# Patient Record
Sex: FEMALE | Race: WHITE | Employment: PART TIME | URBAN - METROPOLITAN AREA
[De-identification: names, ages, dates, MRNs, and addresses within clinical notes are randomized per-mention and may not be internally consistent; named-entity substitution may affect disease eponyms.]

---

## 2017-03-01 PROBLEM — J44.1 COPD EXACERBATION (HCC): Status: ACTIVE | Noted: 2017-03-01

## 2017-03-23 ENCOUNTER — OFFICE VISIT (OUTPATIENT)
Dept: INTERNAL MEDICINE CLINIC | Age: 62
End: 2017-03-23

## 2017-03-23 VITALS
WEIGHT: 134 LBS | HEIGHT: 63 IN | BODY MASS INDEX: 23.74 KG/M2 | HEART RATE: 102 BPM | DIASTOLIC BLOOD PRESSURE: 80 MMHG | SYSTOLIC BLOOD PRESSURE: 150 MMHG | TEMPERATURE: 97.7 F

## 2017-03-23 DIAGNOSIS — D50.9 MICROCYTIC ANEMIA: ICD-10-CM

## 2017-03-23 DIAGNOSIS — K13.79 ORAL PAIN: Primary | ICD-10-CM

## 2017-03-23 DIAGNOSIS — Z09 HOSPITAL DISCHARGE FOLLOW-UP: ICD-10-CM

## 2017-03-23 DIAGNOSIS — E11.8 TYPE 2 DIABETES MELLITUS WITH COMPLICATION, WITHOUT LONG-TERM CURRENT USE OF INSULIN (HCC): ICD-10-CM

## 2017-03-23 LAB
FERRITIN: 5.4 NG/ML (ref 15–150)
FOLATE: 13.25 NG/ML (ref 4.78–24.2)
IRON SATURATION: 5 % (ref 15–50)
IRON: 26 UG/DL (ref 37–145)
TOTAL IRON BINDING CAPACITY: 516 UG/DL (ref 260–445)
TRANSFERRIN: 428 MG/DL (ref 200–360)
VITAMIN B-12: 483 PG/ML (ref 211–911)

## 2017-03-23 PROCEDURE — 99213 OFFICE O/P EST LOW 20 MIN: CPT | Performed by: PHYSICIAN ASSISTANT

## 2017-03-23 RX ORDER — DEXTROSE 4 G
1 TABLET,CHEWABLE ORAL 3 TIMES DAILY
Qty: 100 EACH | Refills: 3 | Status: SHIPPED | OUTPATIENT
Start: 2017-03-23 | End: 2018-05-02

## 2017-03-23 ASSESSMENT — ENCOUNTER SYMPTOMS
COUGH: 1
SORE THROAT: 0
VOICE CHANGE: 1
RHINORRHEA: 0
SHORTNESS OF BREATH: 0
ABDOMINAL PAIN: 0
VOMITING: 0
BACK PAIN: 0
NAUSEA: 0
TROUBLE SWALLOWING: 0

## 2017-03-24 DIAGNOSIS — D50.9 IRON DEFICIENCY ANEMIA, UNSPECIFIED IRON DEFICIENCY ANEMIA TYPE: Primary | ICD-10-CM

## 2017-03-24 RX ORDER — LANOLIN ALCOHOL/MO/W.PET/CERES
325 CREAM (GRAM) TOPICAL
Qty: 90 TABLET | Refills: 3 | Status: ON HOLD | OUTPATIENT
Start: 2017-03-24 | End: 2019-10-30 | Stop reason: SDUPTHER

## 2019-10-28 ENCOUNTER — HOSPITAL ENCOUNTER (INPATIENT)
Age: 64
LOS: 2 days | Discharge: HOME | DRG: 378 | End: 2019-10-30
Attending: EMERGENCY MEDICINE | Admitting: INTERNAL MEDICINE
Payer: COMMERCIAL

## 2019-10-28 ENCOUNTER — APPOINTMENT (OUTPATIENT)
Dept: GENERAL RADIOLOGY | Age: 64
DRG: 378 | End: 2019-10-28
Payer: COMMERCIAL

## 2019-10-28 DIAGNOSIS — R60.0 BILATERAL LEG EDEMA: ICD-10-CM

## 2019-10-28 DIAGNOSIS — D50.9 IRON DEFICIENCY ANEMIA, UNSPECIFIED IRON DEFICIENCY ANEMIA TYPE: Primary | ICD-10-CM

## 2019-10-28 DIAGNOSIS — I50.9 CONGESTIVE HEART FAILURE, UNSPECIFIED HF CHRONICITY, UNSPECIFIED HEART FAILURE TYPE (HCC): ICD-10-CM

## 2019-10-28 PROBLEM — D62 ACUTE BLOOD LOSS ANEMIA: Status: ACTIVE | Noted: 2019-10-28

## 2019-10-28 LAB
A/G RATIO: 1.3 (ref 1.1–2.2)
ABO/RH: NORMAL
ALBUMIN SERPL-MCNC: 4.2 G/DL (ref 3.4–5)
ALP BLD-CCNC: 80 U/L (ref 40–129)
ALT SERPL-CCNC: 11 U/L (ref 10–40)
ANION GAP SERPL CALCULATED.3IONS-SCNC: 11 MMOL/L (ref 3–16)
ANISOCYTOSIS: ABNORMAL
ANTIBODY SCREEN: NORMAL
AST SERPL-CCNC: 19 U/L (ref 15–37)
BASOPHILS ABSOLUTE: 0.1 K/UL (ref 0–0.2)
BASOPHILS RELATIVE PERCENT: 2.4 %
BILIRUB SERPL-MCNC: 0.5 MG/DL (ref 0–1)
BILIRUBIN URINE: NEGATIVE
BLOOD BANK DISPENSE STATUS: NORMAL
BLOOD BANK PRODUCT CODE: NORMAL
BLOOD, URINE: NEGATIVE
BPU ID: NORMAL
BUN BLDV-MCNC: 16 MG/DL (ref 7–20)
CALCIUM SERPL-MCNC: 9 MG/DL (ref 8.3–10.6)
CHLORIDE BLD-SCNC: 99 MMOL/L (ref 99–110)
CLARITY: CLEAR
CO2: 24 MMOL/L (ref 21–32)
COLOR: YELLOW
CREAT SERPL-MCNC: 0.6 MG/DL (ref 0.6–1.2)
DESCRIPTION BLOOD BANK: NORMAL
EKG ATRIAL RATE: 97 BPM
EKG ATRIAL RATE: 97 BPM
EKG DIAGNOSIS: NORMAL
EKG DIAGNOSIS: NORMAL
EKG P AXIS: 77 DEGREES
EKG P AXIS: 86 DEGREES
EKG P-R INTERVAL: 142 MS
EKG P-R INTERVAL: 168 MS
EKG Q-T INTERVAL: 408 MS
EKG Q-T INTERVAL: 412 MS
EKG QRS DURATION: 138 MS
EKG QRS DURATION: 138 MS
EKG QTC CALCULATION (BAZETT): 518 MS
EKG QTC CALCULATION (BAZETT): 523 MS
EKG R AXIS: -45 DEGREES
EKG R AXIS: -49 DEGREES
EKG T AXIS: 125 DEGREES
EKG T AXIS: 128 DEGREES
EKG VENTRICULAR RATE: 97 BPM
EKG VENTRICULAR RATE: 97 BPM
EOSINOPHILS ABSOLUTE: 0.1 K/UL (ref 0–0.6)
EOSINOPHILS RELATIVE PERCENT: 1.6 %
FERRITIN: 7.9 NG/ML (ref 15–150)
FOLATE: 13.92 NG/ML (ref 4.78–24.2)
GFR AFRICAN AMERICAN: >60
GFR NON-AFRICAN AMERICAN: >60
GLOBULIN: 3.2 G/DL
GLUCOSE BLD-MCNC: 169 MG/DL (ref 70–99)
GLUCOSE BLD-MCNC: 239 MG/DL (ref 70–99)
GLUCOSE BLD-MCNC: 325 MG/DL (ref 70–99)
GLUCOSE URINE: 100 MG/DL
HCT VFR BLD CALC: 20.1 % (ref 36–48)
HEMATOLOGY PATH CONSULT: YES
HEMOGLOBIN: 5.5 G/DL (ref 12–16)
HEMOGLOBIN: 6.7 G/DL (ref 12–16)
HEMOGLOBIN: 7.2 G/DL (ref 12–16)
HYPOCHROMIA: ABNORMAL
INR BLD: 1.32 (ref 0.86–1.14)
IRON SATURATION: 3 % (ref 15–50)
IRON: 14 UG/DL (ref 37–145)
KETONES, URINE: NEGATIVE MG/DL
LEUKOCYTE ESTERASE, URINE: NEGATIVE
LYMPHOCYTES ABSOLUTE: 1.3 K/UL (ref 1–5.1)
LYMPHOCYTES RELATIVE PERCENT: 21.3 %
MCH RBC QN AUTO: 14.9 PG (ref 26–34)
MCHC RBC AUTO-ENTMCNC: 27.5 G/DL (ref 31–36)
MCV RBC AUTO: 54.1 FL (ref 80–100)
MICROCYTES: ABNORMAL
MICROSCOPIC EXAMINATION: ABNORMAL
MONOCYTES ABSOLUTE: 0.5 K/UL (ref 0–1.3)
MONOCYTES RELATIVE PERCENT: 8.7 %
NEUTROPHILS ABSOLUTE: 3.9 K/UL (ref 1.7–7.7)
NEUTROPHILS RELATIVE PERCENT: 66 %
NITRITE, URINE: NEGATIVE
OCCULT BLOOD DIAGNOSTIC: ABNORMAL
OVALOCYTES: ABNORMAL
PDW BLD-RTO: 21.2 % (ref 12.4–15.4)
PERFORMED ON: ABNORMAL
PERFORMED ON: ABNORMAL
PH UA: 6.5 (ref 5–8)
PLATELET # BLD: 243 K/UL (ref 135–450)
PLATELET SLIDE REVIEW: ADEQUATE
PMV BLD AUTO: 8.9 FL (ref 5–10.5)
POIKILOCYTES: ABNORMAL
POLYCHROMASIA: ABNORMAL
POTASSIUM REFLEX MAGNESIUM: 3.6 MMOL/L (ref 3.5–5.1)
PRO-BNP: 4057 PG/ML (ref 0–124)
PROTEIN UA: NEGATIVE MG/DL
PROTHROMBIN TIME: 15 SEC (ref 9.8–13)
RBC # BLD: 3.72 M/UL (ref 4–5.2)
SLIDE REVIEW: ABNORMAL
SODIUM BLD-SCNC: 134 MMOL/L (ref 136–145)
SPECIFIC GRAVITY UA: <=1.005 (ref 1–1.03)
TOTAL IRON BINDING CAPACITY: 538 UG/DL (ref 260–445)
TOTAL PROTEIN: 7.4 G/DL (ref 6.4–8.2)
TROPONIN: 0.01 NG/ML
TROPONIN: 0.02 NG/ML
TROPONIN: <0.01 NG/ML
TROPONIN: <0.01 NG/ML
URINE REFLEX TO CULTURE: ABNORMAL
URINE TYPE: ABNORMAL
UROBILINOGEN, URINE: 1 E.U./DL
VITAMIN B-12: 462 PG/ML (ref 211–911)
WBC # BLD: 5.9 K/UL (ref 4–11)

## 2019-10-28 PROCEDURE — 82746 ASSAY OF FOLIC ACID SERUM: CPT

## 2019-10-28 PROCEDURE — 96361 HYDRATE IV INFUSION ADD-ON: CPT

## 2019-10-28 PROCEDURE — 82607 VITAMIN B-12: CPT

## 2019-10-28 PROCEDURE — 2580000003 HC RX 258: Performed by: PHYSICIAN ASSISTANT

## 2019-10-28 PROCEDURE — 83550 IRON BINDING TEST: CPT

## 2019-10-28 PROCEDURE — 6370000000 HC RX 637 (ALT 250 FOR IP): Performed by: PHYSICIAN ASSISTANT

## 2019-10-28 PROCEDURE — 93010 ELECTROCARDIOGRAM REPORT: CPT | Performed by: INTERNAL MEDICINE

## 2019-10-28 PROCEDURE — 83036 HEMOGLOBIN GLYCOSYLATED A1C: CPT

## 2019-10-28 PROCEDURE — 99285 EMERGENCY DEPT VISIT HI MDM: CPT

## 2019-10-28 PROCEDURE — 71046 X-RAY EXAM CHEST 2 VIEWS: CPT

## 2019-10-28 PROCEDURE — 83540 ASSAY OF IRON: CPT

## 2019-10-28 PROCEDURE — 82728 ASSAY OF FERRITIN: CPT

## 2019-10-28 PROCEDURE — 86850 RBC ANTIBODY SCREEN: CPT

## 2019-10-28 PROCEDURE — 36415 COLL VENOUS BLD VENIPUNCTURE: CPT

## 2019-10-28 PROCEDURE — 93005 ELECTROCARDIOGRAM TRACING: CPT | Performed by: PHYSICIAN ASSISTANT

## 2019-10-28 PROCEDURE — 83880 ASSAY OF NATRIURETIC PEPTIDE: CPT

## 2019-10-28 PROCEDURE — 36430 TRANSFUSION BLD/BLD COMPNT: CPT

## 2019-10-28 PROCEDURE — 6360000002 HC RX W HCPCS: Performed by: PHYSICIAN ASSISTANT

## 2019-10-28 PROCEDURE — C9113 INJ PANTOPRAZOLE SODIUM, VIA: HCPCS | Performed by: PHYSICIAN ASSISTANT

## 2019-10-28 PROCEDURE — 81003 URINALYSIS AUTO W/O SCOPE: CPT

## 2019-10-28 PROCEDURE — 99223 1ST HOSP IP/OBS HIGH 75: CPT | Performed by: PHYSICIAN ASSISTANT

## 2019-10-28 PROCEDURE — 94640 AIRWAY INHALATION TREATMENT: CPT

## 2019-10-28 PROCEDURE — 80053 COMPREHEN METABOLIC PANEL: CPT

## 2019-10-28 PROCEDURE — 96374 THER/PROPH/DIAG INJ IV PUSH: CPT

## 2019-10-28 PROCEDURE — 85025 COMPLETE CBC W/AUTO DIFF WBC: CPT

## 2019-10-28 PROCEDURE — G0328 FECAL BLOOD SCRN IMMUNOASSAY: HCPCS

## 2019-10-28 PROCEDURE — 96375 TX/PRO/DX INJ NEW DRUG ADDON: CPT

## 2019-10-28 PROCEDURE — 84484 ASSAY OF TROPONIN QUANT: CPT

## 2019-10-28 PROCEDURE — P9016 RBC LEUKOCYTES REDUCED: HCPCS

## 2019-10-28 PROCEDURE — 2060000000 HC ICU INTERMEDIATE R&B

## 2019-10-28 PROCEDURE — 94761 N-INVAS EAR/PLS OXIMETRY MLT: CPT

## 2019-10-28 PROCEDURE — 86901 BLOOD TYPING SEROLOGIC RH(D): CPT

## 2019-10-28 PROCEDURE — 85610 PROTHROMBIN TIME: CPT

## 2019-10-28 PROCEDURE — 85018 HEMOGLOBIN: CPT

## 2019-10-28 PROCEDURE — 86900 BLOOD TYPING SEROLOGIC ABO: CPT

## 2019-10-28 PROCEDURE — 86923 COMPATIBILITY TEST ELECTRIC: CPT

## 2019-10-28 RX ORDER — METHYLPREDNISOLONE SODIUM SUCCINATE 125 MG/2ML
125 INJECTION, POWDER, LYOPHILIZED, FOR SOLUTION INTRAMUSCULAR; INTRAVENOUS ONCE
Status: COMPLETED | OUTPATIENT
Start: 2019-10-28 | End: 2019-10-28

## 2019-10-28 RX ORDER — DIPHENHYDRAMINE HCL 25 MG
25 TABLET ORAL NIGHTLY PRN
Status: DISCONTINUED | OUTPATIENT
Start: 2019-10-28 | End: 2019-10-30 | Stop reason: HOSPADM

## 2019-10-28 RX ORDER — FERROUS SULFATE 325(65) MG
325 TABLET ORAL
Status: DISCONTINUED | OUTPATIENT
Start: 2019-10-28 | End: 2019-10-29

## 2019-10-28 RX ORDER — SODIUM CHLORIDE 9 MG/ML
INJECTION, SOLUTION INTRAVENOUS CONTINUOUS
Status: DISCONTINUED | OUTPATIENT
Start: 2019-10-28 | End: 2019-10-29

## 2019-10-28 RX ORDER — FUROSEMIDE 10 MG/ML
40 INJECTION INTRAMUSCULAR; INTRAVENOUS ONCE
Status: COMPLETED | OUTPATIENT
Start: 2019-10-28 | End: 2019-10-28

## 2019-10-28 RX ORDER — PANTOPRAZOLE SODIUM 40 MG/10ML
40 INJECTION, POWDER, LYOPHILIZED, FOR SOLUTION INTRAVENOUS EVERY 12 HOURS
Status: DISCONTINUED | OUTPATIENT
Start: 2019-10-28 | End: 2019-10-30 | Stop reason: HOSPADM

## 2019-10-28 RX ORDER — PANTOPRAZOLE SODIUM 40 MG/10ML
40 INJECTION, POWDER, LYOPHILIZED, FOR SOLUTION INTRAVENOUS ONCE
Status: COMPLETED | OUTPATIENT
Start: 2019-10-28 | End: 2019-10-28

## 2019-10-28 RX ORDER — IPRATROPIUM BROMIDE AND ALBUTEROL SULFATE 2.5; .5 MG/3ML; MG/3ML
1 SOLUTION RESPIRATORY (INHALATION) ONCE
Status: COMPLETED | OUTPATIENT
Start: 2019-10-28 | End: 2019-10-28

## 2019-10-28 RX ORDER — SODIUM CHLORIDE 0.9 % (FLUSH) 0.9 %
10 SYRINGE (ML) INJECTION EVERY 12 HOURS SCHEDULED
Status: DISCONTINUED | OUTPATIENT
Start: 2019-10-28 | End: 2019-10-30 | Stop reason: HOSPADM

## 2019-10-28 RX ORDER — 0.9 % SODIUM CHLORIDE 0.9 %
250 INTRAVENOUS SOLUTION INTRAVENOUS ONCE
Status: DISCONTINUED | OUTPATIENT
Start: 2019-10-28 | End: 2019-10-28

## 2019-10-28 RX ORDER — NICOTINE POLACRILEX 4 MG
15 LOZENGE BUCCAL PRN
Status: DISCONTINUED | OUTPATIENT
Start: 2019-10-28 | End: 2019-10-30 | Stop reason: HOSPADM

## 2019-10-28 RX ORDER — SODIUM CHLORIDE 9 MG/ML
10 INJECTION INTRAVENOUS EVERY 12 HOURS
Status: DISCONTINUED | OUTPATIENT
Start: 2019-10-28 | End: 2019-10-28

## 2019-10-28 RX ORDER — ACETAMINOPHEN 325 MG/1
650 TABLET ORAL EVERY 4 HOURS PRN
Status: DISCONTINUED | OUTPATIENT
Start: 2019-10-28 | End: 2019-10-30 | Stop reason: HOSPADM

## 2019-10-28 RX ORDER — LISINOPRIL AND HYDROCHLOROTHIAZIDE 12.5; 1 MG/1; MG/1
1 TABLET ORAL DAILY
Status: DISCONTINUED | OUTPATIENT
Start: 2019-10-28 | End: 2019-10-30 | Stop reason: HOSPADM

## 2019-10-28 RX ORDER — DEXTROSE MONOHYDRATE 25 G/50ML
12.5 INJECTION, SOLUTION INTRAVENOUS PRN
Status: DISCONTINUED | OUTPATIENT
Start: 2019-10-28 | End: 2019-10-30 | Stop reason: HOSPADM

## 2019-10-28 RX ORDER — TRAZODONE HYDROCHLORIDE 50 MG/1
50 TABLET ORAL NIGHTLY PRN
Status: DISCONTINUED | OUTPATIENT
Start: 2019-10-28 | End: 2019-10-30 | Stop reason: HOSPADM

## 2019-10-28 RX ORDER — DEXTROSE MONOHYDRATE 50 MG/ML
100 INJECTION, SOLUTION INTRAVENOUS PRN
Status: DISCONTINUED | OUTPATIENT
Start: 2019-10-28 | End: 2019-10-30 | Stop reason: HOSPADM

## 2019-10-28 RX ORDER — FUROSEMIDE 10 MG/ML
40 INJECTION INTRAMUSCULAR; INTRAVENOUS DAILY
Status: DISCONTINUED | OUTPATIENT
Start: 2019-10-29 | End: 2019-10-29

## 2019-10-28 RX ORDER — ONDANSETRON 2 MG/ML
4 INJECTION INTRAMUSCULAR; INTRAVENOUS EVERY 6 HOURS PRN
Status: DISCONTINUED | OUTPATIENT
Start: 2019-10-28 | End: 2019-10-28

## 2019-10-28 RX ORDER — SODIUM CHLORIDE 0.9 % (FLUSH) 0.9 %
10 SYRINGE (ML) INJECTION PRN
Status: DISCONTINUED | OUTPATIENT
Start: 2019-10-28 | End: 2019-10-30 | Stop reason: HOSPADM

## 2019-10-28 RX ORDER — 0.9 % SODIUM CHLORIDE 0.9 %
250 INTRAVENOUS SOLUTION INTRAVENOUS ONCE
Status: COMPLETED | OUTPATIENT
Start: 2019-10-28 | End: 2019-10-29

## 2019-10-28 RX ORDER — METOPROLOL SUCCINATE 25 MG/1
25 TABLET, EXTENDED RELEASE ORAL DAILY
Status: DISCONTINUED | OUTPATIENT
Start: 2019-10-28 | End: 2019-10-30

## 2019-10-28 RX ADMIN — METOPROLOL SUCCINATE 25 MG: 25 TABLET, EXTENDED RELEASE ORAL at 17:22

## 2019-10-28 RX ADMIN — METHYLPREDNISOLONE SODIUM SUCCINATE 125 MG: 125 INJECTION, POWDER, FOR SOLUTION INTRAMUSCULAR; INTRAVENOUS at 12:51

## 2019-10-28 RX ADMIN — Medication 10 ML: at 21:44

## 2019-10-28 RX ADMIN — PANTOPRAZOLE SODIUM 40 MG: 40 INJECTION, POWDER, FOR SOLUTION INTRAVENOUS at 14:53

## 2019-10-28 RX ADMIN — FUROSEMIDE 40 MG: 10 INJECTION, SOLUTION INTRAMUSCULAR; INTRAVENOUS at 13:56

## 2019-10-28 RX ADMIN — TRAZODONE HYDROCHLORIDE 50 MG: 50 TABLET ORAL at 21:45

## 2019-10-28 RX ADMIN — PANTOPRAZOLE SODIUM 40 MG: 40 INJECTION, POWDER, FOR SOLUTION INTRAVENOUS at 17:22

## 2019-10-28 RX ADMIN — INSULIN LISPRO 2 UNITS: 100 INJECTION, SOLUTION INTRAVENOUS; SUBCUTANEOUS at 17:24

## 2019-10-28 RX ADMIN — Medication 10 ML: at 17:23

## 2019-10-28 RX ADMIN — SODIUM CHLORIDE 250 ML: 9 INJECTION, SOLUTION INTRAVENOUS at 15:25

## 2019-10-28 RX ADMIN — LISINOPRIL AND HYDROCHLOROTHIAZIDE 1 TABLET: 12.5; 1 TABLET ORAL at 17:22

## 2019-10-28 RX ADMIN — SODIUM CHLORIDE: 9 INJECTION, SOLUTION INTRAVENOUS at 21:45

## 2019-10-28 RX ADMIN — IPRATROPIUM BROMIDE AND ALBUTEROL SULFATE 1 AMPULE: .5; 3 SOLUTION RESPIRATORY (INHALATION) at 12:51

## 2019-10-28 RX ADMIN — FERROUS SULFATE TAB 325 MG (65 MG ELEMENTAL FE) 325 MG: 325 (65 FE) TAB at 17:22

## 2019-10-28 RX ADMIN — Medication 2 PUFF: at 18:50

## 2019-10-28 ASSESSMENT — ENCOUNTER SYMPTOMS
EYES NEGATIVE: 1
SHORTNESS OF BREATH: 1
RESPIRATORY NEGATIVE: 1
ALLERGIC/IMMUNOLOGIC NEGATIVE: 1
GASTROINTESTINAL NEGATIVE: 1

## 2019-10-28 ASSESSMENT — PAIN DESCRIPTION - PAIN TYPE: TYPE: ACUTE PAIN

## 2019-10-28 ASSESSMENT — PAIN DESCRIPTION - DESCRIPTORS: DESCRIPTORS: ACHING

## 2019-10-28 ASSESSMENT — PAIN DESCRIPTION - FREQUENCY: FREQUENCY: INTERMITTENT

## 2019-10-28 ASSESSMENT — PAIN DESCRIPTION - LOCATION: LOCATION: ANKLE

## 2019-10-28 ASSESSMENT — PAIN SCALES - GENERAL: PAINLEVEL_OUTOF10: 5

## 2019-10-28 ASSESSMENT — PAIN DESCRIPTION - ORIENTATION: ORIENTATION: RIGHT;LEFT

## 2019-10-29 LAB
ANION GAP SERPL CALCULATED.3IONS-SCNC: 11 MMOL/L (ref 3–16)
BASOPHILS ABSOLUTE: 0 K/UL (ref 0–0.2)
BASOPHILS RELATIVE PERCENT: 0.4 %
BLOOD BANK DISPENSE STATUS: NORMAL
BLOOD BANK PRODUCT CODE: NORMAL
BPU ID: NORMAL
BUN BLDV-MCNC: 16 MG/DL (ref 7–20)
CALCIUM SERPL-MCNC: 8.6 MG/DL (ref 8.3–10.6)
CHLORIDE BLD-SCNC: 97 MMOL/L (ref 99–110)
CO2: 25 MMOL/L (ref 21–32)
CREAT SERPL-MCNC: 0.8 MG/DL (ref 0.6–1.2)
DESCRIPTION BLOOD BANK: NORMAL
EOSINOPHILS ABSOLUTE: 0.1 K/UL (ref 0–0.6)
EOSINOPHILS RELATIVE PERCENT: 0.9 %
GFR AFRICAN AMERICAN: >60
GFR NON-AFRICAN AMERICAN: >60
GLUCOSE BLD-MCNC: 139 MG/DL (ref 70–99)
GLUCOSE BLD-MCNC: 202 MG/DL (ref 70–99)
GLUCOSE BLD-MCNC: 222 MG/DL (ref 70–99)
GLUCOSE BLD-MCNC: 266 MG/DL (ref 70–99)
HCT VFR BLD CALC: 26.6 % (ref 36–48)
HEMATOLOGY PATH CONSULT: NO
HEMATOLOGY PATH CONSULT: NORMAL
HEMOGLOBIN: 7.9 G/DL (ref 12–16)
HEMOGLOBIN: 8.6 G/DL (ref 12–16)
LV EF: 48 %
LVEF MODALITY: NORMAL
LYMPHOCYTES ABSOLUTE: 1.5 K/UL (ref 1–5.1)
LYMPHOCYTES RELATIVE PERCENT: 20.4 %
MAGNESIUM: 2 MG/DL (ref 1.8–2.4)
MAGNESIUM: 2 MG/DL (ref 1.8–2.4)
MCH RBC QN AUTO: 18.6 PG (ref 26–34)
MCHC RBC AUTO-ENTMCNC: 29.6 G/DL (ref 31–36)
MCV RBC AUTO: 62.8 FL (ref 80–100)
MONOCYTES ABSOLUTE: 1.1 K/UL (ref 0–1.3)
MONOCYTES RELATIVE PERCENT: 14.8 %
NEUTROPHILS ABSOLUTE: 4.7 K/UL (ref 1.7–7.7)
NEUTROPHILS RELATIVE PERCENT: 63.5 %
PDW BLD-RTO: 31.9 % (ref 12.4–15.4)
PERFORMED ON: ABNORMAL
PLATELET # BLD: 196 K/UL (ref 135–450)
PMV BLD AUTO: 9.3 FL (ref 5–10.5)
POTASSIUM REFLEX MAGNESIUM: 3.3 MMOL/L (ref 3.5–5.1)
RBC # BLD: 4.24 M/UL (ref 4–5.2)
SODIUM BLD-SCNC: 133 MMOL/L (ref 136–145)
TROPONIN: <0.01 NG/ML
WBC # BLD: 7.4 K/UL (ref 4–11)

## 2019-10-29 PROCEDURE — G0008 ADMIN INFLUENZA VIRUS VAC: HCPCS | Performed by: INTERNAL MEDICINE

## 2019-10-29 PROCEDURE — 2709999900 HC NON-CHARGEABLE SUPPLY: Performed by: INTERNAL MEDICINE

## 2019-10-29 PROCEDURE — 7100000011 HC PHASE II RECOVERY - ADDTL 15 MIN: Performed by: INTERNAL MEDICINE

## 2019-10-29 PROCEDURE — 99232 SBSQ HOSP IP/OBS MODERATE 35: CPT | Performed by: INTERNAL MEDICINE

## 2019-10-29 PROCEDURE — 99254 IP/OBS CNSLTJ NEW/EST MOD 60: CPT | Performed by: INTERNAL MEDICINE

## 2019-10-29 PROCEDURE — 2580000003 HC RX 258: Performed by: INTERNAL MEDICINE

## 2019-10-29 PROCEDURE — 84484 ASSAY OF TROPONIN QUANT: CPT

## 2019-10-29 PROCEDURE — 85025 COMPLETE CBC W/AUTO DIFF WBC: CPT

## 2019-10-29 PROCEDURE — 2060000000 HC ICU INTERMEDIATE R&B

## 2019-10-29 PROCEDURE — 6370000000 HC RX 637 (ALT 250 FOR IP): Performed by: INTERNAL MEDICINE

## 2019-10-29 PROCEDURE — 6370000000 HC RX 637 (ALT 250 FOR IP): Performed by: PHYSICIAN ASSISTANT

## 2019-10-29 PROCEDURE — 36430 TRANSFUSION BLD/BLD COMPNT: CPT

## 2019-10-29 PROCEDURE — 36415 COLL VENOUS BLD VENIPUNCTURE: CPT

## 2019-10-29 PROCEDURE — 90686 IIV4 VACC NO PRSV 0.5 ML IM: CPT | Performed by: INTERNAL MEDICINE

## 2019-10-29 PROCEDURE — 94761 N-INVAS EAR/PLS OXIMETRY MLT: CPT

## 2019-10-29 PROCEDURE — 83735 ASSAY OF MAGNESIUM: CPT

## 2019-10-29 PROCEDURE — 3609012400 HC EGD TRANSORAL BIOPSY SINGLE/MULTIPLE: Performed by: INTERNAL MEDICINE

## 2019-10-29 PROCEDURE — 80048 BASIC METABOLIC PNL TOTAL CA: CPT

## 2019-10-29 PROCEDURE — 88305 TISSUE EXAM BY PATHOLOGIST: CPT

## 2019-10-29 PROCEDURE — 88342 IMHCHEM/IMCYTCHM 1ST ANTB: CPT

## 2019-10-29 PROCEDURE — 6360000002 HC RX W HCPCS: Performed by: INTERNAL MEDICINE

## 2019-10-29 PROCEDURE — 2580000003 HC RX 258: Performed by: PHYSICIAN ASSISTANT

## 2019-10-29 PROCEDURE — 7100000010 HC PHASE II RECOVERY - FIRST 15 MIN: Performed by: INTERNAL MEDICINE

## 2019-10-29 PROCEDURE — 6360000002 HC RX W HCPCS: Performed by: PHYSICIAN ASSISTANT

## 2019-10-29 PROCEDURE — C9113 INJ PANTOPRAZOLE SODIUM, VIA: HCPCS | Performed by: PHYSICIAN ASSISTANT

## 2019-10-29 PROCEDURE — 93306 TTE W/DOPPLER COMPLETE: CPT

## 2019-10-29 PROCEDURE — 0DB68ZX EXCISION OF STOMACH, VIA NATURAL OR ARTIFICIAL OPENING ENDOSCOPIC, DIAGNOSTIC: ICD-10-PCS | Performed by: INTERNAL MEDICINE

## 2019-10-29 PROCEDURE — 99152 MOD SED SAME PHYS/QHP 5/>YRS: CPT | Performed by: INTERNAL MEDICINE

## 2019-10-29 PROCEDURE — P9016 RBC LEUKOCYTES REDUCED: HCPCS

## 2019-10-29 PROCEDURE — 94640 AIRWAY INHALATION TREATMENT: CPT

## 2019-10-29 PROCEDURE — 85018 HEMOGLOBIN: CPT

## 2019-10-29 RX ORDER — FENTANYL CITRATE 50 UG/ML
INJECTION, SOLUTION INTRAMUSCULAR; INTRAVENOUS PRN
Status: DISCONTINUED | OUTPATIENT
Start: 2019-10-29 | End: 2019-10-29 | Stop reason: ALTCHOICE

## 2019-10-29 RX ORDER — MIDAZOLAM HYDROCHLORIDE 5 MG/ML
INJECTION INTRAMUSCULAR; INTRAVENOUS PRN
Status: DISCONTINUED | OUTPATIENT
Start: 2019-10-29 | End: 2019-10-29 | Stop reason: ALTCHOICE

## 2019-10-29 RX ORDER — SODIUM CHLORIDE, SODIUM LACTATE, POTASSIUM CHLORIDE, CALCIUM CHLORIDE 600; 310; 30; 20 MG/100ML; MG/100ML; MG/100ML; MG/100ML
INJECTION, SOLUTION INTRAVENOUS CONTINUOUS PRN
Status: COMPLETED | OUTPATIENT
Start: 2019-10-29 | End: 2019-10-29

## 2019-10-29 RX ADMIN — TRAZODONE HYDROCHLORIDE 50 MG: 50 TABLET ORAL at 21:41

## 2019-10-29 RX ADMIN — LISINOPRIL AND HYDROCHLOROTHIAZIDE 1 TABLET: 12.5; 1 TABLET ORAL at 09:09

## 2019-10-29 RX ADMIN — Medication 2 PUFF: at 18:57

## 2019-10-29 RX ADMIN — INFLUENZA A VIRUS A/BRISBANE/02/2018 IVR-190 (H1N1) ANTIGEN (PROPIOLACTONE INACTIVATED), INFLUENZA A VIRUS A/KANSAS/14/2017 X-327 (H3N2) ANTIGEN (PROPIOLACTONE INACTIVATED), INFLUENZA B VIRUS B/MARYLAND/15/2016 ANTIGEN (PROPIOLACTONE INACTIVATED), INFLUENZA B VIRUS B/PHUKET/3073/2013 BVR-1B ANTIGEN (PROPIOLACTONE INACTIVATED) 0.5 ML: 15; 15; 15; 15 INJECTION, SUSPENSION INTRAMUSCULAR at 09:09

## 2019-10-29 RX ADMIN — INSULIN LISPRO 2 UNITS: 100 INJECTION, SOLUTION INTRAVENOUS; SUBCUTANEOUS at 17:39

## 2019-10-29 RX ADMIN — Medication 10 ML: at 17:39

## 2019-10-29 RX ADMIN — INSULIN LISPRO 3 UNITS: 100 INJECTION, SOLUTION INTRAVENOUS; SUBCUTANEOUS at 21:43

## 2019-10-29 RX ADMIN — SODIUM CHLORIDE 250 ML: 9 INJECTION, SOLUTION INTRAVENOUS at 02:53

## 2019-10-29 RX ADMIN — FUROSEMIDE 40 MG: 10 INJECTION, SOLUTION INTRAMUSCULAR; INTRAVENOUS at 09:09

## 2019-10-29 RX ADMIN — PANTOPRAZOLE SODIUM 40 MG: 40 INJECTION, POWDER, FOR SOLUTION INTRAVENOUS at 06:15

## 2019-10-29 RX ADMIN — IRON SUCROSE 200 MG: 20 INJECTION, SOLUTION INTRAVENOUS at 09:30

## 2019-10-29 RX ADMIN — Medication 2 PUFF: at 07:14

## 2019-10-29 RX ADMIN — Medication 10 ML: at 09:09

## 2019-10-29 RX ADMIN — PANTOPRAZOLE SODIUM 40 MG: 40 INJECTION, POWDER, FOR SOLUTION INTRAVENOUS at 17:39

## 2019-10-29 RX ADMIN — METOPROLOL SUCCINATE 25 MG: 25 TABLET, EXTENDED RELEASE ORAL at 09:09

## 2019-10-29 RX ADMIN — Medication 10 ML: at 21:41

## 2019-10-29 ASSESSMENT — PAIN SCALES - GENERAL
PAINLEVEL_OUTOF10: 0
PAINLEVEL_OUTOF10: 0

## 2019-10-29 ASSESSMENT — PAIN - FUNCTIONAL ASSESSMENT: PAIN_FUNCTIONAL_ASSESSMENT: 0-10

## 2019-10-30 VITALS
BODY MASS INDEX: 23.62 KG/M2 | SYSTOLIC BLOOD PRESSURE: 132 MMHG | RESPIRATION RATE: 16 BRPM | HEART RATE: 77 BPM | WEIGHT: 133.31 LBS | DIASTOLIC BLOOD PRESSURE: 57 MMHG | TEMPERATURE: 98 F | OXYGEN SATURATION: 92 % | HEIGHT: 63 IN

## 2019-10-30 LAB
ANION GAP SERPL CALCULATED.3IONS-SCNC: 12 MMOL/L (ref 3–16)
BUN BLDV-MCNC: 23 MG/DL (ref 7–20)
CALCIUM SERPL-MCNC: 8.6 MG/DL (ref 8.3–10.6)
CHLORIDE BLD-SCNC: 101 MMOL/L (ref 99–110)
CO2: 26 MMOL/L (ref 21–32)
CREAT SERPL-MCNC: 0.8 MG/DL (ref 0.6–1.2)
GFR AFRICAN AMERICAN: >60
GFR NON-AFRICAN AMERICAN: >60
GLUCOSE BLD-MCNC: 170 MG/DL (ref 70–99)
GLUCOSE BLD-MCNC: 186 MG/DL (ref 70–99)
GLUCOSE BLD-MCNC: 191 MG/DL (ref 70–99)
HEMOGLOBIN: 8.1 G/DL (ref 12–16)
PERFORMED ON: ABNORMAL
PERFORMED ON: ABNORMAL
POTASSIUM SERPL-SCNC: 3.6 MMOL/L (ref 3.5–5.1)
SODIUM BLD-SCNC: 139 MMOL/L (ref 136–145)

## 2019-10-30 PROCEDURE — 2580000003 HC RX 258: Performed by: PHYSICIAN ASSISTANT

## 2019-10-30 PROCEDURE — 36415 COLL VENOUS BLD VENIPUNCTURE: CPT

## 2019-10-30 PROCEDURE — 6360000002 HC RX W HCPCS: Performed by: INTERNAL MEDICINE

## 2019-10-30 PROCEDURE — 6370000000 HC RX 637 (ALT 250 FOR IP): Performed by: INTERNAL MEDICINE

## 2019-10-30 PROCEDURE — 80048 BASIC METABOLIC PNL TOTAL CA: CPT

## 2019-10-30 PROCEDURE — 6370000000 HC RX 637 (ALT 250 FOR IP): Performed by: PHYSICIAN ASSISTANT

## 2019-10-30 PROCEDURE — C9113 INJ PANTOPRAZOLE SODIUM, VIA: HCPCS | Performed by: PHYSICIAN ASSISTANT

## 2019-10-30 PROCEDURE — 6360000002 HC RX W HCPCS: Performed by: PHYSICIAN ASSISTANT

## 2019-10-30 PROCEDURE — 85018 HEMOGLOBIN: CPT

## 2019-10-30 PROCEDURE — 2580000003 HC RX 258: Performed by: INTERNAL MEDICINE

## 2019-10-30 PROCEDURE — 94640 AIRWAY INHALATION TREATMENT: CPT

## 2019-10-30 PROCEDURE — 99238 HOSP IP/OBS DSCHRG MGMT 30/<: CPT | Performed by: INTERNAL MEDICINE

## 2019-10-30 PROCEDURE — 99233 SBSQ HOSP IP/OBS HIGH 50: CPT | Performed by: INTERNAL MEDICINE

## 2019-10-30 RX ORDER — BLOOD-GLUCOSE METER
EACH MISCELLANEOUS
Qty: 1 DEVICE | Refills: 0 | Status: SHIPPED | OUTPATIENT
Start: 2019-10-30

## 2019-10-30 RX ORDER — METOPROLOL SUCCINATE 50 MG/1
50 TABLET, EXTENDED RELEASE ORAL DAILY
Status: DISCONTINUED | OUTPATIENT
Start: 2019-10-30 | End: 2019-10-30 | Stop reason: HOSPADM

## 2019-10-30 RX ORDER — IPRATROPIUM BROMIDE AND ALBUTEROL SULFATE 2.5; .5 MG/3ML; MG/3ML
3 SOLUTION RESPIRATORY (INHALATION) EVERY 6 HOURS PRN
Qty: 360 ML | Refills: 0 | Status: SHIPPED | OUTPATIENT
Start: 2019-10-30 | End: 2019-12-04 | Stop reason: SDUPTHER

## 2019-10-30 RX ORDER — METOPROLOL SUCCINATE 50 MG/1
50 TABLET, EXTENDED RELEASE ORAL DAILY
Qty: 90 TABLET | Refills: 0 | Status: SHIPPED | OUTPATIENT
Start: 2019-10-30 | End: 2019-12-04 | Stop reason: SDUPTHER

## 2019-10-30 RX ORDER — LISINOPRIL AND HYDROCHLOROTHIAZIDE 12.5; 1 MG/1; MG/1
1 TABLET ORAL DAILY
Qty: 30 TABLET | Refills: 2 | Status: SHIPPED | OUTPATIENT
Start: 2019-10-30 | End: 2019-11-20 | Stop reason: SINTOL

## 2019-10-30 RX ORDER — PANTOPRAZOLE SODIUM 40 MG/1
40 TABLET, DELAYED RELEASE ORAL
Qty: 60 TABLET | Refills: 0 | Status: SHIPPED | OUTPATIENT
Start: 2019-10-30 | End: 2019-12-04 | Stop reason: SDUPTHER

## 2019-10-30 RX ORDER — GLIMEPIRIDE 4 MG/1
2 TABLET ORAL
Qty: 30 TABLET | Refills: 1 | Status: SHIPPED | OUTPATIENT
Start: 2019-10-30 | End: 2019-12-04 | Stop reason: SDUPTHER

## 2019-10-30 RX ORDER — LANCETS 33 GAUGE
EACH MISCELLANEOUS
Qty: 100 EACH | Refills: 0 | Status: SHIPPED | OUTPATIENT
Start: 2019-10-30

## 2019-10-30 RX ORDER — LANOLIN ALCOHOL/MO/W.PET/CERES
325 CREAM (GRAM) TOPICAL
Qty: 90 TABLET | Refills: 1 | Status: ON HOLD | OUTPATIENT
Start: 2019-10-30 | End: 2020-10-13 | Stop reason: SDUPTHER

## 2019-10-30 RX ADMIN — PANTOPRAZOLE SODIUM 40 MG: 40 INJECTION, POWDER, FOR SOLUTION INTRAVENOUS at 03:31

## 2019-10-30 RX ADMIN — Medication 10 ML: at 10:38

## 2019-10-30 RX ADMIN — METOPROLOL SUCCINATE 50 MG: 50 TABLET, EXTENDED RELEASE ORAL at 10:37

## 2019-10-30 RX ADMIN — LISINOPRIL AND HYDROCHLOROTHIAZIDE 1 TABLET: 12.5; 1 TABLET ORAL at 10:37

## 2019-10-30 RX ADMIN — Medication 10 ML: at 03:32

## 2019-10-30 RX ADMIN — IRON SUCROSE 200 MG: 20 INJECTION, SOLUTION INTRAVENOUS at 10:37

## 2019-10-30 RX ADMIN — INSULIN LISPRO 1 UNITS: 100 INJECTION, SOLUTION INTRAVENOUS; SUBCUTANEOUS at 12:14

## 2019-10-30 RX ADMIN — Medication 2 PUFF: at 06:57

## 2019-10-31 ENCOUNTER — FOLLOWUP TELEPHONE ENCOUNTER (OUTPATIENT)
Dept: ADMINISTRATIVE | Age: 64
End: 2019-10-31

## 2019-10-31 ENCOUNTER — TELEPHONE (OUTPATIENT)
Dept: INTERNAL MEDICINE CLINIC | Age: 64
End: 2019-10-31

## 2019-10-31 LAB
ESTIMATED AVERAGE GLUCOSE: 168.6 MG/DL
HBA1C MFR BLD: 7.5 %

## 2019-11-01 ENCOUNTER — OFFICE VISIT (OUTPATIENT)
Dept: INTERNAL MEDICINE CLINIC | Age: 64
End: 2019-11-01

## 2019-11-01 VITALS
SYSTOLIC BLOOD PRESSURE: 140 MMHG | BODY MASS INDEX: 23.57 KG/M2 | TEMPERATURE: 98.4 F | HEART RATE: 80 BPM | HEIGHT: 63 IN | DIASTOLIC BLOOD PRESSURE: 65 MMHG | WEIGHT: 133 LBS

## 2019-11-01 DIAGNOSIS — I50.20 SYSTOLIC CONGESTIVE HEART FAILURE, UNSPECIFIED HF CHRONICITY (HCC): ICD-10-CM

## 2019-11-01 DIAGNOSIS — Z09 HOSPITAL DISCHARGE FOLLOW-UP: Primary | ICD-10-CM

## 2019-11-01 DIAGNOSIS — D62 ACUTE BLOOD LOSS ANEMIA: ICD-10-CM

## 2019-11-01 DIAGNOSIS — K25.3 ACUTE GASTRIC EROSION: ICD-10-CM

## 2019-11-01 PROCEDURE — 99214 OFFICE O/P EST MOD 30 MIN: CPT | Performed by: PHYSICIAN ASSISTANT

## 2019-11-01 PROCEDURE — 1111F DSCHRG MED/CURRENT MED MERGE: CPT | Performed by: PHYSICIAN ASSISTANT

## 2019-11-01 ASSESSMENT — ENCOUNTER SYMPTOMS
BLOOD IN STOOL: 0
NAUSEA: 0
ABDOMINAL PAIN: 0
VOMITING: 0
SHORTNESS OF BREATH: 0
COUGH: 0

## 2019-11-18 ENCOUNTER — OFFICE VISIT (OUTPATIENT)
Dept: CARDIOLOGY CLINIC | Age: 64
End: 2019-11-18
Payer: COMMERCIAL

## 2019-11-18 VITALS
WEIGHT: 131.8 LBS | SYSTOLIC BLOOD PRESSURE: 138 MMHG | HEART RATE: 69 BPM | BODY MASS INDEX: 23.35 KG/M2 | OXYGEN SATURATION: 99 % | HEIGHT: 63 IN | DIASTOLIC BLOOD PRESSURE: 82 MMHG

## 2019-11-18 DIAGNOSIS — D62 ACUTE BLOOD LOSS ANEMIA: ICD-10-CM

## 2019-11-18 DIAGNOSIS — R06.09 DOE (DYSPNEA ON EXERTION): ICD-10-CM

## 2019-11-18 DIAGNOSIS — Z86.79 HX OF SUPRAVENTRICULAR TACHYCARDIA: ICD-10-CM

## 2019-11-18 DIAGNOSIS — I50.20 SYSTOLIC CONGESTIVE HEART FAILURE, UNSPECIFIED HF CHRONICITY (HCC): ICD-10-CM

## 2019-11-18 DIAGNOSIS — R60.0 BILATERAL LEG EDEMA: ICD-10-CM

## 2019-11-18 DIAGNOSIS — I42.9 CARDIOMYOPATHY, UNSPECIFIED TYPE (HCC): ICD-10-CM

## 2019-11-18 DIAGNOSIS — Z72.0 TOBACCO ABUSE: ICD-10-CM

## 2019-11-18 DIAGNOSIS — I10 ESSENTIAL HYPERTENSION: ICD-10-CM

## 2019-11-18 DIAGNOSIS — I50.22 CHRONIC SYSTOLIC (CONGESTIVE) HEART FAILURE (HCC): Primary | ICD-10-CM

## 2019-11-18 LAB
ANION GAP SERPL CALCULATED.3IONS-SCNC: 15 MMOL/L (ref 3–16)
BASOPHILS ABSOLUTE: 0.2 K/UL (ref 0–0.2)
BASOPHILS RELATIVE PERCENT: 1.9 %
BUN BLDV-MCNC: 15 MG/DL (ref 7–20)
CALCIUM SERPL-MCNC: 9.5 MG/DL (ref 8.3–10.6)
CHLORIDE BLD-SCNC: 90 MMOL/L (ref 99–110)
CO2: 25 MMOL/L (ref 21–32)
CREAT SERPL-MCNC: 0.6 MG/DL (ref 0.6–1.2)
EOSINOPHILS ABSOLUTE: 0.2 K/UL (ref 0–0.6)
EOSINOPHILS RELATIVE PERCENT: 2.2 %
GFR AFRICAN AMERICAN: >60
GFR NON-AFRICAN AMERICAN: >60
GLUCOSE BLD-MCNC: 164 MG/DL (ref 70–99)
HCT VFR BLD CALC: 38.6 % (ref 36–48)
HEMATOLOGY PATH CONSULT: NO
HEMOGLOBIN: 12 G/DL (ref 12–16)
LYMPHOCYTES ABSOLUTE: 2.1 K/UL (ref 1–5.1)
LYMPHOCYTES RELATIVE PERCENT: 23.4 %
MCH RBC QN AUTO: 21.7 PG (ref 26–34)
MCHC RBC AUTO-ENTMCNC: 31.2 G/DL (ref 31–36)
MCV RBC AUTO: 69.7 FL (ref 80–100)
MONOCYTES ABSOLUTE: 0.8 K/UL (ref 0–1.3)
MONOCYTES RELATIVE PERCENT: 9.2 %
NEUTROPHILS ABSOLUTE: 5.5 K/UL (ref 1.7–7.7)
NEUTROPHILS RELATIVE PERCENT: 63.3 %
PDW BLD-RTO: 35.5 % (ref 12.4–15.4)
PLATELET # BLD: 541 K/UL (ref 135–450)
PMV BLD AUTO: 9.6 FL (ref 5–10.5)
POTASSIUM SERPL-SCNC: 4.5 MMOL/L (ref 3.5–5.1)
RBC # BLD: 5.54 M/UL (ref 4–5.2)
SODIUM BLD-SCNC: 130 MMOL/L (ref 136–145)
WBC # BLD: 8.8 K/UL (ref 4–11)

## 2019-11-18 PROCEDURE — 99214 OFFICE O/P EST MOD 30 MIN: CPT | Performed by: NURSE PRACTITIONER

## 2019-11-18 ASSESSMENT — ENCOUNTER SYMPTOMS
SHORTNESS OF BREATH: 0
CHEST TIGHTNESS: 0

## 2019-11-20 DIAGNOSIS — R79.89 ELEVATED PLATELET COUNT: ICD-10-CM

## 2019-11-20 DIAGNOSIS — I50.20 SYSTOLIC CONGESTIVE HEART FAILURE, UNSPECIFIED HF CHRONICITY (HCC): ICD-10-CM

## 2019-11-20 DIAGNOSIS — D75.839 THROMBOCYTOSIS: ICD-10-CM

## 2019-11-20 DIAGNOSIS — E87.1 HYPONATREMIA: Primary | ICD-10-CM

## 2019-11-20 RX ORDER — LISINOPRIL 10 MG/1
10 TABLET ORAL DAILY
Qty: 30 TABLET | Refills: 3 | Status: SHIPPED | OUTPATIENT
Start: 2019-11-20 | End: 2019-12-04 | Stop reason: SDUPTHER

## 2019-11-22 ENCOUNTER — TELEPHONE (OUTPATIENT)
Dept: INTERNAL MEDICINE CLINIC | Age: 64
End: 2019-11-22

## 2019-12-04 ENCOUNTER — OFFICE VISIT (OUTPATIENT)
Dept: INTERNAL MEDICINE CLINIC | Age: 64
End: 2019-12-04

## 2019-12-04 VITALS
BODY MASS INDEX: 23.74 KG/M2 | DIASTOLIC BLOOD PRESSURE: 78 MMHG | SYSTOLIC BLOOD PRESSURE: 160 MMHG | HEIGHT: 63 IN | WEIGHT: 134 LBS

## 2019-12-04 DIAGNOSIS — I10 ESSENTIAL HYPERTENSION: ICD-10-CM

## 2019-12-04 DIAGNOSIS — D50.9 IRON DEFICIENCY ANEMIA, UNSPECIFIED IRON DEFICIENCY ANEMIA TYPE: ICD-10-CM

## 2019-12-04 DIAGNOSIS — I50.20 SYSTOLIC CONGESTIVE HEART FAILURE, UNSPECIFIED HF CHRONICITY (HCC): Primary | ICD-10-CM

## 2019-12-04 DIAGNOSIS — E11.9 TYPE 2 DIABETES MELLITUS WITHOUT COMPLICATION, WITHOUT LONG-TERM CURRENT USE OF INSULIN (HCC): ICD-10-CM

## 2019-12-04 DIAGNOSIS — J44.9 CHRONIC OBSTRUCTIVE PULMONARY DISEASE, UNSPECIFIED COPD TYPE (HCC): ICD-10-CM

## 2019-12-04 PROBLEM — E87.1 HYPONATREMIA: Status: RESOLVED | Noted: 2019-11-20 | Resolved: 2019-12-04

## 2019-12-04 PROBLEM — D62 ACUTE BLOOD LOSS ANEMIA: Status: RESOLVED | Noted: 2019-10-28 | Resolved: 2019-12-04

## 2019-12-04 PROCEDURE — 99214 OFFICE O/P EST MOD 30 MIN: CPT | Performed by: INTERNAL MEDICINE

## 2019-12-04 RX ORDER — PANTOPRAZOLE SODIUM 40 MG/1
40 TABLET, DELAYED RELEASE ORAL DAILY
Qty: 30 TABLET | Refills: 2 | Status: SHIPPED | OUTPATIENT
Start: 2019-12-04 | End: 2020-02-28 | Stop reason: SDUPTHER

## 2019-12-04 RX ORDER — LISINOPRIL 10 MG/1
10 TABLET ORAL DAILY
Qty: 30 TABLET | Refills: 2 | Status: SHIPPED | OUTPATIENT
Start: 2019-12-04 | End: 2020-02-28 | Stop reason: SDUPTHER

## 2019-12-04 RX ORDER — IPRATROPIUM BROMIDE AND ALBUTEROL SULFATE 2.5; .5 MG/3ML; MG/3ML
3 SOLUTION RESPIRATORY (INHALATION) EVERY 6 HOURS PRN
Qty: 360 ML | Refills: 2 | Status: SHIPPED | OUTPATIENT
Start: 2019-12-04 | End: 2022-01-20

## 2019-12-04 RX ORDER — METOPROLOL SUCCINATE 50 MG/1
50 TABLET, EXTENDED RELEASE ORAL DAILY
Qty: 30 TABLET | Refills: 2 | Status: SHIPPED | OUTPATIENT
Start: 2019-12-04 | End: 2020-02-28 | Stop reason: SDUPTHER

## 2019-12-04 RX ORDER — GLIMEPIRIDE 4 MG/1
2 TABLET ORAL
Qty: 15 TABLET | Refills: 2 | Status: SHIPPED | OUTPATIENT
Start: 2019-12-04 | End: 2020-09-18 | Stop reason: SDUPTHER

## 2019-12-04 ASSESSMENT — ENCOUNTER SYMPTOMS
DIARRHEA: 0
SHORTNESS OF BREATH: 1
CHEST TIGHTNESS: 0
WHEEZING: 0
ABDOMINAL PAIN: 0
COUGH: 1
VOMITING: 0
BLOOD IN STOOL: 0
NAUSEA: 0

## 2019-12-05 LAB
CREATININE URINE: 53.5 MG/DL (ref 28–259)
MICROALBUMIN UR-MCNC: 2.6 MG/DL
MICROALBUMIN/CREAT UR-RTO: 48.6 MG/G (ref 0–30)

## 2020-01-23 ENCOUNTER — OFFICE VISIT (OUTPATIENT)
Dept: INTERNAL MEDICINE CLINIC | Age: 65
End: 2020-01-23

## 2020-01-23 VITALS
HEART RATE: 80 BPM | DIASTOLIC BLOOD PRESSURE: 80 MMHG | BODY MASS INDEX: 23.74 KG/M2 | SYSTOLIC BLOOD PRESSURE: 180 MMHG | RESPIRATION RATE: 16 BRPM | WEIGHT: 134 LBS | HEIGHT: 63 IN

## 2020-01-23 DIAGNOSIS — R31.9 HEMATURIA, UNSPECIFIED TYPE: ICD-10-CM

## 2020-01-23 LAB
ANION GAP SERPL CALCULATED.3IONS-SCNC: 15 MMOL/L (ref 3–16)
BILIRUBIN, POC: NORMAL
BLOOD URINE, POC: NORMAL
BUN BLDV-MCNC: 12 MG/DL (ref 7–20)
CALCIUM SERPL-MCNC: 9.5 MG/DL (ref 8.3–10.6)
CHLORIDE BLD-SCNC: 96 MMOL/L (ref 99–110)
CLARITY, POC: NORMAL
CO2: 24 MMOL/L (ref 21–32)
COLOR, POC: NORMAL
CREAT SERPL-MCNC: 0.6 MG/DL (ref 0.6–1.2)
GFR AFRICAN AMERICAN: >60
GFR NON-AFRICAN AMERICAN: >60
GLUCOSE BLD-MCNC: 98 MG/DL (ref 70–99)
GLUCOSE URINE, POC: NORMAL
HCT VFR BLD CALC: 39.2 % (ref 36–48)
HEMOGLOBIN: 12.9 G/DL (ref 12–16)
KETONES, POC: NORMAL
LEUKOCYTE EST, POC: NORMAL
MCH RBC QN AUTO: 24.4 PG (ref 26–34)
MCHC RBC AUTO-ENTMCNC: 33 G/DL (ref 31–36)
MCV RBC AUTO: 74 FL (ref 80–100)
NITRITE, POC: NORMAL
PDW BLD-RTO: 17.3 % (ref 12.4–15.4)
PH, POC: NORMAL
PLATELET # BLD: 261 K/UL (ref 135–450)
PMV BLD AUTO: 9.4 FL (ref 5–10.5)
POTASSIUM SERPL-SCNC: 4.4 MMOL/L (ref 3.5–5.1)
PROTEIN, POC: NORMAL
RBC # BLD: 5.29 M/UL (ref 4–5.2)
SODIUM BLD-SCNC: 135 MMOL/L (ref 136–145)
SPECIFIC GRAVITY, POC: NORMAL
UROBILINOGEN, POC: NORMAL
WBC # BLD: 8.4 K/UL (ref 4–11)

## 2020-01-23 PROCEDURE — 99213 OFFICE O/P EST LOW 20 MIN: CPT | Performed by: NURSE PRACTITIONER

## 2020-01-23 PROCEDURE — 81002 URINALYSIS NONAUTO W/O SCOPE: CPT | Performed by: NURSE PRACTITIONER

## 2020-01-23 RX ORDER — CIPROFLOXACIN 500 MG/1
500 TABLET, FILM COATED ORAL 2 TIMES DAILY
Qty: 14 TABLET | Refills: 0 | Status: SHIPPED | OUTPATIENT
Start: 2020-01-23 | End: 2020-01-30

## 2020-01-23 ASSESSMENT — ENCOUNTER SYMPTOMS
NAUSEA: 0
DIARRHEA: 0
VOMITING: 0
ABDOMINAL PAIN: 1
SHORTNESS OF BREATH: 1

## 2020-01-23 NOTE — PROGRESS NOTES
cmp  Chief Complaint:   Waylon Esparza is a 59 y.o. female who presents for   Chief Complaint   Patient presents with    Urinary Tract Infection        HPI    Patient presents to the office today with c/o passing 2 clots. States there was blood on the toilet paper when wiped. She shows pictures of these. This happened today. C/o suprapubic pressure. She states when she first urinated, no blood came out, but there was blood when she wiped. Denies fevers, chills, dysuria, urgency, or frequency. Review of Systems  Review of Systems   Constitutional: Negative for chills and fever. Respiratory: Positive for shortness of breath. Cardiovascular: Positive for chest pain. Gastrointestinal: Positive for abdominal pain (suprapubic pressure). Negative for diarrhea, nausea and vomiting. Genitourinary: Positive for frequency, hematuria and vaginal bleeding. Negative for dysuria and urgency. Psychiatric/Behavioral: The patient is nervous/anxious. Allergies  Morphine;  Chantix [varenicline tartrate]; and Percocet [oxycodone-acetaminophen]      Vitals  BP (!) 180/80 (Site: Left Upper Arm, Position: Sitting)   Pulse 80   Resp 16   Ht 5' 3\" (1.6 m)   Wt 134 lb (60.8 kg)   BMI 23.74 kg/m²     Current Medications  Current Outpatient Medications   Medication Sig Dispense Refill    lisinopril (PRINIVIL) 10 MG tablet Take 1 tablet by mouth daily 30 tablet 2    metoprolol succinate (TOPROL XL) 50 MG extended release tablet Take 1 tablet by mouth daily 30 tablet 2    metFORMIN (GLUCOPHAGE) 500 MG tablet Take 1 tablet by mouth 2 times daily (with meals) 60 tablet 2    ipratropium-albuterol (DUONEB) 0.5-2.5 (3) MG/3ML SOLN nebulizer solution Inhale 3 mLs into the lungs every 6 hours as needed for Shortness of Breath 360 mL 2    glimepiride (AMARYL) 4 MG tablet Take 0.5 tablets by mouth every morning (before breakfast) 15 tablet 2    pantoprazole (PROTONIX) 40 MG tablet Take 1 tablet by mouth daily 30 tablet 2    ferrous sulfate (FE TABS) 325 (65 Fe) MG EC tablet Take 1 tablet by mouth daily (with breakfast) 90 tablet 1    blood glucose test strips (DARVIN CONTOUR NEXT TEST) strip Test three times daily. DX:E11.9 100 each 0    AGAMATRIX ULTRA-THIN LANCETS MISC Check sugars daily after each meal 100 each 0    Blood Glucose Monitoring Suppl (AGAMATRIX AMP) ISABELLA Check BG daily after meals 1 Device 0    [facility] Lancets MISC 1 each by Does not apply route 3 times daily 100 each 0     No current facility-administered medications for this visit. Past Medical History  Past Medical History:   Diagnosis Date    Bronchitis chronic     Cancer ([facility] [address]     cervical    Diabetes mellitus ([facility] [address]     Emphysema of lung ([facility] [address]     Hypercholesteremia     Hypertension     Microcytic anemia 3/23/2017    Psoriasis        Social History  Social History     Socioeconomic History    Marital status:       Spouse name: Not on file    Number of children: Not on file    Years of education: Not on file    Highest education level: Not on file   Occupational History    Not on file   Social Needs    Financial resource strain: Not on file    Food insecurity:     Worry: Not on file     Inability: Not on file    Transportation needs:     Medical: Not on file     Non-medical: Not on file   Tobacco Use    Smoking status: Current Every Day Smoker     Packs/day: 1.00     Years: 32.00     Pack years: 32.00     Types: Cigarettes    Smokeless tobacco: Never Used    Tobacco comment: pt states she wants to quit. chantix did not work   Substance and Sexual Activity    Alcohol use: No    Drug use: No    Sexual activity: Yes     Partners: Male   Lifestyle    Physical activity:     Days per week: Not on file     Minutes per session: Not on file    Stress: Not on file   Relationships    Social connections:     Talks on phone: Not on file     Gets together: Not on file     Attends Worship service: Not on file     Active member of club or organization: Not on file     Attends meetings of clubs or organizations: Not on file     Relationship status: Not on file    Intimate partner violence:     Fear of current or ex partner: Not on file     Emotionally abused: Not on file     Physically abused: Not on file     Forced sexual activity: Not on file   Other Topics Concern    Not on file   Social History Narrative    Not on file       SurgicalHistory  Past Surgical History:   Procedure Laterality Date   [address] SURGERY  2004    CHOLECYSTECTOMY      DIAGNOSTIC CARDIAC CATH LAB PROCEDURE  4/2011    angiogram with 30% blockage    HYSTERECTOMY      KIDNEY STONE SURGERY      TONSILLECTOMY      UPPER GASTROINTESTINAL ENDOSCOPY N/A 10/29/2019    EGD BIOPSY performed by Milli Boyd DO at [address]  [facility]       Physical Exam  Physical Exam  Constitutional:       General: She is not in acute distress. Appearance: Normal appearance. HENT:      Head: Normocephalic and atraumatic. Eyes:      Conjunctiva/sclera: Conjunctivae normal.      Pupils: Pupils are equal, round, and reactive to light. Cardiovascular:      Rate and Rhythm: Normal rate and regular rhythm. Heart sounds: Normal heart sounds. No murmur. No friction rub. No gallop. Pulmonary:      Effort: Pulmonary effort is normal. No respiratory distress. Breath sounds: Normal breath sounds. No wheezing, rhonchi or rales. Abdominal:      General: Bowel sounds are normal. There is no distension. Palpations: Abdomen is soft. Tenderness: There is no tenderness. Skin:     General: Skin is warm and dry. Neurological:      General: No focal deficit present. Mental Status: She is alert and oriented to person, place, and time. Psychiatric:         Mood and Affect: Mood normal.         Behavior: Behavior normal.         Thought Content:  Thought content normal.         Judgment: Judgment normal.         Assessment and Plan    Hematuria  - with clots  - Checked UA (trace protein, Large blood), sent for culture  - will Rx. Cipro 500 mg BID for 7 days  - BMP, CBC  - referral to urology. - patient states she may not go see urology. Educated that she should see them as this could get worse and needs to be evaluated. Attempted to call patient with results. Left message.      Silvia ANAYA-C  1/23/2020

## 2020-01-24 LAB — URINE CULTURE, ROUTINE: NORMAL

## 2020-01-28 ENCOUNTER — TELEPHONE (OUTPATIENT)
Dept: INTERNAL MEDICINE CLINIC | Age: 65
End: 2020-01-28

## 2020-01-28 NOTE — TELEPHONE ENCOUNTER
----- Message from Rebeka Stapleton sent at 1/28/2020  2:41 PM EST -----  Contact: [phone]    ----- Message -----  From: GOLDEN Holden CNP  Sent: 1/28/2020   2:33 PM EST  To: Rebeka Stapleton    It was negative.   ----- Message -----  From: Rebeka Stapleton  Sent: 1/28/2020   1:43 PM EST  To: GOLDEN Holden CNP    Pt called about the results of her urine culture from 1/23/2020.     [phone]

## 2020-02-28 ENCOUNTER — OFFICE VISIT (OUTPATIENT)
Dept: INTERNAL MEDICINE CLINIC | Age: 65
End: 2020-02-28

## 2020-02-28 VITALS
SYSTOLIC BLOOD PRESSURE: 146 MMHG | HEIGHT: 63 IN | BODY MASS INDEX: 24.1 KG/M2 | DIASTOLIC BLOOD PRESSURE: 80 MMHG | WEIGHT: 136 LBS | HEART RATE: 76 BPM

## 2020-02-28 DIAGNOSIS — E11.9 TYPE 2 DIABETES MELLITUS WITHOUT COMPLICATION, WITHOUT LONG-TERM CURRENT USE OF INSULIN (HCC): ICD-10-CM

## 2020-02-28 PROBLEM — J44.1 COPD EXACERBATION (HCC): Status: RESOLVED | Noted: 2017-03-01 | Resolved: 2020-02-28

## 2020-02-28 PROBLEM — F51.01 PRIMARY INSOMNIA: Status: ACTIVE | Noted: 2020-02-28

## 2020-02-28 LAB
ALBUMIN SERPL-MCNC: 4.1 G/DL (ref 3.4–5)
ALP BLD-CCNC: 82 U/L (ref 40–129)
ALT SERPL-CCNC: 17 U/L (ref 10–40)
AST SERPL-CCNC: 29 U/L (ref 15–37)
BILIRUB SERPL-MCNC: <0.2 MG/DL (ref 0–1)
BILIRUBIN DIRECT: <0.2 MG/DL (ref 0–0.3)
BILIRUBIN, INDIRECT: NORMAL MG/DL (ref 0–1)
CHOLESTEROL, TOTAL: 175 MG/DL (ref 0–199)
HDLC SERPL-MCNC: 31 MG/DL (ref 40–60)
LDL CHOLESTEROL CALCULATED: 97 MG/DL
TOTAL PROTEIN: 8 G/DL (ref 6.4–8.2)
TRIGL SERPL-MCNC: 233 MG/DL (ref 0–150)
VLDLC SERPL CALC-MCNC: 47 MG/DL

## 2020-02-28 PROCEDURE — G0296 VISIT TO DETERM LDCT ELIG: HCPCS | Performed by: INTERNAL MEDICINE

## 2020-02-28 PROCEDURE — 99214 OFFICE O/P EST MOD 30 MIN: CPT | Performed by: INTERNAL MEDICINE

## 2020-02-28 PROCEDURE — 90732 PPSV23 VACC 2 YRS+ SUBQ/IM: CPT | Performed by: INTERNAL MEDICINE

## 2020-02-28 PROCEDURE — 90471 IMMUNIZATION ADMIN: CPT | Performed by: INTERNAL MEDICINE

## 2020-02-28 RX ORDER — PANTOPRAZOLE SODIUM 40 MG/1
40 TABLET, DELAYED RELEASE ORAL DAILY
Qty: 30 TABLET | Refills: 2 | Status: SHIPPED | OUTPATIENT
Start: 2020-02-28 | End: 2020-09-18 | Stop reason: SDUPTHER

## 2020-02-28 RX ORDER — LISINOPRIL 20 MG/1
20 TABLET ORAL DAILY
Qty: 30 TABLET | Refills: 2 | Status: SHIPPED | OUTPATIENT
Start: 2020-02-28 | End: 2020-09-18 | Stop reason: SDUPTHER

## 2020-02-28 RX ORDER — ZOLPIDEM TARTRATE 5 MG/1
5 TABLET ORAL NIGHTLY PRN
Qty: 30 TABLET | Refills: 0 | Status: SHIPPED | OUTPATIENT
Start: 2020-02-28 | End: 2020-03-29

## 2020-02-28 RX ORDER — METOPROLOL SUCCINATE 50 MG/1
50 TABLET, EXTENDED RELEASE ORAL DAILY
Qty: 30 TABLET | Refills: 2 | Status: SHIPPED | OUTPATIENT
Start: 2020-02-28 | End: 2020-09-18 | Stop reason: SDUPTHER

## 2020-02-28 ASSESSMENT — ENCOUNTER SYMPTOMS
VOMITING: 0
WHEEZING: 0
COUGH: 1
CHEST TIGHTNESS: 0
SHORTNESS OF BREATH: 0
ABDOMINAL PAIN: 0
DIARRHEA: 0
NAUSEA: 0
BLOOD IN STOOL: 0

## 2020-02-28 NOTE — PROGRESS NOTES
screening. Need for follow up screens annually to continue lung cancer screening effectiveness     Risks associated with radiation from annual LDCT- Radiation exposure is about the same as for a mammogram, which is about 1/3 of the annual background radiation exposure from everyday life. Starting screening at age 54 is not likely to increase cancer risk from radiation exposure. Patients with comorbidities resulting in life expectancy of < 10 years, or that would preclude treatment of an abnormality identified on CT, should not be screened due to lack of benefit.     To obtain maximal benefit from this screening, smoking cessation and long-term abstinence from smoking is critical

## 2020-02-29 LAB
ESTIMATED AVERAGE GLUCOSE: 200.1 MG/DL
HBA1C MFR BLD: 8.6 %

## 2020-09-18 ENCOUNTER — OFFICE VISIT (OUTPATIENT)
Dept: INTERNAL MEDICINE CLINIC | Age: 65
End: 2020-09-18

## 2020-09-18 VITALS
SYSTOLIC BLOOD PRESSURE: 146 MMHG | WEIGHT: 135 LBS | DIASTOLIC BLOOD PRESSURE: 78 MMHG | BODY MASS INDEX: 23.92 KG/M2 | HEIGHT: 63 IN

## 2020-09-18 DIAGNOSIS — E11.9 TYPE 2 DIABETES MELLITUS WITHOUT COMPLICATION, WITHOUT LONG-TERM CURRENT USE OF INSULIN (HCC): ICD-10-CM

## 2020-09-18 PROCEDURE — 99214 OFFICE O/P EST MOD 30 MIN: CPT | Performed by: INTERNAL MEDICINE

## 2020-09-18 PROCEDURE — 90662 IIV NO PRSV INCREASED AG IM: CPT | Performed by: INTERNAL MEDICINE

## 2020-09-18 PROCEDURE — 90471 IMMUNIZATION ADMIN: CPT | Performed by: INTERNAL MEDICINE

## 2020-09-18 PROCEDURE — 3052F HG A1C>EQUAL 8.0%<EQUAL 9.0%: CPT | Performed by: INTERNAL MEDICINE

## 2020-09-18 RX ORDER — LISINOPRIL 20 MG/1
20 TABLET ORAL DAILY
Qty: 30 TABLET | Refills: 2 | Status: ON HOLD | OUTPATIENT
Start: 2020-09-18 | End: 2022-06-27 | Stop reason: SDUPTHER

## 2020-09-18 RX ORDER — PANTOPRAZOLE SODIUM 40 MG/1
40 TABLET, DELAYED RELEASE ORAL DAILY
Qty: 30 TABLET | Refills: 2 | Status: ON HOLD | OUTPATIENT
Start: 2020-09-18 | End: 2021-12-18

## 2020-09-18 RX ORDER — GLIMEPIRIDE 4 MG/1
2 TABLET ORAL
Qty: 15 TABLET | Refills: 2 | Status: ON HOLD | OUTPATIENT
Start: 2020-09-18 | End: 2022-06-27 | Stop reason: HOSPADM

## 2020-09-18 RX ORDER — METOPROLOL SUCCINATE 50 MG/1
50 TABLET, EXTENDED RELEASE ORAL DAILY
Qty: 30 TABLET | Refills: 2 | Status: ON HOLD | OUTPATIENT
Start: 2020-09-18 | End: 2022-06-27 | Stop reason: SDUPTHER

## 2020-09-18 ASSESSMENT — ENCOUNTER SYMPTOMS
CHEST TIGHTNESS: 0
NAUSEA: 0
DIARRHEA: 0
VOMITING: 0
SHORTNESS OF BREATH: 0
COUGH: 0
WHEEZING: 0
ABDOMINAL PAIN: 0
BLOOD IN STOOL: 0

## 2020-09-18 NOTE — PROGRESS NOTES
oriented to person, place, and time. Assessment:       Diagnosis Orders   1. Systolic congestive heart failure, unspecified HF chronicity (HCC)  lisinopril (PRINIVIL;ZESTRIL) 20 MG tablet   2. Need for influenza vaccination  INFLUENZA, HIGH-DOSE, QUADV, 65 YRS +, IM, PF, 0.7ML (FLUZONE)   3. Type 2 diabetes mellitus without complication, without long-term current use of insulin (HCC)  Hemoglobin M1D    Basic Metabolic Panel   4. Essential hypertension     5. Hypercholesteremia     6. Chronic obstructive pulmonary disease, unspecified COPD type ([facility] [address]     7. Primary insomnia     8. Muscle cramps             Plan:        HTN. Bp is high as she ran out of pills a few days ago  Continue lisinopril and toprol     DM, type 2   Controlled. Continue current meds      Chronic systolic cHF  Patent foramen ovale versus ASD  Stable. Continue ACEI and toprol     Insomnia  Sleep hygeine discussed. Muscle cramps at night  Check BMP     Anemia- iron def. S/p EGD and colon 10/19  Has polyps- removed     COPD. Counselled to stop smoking.   Tosin Foreman MD

## 2020-09-19 LAB
ANION GAP SERPL CALCULATED.3IONS-SCNC: 11 MMOL/L (ref 3–16)
BUN BLDV-MCNC: 20 MG/DL (ref 7–20)
CALCIUM SERPL-MCNC: 9.7 MG/DL (ref 8.3–10.6)
CHLORIDE BLD-SCNC: 100 MMOL/L (ref 99–110)
CO2: 24 MMOL/L (ref 21–32)
CREAT SERPL-MCNC: 0.8 MG/DL (ref 0.6–1.2)
ESTIMATED AVERAGE GLUCOSE: 171.4 MG/DL
GFR AFRICAN AMERICAN: >60
GFR NON-AFRICAN AMERICAN: >60
GLUCOSE BLD-MCNC: 133 MG/DL (ref 70–99)
HBA1C MFR BLD: 7.6 %
POTASSIUM SERPL-SCNC: 4.7 MMOL/L (ref 3.5–5.1)
SODIUM BLD-SCNC: 135 MMOL/L (ref 136–145)

## 2020-10-09 ENCOUNTER — APPOINTMENT (OUTPATIENT)
Dept: GENERAL RADIOLOGY | Age: 65
DRG: 378 | End: 2020-10-09
Payer: MEDICARE

## 2020-10-09 ENCOUNTER — APPOINTMENT (OUTPATIENT)
Dept: CT IMAGING | Age: 65
DRG: 378 | End: 2020-10-09
Payer: MEDICARE

## 2020-10-09 ENCOUNTER — HOSPITAL ENCOUNTER (INPATIENT)
Age: 65
LOS: 3 days | Discharge: HOME | DRG: 378 | End: 2020-10-13
Attending: EMERGENCY MEDICINE | Admitting: INTERNAL MEDICINE
Payer: MEDICARE

## 2020-10-09 LAB
BACTERIA: ABNORMAL /HPF
BILIRUBIN URINE: NEGATIVE
BLOOD, URINE: ABNORMAL
CLARITY: ABNORMAL
COLOR: YELLOW
GLUCOSE URINE: NEGATIVE MG/DL
KETONES, URINE: NEGATIVE MG/DL
LEUKOCYTE ESTERASE, URINE: ABNORMAL
MICROSCOPIC EXAMINATION: YES
MUCUS: ABNORMAL /LPF
NITRITE, URINE: POSITIVE
PH UA: 5 (ref 5–8)
PROTEIN UA: ABNORMAL MG/DL
RBC UA: ABNORMAL /HPF (ref 0–4)
SPECIFIC GRAVITY UA: 1.02 (ref 1–1.03)
URINE TYPE: ABNORMAL
UROBILINOGEN, URINE: 4 E.U./DL
WBC UA: ABNORMAL /HPF (ref 0–5)

## 2020-10-09 PROCEDURE — 86923 COMPATIBILITY TEST ELECTRIC: CPT

## 2020-10-09 PROCEDURE — 83605 ASSAY OF LACTIC ACID: CPT

## 2020-10-09 PROCEDURE — 87086 URINE CULTURE/COLONY COUNT: CPT

## 2020-10-09 PROCEDURE — 81001 URINALYSIS AUTO W/SCOPE: CPT

## 2020-10-09 PROCEDURE — 86900 BLOOD TYPING SEROLOGIC ABO: CPT

## 2020-10-09 PROCEDURE — P9016 RBC LEUKOCYTES REDUCED: HCPCS

## 2020-10-09 PROCEDURE — 80053 COMPREHEN METABOLIC PANEL: CPT

## 2020-10-09 PROCEDURE — 87040 BLOOD CULTURE FOR BACTERIA: CPT

## 2020-10-09 PROCEDURE — 86901 BLOOD TYPING SEROLOGIC RH(D): CPT

## 2020-10-09 PROCEDURE — 83880 ASSAY OF NATRIURETIC PEPTIDE: CPT

## 2020-10-09 PROCEDURE — 86850 RBC ANTIBODY SCREEN: CPT

## 2020-10-09 PROCEDURE — 70450 CT HEAD/BRAIN W/O DYE: CPT

## 2020-10-09 PROCEDURE — 99285 EMERGENCY DEPT VISIT HI MDM: CPT

## 2020-10-09 PROCEDURE — 85025 COMPLETE CBC W/AUTO DIFF WBC: CPT

## 2020-10-09 PROCEDURE — 93005 ELECTROCARDIOGRAM TRACING: CPT | Performed by: EMERGENCY MEDICINE

## 2020-10-09 PROCEDURE — 71046 X-RAY EXAM CHEST 2 VIEWS: CPT

## 2020-10-09 PROCEDURE — 84484 ASSAY OF TROPONIN QUANT: CPT

## 2020-10-09 ASSESSMENT — ENCOUNTER SYMPTOMS
NAUSEA: 0
BACK PAIN: 0
ABDOMINAL PAIN: 0
COUGH: 0
SHORTNESS OF BREATH: 1
DIARRHEA: 0
VOMITING: 0
SORE THROAT: 0
EYE DISCHARGE: 0

## 2020-10-09 NOTE — LETTER
[facility] PCU Telemetry  [address] [address]  Phone: [phone]          October 13, 2020     Patient: Peter Reid   YOB: 1955   Date of Visit: 10/9/2020       To Whom It May Concern: It is my medical opinion that Peter Reid may return to work on Monday October 19 2020. If you have any questions or concerns, please don't hesitate to call.     Sincerely,

## 2020-10-10 PROBLEM — K92.2 ACUTE GI BLEEDING: Status: ACTIVE | Noted: 2020-10-10

## 2020-10-10 LAB
A/G RATIO: 1.4 (ref 1.1–2.2)
ABO/RH: NORMAL
ALBUMIN SERPL-MCNC: 4.1 G/DL (ref 3.4–5)
ALP BLD-CCNC: 65 U/L (ref 40–129)
ALT SERPL-CCNC: 9 U/L (ref 10–40)
ANION GAP SERPL CALCULATED.3IONS-SCNC: 8 MMOL/L (ref 3–16)
ANISOCYTOSIS: ABNORMAL
ANTIBODY SCREEN: NORMAL
AST SERPL-CCNC: 18 U/L (ref 15–37)
BASOPHILS ABSOLUTE: 0.1 K/UL (ref 0–0.2)
BASOPHILS RELATIVE PERCENT: 2 %
BILIRUB SERPL-MCNC: 0.5 MG/DL (ref 0–1)
BLOOD BANK DISPENSE STATUS: NORMAL
BLOOD BANK PRODUCT CODE: NORMAL
BPU ID: NORMAL
BUN BLDV-MCNC: 31 MG/DL (ref 7–20)
CALCIUM SERPL-MCNC: 8.7 MG/DL (ref 8.3–10.6)
CHLORIDE BLD-SCNC: 105 MMOL/L (ref 99–110)
CO2: 24 MMOL/L (ref 21–32)
CREAT SERPL-MCNC: 1 MG/DL (ref 0.6–1.2)
DESCRIPTION BLOOD BANK: NORMAL
EKG ATRIAL RATE: 77 BPM
EKG DIAGNOSIS: NORMAL
EKG P AXIS: 72 DEGREES
EKG P-R INTERVAL: 174 MS
EKG Q-T INTERVAL: 430 MS
EKG QRS DURATION: 134 MS
EKG QTC CALCULATION (BAZETT): 486 MS
EKG R AXIS: -50 DEGREES
EKG T AXIS: 148 DEGREES
EKG VENTRICULAR RATE: 77 BPM
EOSINOPHILS ABSOLUTE: 0.1 K/UL (ref 0–0.6)
EOSINOPHILS RELATIVE PERCENT: 2.5 %
GFR AFRICAN AMERICAN: >60
GFR NON-AFRICAN AMERICAN: 56
GLOBULIN: 2.9 G/DL
GLUCOSE BLD-MCNC: 122 MG/DL (ref 70–99)
GLUCOSE BLD-MCNC: 133 MG/DL (ref 70–99)
GLUCOSE BLD-MCNC: 155 MG/DL (ref 70–99)
GLUCOSE BLD-MCNC: 156 MG/DL (ref 70–99)
GLUCOSE BLD-MCNC: 195 MG/DL (ref 70–99)
GLUCOSE BLD-MCNC: 199 MG/DL (ref 70–99)
HCT VFR BLD CALC: 16.4 % (ref 36–48)
HCT VFR BLD CALC: 30.2 % (ref 36–48)
HEMATOLOGY PATH CONSULT: YES
HEMOGLOBIN: 4.5 G/DL (ref 12–16)
HEMOGLOBIN: 9.2 G/DL (ref 12–16)
LACTIC ACID, SEPSIS: 1.1 MMOL/L (ref 0.4–1.9)
LYMPHOCYTES ABSOLUTE: 1.1 K/UL (ref 1–5.1)
LYMPHOCYTES RELATIVE PERCENT: 18.7 %
MCH RBC QN AUTO: 14.9 PG (ref 26–34)
MCHC RBC AUTO-ENTMCNC: 27.5 G/DL (ref 31–36)
MCV RBC AUTO: 54.2 FL (ref 80–100)
MICROCYTES: ABNORMAL
MONOCYTES ABSOLUTE: 0.7 K/UL (ref 0–1.3)
MONOCYTES RELATIVE PERCENT: 12.5 %
NEUTROPHILS ABSOLUTE: 3.8 K/UL (ref 1.7–7.7)
NEUTROPHILS RELATIVE PERCENT: 64.3 %
OCCULT BLOOD DIAGNOSTIC: ABNORMAL
PDW BLD-RTO: 22 % (ref 12.4–15.4)
PERFORMED ON: ABNORMAL
PLATELET # BLD: 198 K/UL (ref 135–450)
PMV BLD AUTO: 8.9 FL (ref 5–10.5)
POTASSIUM REFLEX MAGNESIUM: 4.1 MMOL/L (ref 3.5–5.1)
PRO-BNP: 4859 PG/ML (ref 0–124)
RBC # BLD: 3.04 M/UL (ref 4–5.2)
SLIDE REVIEW: ABNORMAL
SODIUM BLD-SCNC: 137 MMOL/L (ref 136–145)
TARGET CELLS: ABNORMAL
TOTAL PROTEIN: 7 G/DL (ref 6.4–8.2)
TROPONIN: <0.01 NG/ML
WBC # BLD: 5.8 K/UL (ref 4–11)

## 2020-10-10 PROCEDURE — 36430 TRANSFUSION BLD/BLD COMPNT: CPT

## 2020-10-10 PROCEDURE — 6370000000 HC RX 637 (ALT 250 FOR IP): Performed by: INTERNAL MEDICINE

## 2020-10-10 PROCEDURE — 87040 BLOOD CULTURE FOR BACTERIA: CPT

## 2020-10-10 PROCEDURE — 6360000002 HC RX W HCPCS: Performed by: EMERGENCY MEDICINE

## 2020-10-10 PROCEDURE — 2580000003 HC RX 258: Performed by: INTERNAL MEDICINE

## 2020-10-10 PROCEDURE — 85018 HEMOGLOBIN: CPT

## 2020-10-10 PROCEDURE — 6360000002 HC RX W HCPCS: Performed by: INTERNAL MEDICINE

## 2020-10-10 PROCEDURE — 2580000003 HC RX 258: Performed by: EMERGENCY MEDICINE

## 2020-10-10 PROCEDURE — C9113 INJ PANTOPRAZOLE SODIUM, VIA: HCPCS | Performed by: EMERGENCY MEDICINE

## 2020-10-10 PROCEDURE — 36415 COLL VENOUS BLD VENIPUNCTURE: CPT

## 2020-10-10 PROCEDURE — 93010 ELECTROCARDIOGRAM REPORT: CPT | Performed by: INTERNAL MEDICINE

## 2020-10-10 PROCEDURE — 85014 HEMATOCRIT: CPT

## 2020-10-10 PROCEDURE — 96365 THER/PROPH/DIAG IV INF INIT: CPT

## 2020-10-10 PROCEDURE — 2060000000 HC ICU INTERMEDIATE R&B

## 2020-10-10 PROCEDURE — 96375 TX/PRO/DX INJ NEW DRUG ADDON: CPT

## 2020-10-10 PROCEDURE — C9113 INJ PANTOPRAZOLE SODIUM, VIA: HCPCS | Performed by: INTERNAL MEDICINE

## 2020-10-10 PROCEDURE — G0328 FECAL BLOOD SCRN IMMUNOASSAY: HCPCS

## 2020-10-10 RX ORDER — POLYETHYLENE GLYCOL 3350 17 G/17G
17 POWDER, FOR SOLUTION ORAL DAILY PRN
Status: DISCONTINUED | OUTPATIENT
Start: 2020-10-10 | End: 2020-10-13 | Stop reason: HOSPADM

## 2020-10-10 RX ORDER — LISINOPRIL 20 MG/1
20 TABLET ORAL DAILY
Status: DISCONTINUED | OUTPATIENT
Start: 2020-10-10 | End: 2020-10-13 | Stop reason: HOSPADM

## 2020-10-10 RX ORDER — CEFDINIR 300 MG/1
300 CAPSULE ORAL EVERY 12 HOURS SCHEDULED
Status: DISCONTINUED | OUTPATIENT
Start: 2020-10-10 | End: 2020-10-11

## 2020-10-10 RX ORDER — PANTOPRAZOLE SODIUM 40 MG/10ML
40 INJECTION, POWDER, LYOPHILIZED, FOR SOLUTION INTRAVENOUS ONCE
Status: COMPLETED | OUTPATIENT
Start: 2020-10-10 | End: 2020-10-10

## 2020-10-10 RX ORDER — GLIMEPIRIDE 2 MG/1
2 TABLET ORAL
Status: DISCONTINUED | OUTPATIENT
Start: 2020-10-10 | End: 2020-10-13 | Stop reason: HOSPADM

## 2020-10-10 RX ORDER — DEXTROSE MONOHYDRATE 50 MG/ML
100 INJECTION, SOLUTION INTRAVENOUS PRN
Status: DISCONTINUED | OUTPATIENT
Start: 2020-10-10 | End: 2020-10-13 | Stop reason: HOSPADM

## 2020-10-10 RX ORDER — PROMETHAZINE HYDROCHLORIDE 25 MG/1
12.5 TABLET ORAL EVERY 6 HOURS PRN
Status: DISCONTINUED | OUTPATIENT
Start: 2020-10-10 | End: 2020-10-13 | Stop reason: HOSPADM

## 2020-10-10 RX ORDER — ONDANSETRON 2 MG/ML
4 INJECTION INTRAMUSCULAR; INTRAVENOUS EVERY 6 HOURS PRN
Status: DISCONTINUED | OUTPATIENT
Start: 2020-10-10 | End: 2020-10-13 | Stop reason: HOSPADM

## 2020-10-10 RX ORDER — PANTOPRAZOLE SODIUM 40 MG/1
40 TABLET, DELAYED RELEASE ORAL DAILY
Status: DISCONTINUED | OUTPATIENT
Start: 2020-10-10 | End: 2020-10-11

## 2020-10-10 RX ORDER — SODIUM CHLORIDE 0.9 % (FLUSH) 0.9 %
10 SYRINGE (ML) INJECTION PRN
Status: DISCONTINUED | OUTPATIENT
Start: 2020-10-10 | End: 2020-10-13 | Stop reason: HOSPADM

## 2020-10-10 RX ORDER — ACETAMINOPHEN 650 MG/1
650 SUPPOSITORY RECTAL EVERY 6 HOURS PRN
Status: DISCONTINUED | OUTPATIENT
Start: 2020-10-10 | End: 2020-10-13 | Stop reason: HOSPADM

## 2020-10-10 RX ORDER — 0.9 % SODIUM CHLORIDE 0.9 %
250 INTRAVENOUS SOLUTION INTRAVENOUS ONCE
Status: COMPLETED | OUTPATIENT
Start: 2020-10-10 | End: 2020-10-10

## 2020-10-10 RX ORDER — 0.9 % SODIUM CHLORIDE 0.9 %
20 INTRAVENOUS SOLUTION INTRAVENOUS ONCE
Status: DISCONTINUED | OUTPATIENT
Start: 2020-10-10 | End: 2020-10-11

## 2020-10-10 RX ORDER — SODIUM CHLORIDE 9 MG/ML
INJECTION, SOLUTION INTRAVENOUS CONTINUOUS
Status: DISCONTINUED | OUTPATIENT
Start: 2020-10-10 | End: 2020-10-12

## 2020-10-10 RX ORDER — FUROSEMIDE 10 MG/ML
20 INJECTION INTRAMUSCULAR; INTRAVENOUS ONCE
Status: COMPLETED | OUTPATIENT
Start: 2020-10-10 | End: 2020-10-10

## 2020-10-10 RX ORDER — DEXTROSE MONOHYDRATE 25 G/50ML
12.5 INJECTION, SOLUTION INTRAVENOUS PRN
Status: DISCONTINUED | OUTPATIENT
Start: 2020-10-10 | End: 2020-10-13 | Stop reason: HOSPADM

## 2020-10-10 RX ORDER — METOPROLOL SUCCINATE 50 MG/1
50 TABLET, EXTENDED RELEASE ORAL DAILY
Status: DISCONTINUED | OUTPATIENT
Start: 2020-10-10 | End: 2020-10-13 | Stop reason: HOSPADM

## 2020-10-10 RX ORDER — ACETAMINOPHEN 325 MG/1
650 TABLET ORAL EVERY 6 HOURS PRN
Status: DISCONTINUED | OUTPATIENT
Start: 2020-10-10 | End: 2020-10-13 | Stop reason: HOSPADM

## 2020-10-10 RX ORDER — ZOLPIDEM TARTRATE 5 MG/1
5 TABLET ORAL ONCE
Status: COMPLETED | OUTPATIENT
Start: 2020-10-10 | End: 2020-10-10

## 2020-10-10 RX ORDER — NICOTINE POLACRILEX 4 MG
15 LOZENGE BUCCAL PRN
Status: DISCONTINUED | OUTPATIENT
Start: 2020-10-10 | End: 2020-10-13 | Stop reason: HOSPADM

## 2020-10-10 RX ORDER — SODIUM CHLORIDE 0.9 % (FLUSH) 0.9 %
10 SYRINGE (ML) INJECTION EVERY 12 HOURS SCHEDULED
Status: DISCONTINUED | OUTPATIENT
Start: 2020-10-10 | End: 2020-10-13 | Stop reason: HOSPADM

## 2020-10-10 RX ORDER — PANTOPRAZOLE SODIUM 40 MG/10ML
40 INJECTION, POWDER, LYOPHILIZED, FOR SOLUTION INTRAVENOUS 2 TIMES DAILY
Status: DISCONTINUED | OUTPATIENT
Start: 2020-10-10 | End: 2020-10-13 | Stop reason: HOSPADM

## 2020-10-10 RX ADMIN — METFORMIN HYDROCHLORIDE 500 MG: 500 TABLET ORAL at 09:38

## 2020-10-10 RX ADMIN — METOPROLOL SUCCINATE 50 MG: 50 TABLET, EXTENDED RELEASE ORAL at 09:47

## 2020-10-10 RX ADMIN — CEFDINIR 300 MG: 300 CAPSULE ORAL at 09:37

## 2020-10-10 RX ADMIN — LISINOPRIL 20 MG: 20 TABLET ORAL at 09:47

## 2020-10-10 RX ADMIN — PANTOPRAZOLE SODIUM 40 MG: 40 INJECTION, POWDER, FOR SOLUTION INTRAVENOUS at 02:09

## 2020-10-10 RX ADMIN — METFORMIN HYDROCHLORIDE 500 MG: 500 TABLET ORAL at 16:48

## 2020-10-10 RX ADMIN — Medication 10 ML: at 09:36

## 2020-10-10 RX ADMIN — PANTOPRAZOLE SODIUM 40 MG: 40 INJECTION, POWDER, FOR SOLUTION INTRAVENOUS at 20:36

## 2020-10-10 RX ADMIN — INSULIN LISPRO 2 UNITS: 100 INJECTION, SOLUTION INTRAVENOUS; SUBCUTANEOUS at 16:49

## 2020-10-10 RX ADMIN — ZOLPIDEM TARTRATE 5 MG: 5 TABLET ORAL at 22:25

## 2020-10-10 RX ADMIN — PANTOPRAZOLE SODIUM 40 MG: 40 INJECTION, POWDER, FOR SOLUTION INTRAVENOUS at 09:35

## 2020-10-10 RX ADMIN — CEFTRIAXONE SODIUM 1 G: 1 INJECTION, POWDER, FOR SOLUTION INTRAMUSCULAR; INTRAVENOUS at 00:33

## 2020-10-10 RX ADMIN — INSULIN LISPRO 2 UNITS: 100 INJECTION, SOLUTION INTRAVENOUS; SUBCUTANEOUS at 12:19

## 2020-10-10 RX ADMIN — SODIUM CHLORIDE: 9 INJECTION, SOLUTION INTRAVENOUS at 09:37

## 2020-10-10 RX ADMIN — CEFDINIR 300 MG: 300 CAPSULE ORAL at 20:36

## 2020-10-10 RX ADMIN — SODIUM CHLORIDE 250 ML: 9 INJECTION, SOLUTION INTRAVENOUS at 00:40

## 2020-10-10 RX ADMIN — Medication 10 ML: at 20:37

## 2020-10-10 RX ADMIN — ONDANSETRON 4 MG: 2 INJECTION INTRAMUSCULAR; INTRAVENOUS at 11:14

## 2020-10-10 RX ADMIN — FUROSEMIDE 20 MG: 10 INJECTION, SOLUTION INTRAVENOUS at 09:35

## 2020-10-10 RX ADMIN — PANTOPRAZOLE SODIUM 40 MG: 40 TABLET, DELAYED RELEASE ORAL at 09:38

## 2020-10-10 ASSESSMENT — ENCOUNTER SYMPTOMS
EYES NEGATIVE: 1
ABDOMINAL PAIN: 1
ALLERGIC/IMMUNOLOGIC NEGATIVE: 1
RESPIRATORY NEGATIVE: 1

## 2020-10-10 ASSESSMENT — PAIN SCALES - GENERAL: PAINLEVEL_OUTOF10: 0

## 2020-10-10 NOTE — PROGRESS NOTES
Pt has 2nd unit of PRBC's infusing without difficulty. No s/s of reaction noted. Pt is to get a total of 3 units of PRBC's.

## 2020-10-10 NOTE — ED PROVIDER NOTES
[address] ED  EMERGENCY DEPARTMENT ENCOUNTER        Pt Name: Darlina Snellen  MRN: [id number]  [place] 1955  Date of evaluation: 10/9/2020  Provider: Rosalina Boateng MD  PCP: Charbel Shah MD  ED Attending: Rosalina Boateng MD    CHIEF COMPLAINT       Chief Complaint   Patient presents with    Dizziness     pt c/o seeing strobe lights and having dizziness that started around 0800 this am, pt states she feels weak all over     Leg Swelling     pt c/o bilat ankle swelling x 3 days, pt states she has hx of CHF       HISTORY OF PRESENT ILLNESS   (Location/Symptom, Timing/Onset, Context/Setting, Quality, Duration, Modifying Factors, Severity)  Note limiting factors. Darlina Snellen is a 72 y.o. female who for three days has had increasing swelling in her legs. Having strobe lights in her vision. Has had a couple of episodes of near syncope. No increased shortness of breath. Does not weigh herself daily. No chest pain. Gets up to walk, feels lightheaded, sits down before falling. Sees the strobe lights when she sits down. They occur in both eyes. Family denies slurred speech, dysarthria. Patient's diuretic was stopped about six months ago. History is obtained from the patient and her family. REVIEW OF SYSTEMS    (2-9 systems for level 4, 10 or more for level 5)     Review of Systems   Constitutional: Positive for fatigue. Negative for chills and fever. HENT: Negative for congestion and sore throat. Eyes: Positive for visual disturbance. Negative for discharge. Respiratory: Positive for shortness of breath. Negative for cough. Cardiovascular: Positive for leg swelling. Negative for chest pain. Gastrointestinal: Negative for abdominal pain, diarrhea, nausea and vomiting. Endocrine: Negative for polydipsia. Genitourinary: Negative for dysuria and urgency. Musculoskeletal: Negative for back pain and myalgias. Skin: Negative for rash. Neurological: Negative for weakness and headaches. Hematological: Does not bruise/bleed easily. Psychiatric/Behavioral: Negative for confusion. Positives and Pertinent negatives as per HPI. Except as noted above in the ROS, all other systems were reviewed and negative. PAST MEDICAL HISTORY     Past Medical History:   Diagnosis Date    Bronchitis chronic     Cancer ([facility] [address]     cervical    Diabetes mellitus ([facility] [address]     Emphysema of lung ([facility] [address]     Hypercholesteremia     Hypertension     Microcytic anemia 3/23/2017    Psoriasis          SURGICAL HISTORY     Past Surgical History:   Procedure Laterality Date    CERVICAL DISC SURGERY  2004    CHOLECYSTECTOMY      DIAGNOSTIC CARDIAC CATH LAB PROCEDURE  4/2011    angiogram with 30% blockage    HYSTERECTOMY      KIDNEY STONE SURGERY      TONSILLECTOMY      UPPER GASTROINTESTINAL ENDOSCOPY N/A 10/29/2019    EGD BIOPSY performed by Veronica De Guzman DO at [address]       Previous Medications    AGAMATRIX ULTRA-THIN LANCETS MISC    Check sugars daily after each meal    BLOOD GLUCOSE MONITORING SUPPL (AGAMATRIX AMP) ISABELLA    Check BG daily after meals    BLOOD GLUCOSE TEST STRIPS (DARVNI CONTOUR NEXT TEST) STRIP    Test three times daily.  DX:E11.9    [facility] LANCETS MISC    1 each by Does not apply route 3 times daily    FERROUS SULFATE (FE TABS) 325 (65 FE) MG EC TABLET    Take 1 tablet by mouth daily (with breakfast)    GLIMEPIRIDE (AMARYL) 4 MG TABLET    Take 0.5 tablets by mouth every morning (before breakfast)    IPRATROPIUM-ALBUTEROL (DUONEB) 0.5-2.5 (3) MG/3ML SOLN NEBULIZER SOLUTION    Inhale 3 mLs into the lungs every 6 hours as needed for Shortness of Breath    LISINOPRIL (PRINIVIL;ZESTRIL) 20 MG TABLET    Take 1 tablet by mouth daily    METFORMIN (GLUCOPHAGE) 500 MG TABLET    Take 1 tablet by mouth 2 times daily (with meals)    METOPROLOL SUCCINATE (TOPROL XL) 50 MG EXTENDED RELEASE TABLET    Take 1 tablet by mouth daily    PANTOPRAZOLE (PROTONIX) 40 MG TABLET    Take 1 tablet by mouth daily         ALLERGIES     Morphine; Chantix [varenicline tartrate]; and Percocet [oxycodone-acetaminophen]    FAMILYHISTORY       Family History   Problem Relation Age of Onset    Diabetes Mother     Heart Disease Mother     High Blood Pressure Mother     Hypertension Mother     Heart Failure Mother     Cancer Father     Cancer Sister     Diabetes Brother     Cancer Brother     Cancer Brother     Asthma Daughter     Hypertension Brother     Hypertension Brother     Emphysema Neg Hx           SOCIAL HISTORY       Social History     Socioeconomic History    Marital status:       Spouse name: None    Number of children: None    Years of education: None    Highest education level: None   Occupational History    None   Social Needs    Financial resource strain: None    Food insecurity     Worry: None     Inability: None    Transportation needs     Medical: None     Non-medical: None   Tobacco Use    Smoking status: Current Every Day Smoker     Packs/day: 1.00     Years: 32.00     Pack years: 32.00     Types: Cigarettes    Smokeless tobacco: Never Used    Tobacco comment: pt states she wants to quit. chantix did not work   Substance and Sexual Activity    Alcohol use: No    Drug use: No    Sexual activity: Yes     Partners: Male   Lifestyle    Physical activity     Days per week: None     Minutes per session: None    Stress: None   Relationships    Social connections     Talks on phone: None     Gets together: None     Attends Hoahaoism service: None     Active member of club or organization: None     Attends meetings of clubs or organizations: None     Relationship status: None    Intimate partner violence     Fear of current or ex partner: None     Emotionally abused: None     Physically abused: None     Forced sexual activity: None   Other Topics Concern    None   Social History Narrative    None SCREENINGS     NIH Stroke Scale     Interval: Baseline  Time: 3:51 AM  Person Administering Scale: Darrion Leonard   Administer stroke scale items in the order listed. Record performance in each category after each subscale exam. Do not go back and change scores. Follow directions provided for each exam technique. Scores should reflect what the patient does, not what the clinician thinks the patient can do. The clinician should record answers while administering the exam and work quickly. Except where indicated, the patient should not be coached (i.e., repeated requests to patient to make a special effort). 1a  Level of consciousness: 0=alert; keenly responsive   1b. LOC questions:  0=Performs both tasks correctly   1c. LOC commands: 0=Performs both tasks correctly   2. Best Gaze: 0=normal   3. Visual: 0=No visual loss   4. Facial Palsy: 0=Normal symmetric movement   5a. Motor left arm: 0=No drift, limb holds 90 (or 45) degrees for full 10 seconds   5b. Motor right arm: 0=No drift, limb holds 90 (or 45) degrees for full 10 seconds   6a. motor left le=No drift, limb holds 90 (or 45) degrees for full 10 seconds   6b  Motor right le=No drift, limb holds 90 (or 45) degrees for full 10 seconds   7. Limb Ataxia: 0=Absent   8. Sensory: 0=Normal; no sensory loss   9. Best Language:  0=No aphasia, normal   10. Dysarthria: 0=Normal   11. Extinction and Inattention: 0=No abnormality   12.  Distal motor function: 0=Normal    Total:  0         PHYSICAL EXAM    (up to 7 for level 4, 8 or more for level 5)     ED Triage Vitals [10/09/20 2029]   BP Temp Temp Source Pulse Resp SpO2 Height Weight   (!) 148/57 97.8 °F (36.6 °C) Oral 67 18 98 % 5' 3\" (1.6 m) 135 lb (61.2 kg)       Physical Exam    DIAGNOSTIC RESULTS   LABS:    Results for orders placed or performed during the hospital encounter of 10/09/20   CBC Auto Differential   Result Value Ref Range    WBC 5.8 4.0 - 11.0 K/uL    RBC 3.04 (L) 4.00 - 5.20 M/uL Hemoglobin 4.5 (LL) 12.0 - 16.0 g/dL    Hematocrit 16.4 (LL) 36.0 - 48.0 %    MCV 54.2 (L) 80.0 - 100.0 fL    MCH 14.9 (L) 26.0 - 34.0 pg    MCHC 27.5 (L) 31.0 - 36.0 g/dL    RDW 22.0 (H) 12.4 - 15.4 %    Platelets 920 454 - 411 K/uL    MPV 8.9 5.0 - 10.5 fL    SLIDE REVIEW see below     Path Consult Yes     Neutrophils % 64.3 %    Lymphocytes % 18.7 %    Monocytes % 12.5 %    Eosinophils % 2.5 %    Basophils % 2.0 %    Neutrophils Absolute 3.8 1.7 - 7.7 K/uL    Lymphocytes Absolute 1.1 1.0 - 5.1 K/uL    Monocytes Absolute 0.7 0.0 - 1.3 K/uL    Eosinophils Absolute 0.1 0.0 - 0.6 K/uL    Basophils Absolute 0.1 0.0 - 0.2 K/uL    Anisocytosis 2+ (A)     Microcytes 1+ (A)     Target Cells Occasional (A)    Comprehensive Metabolic Panel w/ Reflex to MG   Result Value Ref Range    Sodium 137 136 - 145 mmol/L    Potassium reflex Magnesium 4.1 3.5 - 5.1 mmol/L    Chloride 105 99 - 110 mmol/L    CO2 24 21 - 32 mmol/L    Anion Gap 8 3 - 16    Glucose 155 (H) 70 - 99 mg/dL    BUN 31 (H) 7 - 20 mg/dL    CREATININE 1.0 0.6 - 1.2 mg/dL    GFR Non- 56 (A) >60    GFR African American >60 >60    Calcium 8.7 8.3 - 10.6 mg/dL    Total Protein 7.0 6.4 - 8.2 g/dL    Alb 4.1 3.4 - 5.0 g/dL    Albumin/Globulin Ratio 1.4 1.1 - 2.2    Total Bilirubin 0.5 0.0 - 1.0 mg/dL    Alkaline Phosphatase 65 40 - 129 U/L    ALT 9 (L) 10 - 40 U/L    AST 18 15 - 37 U/L    Globulin 2.9 g/dL   Troponin   Result Value Ref Range    Troponin <0.01 <0.01 ng/mL   Brain Natriuretic Peptide   Result Value Ref Range    Pro-BNP 4,859 (H) 0 - 124 pg/mL   Urinalysis, reflex to microscopic   Result Value Ref Range    Color, UA Yellow Straw/Yellow    Clarity, UA SL CLOUDY (A) Clear    Glucose, Ur Negative Negative mg/dL    Bilirubin Urine Negative Negative    Ketones, Urine Negative Negative mg/dL    Specific Gravity, UA 1.025 1.005 - 1.030    Blood, Urine SMALL (A) Negative    pH, UA 5.0 5.0 - 8.0    Protein, UA TRACE (A) Negative mg/dL Urobilinogen, Urine 4.0 (A) <2.0 E.U./dL    Nitrite, Urine POSITIVE (A) Negative    Leukocyte Esterase, Urine MODERATE (A) Negative    Microscopic Examination YES     Urine Type NotGiven    Microscopic Urinalysis   Result Value Ref Range    Mucus, UA 2+ (A) None Seen /LPF    WBC, UA  (A) 0 - 5 /HPF    RBC, UA 0-2 0 - 4 /HPF    Bacteria, UA 3+ (A) None Seen /HPF   Lactate, Sepsis   Result Value Ref Range    Lactic Acid, Sepsis 1.1 0.4 - 1.9 mmol/L   Blood occult stool #1   Result Value Ref Range    Occult Blood Diagnostic Result: POSITIVE  Normal range: Negative   (A)    PREPARE RBC (CROSSMATCH), 2 Units   Result Value Ref Range    Product Code Blood Bank [id number]     Description Blood Bank Red Blood Cells, Leuko-reduced     Unit Number [id number]     Dispense Status Blood Bank issued     Product Code Blood Bank [id number]     Description Blood Bank Red Blood Cells, Leuko-reduced     Unit Number [id number]     Dispense Status Blood Bank issued    TYPE AND SCREEN   Result Value Ref Range    ABO/Rh A POS     Antibody Screen NEG        All other labs were within normal range ornot returned as of this dictation. EKG: All EKG's are interpreted by the Emergency Department Physician who either signs or Co-signs this chart in the absence of a cardiologist.  Please see their note for interpretation of EKG.     EKG Interpretation    Interpreted by emergency department physician    Rhythm: normal sinus   Rate: normal  Axis: normal  Ectopy: none  Conduction: right bundle branch block (complete) and left anterior fasciclar block  ST Segments: nonspecific changes  T Waves: non specific changes  Q Waves: septal    Clinical Impression: normal sinus rhythm, bifascicular block, prior septal infarct      RADIOLOGY:   Non-plain film images such as CT, Ultrasound and MRI are read by the radiologist.  Plain radiographic images are visualized and preliminarily interpreted by the ED Provider with the 10, 2020   0004 Patient significantly anemic. Transfusion ordered. Hemoglobin Quant(!!): 4.5 [TC]      ED Course User Index  [TC] Kanwal Richards MD     Patient has occult blood in her stool. This is likely a return of her gastritis from last year. Her elevated BUN supports a probable upper GI source. Blood work shows significant anemia, which we began transfusing for. Patient also has a UTI, and we started antibiotics for this as well. Dr. Kisha Gastelum has accepted admission. Patient is agreeable with this plan. I spoke with Dr. Kisha Gastelum. We thoroughly discussed the history, physical exam, laboratory and imaging studies, as well as, emergency department course. Based upon that discussion, we've decided to admit Radha Ayala for further observation and evaluation of Kenya Peters's abdominal pain. As I have deemed necessary from their history, physical and studies, I have considered and evaluated Radha Ayala for the following diagnoses:  ACUTE APPENDICITIS, BOWEL OBSTRUCTION, CHOLECYSTITIS, DIVERTICULITIS, INCARCERATED HERNIA, PANCREATITIS, or PERFORATED BOWEL or ULCER. Vitals:  Blood pressure (!) 150/58, pulse 77, temperature 97.4 °F (36.3 °C), resp. rate 22, height 5' 3\" (1.6 m), weight 135 lb (61.2 kg), SpO2 100 %. FINAL IMPRESSION      1. Near syncope    2. Iron deficiency anemia due to chronic blood loss    3. Shortness of breath    4.  Acute on chronic congestive heart failure, unspecified heart failure type [facility])          DISPOSITION/PLAN   DISPOSITION Admitted 10/10/2020 02:34:34 AM    (Please note that portions of this note were completed with a voice recognition program.  Efforts were made to edit the dictations but occasionally words are mis-transcribed.)    Kanwal Richards MD(electronically signed)              Kanwal Richards MD  10/10/20 3754

## 2020-10-10 NOTE — H&P
Hospital Medicine History & Physical      PCP: Chavez Stewart MD    Date of Admission: 10/9/2020    Date of Service: Pt seen/examined on 10/10/2020    Chief Complaint: Dizziness    History Of Present Illness:    72 y.o. female who presented to the hospital with a chief complaint of dizziness and dyspnea on exertion. The patient also states that she was seeing spots. Her symptoms progressed and today it got so severe that she told her granddaughter to bring her to the emergency department. In the ER she was found to be profoundly anemic. She is occult blood positive. She denies any bloody bowel movements, hematemesis or hemoptysis. Of note she has a history of anemia and had an endoscopy and colonoscopy last year and had 3 precancerous polyps removed. She was also told she had gastritis at the time. She was started on Protonix and has done well since. She otherwise has no other symptoms today, denies any fevers or chills or exposures to COVID-19. She will be admitted for blood transfusions and GI consultation. Past Medical History:        Diagnosis Date    Bronchitis chronic     Cancer ([address]     cervical    Diabetes mellitus ([address]     Emphysema of lung ([address]     Hypercholesteremia     Hypertension     Microcytic anemia 3/23/2017    Psoriasis        Past Surgical History:        Procedure Laterality Date    CERVICAL [address] SURGERY  2004    CHOLECYSTECTOMY      DIAGNOSTIC CARDIAC CATH LAB PROCEDURE  4/2011    angiogram with 30% blockage    HYSTERECTOMY      KIDNEY STONE SURGERY      TONSILLECTOMY      UPPER GASTROINTESTINAL ENDOSCOPY N/A 10/29/2019    EGD BIOPSY performed by Margaret Simon DO at [facility]       Medications Prior to Admission:      Prior to Admission medications    Medication Sig Start Date End Date Taking?  Authorizing Provider   pantoprazole (PROTONIX) 40 MG tablet Take 1 tablet by mouth daily 9/18/20 10/18/20  Horace MD Alli metoprolol succinate (TOPROL XL) 50 MG extended release tablet Take 1 tablet by mouth daily 9/18/20   Sera Mills MD   lisinopril (PRINIVIL;ZESTRIL) 20 MG tablet Take 1 tablet by mouth daily 9/18/20   Sera Mills MD   blood glucose test strips (DARVIN CONTOUR NEXT TEST) strip Test three times daily. DX:E11.9 9/18/20   Sera Mills MD   glimepiride (AMARYL) 4 MG tablet Take 0.5 tablets by mouth every morning (before breakfast) 9/18/20   Sera Mills MD   metFORMIN (GLUCOPHAGE) 500 MG tablet Take 1 tablet by mouth 2 times daily (with meals) 9/18/20   Sera Mills MD   ipratropium-albuterol (DUONEB) 0.5-2.5 (3) MG/3ML SOLN nebulizer solution Inhale 3 mLs into the lungs every 6 hours as needed for Shortness of Breath 12/4/19 1/16/20  Sera Mills MD   ferrous sulfate (FE TABS) 325 (65 Fe) MG EC tablet Take 1 tablet by mouth daily (with breakfast) 10/30/19   Ling Fox MD   AGAMATRIX ULTRA-THIN LANCETS MISC Check sugars daily after each meal 10/30/19   STEPHY Palacios   Blood Glucose Monitoring Suppl (AGAMATRIX AMP) ISABELLA Check BG daily after meals 10/30/19   STEPHY Palacios   [employer] Lancets MISC 1 each by Does not apply route 3 times daily 5/2/18   Boby Carney PA-C       Allergies:  Morphine; Chantix [varenicline tartrate]; and Percocet [oxycodone-acetaminophen]    Social History:      TOBACCO:   reports that she has been smoking cigarettes. She has a 32.00 pack-year smoking history. She has never used smokeless tobacco.  ETOH:   reports no history of alcohol use.       Family History:          Problem Relation Age of Onset    Diabetes Mother     Heart Disease Mother     High Blood Pressure Mother     Hypertension Mother     Heart Failure Mother     Cancer Father     Cancer Sister     Diabetes Brother     Cancer Brother     Cancer Brother     Asthma Daughter     Hypertension Brother     Hypertension Brother  Emphysema Neg Hx        REVIEW OF SYSTEMS:   Constitutional:   Positive for dizziness  ENT:  Negative for rhinorrhea, epistaxis, hoarseness, sore throat. Respiratory: Negative for SOB or wheezing   Cardiovascular:   Negative for  chest pain, palpitations   Gastrointestinal:  Negative for nausea, vomiting, diarrhea  Genitourinary:  Negative for polyuria, dysuria   Hematologic/Lymphatic:  Negative for  bleeding tendency, easy bruising  Musculoskeletal:  Negative for myalgias and arthralgias  Neurologic:  Negative for  confusion,dysarthria. Skin:  Negative for itching,rash  Psychiatric:  Negative for depression,anxiety, agitation. Endocrine:  Negative for polydipsia,polyuria,heat /cold intolerance. PHYSICAL EXAM:    BP (!) 142/49   Pulse 78   Temp 97.4 °F (36.3 °C) (Oral)   Resp 22   Ht 5' 3\" (1.6 m)   Wt 135 lb (61.2 kg)   SpO2 100%   BMI 23.91 kg/m²     General appearance:  No apparent distress, appears stated age and cooperative. HEENT:  Normal cephalic, atraumatic without obvious deformity. Pupils equal, round, and reactive to light. Extra ocular muscles intact. Conjunctivae/corneas clear. Neck: Supple, with full range of motion. No jugular venous distention. Trachea midline. Respiratory:  Normal respiratory effort. Clear to auscultation, bilaterally without Rales/Wheezes/Rhonchi. Cardiovascular:  Regular rate and rhythm with normal S1/S2 without murmurs, rubs or gallops. Abdomen: Soft, non-tender, non-distended with normal bowel sounds. Musculoskeletal:  No clubbing, cyanosis or edema bilaterally. Full range of motion without deformity. Skin: Skin color, texture, turgor normal.  No rashes or lesions. Neurologic:  Neurovascularly intact without any focal sensory/motor deficits.  Cranial nerves: II-XII intact, grossly non-focal.  Psychiatric:  Alert and oriented, thought content appropriate, normal insight  Capillary Refill: Brisk,< 3 seconds   Peripheral Pulses: +2 palpable, equal

## 2020-10-10 NOTE — PROGRESS NOTES
Patient admitted to room 324 from ER. Patient oriented to room, call light, bed rails, phone, lights and bathroom. Patient instructed about the schedule of the day including: vital sign frequency, lab draws, possible tests, frequency of MD and staff rounds, daily weights, I &O's and prescribed diet. #3Telemetry box in place, patient aware of placement and reason. Bed locked, in lowest position, side rails up 2/4, call light within reach. Recliner Assessment  Patient is able to demonstrated the ability to move from a reclining position to an upright position within the recliner. 4 Eyes Skin Assessment     The patient is being assess for   Admission    I agree that 2 RN's have performed a thorough Head to Toe Skin Assessment on the patient. ALL assessment sites listed below have been assessed. Areas assessed by both nurses:   [x]   Head, Face, and Ears   [x]   Shoulders, Back, and Chest, Abdomen  [x]   Arms, Elbows, and Hands   [x]   Coccyx, Sacrum, and Ischium  [x]   Legs, Feet, and Heels        Skin is WNL. No open areas noted. **SHARE this note so that the co-signing nurse is able to place an eSignature**    Co-signer eSignature: Electronically signed by Subha Bauer RN on 10/10/20 at 5:31 AM EDT    Does the Patient have Skin Breakdown?   No          Kasi Prevention initiated:  No   Wound Care Orders initiated:  No      [facility] nurse consulted for Pressure Injury (Stage 3,4, Unstageable, DTI, NWPT, Complex wounds)and New or Established Ostomies:  No      Primary Nurse eSignature: Electronically signed by Jeffy Gonzalez RN on 10/10/20 at 5:30 AM EDT

## 2020-10-10 NOTE — PROGRESS NOTES
Blood transfusion complete. No s/s of reaction. Pt sitting up eating breakfast. Call light within reach.

## 2020-10-10 NOTE — PROGRESS NOTES
Third unit of PRBC's infusing without difficulty. No s/s of reaction noted. Pt is lying in bed with their eyes closed. Respirations are easy and even. Call light within reach bed in lowest position with the wheels locked. Will continue to monitor.  Ora No

## 2020-10-10 NOTE — CONSULTS
Gastroenterology Consult Note    Patient:   Rony Joya   :    1955   Facility:     [address]  [address] [facility] [address]   Referring/PCP: Priti Ding MD  Date:     10/10/2020  Consultant:   Oliverio Ham DO    Subjective: This 72 y.o. female was admitted 10/9/2020 with a diagnosis of \"Acute GI bleeding [K92.2]\" and is seen in consultation regarding acute blood loss anemia    73 yo female patient of mine with history of upper endoscopy and colonoscopy in 2019 presents with symptomatic anemia. EGD demonstrated gastric erosions in the past.  Colonoscopy demonstrated polyps. Patient presented with profound anemia. Last bowel movement yesterday. No reported gross bleeding. Receiving 3 units of blood. Feeling short of breath.   Receiving lasix this am.      Past Medical History:   Diagnosis Date    Bronchitis chronic     Cancer ([address]     cervical    CHF (congestive heart failure) (HCC)     Diabetes mellitus (HCC)     Emphysema of lung ([address]     Hypercholesteremia     Hypertension     Microcytic anemia 3/23/2017    Psoriasis      Past Surgical History:   Procedure Laterality Date    CERVICAL [address] SURGERY  2004    CHOLECYSTECTOMY      DIAGNOSTIC CARDIAC CATH LAB PROCEDURE  2011    angiogram with 30% blockage    HYSTERECTOMY      KIDNEY STONE SURGERY      TONSILLECTOMY      UPPER GASTROINTESTINAL ENDOSCOPY N/A 10/29/2019    EGD BIOPSY performed by David Garcia DO at [facility]       Social:   Social History     Tobacco Use    Smoking status: Current Every Day Smoker     Packs/day: 1.00     Years: 32.00     Pack years: 32.00     Types: Cigarettes    Smokeless tobacco: Never Used    Tobacco comment: pt states she wants to quit. chantix did not work   Substance Use Topics    Alcohol use: No     Family:   Family History   Problem Relation Age of Onset    Diabetes Mother     Heart Disease Mother     High Blood Pressure Mother     Hypertension Mother     Heart Failure Mother     Cancer Father     Cancer Sister     Diabetes Brother     Cancer Brother     Cancer Brother     Asthma Daughter     Hypertension Brother     Hypertension Brother     Emphysema Neg Hx        Scheduled Medications:    pantoprazole  40 mg Oral Daily    metoprolol succinate  50 mg Oral Daily    metFORMIN  500 mg Oral BID WC    lisinopril  20 mg Oral Daily    glimepiride  2 mg Oral QAM AC    sodium chloride flush  10 mL Intravenous 2 times per day    insulin lispro  0-12 Units Subcutaneous TID WC    insulin lispro  0-6 Units Subcutaneous Nightly    sodium chloride  20 mL Intravenous Once    pantoprazole  40 mg Intravenous BID    cefdinir  300 mg Oral 2 times per day    furosemide  20 mg Intravenous Once    sodium chloride  20 mL Intravenous Once     Infusions:    dextrose      sodium chloride       PRN Medications: sodium chloride flush, acetaminophen **OR** acetaminophen, polyethylene glycol, promethazine **OR** ondansetron, glucose, dextrose, glucagon (rDNA), dextrose  Allergies: Allergies   Allergen Reactions    Morphine Other (See Comments)     Bad headache    Chantix [Varenicline Tartrate]      BAD DREAMS    Percocet [Oxycodone-Acetaminophen] Nausea And Vomiting       ROS:   Review of Systems   Constitutional: Negative. HENT: Negative. Eyes: Negative. Respiratory: Negative. Cardiovascular: Negative. Gastrointestinal: Positive for abdominal pain. Endocrine: Negative. Genitourinary: Negative. Musculoskeletal: Negative. Skin: Negative. Allergic/Immunologic: Negative. Neurological: Negative. Hematological: Negative. Psychiatric/Behavioral: Negative. Objective:     Physical Exam:   Vitals:    10/10/20 0638   BP: (!) 159/63   Pulse: 75   Resp: 16   Temp: 98.2 °F (36.8 °C)   SpO2: 93%     I/O last 3 completed shifts:   In: 425 [Blood:425]  Out: 200 [Urine:200]   General appearance: alert, appears stated age and cooperative  HEENT: PERRLA  Neck: no adenopathy, no carotid bruit, no JVD, supple, symmetrical, trachea midline and thyroid not enlarged, symmetric, no tenderness/mass/nodules  Lungs: clear to auscultation bilaterally  Heart: regular rate and rhythm, S1, S2 normal, no murmur, click, rub or gallop  Abdomen: soft, non-tender; bowel sounds normal; no masses,  no organomegaly  Extremities: extremities normal, atraumatic, no cyanosis or edema     Lab and Imaging Review   Labs:  CBC:   Recent Labs     10/09/20  2339   WBC 5.8   HGB 4.5*   HCT 16.4*   MCV 54.2*        BMP:   Recent Labs     10/09/20  2339      K 4.1      CO2 24   BUN 31*   CREATININE 1.0     LIVER PROFILE:   Recent Labs     10/09/20  2339   AST 18   ALT 9*   PROT 7.0   BILITOT 0.5   ALKPHOS 65     PT/INR: No results for input(s): INR in the last 72 hours. Invalid input(s): PT    IMAGING:  Xr Chest (2 Vw)    Result Date: 10/9/2020  EXAMINATION: TWO XRAY VIEWS OF THE CHEST 10/9/2020 10:03 pm COMPARISON: 10/28/2019 HISTORY: ORDERING SYSTEM PROVIDED HISTORY: shortness of breath, CHF TECHNOLOGIST PROVIDED HISTORY: Reason for exam:->shortness of breath, CHF Reason for Exam: spb chf FINDINGS: The lungs are clear. There is no pleural effusion. The heart size is mildly to moderately enlarged. There is no pneumothorax. No acute disease. Cardiomegaly. Ct Head Wo Contrast    Result Date: 10/9/2020  EXAMINATION: CT OF THE HEAD WITHOUT CONTRAST  10/9/2020 7:26 pm TECHNIQUE: CT of the head was performed without the administration of intravenous contrast. Dose modulation, iterative reconstruction, and/or weight based adjustment of the mA/kV was utilized to reduce the radiation dose to as low as reasonably achievable. COMPARISON: None.  HISTORY: ORDERING SYSTEM PROVIDED HISTORY: near syncope TECHNOLOGIST PROVIDED HISTORY: Reason for exam:->near syncope Has a \"code stroke\" or \"stroke alert\" been called? ->No Reason for Exam: dizzy FINDINGS: BRAIN/VENTRICLES:  No acute loss of the gray-white matter differentiation is identified to suggest acute or subacute infarct. No masses or hemorrhages within the brain parenchyma are found. No evidence of midline shift. There is mild to moderate periventricular low-attenuation, compatible with chronic small vessel ischemic disease. The intracranial vasculature, including the dural venous sinuses, is within normal limits. ORBITS: No acute orbital abnormalities are identified. SINUSES: The visualized paranasal sinuses and mastoid air cells are clear. SOFT TISSUES/SKULL: The calvarium is intact. Extracranial soft tissues are unremarkable. No acute intracranial abnormality. Hospital Problems           Last Modified POA    Acute GI bleeding 10/10/2020 Yes        Assessment:   73 yo female with profound anemia and epigastric pain. Plan:   1. Will plan upper endoscopy after medical stabilization. Given no gross bleeding will plan urgent endoscopy after hgb repleted and receives diuresis. Ok to advance diet.   Plan upper endoscopy tomorrow given  Profound anemia      Georgeanne Leventhal, DO  9:02 AM 10/10/2020            [address] [place] [address]    [address]      [address] [place] [address]     Phone: [phone]     Fax: [phone]

## 2020-10-10 NOTE — PLAN OF CARE
Problem: Falls - Risk of:  Goal: Will remain free from falls  Description: Will remain free from falls  Outcome: Ongoing     Problem: Fluid Volume - Imbalance:  Goal: Absence of imbalanced fluid volume signs and symptoms  Description: Absence of imbalanced fluid volume signs and symptoms  Outcome: Ongoing     Problem: Fluid Volume - Imbalance:  Goal: Will show no signs and symptoms of excessive bleeding  Description: Will show no signs and symptoms of excessive bleeding  Outcome: Ongoing     Problem: Nausea/Vomiting:  Goal: Absence of nausea/vomiting  Description: Absence of nausea/vomiting  Outcome: Ongoing     Problem: Discharge Planning:  Goal: Discharged to appropriate level of care  Description: Discharged to appropriate level of care  Outcome: Ongoing

## 2020-10-11 LAB
ANION GAP SERPL CALCULATED.3IONS-SCNC: 10 MMOL/L (ref 3–16)
BASOPHILS ABSOLUTE: 0.1 K/UL (ref 0–0.2)
BASOPHILS RELATIVE PERCENT: 1 %
BUN BLDV-MCNC: 24 MG/DL (ref 7–20)
CALCIUM SERPL-MCNC: 8.5 MG/DL (ref 8.3–10.6)
CHLORIDE BLD-SCNC: 101 MMOL/L (ref 99–110)
CO2: 21 MMOL/L (ref 21–32)
CREAT SERPL-MCNC: 0.7 MG/DL (ref 0.6–1.2)
EOSINOPHILS ABSOLUTE: 0.3 K/UL (ref 0–0.6)
EOSINOPHILS RELATIVE PERCENT: 2.9 %
GFR AFRICAN AMERICAN: >60
GFR NON-AFRICAN AMERICAN: >60
GLUCOSE BLD-MCNC: 101 MG/DL (ref 70–99)
GLUCOSE BLD-MCNC: 117 MG/DL (ref 70–99)
GLUCOSE BLD-MCNC: 117 MG/DL (ref 70–99)
GLUCOSE BLD-MCNC: 129 MG/DL (ref 70–99)
GLUCOSE BLD-MCNC: 181 MG/DL (ref 70–99)
HCT VFR BLD CALC: 27.1 % (ref 36–48)
HCT VFR BLD CALC: 28 % (ref 36–48)
HEMOGLOBIN: 8.3 G/DL (ref 12–16)
HEMOGLOBIN: 8.9 G/DL (ref 12–16)
LYMPHOCYTES ABSOLUTE: 1.3 K/UL (ref 1–5.1)
LYMPHOCYTES RELATIVE PERCENT: 14.8 %
MCH RBC QN AUTO: 21.1 PG (ref 26–34)
MCHC RBC AUTO-ENTMCNC: 30.8 G/DL (ref 31–36)
MCV RBC AUTO: 68.7 FL (ref 80–100)
MONOCYTES ABSOLUTE: 0.9 K/UL (ref 0–1.3)
MONOCYTES RELATIVE PERCENT: 10 %
NEUTROPHILS ABSOLUTE: 6.3 K/UL (ref 1.7–7.7)
NEUTROPHILS RELATIVE PERCENT: 71.3 %
PDW BLD-RTO: 34.7 % (ref 12.4–15.4)
PERFORMED ON: ABNORMAL
PLATELET # BLD: 168 K/UL (ref 135–450)
PMV BLD AUTO: 9.2 FL (ref 5–10.5)
POTASSIUM REFLEX MAGNESIUM: 4.1 MMOL/L (ref 3.5–5.1)
RBC # BLD: 3.94 M/UL (ref 4–5.2)
SODIUM BLD-SCNC: 132 MMOL/L (ref 136–145)
URINE CULTURE, ROUTINE: NORMAL
WBC # BLD: 8.8 K/UL (ref 4–11)

## 2020-10-11 PROCEDURE — 88342 IMHCHEM/IMCYTCHM 1ST ANTB: CPT

## 2020-10-11 PROCEDURE — 85025 COMPLETE CBC W/AUTO DIFF WBC: CPT

## 2020-10-11 PROCEDURE — C9113 INJ PANTOPRAZOLE SODIUM, VIA: HCPCS | Performed by: INTERNAL MEDICINE

## 2020-10-11 PROCEDURE — 0W3P8ZZ CONTROL BLEEDING IN GASTROINTESTINAL TRACT, VIA NATURAL OR ARTIFICIAL OPENING ENDOSCOPIC: ICD-10-PCS | Performed by: INTERNAL MEDICINE

## 2020-10-11 PROCEDURE — 3609012400 HC EGD TRANSORAL BIOPSY SINGLE/MULTIPLE: Performed by: INTERNAL MEDICINE

## 2020-10-11 PROCEDURE — 99232 SBSQ HOSP IP/OBS MODERATE 35: CPT | Performed by: INTERNAL MEDICINE

## 2020-10-11 PROCEDURE — 2720000010 HC SURG SUPPLY STERILE: Performed by: INTERNAL MEDICINE

## 2020-10-11 PROCEDURE — 85014 HEMATOCRIT: CPT

## 2020-10-11 PROCEDURE — 2060000000 HC ICU INTERMEDIATE R&B

## 2020-10-11 PROCEDURE — 2709999900 HC NON-CHARGEABLE SUPPLY: Performed by: INTERNAL MEDICINE

## 2020-10-11 PROCEDURE — 36415 COLL VENOUS BLD VENIPUNCTURE: CPT

## 2020-10-11 PROCEDURE — 2580000003 HC RX 258: Performed by: INTERNAL MEDICINE

## 2020-10-11 PROCEDURE — 3609013000 HC EGD TRANSORAL CONTROL BLEEDING ANY METHOD: Performed by: INTERNAL MEDICINE

## 2020-10-11 PROCEDURE — 0DB68ZX EXCISION OF STOMACH, VIA NATURAL OR ARTIFICIAL OPENING ENDOSCOPIC, DIAGNOSTIC: ICD-10-PCS | Performed by: INTERNAL MEDICINE

## 2020-10-11 PROCEDURE — 6360000002 HC RX W HCPCS: Performed by: INTERNAL MEDICINE

## 2020-10-11 PROCEDURE — 88305 TISSUE EXAM BY PATHOLOGIST: CPT

## 2020-10-11 PROCEDURE — 80048 BASIC METABOLIC PNL TOTAL CA: CPT

## 2020-10-11 PROCEDURE — 99152 MOD SED SAME PHYS/QHP 5/>YRS: CPT | Performed by: INTERNAL MEDICINE

## 2020-10-11 PROCEDURE — 7100000010 HC PHASE II RECOVERY - FIRST 15 MIN: Performed by: INTERNAL MEDICINE

## 2020-10-11 PROCEDURE — 6370000000 HC RX 637 (ALT 250 FOR IP): Performed by: INTERNAL MEDICINE

## 2020-10-11 PROCEDURE — 7100000011 HC PHASE II RECOVERY - ADDTL 15 MIN: Performed by: INTERNAL MEDICINE

## 2020-10-11 PROCEDURE — 85018 HEMOGLOBIN: CPT

## 2020-10-11 PROCEDURE — 6370000000 HC RX 637 (ALT 250 FOR IP): Performed by: PHYSICIAN ASSISTANT

## 2020-10-11 RX ORDER — FENTANYL CITRATE 50 UG/ML
INJECTION, SOLUTION INTRAMUSCULAR; INTRAVENOUS PRN
Status: DISCONTINUED | OUTPATIENT
Start: 2020-10-11 | End: 2020-10-11 | Stop reason: ALTCHOICE

## 2020-10-11 RX ORDER — FERROUS SULFATE 325(65) MG
325 TABLET ORAL 2 TIMES DAILY WITH MEALS
Status: DISCONTINUED | OUTPATIENT
Start: 2020-10-11 | End: 2020-10-12

## 2020-10-11 RX ORDER — MIDAZOLAM HYDROCHLORIDE 5 MG/ML
INJECTION INTRAMUSCULAR; INTRAVENOUS PRN
Status: DISCONTINUED | OUTPATIENT
Start: 2020-10-11 | End: 2020-10-11 | Stop reason: ALTCHOICE

## 2020-10-11 RX ORDER — SODIUM CHLORIDE 9 MG/ML
INJECTION, SOLUTION INTRAVENOUS CONTINUOUS PRN
Status: COMPLETED | OUTPATIENT
Start: 2020-10-11 | End: 2020-10-11

## 2020-10-11 RX ADMIN — Medication 10 ML: at 09:11

## 2020-10-11 RX ADMIN — FERROUS SULFATE TAB 325 MG (65 MG ELEMENTAL FE) 325 MG: 325 (65 FE) TAB at 17:10

## 2020-10-11 RX ADMIN — METFORMIN HYDROCHLORIDE 500 MG: 500 TABLET ORAL at 17:10

## 2020-10-11 RX ADMIN — Medication 10 ML: at 21:53

## 2020-10-11 RX ADMIN — PANTOPRAZOLE SODIUM 40 MG: 40 INJECTION, POWDER, FOR SOLUTION INTRAVENOUS at 09:11

## 2020-10-11 RX ADMIN — PANTOPRAZOLE SODIUM 40 MG: 40 INJECTION, POWDER, FOR SOLUTION INTRAVENOUS at 21:53

## 2020-10-11 ASSESSMENT — PAIN - FUNCTIONAL ASSESSMENT: PAIN_FUNCTIONAL_ASSESSMENT: 0-10

## 2020-10-11 NOTE — OP NOTE
Esophagogastroduodenoscopy Note    Patient:   Bridget España    YOB: 1955    Facility:     [address] [place] [address] [address] [facility] [address]   [Inpatient]   Referring/PCP: Megan Primrose, MD    Procedure:   Esophagogastroduodenoscopy   Date:     10/11/2020   Endoscopist:  Megan Ibarra     Preoperative Diagnosis:   Anemia    Postoperative Diagnosis:  same    Anesthesia:  Versed 7 mg IV, fentanyl 100 mcg IV    Estimated blood loss: Minimal    Complications: None    Description of Procedure:  Informed consent was obtained from the patient after explanation of the procedure including indications, description of the procedure,  benefits and possible risks and complications of the procedure, and alternatives. Questions were answered. The patient's history was reviewed and a directed physical examination was performed prior to the procedure. Patient was monitored throughout the procedure with pulse oximetry and periodic assessment of vital signs. Patient was sedated as noted above. The Nursing staff and I performed a time out. With the patient in the left lateral decubitus position, the Olympus videoendoscope was placed in the patient's mouth and under direct visualization passed into the esophagus. The scope was ultimately passed to the second portion of the duodenum. Visualization was performed during both introduction and withdrawal of the endoscope and retroflexed view of the proximal stomach was obtained. Findings[de-identified]   Esophagus: normal. The findings do not support a diagnosis of De Guzman's Esophagus. 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   Biopsies were obtained of the angularis and the nodules and linear erythema were ablated with APC  Duodenum: normal    Recommendations:  Await pathology results  Would repeat colonoscopy given iron deficiency and

## 2020-10-11 NOTE — PROGRESS NOTES
Bedside report and Pt care transferred to [facility].. Pt denies any assistance at this time.

## 2020-10-11 NOTE — PROGRESS NOTES
Pt is lying in bed with their eyes closed. Respirations are easy and even. Pt NPO at this time. Call light within reach bed in lowest position with the wheels locked. Will continue to monitor.  Susanne Varma

## 2020-10-11 NOTE — FLOWSHEET NOTE
10/11/20 0908   Vitals   Temp 98.1 °F (36.7 °C)   Temp Source Oral   Pulse 65   Heart Rate Source Monitor   Resp 16   BP (!) 153/60   MAP (mmHg) 91   Level of Consciousness 0   MEWS Score 1   Oxygen Therapy   SpO2 92 %   O2 Device None (Room air)   Vital signs stable. Pt is alert and oriented and denies any nausea, dizziness or abdominal pain at the moment. Nothing new noted on head to toe assessment. Pt is NSR on the monitor. Morning medication administration completed. Pt denies any further assistance at the moment. Will continue to monitor.

## 2020-10-11 NOTE — H&P
Gastroenterology Preop Assessment    Patient:   Molly Zamudio   :    1955   Facility:   [facility]  Referring/PCP: Tania Christensen MD  Date:     10/11/2020    Subjective:   Procedure:egd    HPI/Reason for procedure: Iron deficiency anemia    Past Medical History:   Diagnosis Date    Bronchitis chronic     Cancer ([address]     cervical    [facility]) [facility]     Diabetes mellitus ([facility]     Emphysema of lung ([facility]     Hypercholesteremia     Hypertension     Microcytic anemia 3/23/2017    Psoriasis      Past Surgical History:   Procedure Laterality Date    CERVICAL DISC SURGERY      CHOLECYSTECTOMY      DIAGNOSTIC CARDIAC CATH LAB PROCEDURE  2011    angiogram with 30% blockage    HYSTERECTOMY      KIDNEY STONE SURGERY      TONSILLECTOMY      UPPER GASTROINTESTINAL ENDOSCOPY N/A 10/29/2019    EGD BIOPSY performed by Mily Ventura DO at [address] [facility]       Social:   Social History     Tobacco Use    Smoking status: Current Every Day Smoker     Packs/day: 1.00     Years: 32.00     Pack years: 32.00     Types: Cigarettes    Smokeless tobacco: Never Used    Tobacco comment: pt states she wants to quit. chantix did not work   Substance Use Topics    Alcohol use: No     Family:   Family History   Problem Relation Age of Onset    Diabetes Mother     Heart Disease Mother     High Blood Pressure Mother     Hypertension Mother     Heart Failure Mother     Cancer Father     Cancer Sister     Diabetes Brother     Cancer Brother     Cancer Brother     Asthma Daughter     Hypertension Brother     Hypertension Brother     Emphysema Neg Hx        Scheduled Medications:    ferrous sulfate  325 mg Oral BID WC    metoprolol succinate  50 mg Oral Daily    metFORMIN  500 mg Oral BID WC    lisinopril  20 mg Oral Daily    glimepiride  2 mg Oral QAM AC    sodium chloride flush  10 mL Intravenous 2 times per day    insulin lispro  0-12 Units glucagon (rDNA), dextrose  Allergies: Allergies   Allergen Reactions    Morphine Other (See Comments)     Bad headache    Chantix [Varenicline Tartrate]      BAD DREAMS    Percocet [Oxycodone-Acetaminophen] Nausea And Vomiting         Objective:     Physical Exam:   BP (!) 153/60   Pulse 65   Temp 98.1 °F (36.7 °C) (Oral)   Resp 16   Ht 5' 3\" (1.6 m)   Wt 139 lb 14.4 oz (63.5 kg)   SpO2 92%   BMI 24.78 kg/m²     HEENT: NCAT  Lungs: CTAB  CV: RRR  Abd: soft, ntd  Ext: dpi    Lab and Imaging Review   Labs:  CBC:   Recent Labs     10/09/20  2339 10/10/20  1057 10/11/20  0428   WBC 5.8  --  8.8   HGB 4.5* 9.2* 8.3*   HCT 16.4* 30.2* 27.1*   MCV 54.2*  --  68.7*     --  168     BMP:   Recent Labs     10/09/20  2339 10/11/20  0428    132*   K 4.1 4.1    101   CO2 24 21   BUN 31* 24*   CREATININE 1.0 0.7     LIVER PROFILE:   Recent Labs     10/09/20  2339   AST 18   ALT 9*   PROT 7.0   BILITOT 0.5   ALKPHOS 65     PT/INR: No results for input(s): INR in the last 72 hours. Invalid input(s): PT    Pre-Procedure Assessment / Plan:  ASA: Class 2 - A normal healthy patient with mild systemic disease  Airway: Mallampati: II (soft palate, uvula, fauces visible)  Level of Sedation Plan: Moderate sedation  Post Procedure plan: Return to same level of care      Plan:   1. egd    I assessed the patient and find that the patient is in satisfactory condition to proceed with the planned procedure and sedation plan. I have explained the risk, benefits, and alternatives to the procedure; the patient understands and agrees to proceed.        El Russell  10/11/2020

## 2020-10-11 NOTE — PROGRESS NOTES
Pt transported back to room via stretcher. Report called to Primary RN Daphne Grief. Pt is a/o, vss, on room air.

## 2020-10-11 NOTE — FLOWSHEET NOTE
10/10/20 2027   Vital Signs   Temp 98 °F (36.7 °C)   Temp Source Oral   Pulse 70   Heart Rate Source Monitor   Resp 16   BP (!) 151/59   BP Location Left upper arm   BP Upper/Lower Upper   Level of Consciousness 0   MEWS Score 1   Oxygen Therapy   SpO2 95 %   O2 Device None (Room air)   Pt assessment complete. Pt lying quietly in bed. Lung sounds diminished. Pt on room air at this time. Nightly medications given. Pt requesting something to help her sleep. Dr Everardo Whitley paged. Pt to be NPO after midnight for EGD in am.  Pt verbalizes understanding. No other needs at this time. Call light within reach.

## 2020-10-11 NOTE — PROGRESS NOTES
Progress Note    Admit Date:  10/9/2020    70-year-old female with iron deficiency anemia, hospital admission in 10/2019 with anemia, GI work-up with EGD and c scope was negative. Returns to ER now with hemoglobin of 6.4. Hemoccult positive, but she has not noted any blood loss. GI has been consulted      Subjective:  Ms. Spencer Mcfarland has no complaints. No noted blood loss  Hb improved after 3U PRBC      Objective:   Patient Vitals for the past 4 hrs:   BP Temp Temp src Pulse Resp SpO2   10/11/20 0908 (!) 153/60 98.1 °F (36.7 °C) Oral 65 16 92 %            Intake/Output Summary (Last 24 hours) at 10/11/2020 1210  Last data filed at 10/11/2020 0421  Gross per 24 hour   Intake 360 ml   Output 601 ml   Net -241 ml       Physical Exam:    Gen: No distress. Alert. Eyes: PERRL. No sclera icterus. No conjunctival injection. ENT: No discharge. Pharynx clear. Neck: No JVD. Trachea midline. Resp: No accessory muscle use. No crackles. No wheezes. No rhonchi. CV: Regular rate. Regular rhythm. No murmur. No rub. No edema. GI: Non-tender. Non-distended. Normal bowel sounds. Skin: Warm and dry. No nodule on exposed extremities. No rash on exposed extremities. M/S: No cyanosis. No joint deformity. No clubbing. Neuro: Awake. Grossly nonfocal    Psych: Oriented x 3. No anxiety or agitation. I Cha Ross have reviewed the chart on [facility] and personally interviewed and examined patient, reviewed the data (labs and imaging) and after discussion with my PA formulated the plan. Agree with note with the following edits. HPI:     I reviewed the patient's Past Medical History, Past Surgical History, Medications, and Allergies. Transfused 3 units, pt denies any bleeding. Similar episode last year with neg scopes        General thin eldelry appearing female    Awake, alert and oriented.  Appears to be not in any distress  Mucous Membranes:  Pink , anicteric  Neck: No JVD, no carotid bruit, no thyromegaly  Chest:  Clear to auscultation bilaterally, no added sounds  Cardiovascular:  RRR S1S2 heard, no murmurs or gallops  Abdomen:  Soft, undistended, non tender, no organomegaly, BS present  Extremities: No edema or cyanosis. Distal pulses well felt  Neurological : grossly normal              Scheduled Meds:   pantoprazole  40 mg Oral Daily    metoprolol succinate  50 mg Oral Daily    metFORMIN  500 mg Oral BID WC    lisinopril  20 mg Oral Daily    glimepiride  2 mg Oral QAM AC    sodium chloride flush  10 mL Intravenous 2 times per day    insulin lispro  0-12 Units Subcutaneous TID WC    insulin lispro  0-6 Units Subcutaneous Nightly    sodium chloride  20 mL Intravenous Once    pantoprazole  40 mg Intravenous BID    cefdinir  300 mg Oral 2 times per day    sodium chloride  20 mL Intravenous Once       Continuous Infusions:   dextrose      sodium chloride 75 mL/hr at 10/10/20 0937       PRN Meds:  sodium chloride flush, acetaminophen **OR** acetaminophen, polyethylene glycol, promethazine **OR** ondansetron, glucose, dextrose, glucagon (rDNA), dextrose      Data:  CBC:   Recent Labs     10/09/20  2339 10/10/20  1057 10/11/20  0428   WBC 5.8  --  8.8   HGB 4.5* 9.2* 8.3*   HCT 16.4* 30.2* 27.1*   MCV 54.2*  --  68.7*     --  168     BMP:   Recent Labs     10/09/20  2339 10/11/20  0428    132*   K 4.1 4.1    101   CO2 24 21   BUN 31* 24*   CREATININE 1.0 0.7     LIVER PROFILE:   Recent Labs     10/09/20  2339   AST 18   ALT 9*   BILITOT 0.5   ALKPHOS 65     PT/INR: No results for input(s): PROTIME, INR in the last 72 hours. CULTURES  Blood: NGTD  Urine: NG     RADIOLOGY  CT HEAD WO CONTRAST   Final Result   No acute intracranial abnormality. XR CHEST (2 VW)   Final Result   No acute disease. Cardiomegaly. EGD 10/2019  Findings[de-identified]   Esophagus: normal. The findings do not support a diagnosis of De Guzman's Esophagus.   Stomach: abnormal: Gastric erosions seen in the antrum. Biopsies obtained  Duodenum: normal  Recommendations:   High dose bid ppi for 2 weeks  Advance diet as tolerated  Outpatient colonoscopy    Colonoscopy 11/2019  Polyps in colon  Diverticulosis  Internal hemorrhoids     Assessment/Plan:    #Acute on chronic blood loss anemia  #GI Bleed  - Initial evaluation in Fall 2019 with EGD and colonoscopy. EGD showed gastric erosions, no active bleeding. Colonoscopy showed polyps, internal hemorrhoids, diverticulosis. Follow up h/h 1/2020 was stable   -Presented to hospital now with dizziness, hemoglobin 4.5 on admission, hemoccult + but she has not noticed and obvious bleeding   -Status post 3 units packed red blood cells with improvement in H&H  - PO iron    - IV PPI  - GI consulted. Plan for EGD today  - monitor h/h and transfuse for hb<7    If no source noted, consider hematology workup    #Chronic systolic CHF     - appears compensated  - cont lisinopril and toprol XL   - she is not on home diuretics     #HTN  - cont lisinopril and toprol      #DM2   - can cont home meds when taking PO  - SSI     #COPD  #Tobacco dependence   - no AE. Cont inhalers.   Smoking cessation recommended     #Bacteriuria with negative urine culture- stop omnicef    DVT Prophylaxis: SCD  Diet: Diet NPO Time Specified  Code Status: Full Code    Jennifer Sanabria PA-C  10/11/2020 12:21 PM      Agree with above  Changes made to note    Stephan Lin MD 10/11/2020 2:04 PM

## 2020-10-12 LAB
ANION GAP SERPL CALCULATED.3IONS-SCNC: 11 MMOL/L (ref 3–16)
BASOPHILS ABSOLUTE: 0.1 K/UL (ref 0–0.2)
BASOPHILS RELATIVE PERCENT: 0.8 %
BUN BLDV-MCNC: 19 MG/DL (ref 7–20)
CALCIUM SERPL-MCNC: 8.5 MG/DL (ref 8.3–10.6)
CHLORIDE BLD-SCNC: 102 MMOL/L (ref 99–110)
CO2: 24 MMOL/L (ref 21–32)
CREAT SERPL-MCNC: 0.7 MG/DL (ref 0.6–1.2)
EOSINOPHILS ABSOLUTE: 0.2 K/UL (ref 0–0.6)
EOSINOPHILS RELATIVE PERCENT: 2.3 %
FERRITIN: 14.6 NG/ML (ref 15–150)
GFR AFRICAN AMERICAN: >60
GFR NON-AFRICAN AMERICAN: >60
GLUCOSE BLD-MCNC: 111 MG/DL (ref 70–99)
GLUCOSE BLD-MCNC: 128 MG/DL (ref 70–99)
GLUCOSE BLD-MCNC: 129 MG/DL (ref 70–99)
GLUCOSE BLD-MCNC: 132 MG/DL (ref 70–99)
GLUCOSE BLD-MCNC: 147 MG/DL (ref 70–99)
HCT VFR BLD CALC: 26.8 % (ref 36–48)
HCT VFR BLD CALC: 28.6 % (ref 36–48)
HEMOGLOBIN: 8.4 G/DL (ref 12–16)
HEMOGLOBIN: 9 G/DL (ref 12–16)
IRON SATURATION: 8 % (ref 15–50)
IRON: 34 UG/DL (ref 37–145)
LYMPHOCYTES ABSOLUTE: 0.8 K/UL (ref 1–5.1)
LYMPHOCYTES RELATIVE PERCENT: 9.4 %
MCH RBC QN AUTO: 20.6 PG (ref 26–34)
MCHC RBC AUTO-ENTMCNC: 31.2 G/DL (ref 31–36)
MCV RBC AUTO: 65.9 FL (ref 80–100)
MONOCYTES ABSOLUTE: 1 K/UL (ref 0–1.3)
MONOCYTES RELATIVE PERCENT: 11.8 %
NEUTROPHILS ABSOLUTE: 6.4 K/UL (ref 1.7–7.7)
NEUTROPHILS RELATIVE PERCENT: 75.7 %
PDW BLD-RTO: 35.8 % (ref 12.4–15.4)
PERFORMED ON: ABNORMAL
PLATELET # BLD: 171 K/UL (ref 135–450)
PMV BLD AUTO: 9.1 FL (ref 5–10.5)
POTASSIUM REFLEX MAGNESIUM: 4 MMOL/L (ref 3.5–5.1)
RBC # BLD: 4.07 M/UL (ref 4–5.2)
SODIUM BLD-SCNC: 137 MMOL/L (ref 136–145)
TOTAL IRON BINDING CAPACITY: 444 UG/DL (ref 260–445)
TRANSFERRIN: 318 MG/DL (ref 200–360)
WBC # BLD: 8.5 K/UL (ref 4–11)

## 2020-10-12 PROCEDURE — 2580000003 HC RX 258: Performed by: INTERNAL MEDICINE

## 2020-10-12 PROCEDURE — 6360000002 HC RX W HCPCS: Performed by: INTERNAL MEDICINE

## 2020-10-12 PROCEDURE — 85025 COMPLETE CBC W/AUTO DIFF WBC: CPT

## 2020-10-12 PROCEDURE — C9113 INJ PANTOPRAZOLE SODIUM, VIA: HCPCS | Performed by: INTERNAL MEDICINE

## 2020-10-12 PROCEDURE — 2060000000 HC ICU INTERMEDIATE R&B

## 2020-10-12 PROCEDURE — 84466 ASSAY OF TRANSFERRIN: CPT

## 2020-10-12 PROCEDURE — 6370000000 HC RX 637 (ALT 250 FOR IP): Performed by: PHYSICIAN ASSISTANT

## 2020-10-12 PROCEDURE — 85018 HEMOGLOBIN: CPT

## 2020-10-12 PROCEDURE — 82728 ASSAY OF FERRITIN: CPT

## 2020-10-12 PROCEDURE — 80048 BASIC METABOLIC PNL TOTAL CA: CPT

## 2020-10-12 PROCEDURE — 85014 HEMATOCRIT: CPT

## 2020-10-12 PROCEDURE — 83540 ASSAY OF IRON: CPT

## 2020-10-12 PROCEDURE — 36415 COLL VENOUS BLD VENIPUNCTURE: CPT

## 2020-10-12 PROCEDURE — 6370000000 HC RX 637 (ALT 250 FOR IP): Performed by: INTERNAL MEDICINE

## 2020-10-12 PROCEDURE — 99232 SBSQ HOSP IP/OBS MODERATE 35: CPT | Performed by: INTERNAL MEDICINE

## 2020-10-12 RX ADMIN — ONDANSETRON 4 MG: 2 INJECTION INTRAMUSCULAR; INTRAVENOUS at 02:35

## 2020-10-12 RX ADMIN — METFORMIN HYDROCHLORIDE 500 MG: 500 TABLET ORAL at 16:26

## 2020-10-12 RX ADMIN — Medication 10 ML: at 02:35

## 2020-10-12 RX ADMIN — LISINOPRIL 20 MG: 20 TABLET ORAL at 08:25

## 2020-10-12 RX ADMIN — Medication 10 ML: at 20:48

## 2020-10-12 RX ADMIN — METOPROLOL SUCCINATE 50 MG: 50 TABLET, EXTENDED RELEASE ORAL at 08:25

## 2020-10-12 RX ADMIN — GLIMEPIRIDE 2 MG: 2 TABLET ORAL at 08:26

## 2020-10-12 RX ADMIN — PANTOPRAZOLE SODIUM 40 MG: 40 INJECTION, POWDER, FOR SOLUTION INTRAVENOUS at 20:48

## 2020-10-12 RX ADMIN — METFORMIN HYDROCHLORIDE 500 MG: 500 TABLET ORAL at 08:26

## 2020-10-12 RX ADMIN — Medication 10 ML: at 08:25

## 2020-10-12 RX ADMIN — SODIUM CHLORIDE 200 MG: 9 INJECTION, SOLUTION INTRAVENOUS at 14:59

## 2020-10-12 RX ADMIN — FERROUS SULFATE TAB 325 MG (65 MG ELEMENTAL FE) 325 MG: 325 (65 FE) TAB at 08:25

## 2020-10-12 RX ADMIN — ONDANSETRON 4 MG: 2 INJECTION INTRAMUSCULAR; INTRAVENOUS at 16:26

## 2020-10-12 RX ADMIN — PANTOPRAZOLE SODIUM 40 MG: 40 INJECTION, POWDER, FOR SOLUTION INTRAVENOUS at 08:25

## 2020-10-12 ASSESSMENT — PAIN SCALES - GENERAL
PAINLEVEL_OUTOF10: 0
PAINLEVEL_OUTOF10: 0

## 2020-10-12 NOTE — PROGRESS NOTES
Report received from [facility], [address] [facility]. Care transferred.

## 2020-10-12 NOTE — PLAN OF CARE
Problem: Falls - Risk of:  Goal: Will remain free from falls  Description: Will remain free from falls  Outcome: Ongoing  Goal: Absence of physical injury  Description: Absence of physical injury  Outcome: Ongoing     Problem: Discharge Planning:  Goal: Discharged to appropriate level of care  Description: Discharged to appropriate level of care  Outcome: Ongoing     Problem:  Bowel Function - Altered:  Goal: Bowel elimination is within specified parameters  Description: Bowel elimination is within specified parameters  Outcome: Ongoing     Problem: Fluid Volume - Imbalance:  Goal: Will show no signs and symptoms of excessive bleeding  Description: Will show no signs and symptoms of excessive bleeding  Outcome: Ongoing     Problem: Nausea/Vomiting:  Goal: Absence of nausea/vomiting  Description: Absence of nausea/vomiting  Outcome: Ongoing

## 2020-10-12 NOTE — PROGRESS NOTES
Notified by monitor technician that patient had 9 beats of SVT at this time. In to assess patient, patient asymptomatic, denies chest pain/discomfort or palpitations. Message sent to Dr. Yolette Damon to update. Call light in reach. Will continue to monitor.

## 2020-10-12 NOTE — PROGRESS NOTES
Progress Note    Admit Date:  10/9/2020    30-year-old female with iron deficiency anemia, hospital admission in 10/2019 with anemia, GI work-up with EGD and c scope was negative. Returns to ER now with hemoglobin of 6.4. Hemoccult positive, but she has not noted any blood loss. GI has been consulted      Subjective:  Ms. Verona Matthews has no complaints. No noted blood loss  Hb improved after 3U PRBC      Objective:   No data found. Intake/Output Summary (Last 24 hours) at 10/12/2020 1344  Last data filed at 10/12/2020 1221  Gross per 24 hour   Intake 360 ml   Output 1200 ml   Net -840 ml       Physical Exam:    Gen: No distress. Alert. Eyes: PERRL. No sclera icterus. No conjunctival injection. ENT: No discharge. Pharynx clear. Neck: No JVD. Trachea midline. Resp: No accessory muscle use. No crackles. No wheezes. No rhonchi. CV: Regular rate. Regular rhythm. No murmur. No rub. No edema. GI: Non-tender. Non-distended. Normal bowel sounds. Skin: Warm and dry. No nodule on exposed extremities. No rash on exposed extremities. M/S: No cyanosis. No joint deformity. No clubbing. Neuro: Awake. Grossly nonfocal    Psych: Oriented x 3. No anxiety or agitation.      Scheduled Meds:   ferrous sulfate  325 mg Oral BID WC    metoprolol succinate  50 mg Oral Daily    metFORMIN  500 mg Oral BID WC    lisinopril  20 mg Oral Daily    glimepiride  2 mg Oral QAM AC    sodium chloride flush  10 mL Intravenous 2 times per day    insulin lispro  0-12 Units Subcutaneous TID WC    insulin lispro  0-6 Units Subcutaneous Nightly    pantoprazole  40 mg Intravenous BID       Continuous Infusions:   dextrose      sodium chloride 75 mL/hr at 10/10/20 0937       PRN Meds:  sodium chloride flush, acetaminophen **OR** acetaminophen, polyethylene glycol, promethazine **OR** ondansetron, glucose, dextrose, glucagon (rDNA), dextrose      Data:  CBC:   Recent Labs     10/09/20  2339  10/11/20  0428 10/11/20  2052 10/12/20  0428 10/12/20  1203   WBC 5.8  --  8.8  --  8.5  --    HGB 4.5*   < > 8.3* 8.9* 8.4* 9.0*   HCT 16.4*   < > 27.1* 28.0* 26.8* 28.6*   MCV 54.2*  --  68.7*  --  65.9*  --      --  168  --  171  --     < > = values in this interval not displayed. BMP:   Recent Labs     10/09/20  2339 10/11/20  0428 10/12/20  0428    132* 137   K 4.1 4.1 4.0    101 102   CO2 24 21 24   BUN 31* 24* 19   CREATININE 1.0 0.7 0.7     LIVER PROFILE:   Recent Labs     10/09/20  2339   AST 18   ALT 9*   BILITOT 0.5   ALKPHOS 65     PT/INR: No results for input(s): PROTIME, INR in the last 72 hours. CULTURES  Blood: NGTD  Urine: NG     RADIOLOGY  CT HEAD WO CONTRAST   Final Result   No acute intracranial abnormality. XR CHEST (2 VW)   Final Result   No acute disease. Cardiomegaly. EGD 10/2019  Findings[de-identified]   Esophagus: normal. The findings do not support a diagnosis of De Guzman's Esophagus. Stomach: abnormal: Gastric erosions seen in the antrum. Biopsies obtained  Duodenum: normal  Recommendations:   High dose bid ppi for 2 weeks  Advance diet as tolerated  Outpatient colonoscopy    Colonoscopy 11/2019  Polyps in colon  Diverticulosis  Internal hemorrhoids     10/11/2020  Esophagus: normal. The findings do not support a diagnosis of De Guzman's Esophagus. 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 Biopsies were obtained of the angularis and the nodules and linear erythema were ablated with APC  Duodenum: normal    Assessment/Plan:    #Acute on chronic iron def anemia  #GI Bleed  - Initial evaluation in Fall 2019 with EGD and colonoscopy. EGD showed gastric erosions, no active bleeding. Colonoscopy showed polyps, internal hemorrhoids, diverticulosis.   Follow up h/h 1/2020 was stable   -Presented to hospital now with dizziness, hemoglobin 4.5 on admission, hemoccult + but she has not noticed and obvious bleeding   -Status post 3 units packed red blood cells with improvement in H&H  - PO iron    - IV PPI  - GI consulted. EGD today  - monitor h/h and transfuse for hb<7  hematology consult    #Chronic systolic CHF     - appears compensated  - cont lisinopril and toprol XL   - she is not on home diuretics     #HTN  - cont lisinopril and toprol      #DM2   - can cont home meds when taking PO  - SSI     #COPD  #Tobacco dependence   - no AE. Cont inhalers.   Smoking cessation recommended     #Bacteriuria with negative urine culture- stop omnicef    DVT Prophylaxis: SCD  Diet: DIET GENERAL;  Code Status: Full Code    Andi Vaughn MD 10/12/2020 1:44 PM

## 2020-10-12 NOTE — PROGRESS NOTES
Patient improved hemoglobin after transfusion. Planning outpatient colonoscopy. Call if questions or concerns.

## 2020-10-12 NOTE — PROGRESS NOTES
Shift assessment completed and morning medications administered. Patient is alert and oriented x4 in bed. VSS, on RA, NSR on telemetry. Patient denies SOB, pain/disocmfort at this time. No reports of active bleeding by patient (patient continues to independently use restroom). Reviewed plan of care with patient and questions/concerns addressed at this time. Call light in reach. Will continue with P.O.C.

## 2020-10-12 NOTE — PROGRESS NOTES
Patient up to restroom independently, reported continued fatigue.  Patient educated to continue with ambulation as able, but to sit and rest when feeling more fatigued than normal.

## 2020-10-12 NOTE — PROGRESS NOTES
Patient reported feeling nauseated, requested Zofran. PRN IV Zofran administered per MAR at this time. Patient denies additional needs. Call light in reach. Will continue to monitor.

## 2020-10-12 NOTE — PROGRESS NOTES
Shift assessment complete. See flow sheet. Patient resting comfortably in bed. Respirations easy and even. Bowel sounds active. Meds given per MAR. Pt states no additional needs at this time. Call light and bedside table in reach. Will follow.

## 2020-10-13 VITALS
OXYGEN SATURATION: 95 % | BODY MASS INDEX: 23.85 KG/M2 | HEIGHT: 63 IN | RESPIRATION RATE: 18 BRPM | WEIGHT: 134.6 LBS | SYSTOLIC BLOOD PRESSURE: 145 MMHG | HEART RATE: 66 BPM | DIASTOLIC BLOOD PRESSURE: 69 MMHG | TEMPERATURE: 97.6 F

## 2020-10-13 LAB
GLUCOSE BLD-MCNC: 87 MG/DL (ref 70–99)
HCT VFR BLD CALC: 28 % (ref 36–48)
HEMATOLOGY PATH CONSULT: NORMAL
HEMOGLOBIN: 8.7 G/DL (ref 12–16)
MCH RBC QN AUTO: 20.6 PG (ref 26–34)
MCHC RBC AUTO-ENTMCNC: 31.2 G/DL (ref 31–36)
MCV RBC AUTO: 65.9 FL (ref 80–100)
PDW BLD-RTO: 35 % (ref 12.4–15.4)
PERFORMED ON: NORMAL
PLATELET # BLD: 192 K/UL (ref 135–450)
PMV BLD AUTO: 9.1 FL (ref 5–10.5)
RBC # BLD: 4.24 M/UL (ref 4–5.2)
WBC # BLD: 8.3 K/UL (ref 4–11)

## 2020-10-13 PROCEDURE — 2580000003 HC RX 258: Performed by: INTERNAL MEDICINE

## 2020-10-13 PROCEDURE — 6370000000 HC RX 637 (ALT 250 FOR IP): Performed by: INTERNAL MEDICINE

## 2020-10-13 PROCEDURE — 6360000002 HC RX W HCPCS: Performed by: INTERNAL MEDICINE

## 2020-10-13 PROCEDURE — 99239 HOSP IP/OBS DSCHRG MGMT >30: CPT | Performed by: INTERNAL MEDICINE

## 2020-10-13 PROCEDURE — 85027 COMPLETE CBC AUTOMATED: CPT

## 2020-10-13 PROCEDURE — C9113 INJ PANTOPRAZOLE SODIUM, VIA: HCPCS | Performed by: INTERNAL MEDICINE

## 2020-10-13 PROCEDURE — 36415 COLL VENOUS BLD VENIPUNCTURE: CPT

## 2020-10-13 RX ORDER — LANOLIN ALCOHOL/MO/W.PET/CERES
325 CREAM (GRAM) TOPICAL 2 TIMES DAILY
Qty: 1 TABLET | Refills: 0 | Status: ON HOLD
Start: 2020-10-13 | End: 2022-06-27 | Stop reason: SDUPTHER

## 2020-10-13 RX ORDER — HYDRALAZINE HYDROCHLORIDE 20 MG/ML
5 INJECTION INTRAMUSCULAR; INTRAVENOUS EVERY 6 HOURS PRN
Status: DISCONTINUED | OUTPATIENT
Start: 2020-10-13 | End: 2020-10-13 | Stop reason: HOSPADM

## 2020-10-13 RX ADMIN — Medication 10 ML: at 10:00

## 2020-10-13 RX ADMIN — LISINOPRIL 20 MG: 20 TABLET ORAL at 10:00

## 2020-10-13 RX ADMIN — METOPROLOL SUCCINATE 50 MG: 50 TABLET, EXTENDED RELEASE ORAL at 10:02

## 2020-10-13 RX ADMIN — PANTOPRAZOLE SODIUM 40 MG: 40 INJECTION, POWDER, FOR SOLUTION INTRAVENOUS at 10:00

## 2020-10-13 RX ADMIN — GLIMEPIRIDE 2 MG: 2 TABLET ORAL at 10:01

## 2020-10-13 RX ADMIN — METFORMIN HYDROCHLORIDE 500 MG: 500 TABLET ORAL at 10:00

## 2020-10-13 RX ADMIN — HYDRALAZINE HYDROCHLORIDE 5 MG: 20 INJECTION, SOLUTION INTRAMUSCULAR; INTRAVENOUS at 02:11

## 2020-10-13 ASSESSMENT — PAIN SCALES - GENERAL
PAINLEVEL_OUTOF10: 0
PAINLEVEL_OUTOF10: 0

## 2020-10-13 NOTE — PROGRESS NOTES
Progress Note    Admit Date:  10/9/2020    15-year-old female with iron deficiency anemia, hospital admission in 10/2019 with anemia, GI work-up with EGD and c scope was negative. Returns to ER now with hemoglobin of 6.4. Hemoccult positive, but she has not noted any blood loss. GI has been consulted      Subjective:  Ms. Pérez has no complaints. No noted blood loss  Hb improved after 3U PRBC      Objective:   No data found. Intake/Output Summary (Last 24 hours) at 10/13/2020 0803  Last data filed at 10/13/2020 0137  Gross per 24 hour   Intake 730 ml   Output 750 ml   Net -20 ml       Physical Exam:    Gen: No distress. Alert. Eyes: PERRL. No sclera icterus. No conjunctival injection. ENT: No discharge. Pharynx clear. Neck: No JVD. Trachea midline. Resp: No accessory muscle use. No crackles. No wheezes. No rhonchi. CV: Regular rate. Regular rhythm. No murmur. No rub. No edema. GI: Non-tender. Non-distended. Normal bowel sounds. Skin: Warm and dry. No nodule on exposed extremities. No rash on exposed extremities. M/S: No cyanosis. No joint deformity. No clubbing. Neuro: Awake. Grossly nonfocal    Psych: Oriented x 3. No anxiety or agitation.      Scheduled Meds:   iron sucrose (VENOFER) iv piggyback 100 mL (Admin over 60 minutes)  200 mg Intravenous Q24H    metoprolol succinate  50 mg Oral Daily    metFORMIN  500 mg Oral BID WC    lisinopril  20 mg Oral Daily    glimepiride  2 mg Oral QAM AC    sodium chloride flush  10 mL Intravenous 2 times per day    insulin lispro  0-12 Units Subcutaneous TID     insulin lispro  0-6 Units Subcutaneous Nightly    pantoprazole  40 mg Intravenous BID       Continuous Infusions:   dextrose         PRN Meds:  hydrALAZINE, sodium chloride flush, acetaminophen **OR** acetaminophen, polyethylene glycol, promethazine **OR** ondansetron, glucose, dextrose, glucagon (rDNA), dextrose      Data:  CBC:   Recent Labs     10/11/20  0428  10/12/20  0428 10/12/20  1203 10/13/20  0504   WBC 8.8  --  8.5  --  8.3   HGB 8.3*   < > 8.4* 9.0* 8.7*   HCT 27.1*   < > 26.8* 28.6* 28.0*   MCV 68.7*  --  65.9*  --  65.9*     --  171  --  192    < > = values in this interval not displayed. BMP:   Recent Labs     10/11/20  0428 10/12/20  0428   * 137   K 4.1 4.0    102   CO2 21 24   BUN 24* 19   CREATININE 0.7 0.7     LIVER PROFILE:   No results for input(s): AST, ALT, LIPASE, BILIDIR, BILITOT, ALKPHOS in the last 72 hours. Invalid input(s): AMYLASE,  ALB  PT/INR: No results for input(s): PROTIME, INR in the last 72 hours. CULTURES  Blood: NGTD  Urine: NG     RADIOLOGY  CT HEAD WO CONTRAST   Final Result   No acute intracranial abnormality. XR CHEST (2 VW)   Final Result   No acute disease. Cardiomegaly. EGD 10/2019  Findings[de-identified]   Esophagus: normal. The findings do not support a diagnosis of De Guzman's Esophagus. Stomach: abnormal: Gastric erosions seen in the antrum. Biopsies obtained  Duodenum: normal  Recommendations:   High dose bid ppi for 2 weeks  Advance diet as tolerated  Outpatient colonoscopy    Colonoscopy 11/2019  Polyps in colon  Diverticulosis  Internal hemorrhoids     10/11/2020  Esophagus: normal. The findings do not support a diagnosis of De Guzman's Esophagus. 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 Biopsies were obtained of the angularis and the nodules and linear erythema were ablated with APC  Duodenum: normal    Assessment/Plan:    #Acute on chronic iron def anemia  #GI Bleed  - Initial evaluation in Fall 2019 with EGD and colonoscopy. EGD showed gastric erosions, no active bleeding. Colonoscopy showed polyps, internal hemorrhoids, diverticulosis.   Follow up h/h 1/2020 was stable   -Presented to hospital now with dizziness, hemoglobin 4.5 on admission, hemoccult + but she has not noticed and obvious bleeding   -Status post 3 units

## 2020-10-13 NOTE — PROGRESS NOTES
Patient educated on discharge instructions as well as new medications use, dosage, administration and possible side effects. Patient verified knowledge. IV removed without difficulty and dry dressing in place. Telemetry monitor removed and returned to [facility]. Pt left facility in stable condition to Home with all of their personal belongings.

## 2020-10-13 NOTE — CONSULTS
HEMATOLOGY/ONCOLOGY CONSULTATION:     10/13/2020 7:31 AM    REASON FOR CONSULT: Iron deficiency anemia    PROVIDERS:  Aleisha Osorio MD    CHIEF COMPLAINT:     Chief Complaint   Patient presents with    Dizziness     pt c/o seeing strobe lights and having dizziness that started around 0800 this am, pt states she feels weak all over     Leg Swelling     pt c/o bilat ankle swelling x 3 days, pt states she has hx of CHF       HISTORY OF PRESENT ILLNESS:     HPI:    72 WF with pmh DM2, CHF, cervical cancer, HTN, COPD. She has a pmh iron def anemia and GI bleed for which she was admitted in 10/2019. She presented to the hospital 10/10/2020 with dizziness and SOB. She had Hgb 4.5. Her stool was hemoccult (+). She denied any BRBPR or melena. EGD revealed no obvious bleeding. GI is planning outpatient c-scope. Hgb is 8.7 after transfusion and she is getting IV iron.      PAST MEDICAL HISTORY:     Past Medical History:   Diagnosis Date    Bronchitis chronic     Cancer ([address]     cervical    CHF (congestive heart failure) (HCC)     Diabetes mellitus ([address]     Emphysema of lung ([address]     Hypercholesteremia     Hypertension     Microcytic anemia 3/23/2017    Psoriasis        PAST SURGICAL HISTORY:        Past Surgical History:   Procedure Laterality Date    CERVICAL [address] SURGERY  2004    CHOLECYSTECTOMY      DIAGNOSTIC CARDIAC CATH LAB PROCEDURE  4/2011    angiogram with 30% blockage    HYSTERECTOMY      KIDNEY STONE SURGERY      TONSILLECTOMY      UPPER GASTROINTESTINAL ENDOSCOPY N/A 10/29/2019    EGD BIOPSY performed by Majo Stokes DO at [place] [id number] N/A 10/11/2020    EGD BIOPSY performed by Majo Stokes DO at [facility]  10/11/2020    EGD CONTROL HEMORRHAGE performed by Majo Stokes DO at [address] [place] [address] Avenue:     Social History     Socioeconomic History    Marital status:       Spouse name: Not on file    Number of children: Not on file    Years of education: Not on file    Highest education level: Not on file   Occupational History    Not on file   Social Needs    Financial resource strain: Not on file    Food insecurity     Worry: Not on file     Inability: Not on file    Transportation needs     Medical: Not on file     Non-medical: Not on file   Tobacco Use    Smoking status: Current Every Day Smoker     Packs/day: 1.00     Years: 32.00     Pack years: 32.00     Types: Cigarettes    Smokeless tobacco: Never Used    Tobacco comment: pt states she wants to quit. chantix did not work   Substance and Sexual Activity    Alcohol use: No    Drug use: No    Sexual activity: Yes     Partners: Male   Lifestyle    Physical activity     Days per week: Not on file     Minutes per session: Not on file    Stress: Not on file   Relationships    Social connections     Talks on phone: Not on file     Gets together: Not on file     Attends [employer] service: Not on file     Active member of club or organization: Not on file     Attends meetings of clubs or organizations: Not on file     Relationship status: Not on file    Intimate partner violence     Fear of current or ex partner: Not on file     Emotionally abused: Not on file     Physically abused: Not on file     Forced sexual activity: Not on file   Other Topics Concern    Not on file   Social History Narrative    Not on file       FAMILY HISTORY:     Family History   Problem Relation Age of Onset    Diabetes Mother     Heart Disease Mother     High Blood Pressure Mother     Hypertension Mother     Heart Failure Mother     Cancer Father     Cancer Sister     Diabetes Brother     Cancer Brother     Cancer Brother     Asthma Daughter     Hypertension Brother     Hypertension Brother     Emphysema Neg Hx        ALLERGIES:     Allergies as of 10/09/2020 - Review Complete 10/09/2020   Allergen Reaction Noted    Morphine Other (See Comments) 04/06/2011    Chantix [varenicline tartrate]  11/18/2019    Percocet [oxycodone-acetaminophen] Nausea And Vomiting 06/14/2018       MEDICATIONS:     No current facility-administered medications on file prior to encounter. Current Outpatient Medications on File Prior to Encounter   Medication Sig Dispense Refill    glimepiride (AMARYL) 4 MG tablet Take 0.5 tablets by mouth every morning (before breakfast) 15 tablet 2    pantoprazole (PROTONIX) 40 MG tablet Take 1 tablet by mouth daily 30 tablet 2    metoprolol succinate (TOPROL XL) 50 MG extended release tablet Take 1 tablet by mouth daily 30 tablet 2    lisinopril (PRINIVIL;ZESTRIL) 20 MG tablet Take 1 tablet by mouth daily 30 tablet 2    blood glucose test strips (DARVIN CONTOUR NEXT TEST) strip Test three times daily. DX:E11.9 100 each 11    metFORMIN (GLUCOPHAGE) 500 MG tablet Take 1 tablet by mouth 2 times daily (with meals) 60 tablet 2    ipratropium-albuterol (DUONEB) 0.5-2.5 (3) MG/3ML SOLN nebulizer solution Inhale 3 mLs into the lungs every 6 hours as needed for Shortness of Breath 360 mL 2    ferrous sulfate (FE TABS) 325 (65 Fe) MG EC tablet Take 1 tablet by mouth daily (with breakfast) 90 tablet 1    AGAMATRIX ULTRA-THIN LANCETS MISC Check sugars daily after each meal 100 each 0    Blood Glucose Monitoring Suppl (AGAMATRIX AMP) ISABELLA Check BG daily after meals 1 Device 0    CVS Lancets MISC 1 each by Does not apply route 3 times daily 100 each 0     REVIEW OF SYSTEMS:       10 point ROS completed. Pertinent positives in HPI, otherwise negative.      PHYSICAL EXAM:       Vitals:    10/13/20 0135   BP: (!) 170/77   Pulse: 76   Resp: 18   Temp: 99.6 °F (37.6 °C)   SpO2: 94%       General appearance: alert and cooperative  Head: Normocephalic, without obvious abnormality, atraumatic  Neck: No palpable lymphadenopathy in supraclavicular or cervical chains  Lungs: Clear to auscultation bilaterally, no audible rales, wheezes or crackles  Heart: Regular rate and rhythm, S1, S2 normal  Abdomen: Soft, non-tender; bowel sounds normal; no masses,  no organomegaly  Extremities: without cyanosis, clubbing, edema or asymmetry  Skin: No jaundice, purpura or petechiae      LABS:     Lab Results   Component Value Date    WBC 8.3 10/13/2020    HGB 8.7 (L) 10/13/2020    HCT 28.0 (L) 10/13/2020    MCV 65.9 (L) 10/13/2020     10/13/2020       Lab Results   Component Value Date    GLUCOSE 129 (H) 10/12/2020    BUN 19 10/12/2020    CREATININE 0.7 10/12/2020    K 4.0 10/12/2020       Lab Results   Component Value Date    ALKPHOS 65 10/09/2020    ALT 9 (L) 10/09/2020    AST 18 10/09/2020    BILITOT 0.5 10/09/2020    BILIDIR <0.2 02/28/2020    PROT 7.0 10/09/2020       Lab Results   Component Value Date    IRON 34 (L) 10/12/2020    TIBC 444 10/12/2020    FERRITIN 14.6 (L) 10/12/2020       IMAGING:     Xr Chest (2 Vw)  Result Date: 10/9/2020  No acute disease. Cardiomegaly. Ct Head Wo Contrast  Result Date: 10/9/2020  No acute intracranial abnormality. EGD 10/11/2020:  Findings[de-identified]   Esophagus: normal. The findings do not support a diagnosis of De Guzman's Esophagus. 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 Biopsies were obtained of the angularis and the nodules and linear erythema were ablated with APC  Duodenum: normal     Recommendations:  Await pathology results  Would repeat colonoscopy given iron deficiency and multiple polyps on previous exam.  Patient agreeable but wants to pursue as outpatient      ASSESSMENT:     Problem List Items Addressed This Visit     Iron deficiency anemia    Near syncope - Primary      Other Visit Diagnoses     Shortness of breath        Acute on chronic congestive heart failure, unspecified heart failure type ([facility]                    PLAN:     1.  Iron Deficiency Anemia    - history GIB 2019  - Hgb 4.5 on admission

## 2020-10-13 NOTE — PROGRESS NOTES
Shift assessment completed. See flow sheet. Medications given. Patient is awake and dressed awaiting discharge. Call light within reach. Patient denies further needs. Will continue to monitor.

## 2020-10-13 NOTE — DISCHARGE SUMMARY
Name:  Danuta Wynne  Room:  /5963-63  MRN:    [id number]    Discharge Summary      This discharge summary is in conjunction with a complete physical exam done on the day of discharge. Discharging Physician: Misty Stroud MD       Admit: 10/9/2020  Discharge:  10/14/2020    HPI taken from admission H&P:      72 y.o. female who presented to the hospital with a chief complaint of dizziness and dyspnea on exertion. The patient also states that she was seeing spots. Her symptoms progressed and today it got so severe that she told her granddaughter to bring her to the emergency department. In the ER she was found to be profoundly anemic. She is occult blood positive. She denies any bloody bowel movements, hematemesis or hemoptysis. Of note she has a history of anemia and had an endoscopy and colonoscopy last year and had 3 precancerous polyps removed. She was also told she had gastritis at the time. She was started on Protonix and has done well since. She otherwise has no other symptoms today, denies any fevers or chills or exposures to COVID-19. She will be admitted for blood transfusions and GI consultation.     Diagnoses this Admission and Hospital Course     #Acute on chronic iron def anemia  #GI Bleed  - Initial evaluation in Fall 2019 with EGD and colonoscopy. EGD showed gastric erosions, no active bleeding. Colonoscopy showed polyps, internal hemorrhoids, diverticulosis. Follow up h/h 1/2020 was stable   - Presented to hospital with dizziness, hgb 4.5 on admission, hemoccult + but she had not noticed any obvious bleeding   - s/p 3 units PRBCs with improvement in H&H  - PO iron, IV PPI  - GI consulted.   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 Cont'd inhalers. Smoking cessation recommended      #Bacteriuria with negative urine culture- stop omnicef    Procedures (Please Review Full Report for Details)  EGD 10/11/2020  Esophagus: normal. The findings do not support a diagnosis of De Guzman's Esophagus. 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 Biopsies were obtained of the angularis and the nodules and linear erythema were ablated with APC  Duodenum: normal    Consults    GI consult    Physical Exam at Discharge:    BP (!) 170/77   Pulse 76   Temp 99.6 °F (37.6 °C) (Oral)   Resp 18   Ht 5' 3\" (1.6 m)   Wt 134 lb 9.6 oz (61.1 kg)   SpO2 94%   BMI 23.84 kg/m²   Gen: No distress. Alert. Eyes: PERRL. No sclera icterus. No conjunctival injection. ENT: No discharge. Pharynx clear. Neck: No JVD. Trachea midline. Resp: No accessory muscle use. No crackles. No wheezes. No rhonchi. CV: Regular rate. Regular rhythm. No murmur. No rub. No edema. GI: Non-tender. Non-distended. Normal bowel sounds. Skin: Warm and dry. No nodule on exposed extremities. No rash on exposed extremities. M/S: No cyanosis. No joint deformity. No clubbing. Neuro: Awake. Grossly nonfocal    Psych: Oriented x 3. No anxiety or agitation. CBC:   Recent Labs     10/11/20  0428  10/12/20  0428 10/12/20  1203 10/13/20  0504   WBC 8.8  --  8.5  --  8.3   HGB 8.3*   < > 8.4* 9.0* 8.7*   HCT 27.1*   < > 26.8* 28.6* 28.0*   MCV 68.7*  --  65.9*  --  65.9*     --  171  --  192    < > = values in this interval not displayed.      BMP:   Recent Labs     10/11/20  0428 10/12/20  0428   * 137   K 4.1 4.0    102   CO2 21 24   BUN 24* 19   CREATININE 0.7 0.7     CULTURES    Blood cx x2: NGTD     Urine cx: NGTD    RADIOLOGY    CT HEAD WO CONTRAST 10/9/2020   Final Result   No acute intracranial abnormality. XR CHEST (2 VW) 10/9/2020   Final Result   No acute disease. Cardiomegaly. Discharge Medications     Medication List      CHANGE how you take these medications    ferrous sulfate 325 (65 Fe) MG EC tablet  Commonly known as:  Fe Tabs  Take 1 tablet by mouth 2 times daily  What changed:  when to take this        CONTINUE taking these medications    AgaMatrix AMP Adri  Check BG daily after meals     blood glucose test strips strip  Commonly known as:  Lindsey Contour Next Test  Test three times daily. DX:E11.9     * CVS Lancets Misc  1 each by Does not apply route 3 times daily     * AgaMatrix Ultra-Thin Lancets Misc  Check sugars daily after each meal     glimepiride 4 MG tablet  Commonly known as:  AMARYL  Take 0.5 tablets by mouth every morning (before breakfast)     ipratropium-albuterol 0.5-2.5 (3) MG/3ML Soln nebulizer solution  Commonly known as:  DUONEB  Inhale 3 mLs into the lungs every 6 hours as needed for Shortness of Breath     lisinopril 20 MG tablet  Commonly known as:  PRINIVIL;ZESTRIL  Take 1 tablet by mouth daily     metFORMIN 500 MG tablet  Commonly known as:  GLUCOPHAGE  Take 1 tablet by mouth 2 times daily (with meals)     metoprolol succinate 50 MG extended release tablet  Commonly known as:  TOPROL XL  Take 1 tablet by mouth daily     pantoprazole 40 MG tablet  Commonly known as:  PROTONIX  Take 1 tablet by mouth daily         * This list has 2 medication(s) that are the same as other medications prescribed for you. Read the directions carefully, and ask your doctor or other care provider to review them with you. Where to Get Your Medications      Information about where to get these medications is not yet available    Ask your nurse or doctor about these medications  · ferrous sulfate 325 (65 Fe) MG EC tablet           Discharged in stable condition to home. Follow Up: Follow up with PCP in 1 week.           Total time spent on discharge is 35 minutes    ESTHER Ray.

## 2020-10-13 NOTE — PLAN OF CARE
Problem: Falls - Risk of:  Goal: Will remain free from falls  Description: Will remain free from falls  Outcome: Ongoing  Goal: Absence of physical injury  Description: Absence of physical injury  Outcome: Ongoing     Problem: Discharge Planning:  Goal: Discharged to appropriate level of care  Description: Discharged to appropriate level of care  Outcome: Ongoing     Problem:  Bowel Function - Altered:  Goal: Bowel elimination is within specified parameters  Description: Bowel elimination is within specified parameters  Outcome: Ongoing     Problem: Fluid Volume - Imbalance:  Goal: Absence of imbalanced fluid volume signs and symptoms  Description: Absence of imbalanced fluid volume signs and symptoms  Outcome: Ongoing  Goal: Will show no signs and symptoms of excessive bleeding  Description: Will show no signs and symptoms of excessive bleeding  Outcome: Ongoing     Problem: Nausea/Vomiting:  Goal: Absence of nausea/vomiting  Description: Absence of nausea/vomiting  Outcome: Ongoing  Goal: Able to drink  Description: Able to drink  Outcome: Ongoing  Goal: Able to eat  Description: Able to eat  Outcome: Ongoing  Goal: Ability to achieve adequate nutritional intake will improve  Description: Ability to achieve adequate nutritional intake will improve  Outcome: Ongoing

## 2020-10-13 NOTE — FLOWSHEET NOTE
10/12/20 2030   Vital Signs   Temp 99 °F (37.2 °C)   Temp Source Oral   Pulse 70   Heart Rate Source Monitor   Resp 18   BP (!) 162/62   BP Location Right Arm   Level of Consciousness 0   MEWS Score 1   Pain Assessment   Pain Assessment 0-10   Pain Level 0   Oxygen Therapy   SpO2 94 %   O2 Device None (Room air)   Pt. Resting in bed. Call light in reach. Shift assessment completed see flow sheet. Denies any needs at this time. Will continue to monitor.

## 2020-10-14 LAB
BLOOD CULTURE, ROUTINE: NORMAL
CULTURE, BLOOD 2: NORMAL

## 2020-10-15 ENCOUNTER — TELEPHONE (OUTPATIENT)
Dept: INTERNAL MEDICINE CLINIC | Age: 65
End: 2020-10-15

## 2020-10-15 NOTE — TELEPHONE ENCOUNTER
[facility] Transitions Initial Follow Up Call    Outreach made within 2 business days of discharge: Yes    Patient: Natalie Webster Patient : 1955   MRN: [id number]  Reason for Admission: There are no discharge diagnoses documented for the most recent discharge. Discharge Date: 10/13/20       Spoke with: Deangelo    Discharge department/facility: [facility]    [facility] Interactive Patient Contact:  Was patient able to fill all prescriptions: Yes  Was patient instructed to bring all medications to the follow-up visit: Yes  Is patient taking all medications as directed in the discharge summary? Yes  Does patient understand their discharge instructions: Yes  Does patient have questions or concerns that need addressed prior to 7-14 day follow up office visit: no    Scheduled appointment with PCP within 7-14 days    Follow Up  No future appointments.     [address] [place] [address]

## 2020-10-23 ENCOUNTER — OFFICE VISIT (OUTPATIENT)
Dept: INTERNAL MEDICINE CLINIC | Age: 65
End: 2020-10-23

## 2020-10-23 VITALS
DIASTOLIC BLOOD PRESSURE: 80 MMHG | HEIGHT: 63 IN | SYSTOLIC BLOOD PRESSURE: 148 MMHG | TEMPERATURE: 97.5 F | HEART RATE: 72 BPM | WEIGHT: 132 LBS | BODY MASS INDEX: 23.39 KG/M2

## 2020-10-23 DIAGNOSIS — D50.9 IRON DEFICIENCY ANEMIA, UNSPECIFIED IRON DEFICIENCY ANEMIA TYPE: ICD-10-CM

## 2020-10-23 LAB
BASOPHILS ABSOLUTE: 0.2 K/UL (ref 0–0.2)
BASOPHILS RELATIVE PERCENT: 3.5 %
EOSINOPHILS ABSOLUTE: 0.2 K/UL (ref 0–0.6)
EOSINOPHILS RELATIVE PERCENT: 3.6 %
HCT VFR BLD CALC: 35.7 % (ref 36–48)
HEMOGLOBIN: 11.1 G/DL (ref 12–16)
LYMPHOCYTES ABSOLUTE: 1.1 K/UL (ref 1–5.1)
LYMPHOCYTES RELATIVE PERCENT: 18.6 %
MCH RBC QN AUTO: 21.9 PG (ref 26–34)
MCHC RBC AUTO-ENTMCNC: 30.9 G/DL (ref 31–36)
MCV RBC AUTO: 70.8 FL (ref 80–100)
MONOCYTES ABSOLUTE: 0.6 K/UL (ref 0–1.3)
MONOCYTES RELATIVE PERCENT: 9.5 %
NEUTROPHILS ABSOLUTE: 3.8 K/UL (ref 1.7–7.7)
NEUTROPHILS RELATIVE PERCENT: 64.8 %
PDW BLD-RTO: 35.5 % (ref 12.4–15.4)
PLATELET # BLD: 326 K/UL (ref 135–450)
PLATELET SLIDE REVIEW: ADEQUATE
PMV BLD AUTO: 9.6 FL (ref 5–10.5)
RBC # BLD: 5.04 M/UL (ref 4–5.2)
WBC # BLD: 5.9 K/UL (ref 4–11)

## 2020-10-23 PROCEDURE — 99213 OFFICE O/P EST LOW 20 MIN: CPT | Performed by: INTERNAL MEDICINE

## 2020-10-23 ASSESSMENT — ENCOUNTER SYMPTOMS
WHEEZING: 0
SHORTNESS OF BREATH: 0
DIARRHEA: 0
VOMITING: 0
COUGH: 0
CHEST TIGHTNESS: 0
ABDOMINAL PAIN: 0
BLOOD IN STOOL: 0
NAUSEA: 0

## 2020-10-23 NOTE — PROGRESS NOTES
Subjective:      Patient ID: Chel Bonner is a 72 y.o. female. HPI  Admitted to the hospital wit hGI bleed and ABLA   transfused 3 units of blood     EGD 10/11/2020  Esophagus: normal. The findings do not support a diagnosis of De Guzman's Esophagus. 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  Biopsies were obtained of the angularis and the nodules and linear erythema were ablated with APC  Duodenum: normal      Now feeling good   denies complaints    Review of Systems   Constitutional: Negative for fatigue, fever and unexpected weight change. Respiratory: Negative for cough, chest tightness, shortness of breath and wheezing. Cardiovascular: Negative for chest pain, palpitations and leg swelling. Gastrointestinal: Negative for abdominal pain, blood in stool, diarrhea, nausea and vomiting. Neurological: Negative for light-headedness. Objective:   Physical Exam  Constitutional:       Appearance: She is well-developed. HENT:      Head: Normocephalic and atraumatic. Eyes:      Pupils: Pupils are equal, round, and reactive to light. Neck:      Musculoskeletal: Normal range of motion and neck supple. Thyroid: No thyromegaly. Cardiovascular:      Rate and Rhythm: Normal rate and regular rhythm. Heart sounds: Murmur (2/6 systolic ) present. No friction rub. No gallop. Comments: No carotid bruit  Pulmonary:      Effort: Pulmonary effort is normal. No respiratory distress. Breath sounds: Normal breath sounds. No wheezing or rales. Chest:      Chest wall: No tenderness. Abdominal:      General: Bowel sounds are normal. There is no distension. Palpations: Abdomen is soft. There is no mass. Tenderness: There is no abdominal tenderness. There is no guarding or rebound. Neurological:      Mental Status: She is alert and oriented to person, place, and time.          Assessment:       Diagnosis Orders 1. Acute GI bleeding     2.  Iron deficiency anemia, unspecified iron deficiency anemia type  CBC Auto Differential           Plan:      Now doing well  Check CBC    See GI for colonoscopy           Tamra Nichols MD

## 2021-11-10 ENCOUNTER — HOSPITAL ENCOUNTER (EMERGENCY)
Age: 66
Discharge: HOME | End: 2021-11-10
Attending: STUDENT IN AN ORGANIZED HEALTH CARE EDUCATION/TRAINING PROGRAM
Payer: MEDICARE

## 2021-11-10 VITALS
SYSTOLIC BLOOD PRESSURE: 168 MMHG | RESPIRATION RATE: 17 BRPM | DIASTOLIC BLOOD PRESSURE: 60 MMHG | OXYGEN SATURATION: 97 % | HEART RATE: 84 BPM

## 2021-11-10 DIAGNOSIS — R04.0 EPISTAXIS: Primary | ICD-10-CM

## 2021-11-10 DIAGNOSIS — D64.9 ANEMIA, UNSPECIFIED TYPE: ICD-10-CM

## 2021-11-10 LAB
A/G RATIO: 1.2 (ref 1.1–2.2)
ALBUMIN SERPL-MCNC: 4.1 G/DL (ref 3.4–5)
ALP BLD-CCNC: 74 U/L (ref 40–129)
ALT SERPL-CCNC: 12 U/L (ref 10–40)
ANION GAP SERPL CALCULATED.3IONS-SCNC: 12 MMOL/L (ref 3–16)
AST SERPL-CCNC: 15 U/L (ref 15–37)
BASOPHILS ABSOLUTE: 0.1 K/UL (ref 0–0.2)
BASOPHILS RELATIVE PERCENT: 0.9 %
BILIRUB SERPL-MCNC: <0.2 MG/DL (ref 0–1)
BUN BLDV-MCNC: 12 MG/DL (ref 7–20)
CALCIUM SERPL-MCNC: 9 MG/DL (ref 8.3–10.6)
CHLORIDE BLD-SCNC: 101 MMOL/L (ref 99–110)
CO2: 28 MMOL/L (ref 21–32)
CREAT SERPL-MCNC: 0.7 MG/DL (ref 0.6–1.2)
EOSINOPHILS ABSOLUTE: 0.1 K/UL (ref 0–0.6)
EOSINOPHILS RELATIVE PERCENT: 1.4 %
GFR AFRICAN AMERICAN: >60
GFR NON-AFRICAN AMERICAN: >60
GLUCOSE BLD-MCNC: 203 MG/DL (ref 70–99)
HCT VFR BLD CALC: 29.5 % (ref 36–48)
HEMOGLOBIN: 9.7 G/DL (ref 12–16)
LYMPHOCYTES ABSOLUTE: 1.3 K/UL (ref 1–5.1)
LYMPHOCYTES RELATIVE PERCENT: 17.5 %
MCH RBC QN AUTO: 26.3 PG (ref 26–34)
MCHC RBC AUTO-ENTMCNC: 32.7 G/DL (ref 31–36)
MCV RBC AUTO: 80.3 FL (ref 80–100)
MONOCYTES ABSOLUTE: 0.8 K/UL (ref 0–1.3)
MONOCYTES RELATIVE PERCENT: 10 %
NEUTROPHILS ABSOLUTE: 5.3 K/UL (ref 1.7–7.7)
NEUTROPHILS RELATIVE PERCENT: 70.2 %
PDW BLD-RTO: 16.8 % (ref 12.4–15.4)
PLATELET # BLD: 361 K/UL (ref 135–450)
PMV BLD AUTO: 9.5 FL (ref 5–10.5)
POTASSIUM REFLEX MAGNESIUM: 4 MMOL/L (ref 3.5–5.1)
RBC # BLD: 3.68 M/UL (ref 4–5.2)
SODIUM BLD-SCNC: 141 MMOL/L (ref 136–145)
TOTAL PROTEIN: 7.5 G/DL (ref 6.4–8.2)
WBC # BLD: 7.6 K/UL (ref 4–11)

## 2021-11-10 PROCEDURE — 80053 COMPREHEN METABOLIC PANEL: CPT

## 2021-11-10 PROCEDURE — 99283 EMERGENCY DEPT VISIT LOW MDM: CPT

## 2021-11-10 PROCEDURE — 85025 COMPLETE CBC W/AUTO DIFF WBC: CPT

## 2021-11-10 PROCEDURE — 6370000000 HC RX 637 (ALT 250 FOR IP): Performed by: STUDENT IN AN ORGANIZED HEALTH CARE EDUCATION/TRAINING PROGRAM

## 2021-11-10 RX ORDER — OXYMETAZOLINE HYDROCHLORIDE 0.05 G/100ML
3 SPRAY NASAL ONCE
Status: COMPLETED | OUTPATIENT
Start: 2021-11-10 | End: 2021-11-10

## 2021-11-10 RX ADMIN — NASAL DECONGESTANT 3 SPRAY: 0.05 SPRAY NASAL at 07:30

## 2021-11-10 NOTE — ED PROVIDER NOTES
[address] ED      CHIEF COMPLAINT  Epistaxis     HISTORY OF PRESENT ILLNESS  Lima Jiang is a 77 y.o. female with a past medical history of DM, HTN, HLD, COPD, anemia, and CHF, who presents to the ED complaining of epistaxis. Denies digital or other trauma. Reports woke up with nose bleeding. Has been bleeding since 10:30p. Tried to pinch nose, lay head back and stuff nose with toilet paper. Initially right sided, then bilateral nares. Has had previous nose bleeds but they usually stop with shorter duration than current nosebleed. No blood thinners. Does reports some frontal headache, mild aching, feels like headaches she has had before. Denies trauma, fever, altered mental status. Patient is concerned given that she has chronic anemia and has required transfusion in the past.    Old records reviewed: Patient admitted 10/9/20 for anemia and GI bleeding. No other complaints, modifying factors or associated symptoms. I have reviewed the following from the nursing documentation.     Past Medical History:   Diagnosis Date    Bronchitis chronic     Cancer ([address]     cervical    CHF (congestive heart failure) (HCC)     Diabetes mellitus ([address]     Emphysema of lung ([address]     Hypercholesteremia     Hypertension     Microcytic anemia 3/23/2017    Psoriasis      Past Surgical History:   Procedure Laterality Date    CERVICAL [address] SURGERY  2004    CHOLECYSTECTOMY      DIAGNOSTIC CARDIAC CATH LAB PROCEDURE  4/2011    angiogram with 30% blockage    HYSTERECTOMY      KIDNEY STONE SURGERY      TONSILLECTOMY      UPPER GASTROINTESTINAL ENDOSCOPY N/A 10/29/2019    EGD BIOPSY performed by Kale Bernstein DO at [address] N/A 10/11/2020    EGD BIOPSY performed by Kale Bernstein DO at [address]  10/11/2020    EGD CONTROL HEMORRHAGE performed by Kale Bernstein DO at [address]  Liliana Louis Family History   Problem Relation Age of Onset    Diabetes Mother     Heart Disease Mother     High Blood Pressure Mother     Hypertension Mother     Heart Failure Mother     Cancer Father     Cancer Sister     Diabetes Brother     Cancer Brother     Cancer Brother     Asthma Daughter     Hypertension Brother     Hypertension Brother     Emphysema Neg Hx      Social History     Socioeconomic History    Marital status:      Spouse name: Not on file    Number of children: Not on file    Years of education: Not on file    Highest education level: Not on file   Occupational History    Not on file   Tobacco Use    Smoking status: Current Every Day Smoker     Packs/day: 1.00     Years: 32.00     Pack years: 32.00     Types: Cigarettes    Smokeless tobacco: Never Used    Tobacco comment: pt states she wants to quit. chantix did not work   Vaping Use    Vaping Use: Never used   Substance and Sexual Activity    Alcohol use: No    Drug use: No    Sexual activity: Yes     Partners: Male   Other Topics Concern    Not on file   Social History Narrative    Not on file     Social Determinants of Health     Financial Resource Strain:     Difficulty of Paying Living Expenses: Not on file   Food Insecurity:     Worried About [address] in the Last Year: Not on file    Luis [employer] Food in the Last Year: Not on file   Transportation Needs:     Lack of Transportation (Medical): Not on file    Lack of Transportation (Non-Medical):  Not on file   Physical Activity:     Days of Exercise per Week: Not on file    Minutes of Exercise per Session: Not on file   Stress:     Feeling of Stress : Not on file   Social Connections:     Frequency of Communication with Friends and Family: Not on file    Frequency of Social Gatherings with Friends and Family: Not on file    Attends Gnosticist Services: Not on file    Active Member of Clubs or Organizations: Not on file    Attends Club or Organization Meetings: Not on file    Marital Status: Not on file   Intimate Partner Violence:     Fear of Current or Ex-Partner: Not on file    Emotionally Abused: Not on file    Physically Abused: Not on file    Sexually Abused: Not on file   Housing Stability:     Unable to Pay for Housing in the Last Year: Not on file    Number of Vito in the Last Year: Not on file    Unstable Housing in the Last Year: Not on file     No current facility-administered medications for this encounter. Current Outpatient Medications   Medication Sig Dispense Refill    ferrous sulfate (FE TABS) 325 (65 Fe) MG EC tablet Take 1 tablet by mouth 2 times daily 1 tablet 0    pantoprazole (PROTONIX) 40 MG tablet Take 1 tablet by mouth daily 30 tablet 2    metoprolol succinate (TOPROL XL) 50 MG extended release tablet Take 1 tablet by mouth daily 30 tablet 2    lisinopril (PRINIVIL;ZESTRIL) 20 MG tablet Take 1 tablet by mouth daily 30 tablet 2    blood glucose test strips (DARVIN CONTOUR NEXT TEST) strip Test three times daily.  DX:E11.9 100 each 11    glimepiride (AMARYL) 4 MG tablet Take 0.5 tablets by mouth every morning (before breakfast) 15 tablet 2    metFORMIN (GLUCOPHAGE) 500 MG tablet Take 1 tablet by mouth 2 times daily (with meals) 60 tablet 2    ipratropium-albuterol (DUONEB) 0.5-2.5 (3) MG/3ML SOLN nebulizer solution Inhale 3 mLs into the lungs every 6 hours as needed for Shortness of Breath 360 mL 2    AGAMATRIX ULTRA-THIN LANCETS MISC Check sugars daily after each meal 100 each 0    Blood Glucose Monitoring Suppl (AGAMATRIX AMP) ISABELLA Check BG daily after meals 1 Device 0    [employer] Lancets MISC 1 each by Does not apply route 3 times daily 100 each 0     Allergies   Allergen Reactions    Morphine Other (See Comments)     Bad headache    Chantix [Varenicline Tartrate]      BAD DREAMS    Percocet [Oxycodone-Acetaminophen] Nausea And Vomiting       REVIEW OF SYSTEMS  All systems reviewed, pertinent positives per HPI otherwise noted to be negative. PHYSICAL EXAM  BP (!) 168/60   Pulse 84   Resp 17   SpO2 97%    GENERAL APPEARANCE: Awake and alert. Cooperative. no distress. HENT: Normocephalic. Atraumatic. Mucous membranes are moist.  Toilet paper removed from bilateral naris, at this time there is no evidence of bleeding from either nares at this time. No evidence of mucosal irritation or trauma. NECK: Supple. Full range of motion of the neck without stiffness or pain. No meningismus. EYES: PERRL. EOM's grossly intact. HEART/CHEST: RRR. No murmurs. Chest wall is not tender to palpation. LUNGS: Respirations unlabored. CTAB. Good air exchange. Speaking comfortably in full sentences. ABDOMEN: No tenderness. Soft. Non-distended. No masses. No organomegaly. No guarding or rebound. MUSCULOSKELETAL: No extremity edema. Compartments soft. No deformity. No tenderness in the extremities. All extremities neurovascularly intact. SKIN: Warm and dry. No acute rashes. No pallor. NEUROLOGICAL: Alert and oriented. CN's 2-12 intact. No gross facial drooping. Strength 5/5, sensation intact. PSYCHIATRIC: Normal mood and affect. LABS  I have reviewed all labs for this visit.    Results for orders placed or performed during the hospital encounter of 11/10/21   CBC Auto Differential   Result Value Ref Range    WBC 7.6 4.0 - 11.0 K/uL    RBC 3.68 (L) 4.00 - 5.20 M/uL    Hemoglobin 9.7 (L) 12.0 - 16.0 g/dL    Hematocrit 29.5 (L) 36.0 - 48.0 %    MCV 80.3 80.0 - 100.0 fL    MCH 26.3 26.0 - 34.0 pg    MCHC 32.7 31.0 - 36.0 g/dL    RDW 16.8 (H) 12.4 - 15.4 %    Platelets 665 589 - 115 K/uL    MPV 9.5 5.0 - 10.5 fL    Neutrophils % 70.2 %    Lymphocytes % 17.5 %    Monocytes % 10.0 %    Eosinophils % 1.4 %    Basophils % 0.9 %    Neutrophils Absolute 5.3 1.7 - 7.7 K/uL    Lymphocytes Absolute 1.3 1.0 - 5.1 K/uL    Monocytes Absolute 0.8 0.0 - 1.3 K/uL    Eosinophils Absolute 0.1 0.0 - 0.6 K/uL    Basophils Absolute 0.1 0.0 - 0.2 K/uL   Comprehensive Metabolic Panel w/ Reflex to MG   Result Value Ref Range    Sodium 141 136 - 145 mmol/L    Potassium reflex Magnesium 4.0 3.5 - 5.1 mmol/L    Chloride 101 99 - 110 mmol/L    CO2 28 21 - 32 mmol/L    Anion Gap 12 3 - 16    Glucose 203 (H) 70 - 99 mg/dL    BUN 12 7 - 20 mg/dL    CREATININE 0.7 0.6 - 1.2 mg/dL    GFR Non-African American >60 >60    GFR African American >60 >60    Calcium 9.0 8.3 - 10.6 mg/dL    Total Protein 7.5 6.4 - 8.2 g/dL    Albumin 4.1 3.4 - 5.0 g/dL    Albumin/Globulin Ratio 1.2 1.1 - 2.2    Total Bilirubin <0.2 0.0 - 1.0 mg/dL    Alkaline Phosphatase 74 40 - 129 U/L    ALT 12 10 - 40 U/L    AST 15 15 - 37 U/L         ED COURSE / MDM  Patient seen and evaluated. Old records reviewed and pertinent information included in HPI. Labs and imaging reviewed and results discussed with patient. Overall well appearing patient, in no acute distress, presenting for epistasis. Patient reports that epistaxis is been ongoing since yesterday evening. Physical exam remarkable for no current epistaxis. Differential diagnosis includes but is not limited to: Digital trauma, dry mucosal membranes, anterior nosebleed, posterior nosebleed, anemia    Epistaxis is currently stopped, patient provided with nasal clip for pressure application if nares begin to bleed again. Patient observed in the emergency department, but she did not have any further bleeding. Workup showed:  ED Course as of 11/11/21 1608   Wed Nov 10, 2021   She has anemia to 9.7. Patient states this is consistent with baseline. No leukocytosis or thrombocytopenia. [ER]   Hyperglycemia to 203, no other electrolyte abnormalities or evidence of dysfunction. [ER]   Liver function testing unremarkable. [ER]   Coagulation studies ordered but not initially collected. Last coagulation studies collected in 219 and were mildly elevated. Discussed with patient, she is not on any blood thinners.   She

## 2021-12-16 ENCOUNTER — APPOINTMENT (OUTPATIENT)
Dept: GENERAL RADIOLOGY | Age: 66
End: 2021-12-16
Payer: MEDICARE

## 2021-12-16 ENCOUNTER — HOSPITAL ENCOUNTER (OUTPATIENT)
Age: 66
Setting detail: OBSERVATION
Discharge: HOME | End: 2021-12-18
Attending: EMERGENCY MEDICINE | Admitting: INTERNAL MEDICINE
Payer: MEDICARE

## 2021-12-16 ENCOUNTER — APPOINTMENT (OUTPATIENT)
Dept: CT IMAGING | Age: 66
End: 2021-12-16
Payer: MEDICARE

## 2021-12-16 DIAGNOSIS — K92.2 GASTROINTESTINAL HEMORRHAGE, UNSPECIFIED GASTROINTESTINAL HEMORRHAGE TYPE: ICD-10-CM

## 2021-12-16 DIAGNOSIS — R07.9 CHEST PAIN, UNSPECIFIED TYPE: ICD-10-CM

## 2021-12-16 DIAGNOSIS — I10 UNCONTROLLED HYPERTENSION: ICD-10-CM

## 2021-12-16 DIAGNOSIS — D64.9 ANEMIA, UNSPECIFIED TYPE: Primary | ICD-10-CM

## 2021-12-16 DIAGNOSIS — E11.65 UNCONTROLLED TYPE 2 DIABETES MELLITUS WITH HYPERGLYCEMIA (HCC): ICD-10-CM

## 2021-12-16 DIAGNOSIS — Z91.14 NONCOMPLIANCE WITH MEDICATION REGIMEN: ICD-10-CM

## 2021-12-16 LAB
A/G RATIO: 1.3 (ref 1.1–2.2)
ALBUMIN SERPL-MCNC: 4.4 G/DL (ref 3.4–5)
ALP BLD-CCNC: 83 U/L (ref 40–129)
ALT SERPL-CCNC: 12 U/L (ref 10–40)
ANION GAP SERPL CALCULATED.3IONS-SCNC: 11 MMOL/L (ref 3–16)
AST SERPL-CCNC: 17 U/L (ref 15–37)
BILIRUB SERPL-MCNC: 0.3 MG/DL (ref 0–1)
BUN BLDV-MCNC: 19 MG/DL (ref 7–20)
CALCIUM SERPL-MCNC: 9.8 MG/DL (ref 8.3–10.6)
CHLORIDE BLD-SCNC: 100 MMOL/L (ref 99–110)
CHP ED QC CHECK: YES
CO2: 26 MMOL/L (ref 21–32)
CREAT SERPL-MCNC: 0.8 MG/DL (ref 0.6–1.2)
GFR AFRICAN AMERICAN: >60
GFR NON-AFRICAN AMERICAN: >60
GLUCOSE BLD-MCNC: 273 MG/DL (ref 70–99)
GLUCOSE BLD-MCNC: 287 MG/DL
GLUCOSE BLD-MCNC: 287 MG/DL (ref 70–99)
PERFORMED ON: ABNORMAL
POTASSIUM REFLEX MAGNESIUM: 4.1 MMOL/L (ref 3.5–5.1)
PRO-BNP: 3434 PG/ML (ref 0–124)
RAPID INFLUENZA  B AGN: NEGATIVE
RAPID INFLUENZA A AGN: NEGATIVE
SARS-COV-2, NAAT: NOT DETECTED
SODIUM BLD-SCNC: 137 MMOL/L (ref 136–145)
TOTAL PROTEIN: 7.8 G/DL (ref 6.4–8.2)
TROPONIN: <0.01 NG/ML

## 2021-12-16 PROCEDURE — 85025 COMPLETE CBC W/AUTO DIFF WBC: CPT

## 2021-12-16 PROCEDURE — 71260 CT THORAX DX C+: CPT

## 2021-12-16 PROCEDURE — 6370000000 HC RX 637 (ALT 250 FOR IP): Performed by: EMERGENCY MEDICINE

## 2021-12-16 PROCEDURE — 99285 EMERGENCY DEPT VISIT HI MDM: CPT

## 2021-12-16 PROCEDURE — 87804 INFLUENZA ASSAY W/OPTIC: CPT

## 2021-12-16 PROCEDURE — 80053 COMPREHEN METABOLIC PANEL: CPT

## 2021-12-16 PROCEDURE — 71045 X-RAY EXAM CHEST 1 VIEW: CPT

## 2021-12-16 PROCEDURE — 87635 SARS-COV-2 COVID-19 AMP PRB: CPT

## 2021-12-16 PROCEDURE — 6360000002 HC RX W HCPCS: Performed by: EMERGENCY MEDICINE

## 2021-12-16 PROCEDURE — 36415 COLL VENOUS BLD VENIPUNCTURE: CPT

## 2021-12-16 PROCEDURE — 93005 ELECTROCARDIOGRAM TRACING: CPT | Performed by: EMERGENCY MEDICINE

## 2021-12-16 PROCEDURE — 84484 ASSAY OF TROPONIN QUANT: CPT

## 2021-12-16 PROCEDURE — 6360000004 HC RX CONTRAST MEDICATION: Performed by: EMERGENCY MEDICINE

## 2021-12-16 PROCEDURE — 83880 ASSAY OF NATRIURETIC PEPTIDE: CPT

## 2021-12-16 RX ORDER — ASPIRIN 81 MG/1
324 TABLET, CHEWABLE ORAL ONCE
Status: COMPLETED | OUTPATIENT
Start: 2021-12-16 | End: 2021-12-16

## 2021-12-16 RX ORDER — IPRATROPIUM BROMIDE AND ALBUTEROL SULFATE 2.5; .5 MG/3ML; MG/3ML
1 SOLUTION RESPIRATORY (INHALATION) ONCE
Status: COMPLETED | OUTPATIENT
Start: 2021-12-16 | End: 2021-12-16

## 2021-12-16 RX ORDER — DEXAMETHASONE SODIUM PHOSPHATE 10 MG/ML
6 INJECTION, SOLUTION INTRAMUSCULAR; INTRAVENOUS ONCE
Status: COMPLETED | OUTPATIENT
Start: 2021-12-16 | End: 2021-12-16

## 2021-12-16 RX ADMIN — ASPIRIN 324 MG: 81 TABLET, CHEWABLE ORAL at 20:02

## 2021-12-16 RX ADMIN — DEXAMETHASONE SODIUM PHOSPHATE 6 MG: 10 INJECTION INTRAMUSCULAR; INTRAVENOUS at 20:02

## 2021-12-16 RX ADMIN — IOPAMIDOL 75 ML: 755 INJECTION, SOLUTION INTRAVENOUS at 21:19

## 2021-12-16 RX ADMIN — IPRATROPIUM BROMIDE AND ALBUTEROL SULFATE 1 AMPULE: .5; 3 SOLUTION RESPIRATORY (INHALATION) at 20:02

## 2021-12-16 ASSESSMENT — PAIN DESCRIPTION - PAIN TYPE: TYPE: ACUTE PAIN

## 2021-12-16 ASSESSMENT — PAIN SCALES - GENERAL: PAINLEVEL_OUTOF10: 10

## 2021-12-16 NOTE — Clinical Note
Patient Class: Observation [104]   REQUIRED: Diagnosis: Chest pain [751290]   Estimated Length of Stay: Estimated stay of less than 2 midnights   Admitting Provider: Cintia Eldridge [[id number]]

## 2021-12-17 LAB
ABO/RH: NORMAL
ANTIBODY SCREEN: NORMAL
BASOPHILS ABSOLUTE: 0 K/UL (ref 0–0.2)
BASOPHILS ABSOLUTE: 0.1 K/UL (ref 0–0.2)
BASOPHILS RELATIVE PERCENT: 0.4 %
BASOPHILS RELATIVE PERCENT: 0.8 %
BLOOD BANK DISPENSE STATUS: NORMAL
BLOOD BANK DISPENSE STATUS: NORMAL
BLOOD BANK PRODUCT CODE: NORMAL
BLOOD BANK PRODUCT CODE: NORMAL
BPU ID: NORMAL
BPU ID: NORMAL
CHOLESTEROL, FASTING: 119 MG/DL (ref 0–199)
DESCRIPTION BLOOD BANK: NORMAL
DESCRIPTION BLOOD BANK: NORMAL
EKG ATRIAL RATE: 99 BPM
EKG DIAGNOSIS: NORMAL
EKG P AXIS: 85 DEGREES
EKG P-R INTERVAL: 166 MS
EKG Q-T INTERVAL: 352 MS
EKG QRS DURATION: 128 MS
EKG QTC CALCULATION (BAZETT): 451 MS
EKG R AXIS: -54 DEGREES
EKG T AXIS: 142 DEGREES
EKG VENTRICULAR RATE: 99 BPM
EOSINOPHILS ABSOLUTE: 0 K/UL (ref 0–0.6)
EOSINOPHILS ABSOLUTE: 0.2 K/UL (ref 0–0.6)
EOSINOPHILS RELATIVE PERCENT: 0.1 %
EOSINOPHILS RELATIVE PERCENT: 2 %
GLUCOSE BLD-MCNC: 123 MG/DL (ref 70–99)
GLUCOSE BLD-MCNC: 129 MG/DL (ref 70–99)
GLUCOSE BLD-MCNC: 216 MG/DL (ref 70–99)
GLUCOSE BLD-MCNC: 251 MG/DL (ref 70–99)
GLUCOSE BLD-MCNC: 449 MG/DL (ref 70–99)
HCT VFR BLD CALC: 20.9 % (ref 36–48)
HCT VFR BLD CALC: 24.4 % (ref 36–48)
HCT VFR BLD CALC: 28.9 % (ref 36–48)
HCT VFR BLD CALC: 30.2 % (ref 36–48)
HDLC SERPL-MCNC: 29 MG/DL (ref 40–60)
HEMATOLOGY PATH CONSULT: NORMAL
HEMATOLOGY PATH CONSULT: YES
HEMOGLOBIN: 6.2 G/DL (ref 12–16)
HEMOGLOBIN: 7.4 G/DL (ref 12–16)
HEMOGLOBIN: 9.1 G/DL (ref 12–16)
HEMOGLOBIN: 9.5 G/DL (ref 12–16)
LDL CHOLESTEROL CALCULATED: 66 MG/DL
LYMPHOCYTES ABSOLUTE: 0.5 K/UL (ref 1–5.1)
LYMPHOCYTES ABSOLUTE: 1.3 K/UL (ref 1–5.1)
LYMPHOCYTES RELATIVE PERCENT: 11.2 %
LYMPHOCYTES RELATIVE PERCENT: 17.1 %
MCH RBC QN AUTO: 19.2 PG (ref 26–34)
MCH RBC QN AUTO: 19.7 PG (ref 26–34)
MCHC RBC AUTO-ENTMCNC: 29.5 G/DL (ref 31–36)
MCHC RBC AUTO-ENTMCNC: 30.1 G/DL (ref 31–36)
MCV RBC AUTO: 65.2 FL (ref 80–100)
MCV RBC AUTO: 65.4 FL (ref 80–100)
MONOCYTES ABSOLUTE: 0.1 K/UL (ref 0–1.3)
MONOCYTES ABSOLUTE: 0.6 K/UL (ref 0–1.3)
MONOCYTES RELATIVE PERCENT: 3 %
MONOCYTES RELATIVE PERCENT: 8.1 %
NEUTROPHILS ABSOLUTE: 4 K/UL (ref 1.7–7.7)
NEUTROPHILS ABSOLUTE: 5.4 K/UL (ref 1.7–7.7)
NEUTROPHILS RELATIVE PERCENT: 72 %
NEUTROPHILS RELATIVE PERCENT: 85.3 %
OCCULT BLOOD DIAGNOSTIC: ABNORMAL
PDW BLD-RTO: 23.3 % (ref 12.4–15.4)
PDW BLD-RTO: 23.5 % (ref 12.4–15.4)
PERFORMED ON: ABNORMAL
PLATELET # BLD: 231 K/UL (ref 135–450)
PLATELET # BLD: 252 K/UL (ref 135–450)
PLATELET SLIDE REVIEW: ADEQUATE
PMV BLD AUTO: 8.4 FL (ref 5–10.5)
PMV BLD AUTO: 8.7 FL (ref 5–10.5)
RBC # BLD: 3.21 M/UL (ref 4–5.2)
RBC # BLD: 3.74 M/UL (ref 4–5.2)
SLIDE REVIEW: ABNORMAL
TRIGLYCERIDE, FASTING: 120 MG/DL (ref 0–150)
TROPONIN: <0.01 NG/ML
TROPONIN: <0.01 NG/ML
VLDLC SERPL CALC-MCNC: 24 MG/DL
WBC # BLD: 4.7 K/UL (ref 4–11)
WBC # BLD: 7.5 K/UL (ref 4–11)

## 2021-12-17 PROCEDURE — P9016 RBC LEUKOCYTES REDUCED: HCPCS

## 2021-12-17 PROCEDURE — 6360000002 HC RX W HCPCS: Performed by: INTERNAL MEDICINE

## 2021-12-17 PROCEDURE — 85018 HEMOGLOBIN: CPT

## 2021-12-17 PROCEDURE — 86923 COMPATIBILITY TEST ELECTRIC: CPT

## 2021-12-17 PROCEDURE — 93010 ELECTROCARDIOGRAM REPORT: CPT | Performed by: INTERNAL MEDICINE

## 2021-12-17 PROCEDURE — 96361 HYDRATE IV INFUSION ADD-ON: CPT

## 2021-12-17 PROCEDURE — 84484 ASSAY OF TROPONIN QUANT: CPT

## 2021-12-17 PROCEDURE — 6370000000 HC RX 637 (ALT 250 FOR IP): Performed by: PHYSICIAN ASSISTANT

## 2021-12-17 PROCEDURE — 94640 AIRWAY INHALATION TREATMENT: CPT

## 2021-12-17 PROCEDURE — 2580000003 HC RX 258: Performed by: INTERNAL MEDICINE

## 2021-12-17 PROCEDURE — 6370000000 HC RX 637 (ALT 250 FOR IP): Performed by: INTERNAL MEDICINE

## 2021-12-17 PROCEDURE — G0378 HOSPITAL OBSERVATION PER HR: HCPCS

## 2021-12-17 PROCEDURE — 82270 OCCULT BLOOD FECES: CPT

## 2021-12-17 PROCEDURE — 36415 COLL VENOUS BLD VENIPUNCTURE: CPT

## 2021-12-17 PROCEDURE — C9113 INJ PANTOPRAZOLE SODIUM, VIA: HCPCS | Performed by: INTERNAL MEDICINE

## 2021-12-17 PROCEDURE — 86901 BLOOD TYPING SEROLOGIC RH(D): CPT

## 2021-12-17 PROCEDURE — 99223 1ST HOSP IP/OBS HIGH 75: CPT | Performed by: INTERNAL MEDICINE

## 2021-12-17 PROCEDURE — 80061 LIPID PANEL: CPT

## 2021-12-17 PROCEDURE — 96366 THER/PROPH/DIAG IV INF ADDON: CPT

## 2021-12-17 PROCEDURE — 36430 TRANSFUSION BLD/BLD COMPNT: CPT

## 2021-12-17 PROCEDURE — 99204 OFFICE O/P NEW MOD 45 MIN: CPT | Performed by: NURSE PRACTITIONER

## 2021-12-17 PROCEDURE — 86850 RBC ANTIBODY SCREEN: CPT

## 2021-12-17 PROCEDURE — 86900 BLOOD TYPING SEROLOGIC ABO: CPT

## 2021-12-17 PROCEDURE — 85025 COMPLETE CBC W/AUTO DIFF WBC: CPT

## 2021-12-17 PROCEDURE — 6360000002 HC RX W HCPCS: Performed by: EMERGENCY MEDICINE

## 2021-12-17 PROCEDURE — 96368 THER/DIAG CONCURRENT INF: CPT

## 2021-12-17 PROCEDURE — 96365 THER/PROPH/DIAG IV INF INIT: CPT

## 2021-12-17 PROCEDURE — 96376 TX/PRO/DX INJ SAME DRUG ADON: CPT

## 2021-12-17 PROCEDURE — 85014 HEMATOCRIT: CPT

## 2021-12-17 PROCEDURE — C9113 INJ PANTOPRAZOLE SODIUM, VIA: HCPCS | Performed by: EMERGENCY MEDICINE

## 2021-12-17 RX ORDER — SODIUM CHLORIDE 9 MG/ML
INJECTION, SOLUTION INTRAVENOUS
Status: DISPENSED
Start: 2021-12-17 | End: 2021-12-18

## 2021-12-17 RX ORDER — ONDANSETRON 2 MG/ML
4 INJECTION INTRAMUSCULAR; INTRAVENOUS EVERY 6 HOURS PRN
Status: DISCONTINUED | OUTPATIENT
Start: 2021-12-17 | End: 2021-12-18 | Stop reason: HOSPADM

## 2021-12-17 RX ORDER — NICOTINE POLACRILEX 4 MG
15 LOZENGE BUCCAL PRN
Status: DISCONTINUED | OUTPATIENT
Start: 2021-12-17 | End: 2021-12-18 | Stop reason: HOSPADM

## 2021-12-17 RX ORDER — GLUCAGON 1 MG/ML
1 KIT INJECTION PRN
Status: DISCONTINUED | OUTPATIENT
Start: 2021-12-17 | End: 2021-12-18 | Stop reason: HOSPADM

## 2021-12-17 RX ORDER — LISINOPRIL 20 MG/1
20 TABLET ORAL DAILY
Status: DISCONTINUED | OUTPATIENT
Start: 2021-12-17 | End: 2021-12-18 | Stop reason: HOSPADM

## 2021-12-17 RX ORDER — SODIUM CHLORIDE 0.9 % (FLUSH) 0.9 %
5-40 SYRINGE (ML) INJECTION EVERY 12 HOURS SCHEDULED
Status: DISCONTINUED | OUTPATIENT
Start: 2021-12-17 | End: 2021-12-18 | Stop reason: HOSPADM

## 2021-12-17 RX ORDER — SODIUM CHLORIDE 9 MG/ML
INJECTION, SOLUTION INTRAVENOUS PRN
Status: DISCONTINUED | OUTPATIENT
Start: 2021-12-17 | End: 2021-12-18 | Stop reason: HOSPADM

## 2021-12-17 RX ORDER — POLYETHYLENE GLYCOL 3350 17 G/17G
119 POWDER, FOR SOLUTION ORAL ONCE
Status: COMPLETED | OUTPATIENT
Start: 2021-12-17 | End: 2021-12-17

## 2021-12-17 RX ORDER — DEXTROSE MONOHYDRATE 25 G/50ML
12.5 INJECTION, SOLUTION INTRAVENOUS PRN
Status: DISCONTINUED | OUTPATIENT
Start: 2021-12-17 | End: 2021-12-18 | Stop reason: HOSPADM

## 2021-12-17 RX ORDER — IPRATROPIUM BROMIDE AND ALBUTEROL SULFATE 2.5; .5 MG/3ML; MG/3ML
1 SOLUTION RESPIRATORY (INHALATION) EVERY 4 HOURS PRN
Status: DISCONTINUED | OUTPATIENT
Start: 2021-12-17 | End: 2021-12-18 | Stop reason: HOSPADM

## 2021-12-17 RX ORDER — ATORVASTATIN CALCIUM 40 MG/1
40 TABLET, FILM COATED ORAL NIGHTLY
Status: DISCONTINUED | OUTPATIENT
Start: 2021-12-17 | End: 2021-12-18 | Stop reason: HOSPADM

## 2021-12-17 RX ORDER — IPRATROPIUM BROMIDE AND ALBUTEROL SULFATE 2.5; .5 MG/3ML; MG/3ML
1 SOLUTION RESPIRATORY (INHALATION) 4 TIMES DAILY
Status: DISCONTINUED | OUTPATIENT
Start: 2021-12-18 | End: 2021-12-18

## 2021-12-17 RX ORDER — ASPIRIN 81 MG/1
81 TABLET, CHEWABLE ORAL DAILY
Status: DISCONTINUED | OUTPATIENT
Start: 2021-12-18 | End: 2021-12-17

## 2021-12-17 RX ORDER — POLYETHYLENE GLYCOL 3350 17 G/17G
17 POWDER, FOR SOLUTION ORAL DAILY PRN
Status: DISCONTINUED | OUTPATIENT
Start: 2021-12-17 | End: 2021-12-18 | Stop reason: HOSPADM

## 2021-12-17 RX ORDER — SODIUM CHLORIDE 9 MG/ML
25 INJECTION, SOLUTION INTRAVENOUS PRN
Status: DISCONTINUED | OUTPATIENT
Start: 2021-12-17 | End: 2021-12-18 | Stop reason: HOSPADM

## 2021-12-17 RX ORDER — METOPROLOL SUCCINATE 50 MG/1
50 TABLET, EXTENDED RELEASE ORAL DAILY
Status: DISCONTINUED | OUTPATIENT
Start: 2021-12-17 | End: 2021-12-18 | Stop reason: HOSPADM

## 2021-12-17 RX ORDER — ACETAMINOPHEN 650 MG/1
650 SUPPOSITORY RECTAL EVERY 6 HOURS PRN
Status: DISCONTINUED | OUTPATIENT
Start: 2021-12-17 | End: 2021-12-18 | Stop reason: HOSPADM

## 2021-12-17 RX ORDER — SODIUM CHLORIDE 9 MG/ML
INJECTION, SOLUTION INTRAVENOUS CONTINUOUS
Status: DISCONTINUED | OUTPATIENT
Start: 2021-12-17 | End: 2021-12-18 | Stop reason: HOSPADM

## 2021-12-17 RX ORDER — DEXTROSE MONOHYDRATE 50 MG/ML
100 INJECTION, SOLUTION INTRAVENOUS PRN
Status: DISCONTINUED | OUTPATIENT
Start: 2021-12-17 | End: 2021-12-18 | Stop reason: HOSPADM

## 2021-12-17 RX ORDER — POLYETHYLENE GLYCOL 3350 17 G/17G
119 POWDER, FOR SOLUTION ORAL ONCE
Status: COMPLETED | OUTPATIENT
Start: 2021-12-18 | End: 2021-12-18

## 2021-12-17 RX ORDER — ONDANSETRON 4 MG/1
4 TABLET, ORALLY DISINTEGRATING ORAL EVERY 8 HOURS PRN
Status: DISCONTINUED | OUTPATIENT
Start: 2021-12-17 | End: 2021-12-18 | Stop reason: HOSPADM

## 2021-12-17 RX ORDER — SODIUM CHLORIDE 0.9 % (FLUSH) 0.9 %
5-40 SYRINGE (ML) INJECTION PRN
Status: DISCONTINUED | OUTPATIENT
Start: 2021-12-17 | End: 2021-12-18 | Stop reason: HOSPADM

## 2021-12-17 RX ORDER — PANTOPRAZOLE SODIUM 40 MG/10ML
40 INJECTION, POWDER, LYOPHILIZED, FOR SOLUTION INTRAVENOUS ONCE
Status: COMPLETED | OUTPATIENT
Start: 2021-12-17 | End: 2021-12-17

## 2021-12-17 RX ORDER — PANTOPRAZOLE SODIUM 40 MG/10ML
40 INJECTION, POWDER, LYOPHILIZED, FOR SOLUTION INTRAVENOUS 2 TIMES DAILY
Status: DISCONTINUED | OUTPATIENT
Start: 2021-12-17 | End: 2021-12-17

## 2021-12-17 RX ORDER — ACETAMINOPHEN 325 MG/1
650 TABLET ORAL EVERY 6 HOURS PRN
Status: DISCONTINUED | OUTPATIENT
Start: 2021-12-17 | End: 2021-12-18 | Stop reason: HOSPADM

## 2021-12-17 RX ADMIN — Medication 119 G: at 20:46

## 2021-12-17 RX ADMIN — IPRATROPIUM BROMIDE AND ALBUTEROL SULFATE 1 AMPULE: .5; 2.5 SOLUTION RESPIRATORY (INHALATION) at 19:01

## 2021-12-17 RX ADMIN — INSULIN LISPRO 6 UNITS: 100 INJECTION, SOLUTION INTRAVENOUS; SUBCUTANEOUS at 17:01

## 2021-12-17 RX ADMIN — INSULIN LISPRO 4 UNITS: 100 INJECTION, SOLUTION INTRAVENOUS; SUBCUTANEOUS at 09:40

## 2021-12-17 RX ADMIN — BISACODYL 20 MG: 5 TABLET, COATED ORAL at 17:35

## 2021-12-17 RX ADMIN — SODIUM CHLORIDE 80 MG: 9 INJECTION, SOLUTION INTRAVENOUS at 09:21

## 2021-12-17 RX ADMIN — INSULIN LISPRO 12 UNITS: 100 INJECTION, SOLUTION INTRAVENOUS; SUBCUTANEOUS at 05:17

## 2021-12-17 RX ADMIN — SODIUM CHLORIDE: 9 INJECTION, SOLUTION INTRAVENOUS at 05:23

## 2021-12-17 RX ADMIN — LISINOPRIL 20 MG: 20 TABLET ORAL at 09:22

## 2021-12-17 RX ADMIN — ATORVASTATIN CALCIUM 40 MG: 40 TABLET, FILM COATED ORAL at 20:45

## 2021-12-17 RX ADMIN — SODIUM CHLORIDE 8 MG/HR: 9 INJECTION, SOLUTION INTRAVENOUS at 09:57

## 2021-12-17 RX ADMIN — PANTOPRAZOLE SODIUM 40 MG: 40 INJECTION, POWDER, FOR SOLUTION INTRAVENOUS at 01:44

## 2021-12-17 RX ADMIN — METOPROLOL SUCCINATE 50 MG: 50 TABLET, EXTENDED RELEASE ORAL at 09:22

## 2021-12-17 RX ADMIN — Medication 10 ML: at 20:46

## 2021-12-17 ASSESSMENT — PAIN SCALES - GENERAL: PAINLEVEL_OUTOF10: 3

## 2021-12-17 NOTE — ED NOTES
Pt states not taking home medications d/t loss of insurance approx 1 yr ago. Uses albuterol inhaler only.      Shawn Oviedo RN  12/16/21 1925

## 2021-12-17 NOTE — H&P
Hospital Medicine History & Physical      PCP: Jose Alberto Ramon MD    Date of Admission: 12/16/2021    Date of Service: Pt seen/examined on 12/17/2021      Chief Complaint:    Chief Complaint   Patient presents with    Chest Pain     Pt c/o chest \"burning, weakness x 1wk. Pt had covid exposure last week. History Of Present Illness: The patient is a 77 y.o. female with a.fib, chronic combined CHF, COPD, history of anemia requiring transfusion, history of GI bleed, HLD, HTN and hx of cervical CA who presented to [address] ED with complaint of burning chest pain. Patient endorses burning of left upper chest wall radiating across to the right and into her left shoulder blade x2 weeks. This has been accompanied by nonproductive cough and weakness. She also endorses lightheadedness, dizziness and shortness of breath. She does states she has dark stool which is chronic and unchanged for her, she is on iron supplementation. She denies nicolas blood per mouth or rectum. She denies abdominal pain or distention, nausea/vomiting. She states that she had an episode similar to this early this year and was found to be anemic. The previous episode did not have the same burning chest pain. She had a colonoscopy last year where several polyps were removed. In the ED her fecal occult was positive and she was found to have hemoglobin of 7.4. She was admitted for further care.     Past Medical History:        Diagnosis Date    Atrial fibrillation ([facility]     Bronchitis chronic     Cancer (HCC)     cervical    CHF (congestive heart failure) (HCC)     Diabetes mellitus (HCC)     Emphysema of lung ([address]     Hypercholesteremia     Hypertension     Microcytic anemia 3/23/2017    Psoriasis        Past Surgical History:        Procedure Laterality Date    CERVICAL [address] SURGERY  2004    CHOLECYSTECTOMY      DIAGNOSTIC CARDIAC CATH LAB PROCEDURE  4/2011    angiogram with 30% blockage    HYSTERECTOMY      KIDNEY STONE SURGERY      TONSILLECTOMY      UPPER GASTROINTESTINAL ENDOSCOPY N/A 10/29/2019    EGD BIOPSY performed by Kayla Beckwith DO at [place] [address] N/A 10/11/2020    EGD BIOPSY performed by Kayla Beckwith DO at [place] [address]  10/11/2020    EGD CONTROL HEMORRHAGE performed by Kayla Beckwith DO at [facility]       Medications Prior to Admission:    Prior to Admission medications    Medication Sig Start Date End Date Taking? Authorizing Provider   ALBUTEROL IN Inhale into the lungs   Yes Historical Provider, MD   ipratropium-albuterol (DUONEB) 0.5-2.5 (3) MG/3ML SOLN nebulizer solution Inhale 3 mLs into the lungs every 6 hours as needed for Shortness of Breath 12/4/19 12/16/21 Yes Veronica Mead MD   ferrous sulfate (FE TABS) 325 (65 Fe) MG EC tablet Take 1 tablet by mouth 2 times daily 10/13/20   Veronica Mead MD   pantoprazole (PROTONIX) 40 MG tablet Take 1 tablet by mouth daily 9/18/20 10/18/20  Veronica Mead MD   metoprolol succinate (TOPROL XL) 50 MG extended release tablet Take 1 tablet by mouth daily 9/18/20   Veronica Mead MD   lisinopril (PRINIVIL;ZESTRIL) 20 MG tablet Take 1 tablet by mouth daily 9/18/20   Veronica Mead MD   blood glucose test strips (DARVIN CONTOUR NEXT TEST) strip Test three times daily.  DX:E11.9 9/18/20   Veronica Mead MD   glimepiride (AMARYL) 4 MG tablet Take 0.5 tablets by mouth every morning (before breakfast) 9/18/20   Veronica Mead MD   metFORMIN (GLUCOPHAGE) 500 MG tablet Take 1 tablet by mouth 2 times daily (with meals) 9/18/20   Veronica Mead MD   AGAMATRIX ULTRA-THIN LANCETS MISC Check sugars daily after each meal 10/30/19   STEPHY [place]   Blood Glucose Monitoring Suppl (AGAMATRIX AMP) ISABELLA Check BG daily after meals 10/30/19   STEPHY [place]   [employer] Lancets MISC 1 each by Does not apply route 3 times daily 5/2/18   Familia Vasquez PA-C       Allergies:  Morphine, Chantix [varenicline tartrate], and Percocet [oxycodone-acetaminophen]    Social History:  The patient currently lives at home with son    TOBACCO:   reports that she has been smoking cigarettes. She has a 16.00 pack-year smoking history. She has never used smokeless tobacco.  ETOH:   reports no history of alcohol use. Family History:   Positive as follows:        Problem Relation Age of Onset    Diabetes Mother     Heart Disease Mother     High Blood Pressure Mother     Hypertension Mother     Heart Failure Mother     Cancer Father     Cancer Sister     Diabetes Brother     Cancer Brother     Cancer Brother     Asthma Daughter     Hypertension Brother     Hypertension Brother     Emphysema Neg Hx        REVIEW OF SYSTEMS:       Constitutional: Negative for fever, + lightheaded, dizziness, weakness  HENT: Negative for sore throat   Eyes: Negative for redness   Respiratory: + For dyspnea, cough   Cardiovascular: + For burning chest pain   Gastrointestinal: Negative for vomiting, diarrhea, + dark stool  Genitourinary: Negative for hematuria   Musculoskeletal: Negative for arthralgias   Skin: Negative for rash   Neurological: Negative for syncope   Hematological: Negative for adenopathy   Psychiatric/Behavorial: Negative for anxiety    PHYSICAL EXAM:    BP (!) 143/62   Pulse 86   Temp 98 °F (36.7 °C) (Oral)   Resp 28   Ht 5' 3\" (1.6 m)   Wt 138 lb 8 oz (62.8 kg)   SpO2 95%   BMI 24.53 kg/m²     Gen: No distress. Alert. Eyes: PERRL. No sclera icterus. Pale conjunctiva. ENT: No discharge. Pharynx clear. Neck: Trachea midline. Resp: No accessory muscle use. No crackles. No wheezes. No rhonchi. CV: Regular rate. Regular rhythm. Systolic murmur noted. No rub. No edema. Capillary Refill: Brisk,< 3 seconds   Peripheral Pulses: +2 palpable, equal bilaterally   GI: Non-tender. Non-distended. No masses. No organomegaly. Normal bowel sounds. No hernia. Skin: Warm and dry. No nodule on exposed extremities. No rash on exposed extremities. M/S: No cyanosis. No joint deformity. No clubbing. Neuro: Awake. Grossly nonfocal    Psych: Oriented x 3. No anxiety or agitation. CBC:   Recent Labs     12/16/21 1944 12/17/21  0556   WBC 7.5 4.7   HGB 7.4* 6.2*   HCT 24.4* 20.9*   MCV 65.4* 65.2*    231     BMP:   Recent Labs     12/16/21 1944      K 4.1      CO2 26   BUN 19   CREATININE 0.8     LIVER PROFILE:   Recent Labs     12/16/21 1944   AST 17   ALT 12   BILITOT 0.3   ALKPHOS 83     CARDIAC ENZYMES  Recent Labs     12/16/21 1944 12/16/21  2325 12/17/21  0556   TROPONINI <0.01 <0.01 <0.01       U/A:    Lab Results   Component Value Date    NITRITE neg 01/23/2020    COLORU Yellow 10/09/2020    WBCUA  10/09/2020    RBCUA 0-2 10/09/2020    MUCUS 2+ 10/09/2020    BACTERIA 3+ 10/09/2020    CLARITYU SL CLOUDY 10/09/2020    SPECGRAV 1.025 10/09/2020    LEUKOCYTESUR MODERATE 10/09/2020    BLOODU SMALL 10/09/2020    GLUCOSEU Negative 10/09/2020    GLUCOSEU NEGATIVE 03/17/2010      CULTURES  Results for Valeriy Orta (MRN [id number]) as of 12/17/2021 10:30   Ref. Range 12/16/2021 20:12 12/16/2021 20:30 12/17/2021 00:52   Occult Blood Diagnostic Unknown   Result: POSITIVE. .. (A)   BLOOD OCCULT STOOL DIAGNOSTIC Unknown   Rpt (A)   Rapid Influenza A Ag Latest Ref Range: Negative  Negative     Rapid Influenza B Ag Latest Ref Range: Negative  Negative     RAPID INFLUENZA A/B ANTIGENS Unknown Rpt     SARS-CoV-2, NAAT Latest Ref Range: Not Detected   Not Detected        EKG:   Normal sinus rhythm  Right atrial enlargement  Right bundle branch block  Left anterior fascicular block  ** Bifascicular block **  Left ventricular hypertrophy with repolarization abnormality  Cannot rule out Septal infarct (cited on or before 09-OCT-2020)  Abnormal ECG  When compared with ECG of 09-OCT-2020 23:51,  No significant change was found  Confirmed By: Mauricio Damon MD    RADIOLOGY  CT CHEST PULMONARY EMBOLISM W CONTRAST   Final Result   No central to segmental branch pulmonary emboli. No acute pleuroparenchymal disease      RECOMMENDATIONS:   Unavailable         XR CHEST PORTABLE   Final Result   No acute cardiopulmonary disease is identified. Stable, borderline cardiomegaly. Pertinent previous results reviewed   ECHO 10/29/19   Summary   Left ventricular systolic function is mildly reduced to low normal with   ejection fraction estimated at 45-50 %. There is moderate concentric left ventricular hypertrophy. Grade II diastolic dysfunction with elevated filing pressure. The left atrium is mildly dilated. The right atrium is mildly dilated. Mild posterior mitral annular calcification is present. Mild mitral regurgitation. Mild aortic stenosis is present. Mild aortic regurgitation is present. Moderate tricuspid regurgitation. Normal systolic pulmonary artery pressure (SPAP) estimated at 33 mmHg (RA   pressure 8 mmHg). Moderate pulmonic regurgitation present. The atrial septum appears lipomatous. There is an atrial septal aneurysm seen. A bubble study was performed and shows evidence of right to left shunting   consistent with a patent foramen ovale or atrial septal defect. Stress test 11/17/14     Findings: The patient underwent a standard Lexiscan infusion   stress test. Dr. Keith Hager was in attendance. Uniform distribution of   radioactivity is seen within the left ventricular myocardium on   both the stress and rest images. The left ventricular chamber size   is normal. On the gated images, uniform wall motion and normal   systolic wall thickening is seen. Decreased contractility is   noted. The estimated ejection fraction is 43%.            Kerri Roy MD have reviewed the chart on Edi Moore and personally interviewed and examined patient, reviewed the data (labs and imaging) and after discussion with my PA formulated the plan. Agree with note with the following edits. HPI:     80-year-old white female with a history of A. fib, chronic combined CHF, COPD, anemia requiring transfusion, hypertension, hyperlipidemia, cervical cancer, prior abdominal echo came to emergency room with complaints of burning sensation in the chest.  Patient states that she is exposed to [place] her concerns that she was having COVID. She has some lightheadedness shortness of breath and dizziness. Denied any nicolas rectal bleeding. Came to the hospital.  Work-up showed negative for Covid. Patient had a drop in hemoglobin. She is receiving a blood transfusion. Patient had previously been advised to get a stress test but apparently never done that. She says she has no insurance. I reviewed the patient's Past Medical History, Past Surgical History, Medications, and Allergies. Physical exam:    BP (!) 146/44   Pulse 78   Temp 98.4 °F (36.9 °C) (Oral)   Resp 21   Ht 5' 3\" (1.6 m)   Wt 138 lb 8 oz (62.8 kg)   SpO2 99%   BMI 24.53 kg/m²     Gen: No distress. Alert. Eyes: PERRL. No sclera icterus. No conjunctival injection. ENT: No discharge. Pharynx clear. Neck: Trachea midline. Normal thyroid. Resp: No accessory muscle use. No crackles. No wheezes. No rhonchi. No dullness on percussion. CV: Regular rate. Regular rhythm. No murmur or rub. No edema. GI: Non-tender. Non-distended. No masses. No organomegaly. Normal bowel sounds. No hernia. ASSESSMENT/PLAN:    #Microcytic anemia  #Possible GI bleed  -Stool occult positive  -Hgb 7.4 -> 6.2  -Transfuse x2 units  -Goal Hgb greater than 7  -H&H every 6 Hours  -N.p.o. for now, IVF  -IV Protonix  -GI consult; await recommendations  -Discussed with GI. Plan to do EGD and colonoscopy.     #Atypical chest pain but concerning for CAD given multiple risk factors and prior abnormal echo and recommendations stress test.  -CT chest negative for PE  -EKG does not display signs of acute ischemia  -Troponin x3 negative  -consult cardiology  -N.p.o. after midnight. Florence Weinstein for a.m.     #DM2  -Home meds held  -N.p.o. medium dose SSI for now  -POCT glucose    #Afib  -Not currently in A. fib   -Continue Toprol-XL  -No anticoagulation medications    #Chronic combined CHF  -No signs and symptoms of overload  -Stable, monitor    #HTN  -Continue lisinopril  -BP trending down, stable at this time  -Monitor    #COPD no AE  -No signs of exacerbation  -Dyspnea and cough likely related to above  -Home inhalers held consider restart if clinically indicated    #HLD  -Continue statin    #history of anemia requiring transfusion   #history of GI bleed   #history of cervical CA    DVT Prophylaxis: SCD 2/2 GI Bleed  Diet: Diet NPO  Code Status: Full Code    Narinder Mcdonald PA-C  12/17/2021 12:37 PM         Monty Bermudez MD 12/17/2021 2:29 PM

## 2021-12-17 NOTE — PROGRESS NOTES
Pt to receive 2 units of [facility] called and notified of type and screen. Consent signed and placed in soft chart.

## 2021-12-17 NOTE — ED PROVIDER NOTES
CHIEF COMPLAINT  Chest Pain (Pt c/o chest \"burning, weakness x 1wk. Pt had covid exposure last week.)      HISTORY OF PRESENT ILLNESS  Manuel Rodriguez is a 77 y.o. female presents to the ED with chest pain, burning sensation, left side of her chest and across toward the right, reports positive Covid exposure, has been having this off and on for about 2 weeks, worse today, generalized weakness, shortness of breath with exertion, T-max 100.3, but has felt warm, has been coughing, some productive sputum, no abdominal pain/nausea/vomiting, no changes in bowel or bladder except urine has smelled stronger like ammonia. Had been using Pepto-Bismol, she was also on iron supplement recently, so she is not surprised that dark stools, no hematochezia, she does have COPD, CHF, was seen for epistaxis last month, she has anemia, has required transfusions before, had GI bleed in the past, she is a smoker, she states she is supposed to go to work in the morning, she was a longtime [employer] previously, not on any blood thinners, no history of DVT or PE, reports she has not been managing/checking her diabetes, has not seen a doctor in over a year, no other complaints, modifying factors or associated symptoms. I have reviewed the following from the nursing documentation.     Past Medical History:   Diagnosis Date    Bronchitis chronic     Cancer ([address]     cervical    CHF (congestive heart failure) (HCC)     Diabetes mellitus ([address]     Emphysema of lung ([address]     Hypercholesteremia     Hypertension     Microcytic anemia 3/23/2017    Psoriasis      Past Surgical History:   Procedure Laterality Date    CERVICAL [address] SURGERY  2004    CHOLECYSTECTOMY      DIAGNOSTIC CARDIAC CATH LAB PROCEDURE  4/2011    angiogram with 30% blockage    HYSTERECTOMY      KIDNEY STONE SURGERY      TONSILLECTOMY      UPPER GASTROINTESTINAL ENDOSCOPY N/A 10/29/2019    EGD BIOPSY performed by Hinasky Marquez DO at [facility] [place] ENDOSCOPY    UPPER GASTROINTESTINAL ENDOSCOPY N/A 10/11/2020    [facility]  10[facility]11/2020    EGD CONTROL HEMORRHAGE performed by Luís Childress DO at [address] [facility]     Family History   Problem Relation Age of Onset    Diabetes Mother     Heart Disease Mother     High Blood Pressure Mother     Hypertension Mother     Heart Failure Mother     Cancer Father     Cancer Sister     Diabetes Brother     Cancer Brother     Cancer Brother     Asthma Daughter     Hypertension Brother     Hypertension Brother     Emphysema Neg Hx      Social History     Socioeconomic History    Marital status:      Spouse name: Not on file    Number of children: Not on file    Years of education: Not on file    Highest education level: Not on file   Occupational History    Not on file   Tobacco Use    Smoking status: Current Every Day Smoker     Packs/day: 1.00     Years: 32.00     Pack years: 32.00     Types: Cigarettes    Smokeless tobacco: Never Used    Tobacco comment: pt states she wants to quit. chantix did not work   Vaping Use    Vaping Use: Never used   Substance and Sexual Activity    Alcohol use: No    Drug use: No    Sexual activity: Yes     Partners: Male   Other Topics Concern    Not on file   Social History Narrative    Not on file     Social Determinants of Health     Financial Resource Strain:     Difficulty of Paying Living Expenses: Not on file   Food Insecurity:     Worried About [address] in the Last Year: Not on file    Luis Miguel of Food in the Last Year: Not on file   Transportation Needs:     Lack of Transportation (Medical): Not on file    Lack of Transportation (Non-Medical):  Not on file   Physical Activity:     Days of Exercise per Week: Not on file    Minutes of Exercise per Session: Not on file   Stress:     Feeling of Stress : Not on file   Social Connections:  Frequency of Communication with Friends and Family: Not on file    Frequency of Social Gatherings with Friends and Family: Not on file    Attends Mandaen Services: Not on file    Active Member of Clubs or Organizations: Not on file    Attends Club or Organization Meetings: Not on file    Marital Status: Not on file   Intimate Partner Violence:     Fear of Current or Ex-Partner: Not on file    Emotionally Abused: Not on file    Physically Abused: Not on file    Sexually Abused: Not on file   Housing Stability:     Unable to Pay for Housing in the Last Year: Not on file    Number of [place] in the Last Year: Not on file    Unstable Housing in the Last Year: Not on file     No current facility-administered medications for this encounter. Current Outpatient Medications   Medication Sig Dispense Refill    ALBUTEROL IN Inhale into the lungs      ipratropium-albuterol (DUONEB) 0.5-2.5 (3) MG/3ML SOLN nebulizer solution Inhale 3 mLs into the lungs every 6 hours as needed for Shortness of Breath 360 mL 2    ferrous sulfate (FE TABS) 325 (65 Fe) MG EC tablet Take 1 tablet by mouth 2 times daily 1 tablet 0    pantoprazole (PROTONIX) 40 MG tablet Take 1 tablet by mouth daily 30 tablet 2    metoprolol succinate (TOPROL XL) 50 MG extended release tablet Take 1 tablet by mouth daily 30 tablet 2    lisinopril (PRINIVIL;ZESTRIL) 20 MG tablet Take 1 tablet by mouth daily 30 tablet 2    blood glucose test strips (DARVIN CONTOUR NEXT TEST) strip Test three times daily.  DX:E11.9 100 each 11    glimepiride (AMARYL) 4 MG tablet Take 0.5 tablets by mouth every morning (before breakfast) 15 tablet 2    metFORMIN (GLUCOPHAGE) 500 MG tablet Take 1 tablet by mouth 2 times daily (with meals) 60 tablet 2    AGAMATRIX ULTRA-THIN LANCETS MISC Check sugars daily after each meal 100 each 0    Blood Glucose Monitoring Suppl (AGAMATRIX AMP) ISABELLA Check BG daily after meals 1 Device 0    [employer] Lancets MISC 1 each by Does not apply route 3 times daily 100 each 0     Allergies   Allergen Reactions    Morphine Other (See Comments)     Bad headache    Chantix [Varenicline Tartrate]      BAD DREAMS    Percocet [Oxycodone-Acetaminophen] Nausea And Vomiting       REVIEW OF SYSTEMS  10 systems reviewed, pertinent positives per HPI otherwise noted to be negative. PHYSICAL EXAM  BP (!) 154/67   Pulse 86   Temp 99 °F (37.2 °C) (Oral)   Resp 20   Ht 5' 3\" (1.6 m)   Wt 145 lb (65.8 kg)   SpO2 94%   BMI 25.69 kg/m²   GENERAL APPEARANCE: Awake and alert. Cooperative. No acute distress, scent of smoke on patient  HEAD: Normocephalic. Atraumatic. EYES: PERRL. EOM's grossly intact. ENT: Mucous membranes are moist.  Airway patent, no stridor  NECK: Supple. No rigidity  HEART: RRR. No murmurs  LUNGS: Respirations nonlabored. Lungs are with coarse breath sounds, scattered end expiratory wheezes, no crackles or rhonchi. ABDOMEN: Soft. Non-distended. Non-tender. No guarding or rebound. Normal bowel sounds. EXTREMITIES: No peripheral edema. Moves all extremities equally. all extremities neurovascularly intact. No calf tenderness  SKIN: Warm and dry. No acute rashes. NEUROLOGICAL: Alert and oriented. No gross facial drooping. Strength 5/5, sensation intact. No truncal ataxia. Normal speech, steady gait  PSYCHIATRIC: Normal mood and affect. LABS  I have reviewed all labs for this visit.    Results for orders placed or performed during the hospital encounter of 12/16/21   Rapid influenza A/B antigens    Specimen: Nasopharyngeal   Result Value Ref Range    Rapid Influenza A Ag Negative Negative    Rapid Influenza B Ag Negative Negative   COVID-19, Rapid    Specimen: Nasopharyngeal Swab   Result Value Ref Range    SARS-CoV-2, NAAT Not Detected Not Detected   CBC auto differential   Result Value Ref Range    WBC 7.5 4.0 - 11.0 K/uL    RBC 3.74 (L) 4.00 - 5.20 M/uL    Hemoglobin 7.4 (L) 12.0 - 16.0 g/dL    Hematocrit 24.4 (L) 36.0 - 48.0 %    MCV 65.4 (L) 80.0 - 100.0 fL    MCH 19.7 (L) 26.0 - 34.0 pg    MCHC 30.1 (L) 31.0 - 36.0 g/dL    RDW 23.5 (H) 12.4 - 15.4 %    Platelets 996 368 - 978 K/uL    MPV 8.4 5.0 - 10.5 fL    Neutrophils % 72.0 %    Lymphocytes % 17.1 %    Monocytes % 8.1 %    Eosinophils % 2.0 %    Basophils % 0.8 %    Neutrophils Absolute 5.4 1.7 - 7.7 K/uL    Lymphocytes Absolute 1.3 1.0 - 5.1 K/uL    Monocytes Absolute 0.6 0.0 - 1.3 K/uL    Eosinophils Absolute 0.2 0.0 - 0.6 K/uL    Basophils Absolute 0.1 0.0 - 0.2 K/uL   Troponin   Result Value Ref Range    Troponin <0.01 <0.01 ng/mL   Comprehensive Metabolic Panel w/ Reflex to MG   Result Value Ref Range    Sodium 137 136 - 145 mmol/L    Potassium reflex Magnesium 4.1 3.5 - 5.1 mmol/L    Chloride 100 99 - 110 mmol/L    CO2 26 21 - 32 mmol/L    Anion Gap 11 3 - 16    Glucose 273 (H) 70 - 99 mg/dL    BUN 19 7 - 20 mg/dL    CREATININE 0.8 0.6 - 1.2 mg/dL    GFR Non-African American >60 >60    GFR African American >60 >60    Calcium 9.8 8.3 - 10.6 mg/dL    Total Protein 7.8 6.4 - 8.2 g/dL    Albumin 4.4 3.4 - 5.0 g/dL    Albumin/Globulin Ratio 1.3 1.1 - 2.2    Total Bilirubin 0.3 0.0 - 1.0 mg/dL    Alkaline Phosphatase 83 40 - 129 U/L    ALT 12 10 - 40 U/L    AST 17 15 - 37 U/L   Brain Natriuretic Peptide   Result Value Ref Range    Pro-BNP 3,434 (H) 0 - 124 pg/mL   Blood occult stool #1   Result Value Ref Range    Occult Blood Diagnostic Result: POSITIVE  Normal range: Negative   (A)    Troponin   Result Value Ref Range    Troponin <0.01 <0.01 ng/mL   POCT glucose   Result Value Ref Range    Glucose 287 mg/dL    QC OK?  Yes    POCT Glucose   Result Value Ref Range    POC Glucose 287 (H) 70 - 99 mg/dl    Performed on ACCU-CHEK    EKG 12 Lead   Result Value Ref Range    Ventricular Rate 99 BPM    Atrial Rate 99 BPM    P-R Interval 166 ms    QRS Duration 128 ms    Q-T Interval 352 ms    QTc Calculation (Bazett) 451 ms    P Axis 85 degrees    R Axis -54 degrees    T Axis 142 degrees    Diagnosis       Normal sinus rhythmRight atrial enlargementRight bundle branch blockLeft anterior fascicular block** Bifascicular block **Left ventricular hypertrophy with repolarization abnormalityCannot rule out Septal infarct (cited on or before 09-OCT-2020)Abnormal ECGWhen compared with ECG of 09-OCT-2020 23:51,No significant change was found       EKG  The Ekg interpreted by me shows normal sinus rhythm, rate 99, bifascicular block, LVH/left axis deviation, repolarization abnormality, similar to 10/9/2020 EKG except rate faster today, QTc 451, no significant ST elevations or depressions      RADIOLOGY  XR CHEST PORTABLE    Result Date: 12/16/2021  EXAMINATION: ONE XRAY VIEW OF THE CHEST 12/16/2021 7:34 pm COMPARISON: 10/09/2020 HISTORY: ORDERING SYSTEM PROVIDED HISTORY: Chest pain, SOB TECHNOLOGIST PROVIDED HISTORY: Reason for exam:->Chest pain, SOB Reason for Exam: Chest Pain (Pt c/o chest \"burning, weakness x 1wk. Pt had covid exposure last week.) FINDINGS: The cardiac silhouette is mildly prominent. Thoracic aorta is mildly tortuous. There is no consolidation, pleural effusion or pneumothorax. No acute cardiopulmonary disease is identified. Stable, borderline cardiomegaly. CT CHEST PULMONARY EMBOLISM W CONTRAST    Result Date: 12/16/2021  EXAMINATION: CTA OF THE CHEST 12/16/2021 9:15 pm TECHNIQUE: CTA of the chest was performed after the administration of intravenous contrast.  Multiplanar reformatted images are provided for review. MIP images are provided for review. Dose modulation, iterative reconstruction, and/or weight based adjustment of the mA/kV was utilized to reduce the radiation dose to as low as reasonably achievable.  COMPARISON: Chest x-ray 12/16/2021 HISTORY: ORDERING SYSTEM PROVIDED HISTORY: chest pain, possible covid, tachycardia TECHNOLOGIST PROVIDED HISTORY: Reason for exam:->chest pain, possible covid, tachycardia Decision Support Exception - unselect if not a suspected or confirmed emergency medical condition->Emergency Medical Condition (MA) Reason for Exam: Chest Pain (Pt c/o chest \"burning, weakness x 1wk. Pt had covid exposure last week.) Relevant Medical/Surgical History: copd, chf, afib, FINDINGS: Pulmonary Arteries: Main pulmonary artery is enlarged 3.7 cm suggesting pulmonary hypertension. No central, lobar or segmental pulmonary emboli. Mediastinum: Cardiomegaly. Coronary disease. No pericardial effusion. No suspicious adenopathy. Lungs/pleura: Lingular atelectasis versus scarring. Lungs otherwise clear. No pleural effusion or pneumothorax. Upper Abdomen: Gallbladder is absent. Soft Tissues/Bones: Degenerate change. No central to segmental branch pulmonary emboli. No acute pleuroparenchymal disease RECOMMENDATIONS: Unavailable     The total critical care time spent while evaluating and treating this patient was 32 minutes. This excludes time spent doing separately billable procedures. This includes time at the bedside, data interpretation, medication management, obtaining critical history from collateral sources if the patient is unable to provide it directly, and physician consultation. Specifics of interventions taken and potentially life-threatening diagnostic considerations are listed in the medical decision making. HEART SCORE:    History: +0 for low suspicion  EKG: +1 for nonspecific changes   Age: +4 for age >65 years  Risk factors (includes HLD, HTN, DM, tobacco use, obesity, and +FHx): +2 for known CAD or 3+ risk factors  Initial troponin: +0 for negative troponin    Heart score: 5. This falls under the following category: Score of 4-6, which indicates low/moderate risk for major adverse cardiac event and supports observation with repeated troponins and/or non-invasive testing        ED COURSE/MDM  Patient seen and evaluated. Old records reviewed. Labs and imaging reviewed and results discussed with patient.   70-year-old female brought in with chest pain, troponin negative, no acute ischemia on EKG, though it is not normal, however similar to previous, also with cough, she was concerned for Covid but it was negative, flu negative as well, noncompliance with her medications, uncontrolled diabetes, hypertension uncontrolled, could not control her COPD, she felt a little better after neb treatment, Decadron, and she was initially given aspirin for chest pain, but this was prior to realizing her significant anemia with hemoglobin 7.4, and later found Hemoccult was positive, but no active bright red bleeding, given Protonix, though unclear source of bleeding at this time, she is not having significant abdominal pain, her chest burning sensation had improved/resolved while in the ED, she had no PE on CT, no acute infectious process, patient initially reluctant to stay in the hospital, but only agreed to stay if she can go to [place], Strict return precautions given, all questions answered, will return if any worsening symptoms or new concerns, see AVS for further discharge information, patient verbalized understanding of plan, felt comfortable going home.       Orders Placed This Encounter   Procedures    Rapid influenza A/B antigens    COVID-19, Rapid    XR CHEST PORTABLE    CT CHEST PULMONARY EMBOLISM W CONTRAST    CBC auto differential    Troponin    Comprehensive Metabolic Panel w/ Reflex to MG    Brain Natriuretic Peptide    Blood occult stool #1    Troponin    Inpatient consult to Hospitalist    POCT glucose    POCT Glucose    EKG 12 Lead    PATIENT STATUS (DIRECT) Observation     Orders Placed This Encounter   Medications    ipratropium-albuterol (DUONEB) nebulizer solution 1 ampule     Order Specific Question:   Initiate RT Bronchodilator Protocol     Answer:   No    aspirin chewable tablet 324 mg    dexamethasone (PF) (DECADRON) injection 6 mg    iopamidol (ISOVUE-370) 76 % injection 75 mL    pantoprazole (PROTONIX) injection 40 mg     ED Course as of 12/17/21 0343   Fri Dec 17, 2021   0210 Rectal exam with very dark stool, no active bleed, no masses. [SY]          ED Course User Index  [SY] Dong Richter DO       CLINICAL IMPRESSION  1. Anemia, unspecified type    2. Chest pain, unspecified type    3. Gastrointestinal hemorrhage, unspecified gastrointestinal hemorrhage type    4. Noncompliance with medication regimen    5. Uncontrolled type 2 diabetes mellitus with hyperglycemia ([facility]    6. Uncontrolled hypertension        Blood pressure (!) 154/67, pulse 86, temperature 99 °F (37.2 °C), temperature source Oral, resp. rate 20, height 5' 3\" (1.6 m), weight 145 lb (65.8 kg), SpO2 94 %. DISPOSITION  Cyril Rose was admitted to [facility] in stable condition.                   Dong Richter DO  12/17/21 [id number]

## 2021-12-17 NOTE — ACP (ADVANCE CARE PLANNING)
[facility] Patient oriented to room, call light, bed rails, phone, lights and bathroom. Patient instructed about the schedule of the day including: vital sign frequency, lab draws, possible tests, frequency of MD and staff rounds, daily weights, I &O's and prescribed diet. Bed locked, in lowest position, side rails up 2/4, call light within reach. Recliner Assessment  Patient is able to demonstrate the ability to move from a reclining position to an upright position within the recliner. 4 Eyes Skin Assessment     The patient is being assess for   Admission    I agree that 2 RN's have performed a thorough Head to Toe Skin Assessment on the patient. ALL assessment sites listed below have been assessed. Areas assessed for pressure by both nurses:   [x]   Head, Face, and Ears   [x]   Shoulders, Back, and Chest, Abdomen  [x]   Arms, Elbows, and Hands   [x]   Coccyx, Sacrum, and Ischium  [x]   Legs, Feet, and Heels    Skin is wnl no open areas noted. Skin Assessed Under all Medical Devices by both nurses:           All Mepilex Borders were peeled back and area peeked at by both nurses:  No: NA  Please list where Mepilex Borders are located:  NA             **SHARE this note so that the co-signing nurse is able to place an eSignature**    Co-signer eSignature: Electronically signed by Glenda Prader, RN on 12/17/21 at 5:47 AM EST    Does the Patient have Skin Breakdown related to pressure?   No              Kasi Prevention initiated:  No   Wound Care Orders initiated:  No      [facility] nurse consulted for Pressure Injury (Stage 3,4, Unstageable, DTI, NWPT, Complex wounds)and New or Established Ostomies:  No      Primary Nurse eSignature: Electronically signed by Eduardo Burton RN on 12/17/21 at 5:45 AM EST

## 2021-12-17 NOTE — PLAN OF CARE
[place]  Chest pain, atypical  ? Symptomatic anemia, GI bleed.  2pt drop in 1 month with dark stools  NPO, GI consult, protonix IV BID

## 2021-12-17 NOTE — CONSULTS
ENDOSCOPY N/A 10/29/2019    EGD BIOPSY performed by Charles Baez DO at [facility] 5 N/A 10/11/2020    EGD BIOPSY performed by Charles Baez DO at [address] ENDOSCOPY  10/11/2020    EGD CONTROL HEMORRHAGE performed by Charles Baez DO at [facility] ENDOSCOPY       Social:   Social History     Tobacco Use    Smoking status: Current Every Day Smoker     Packs/day: 0.50     Years: 32.00     Pack years: 16.00     Types: Cigarettes    Smokeless tobacco: Never Used    Tobacco comment: pt states she wants to quit. chantix did not work   Substance Use Topics    Alcohol use: No     Family:   Family History   Problem Relation Age of Onset    Diabetes Mother     Heart Disease Mother     High Blood Pressure Mother     Hypertension Mother     Heart Failure Mother     Cancer Father     Cancer Sister     Diabetes Brother     Cancer Brother     Cancer Brother     Asthma Daughter     Hypertension Brother     Hypertension Brother     Emphysema Neg Hx      No current facility-administered medications on file prior to encounter. Current Outpatient Medications on File Prior to Encounter   Medication Sig Dispense Refill    ALBUTEROL IN Inhale into the lungs      ipratropium-albuterol (DUONEB) 0.5-2.5 (3) MG/3ML SOLN nebulizer solution Inhale 3 mLs into the lungs every 6 hours as needed for Shortness of Breath 360 mL 2    ferrous sulfate (FE TABS) 325 (65 Fe) MG EC tablet Take 1 tablet by mouth 2 times daily 1 tablet 0    pantoprazole (PROTONIX) 40 MG tablet Take 1 tablet by mouth daily 30 tablet 2    metoprolol succinate (TOPROL XL) 50 MG extended release tablet Take 1 tablet by mouth daily 30 tablet 2    lisinopril (PRINIVIL;ZESTRIL) 20 MG tablet Take 1 tablet by mouth daily 30 tablet 2    blood glucose test strips (DARVIN CONTOUR NEXT TEST) strip Test three times daily.  DX:E11.9 100 each 11    glimepiride (AMARYL) 4 MG tablet Take 0.5 tablets by mouth every morning (before breakfast) 15 tablet 2    metFORMIN (GLUCOPHAGE) 500 MG tablet Take 1 tablet by mouth 2 times daily (with meals) 60 tablet 2    AGAMATRIX ULTRA-THIN LANCETS MISC Check sugars daily after each meal 100 each 0    Blood Glucose Monitoring Suppl (AGAMATRIX AMP) ISABELLA Check BG daily after meals 1 Device 0    CVS Lancets MISC 1 each by Does not apply route 3 times daily 100 each 0      Infusions:    sodium chloride      dextrose      sodium chloride 50 mL/hr at 12/17/21 1304    sodium chloride      pantoprazole 8 mg/hr (12/17/21 1305)    sodium chloride       PRN Medications: sodium chloride flush, sodium chloride, ondansetron **OR** ondansetron, acetaminophen **OR** acetaminophen, polyethylene glycol, glucose, dextrose, glucagon (rDNA), dextrose, sodium chloride, regadenoson  Allergies: Allergies   Allergen Reactions    Morphine Other (See Comments)     Bad headache    Chantix [Varenicline Tartrate]      BAD DREAMS    Percocet [Oxycodone-Acetaminophen] Nausea And Vomiting       ROS:   Constitutional: negative for chills, fevers and sweats  Eyes: negative for cataracts, icterus and redness  Ears, nose, mouth, throat, and face: negative for epistaxis, hearing loss and sore throat  Respiratory: negative for hemoptysis and sputum. Positive for cough. Cardiovascular: negative for dyspnea and lower extremity edema. Positive for chest burning.    Gastrointestinal: as per HPI  Genitourinary:negative for dysuria, frequency and hematuria  Neurological: negative for coordination problems, dizziness and gait problems  Behavioral/Psych: negative for anxiety, depression and mood swings    Physical Exam   BP (!) 172/64   Pulse 77   Temp 97.4 °F (36.3 °C) (Oral)   Resp 21   Ht 5' 3\" (1.6 m)   Wt 138 lb 8 oz (62.8 kg)   SpO2 100%   BMI 24.53 kg/m²       General appearance: alert, appears stated age, cooperative, no distress and syndromic appearance - chronically ill appearing  Head: Normocephalic, without obvious abnormality, atraumatic  Eyes: conjunctivae/corneas clear. PERRL, EOM's intact. Fundi benign. Neck: no adenopathy and supple, symmetrical, trachea midline  Lungs: clear to auscultation bilaterally  Heart: regular rate and rhythm, S1, S2 normal, no murmur, click, rub or gallop  Abdomen: soft, non-tender; bowel sounds normal; no masses,  no organomegaly  Extremities: extremities normal, atraumatic, no cyanosis or edema    Lab and Imaging Review   Labs:  CBC:   Recent Labs     12/16/21 1944 12/17/21  0556   WBC 7.5 4.7   HGB 7.4* 6.2*   HCT 24.4* 20.9*   MCV 65.4* 65.2*    231     BMP:   Recent Labs     12/16/21 1944      K 4.1      CO2 26   BUN 19   CREATININE 0.8     LIVER PROFILE:   Recent Labs     12/16/21 1944   AST 17   ALT 12   PROT 7.8   BILITOT 0.3   ALKPHOS 83     PT/INR: No results for input(s): INR in the last 72 hours. Invalid input(s): PT      IMAGING:   CT CHEST PULMONARY EMBOLISM W CONTRAST   Final Result   No central to segmental branch pulmonary emboli. No acute pleuroparenchymal disease      RECOMMENDATIONS:   Unavailable         XR CHEST PORTABLE   Final Result   No acute cardiopulmonary disease is identified. Stable, borderline cardiomegaly. Attending Supervising [de-identified] Attestation Statement  The patient is a 77 y.o. female. I have performed a history and physical examination of the patient. I discussed the case with my physician assistant Parris Chand PA-C    I reviewed the patient's Past Medical History, Past Surgical History, Medications, and Allergies.      Physical Exam:  Vitals:    12/17/21 1530 12/17/21 1636 12/17/21 1725 12/17/21 1759   BP: (!) 172/64 (!) 134/53 131/76 (!) 141/46   Pulse: 77 73 75 74   Resp: 21 19 26 25   Temp: 97.4 °F (36.3 °C) 98.6 °F (37 °C) 98.2 °F (36.8 °C) 98.1 °F (36.7 °C)   TempSrc: Oral Oral Oral Oral   SpO2: 100% 99% 100% 99%   Weight: Height:           Physical Examination: General appearance - alert, well appearing, and in no distress  Mental status - alert, oriented to person, place, and time  Eyes - pupils equal and reactive, extraocular eye movements intact  Neck - supple, no significant adenopathy  Chest - clear to auscultation, no wheezes, rales or rhonchi, symmetric air entry  Heart - normal rate, regular rhythm, normal S1, S2, no murmurs, rubs, clicks or gallops  Abdomen - soft, nontender, nondistended, no masses or organomegaly  Extremities - no pedal edema noted          Assessment:     77year old female with a history of atrial fibrillation, CHF, cervical cancer, DM, emphysema, and anemia admitted with atypical chest pain and anemia concerning for GIB. Plan:   1. Continue supportive care  2. Monitor Hgb  3. Observe for signs of bleeding  4. Monitor and document output  5. Transfuse as needed if Hgb < 7.0  6. Continue Pantoprazole 40 mg BID  7. Check iron studies  8. Clear liquid diet as tolerated   9. Begin bowel prep  10. NPO after 5:00 AM tomorrow  11. EGD and colonoscopy tomorrow  12. Cardiology following   13. Will follow    Anson Milton PA-C  3:54 PM 12/17/2021                      77year old female with a history of HTN, HLD, DM, CHF, atrial fibrillation, cervical cancer, COPD, emphysema, and psoriasis admitted with symptomatic anemia and positive heme occult    Continue supportive care. Monitor Hgb. Observe for signs of bleeding. EGD and colonoscopy tomorrow. Consider CT abd/pelvis if above workup is negative.  IV iron may be beneficial    Maria Alejandra Perez MD          (O) [phone]  [phone]

## 2021-12-17 NOTE — PROGRESS NOTES
Patient A/O x4, follows commands and responds appropriately. Wet, non productive cough present. Patient complained of back/rib pain previously, denies pain at this time. No s/s of bleeding at this time. No s/s of distress. Denies any needs at this time. See flowsheet and eMar for additional documentation.

## 2021-12-17 NOTE — CARE COORDINATION
Case Management Assessment  Initial Evaluation      Patient Name: Robel Padron  YOB: 1955  Diagnosis: Chest pain [R07.9]  Noncompliance with medication regimen [Z91.14]  Uncontrolled hypertension [I10]  Gastrointestinal hemorrhage, unspecified gastrointestinal hemorrhage type [K92.2]  Anemia, unspecified type [D64.9]  Uncontrolled type 2 diabetes mellitus with hyperglycemia ([facility] [address] [E11.65]  Chest pain, unspecified type [R07.9]  Date / Time: 12/16/2021  7:07 PM    Admission status/Date: IBS  Chart Reviewed: Yes      Patient Interviewed: Yes   Family Interviewed:  No      Hospitalization in the last 30 days:  No      Health Care Decision Maker :   Primary Decision Maker: Arlin Johnston - Child - [phone]    Secondary Decision Maker: isidro rausch - Child - [phone]    (CM - must 1st enter selection under Navigator - emergency contact- Health Care Decision Maker Relationship and pick relationship)   Who do you trust or have selected to make healthcare decisions for you      Current PCP: Cm Kaplan required for SNF : NA        3 night stay required - WAIVED    ADLS  Support Systems/Care Needs: Children  Transportation: self    Meal Preparation: self    Housing  Living Arrangements: IPTA  Steps: NA  Intent for return to present living arrangements: Yes  Identified Issues: NA    Home O2 Use :  No        Community Service Affiliation  Dialysis:  No    · Agency:  · Location:  · Dialysis Schedule:  · Phone:   · Fax: Other Community Services: (ex:PT/OT,Mental Health,Wound Clinic, Cardio/Pul [address])    DISCHARGE PLAN: Explained Case Management role/services. Chart reviewed. Met with pt by phone and explained the role of the CM. IPTA. Likely no DCP needs. Request RX assist if needed. Medicare ins is new to her and she is unsure if her meds will be covered.  +CM following

## 2021-12-17 NOTE — ED NOTES
Pt notified of room assignment at this time and assisted to restroom.      Kasi Brambila RN  12/17/21 2128

## 2021-12-17 NOTE — PROGRESS NOTES
Report called to [facility] in 2W. No questions at this time. Patient placed on telemetry. Belongings collected. Transport order placed.

## 2021-12-17 NOTE — CONSULTS
REASON FOR CONSULTATION/CC: Chest pain     CONSULTING PHYSICIAN: Dr. Vi Mckay:  Solis Parra is a 77y.o. year old female with past medical history of Afib, CHF, Cervical Cancer, DM, emphysema, anemia  presented to [facility] with complaints of chest pain described as a burning sensation for the last few weeks. The chest burning would radiate across her chest into the back at times. Chest burning improves with pepto and at times with 1/2 tab of protonix. Denies any associated shortness of breath. Complains of leg fatigue with exertion over the last several weeks. ER work up notable for elevated blood pressures, afebrile, Elevated Pro-BNP 3434, negative troponin x3. EKG - NSR RBBB; Chest CT 12/16/21- negative for PE,+  cardiomegaly, CAD, clear lungs, Hgb 7.4--> 6.2; with positive occult stool.      PAST MEDICAL HISTORY:  Past Medical History:   Diagnosis Date    Atrial fibrillation ([address]     Bronchitis chronic     Cancer ([address]     cervical    CHF (congestive heart failure) ([address]     Diabetes mellitus ([address]     Emphysema of lung ([address]     Hypercholesteremia     Hypertension     Microcytic anemia 3/23/2017    Psoriasis      PAST SURGICAL HISTORY:  Past Surgical History:   Procedure Laterality Date    CERVICAL DISC SURGERY  2004    CHOLECYSTECTOMY      DIAGNOSTIC CARDIAC CATH LAB PROCEDURE  4/2011    angiogram with 30% blockage    HYSTERECTOMY      KIDNEY STONE SURGERY      TONSILLECTOMY      UPPER GASTROINTESTINAL ENDOSCOPY N/A 10/29/2019    EGD BIOPSY performed by Bhavna Zavala DO at [place] [address] N/A 10/11/2020    EGD BIOPSY performed by Bhavna Zavala DO at [place] [address]  10/11/2020    EGD CONTROL HEMORRHAGE performed by Bhavna Zavala DO at [address]:  family history includes Asthma in her daughter; Cancer in her brother, brother, father, and lipomatous. There is an atrial septal aneurysm seen. A bubble study was performed and shows evidence of right to left shunting   consistent with a patent foramen ovale or atrial septal defect. Lexiscan stress test 11/17/2014  Findings: The patient underwent a standard Lexiscan infusion stress test. Dr. Kierra Harris was in attendance. Uniform distribution of radioactivity is seen within the left ventricular myocardium on both the stress and rest images. The left ventricular chamber size is normal. On the gated images, uniform wall motion and normal systolic wall thickening is seen. Decreased contractility is noted. The estimated ejection fraction is 43%. ASSESSMENT:  Chest burning, troponin neg x3- suspect gastritis  HTN  Anemia- with positive FOBT  Hx of GI bleed   Chronic combined heart failure- appears compensated   Coronary artery disease- noted on CT scan   Tobacco abuse    PLAN:  Hold aspirin given anemia requiring blood transfusion  Continue lisinopril 20mg daily   Continue toprol 50mg daily   Continue lipitor    Anemia evaluation underway. No further cardiac testing recommended at this time. Recommend correction and stabilization of anemia prior to completing stress testing. Can consider outpatient stress testing pending clinical response. Patient doesn't want stress test this admission either. Jon Rosenbaum CNP, 12/17/2021, 3:25 PM    I personally reviewed the H& P labs diagnosis and Rx plan with Jon Rosenbaum NP  Agree with her I will follow up this patient.

## 2021-12-17 NOTE — ACP (ADVANCE CARE PLANNING)
Advance Care Planning     General Advance Care Planning (ACP) Conversation    Date of Conversation: 12/16/2021  Conducted with: Patient with Decision Making Capacity    Healthcare Decision Maker:    Primary Decision Maker: Arlin Johnston - Child - [phone]    Secondary Decision Maker: isidro rausch - Child - [phone]  Click here to complete Healthcare Decision Makers including selection of the Healthcare Decision Maker Relationship (ie \"Primary\"). Today we documented Decision Maker(s) consistent with Legal Next of Kin hierarchy. Content/Action Overview:     elects Full Code (Attempt Resuscitation)  Declined referral to Pastoral Care for DPOA/LW at this time.     Length of Voluntary ACP Conversation in minutes:  <16 minutes (Non-Billable)    Rosita Stratton RN

## 2021-12-18 ENCOUNTER — APPOINTMENT (OUTPATIENT)
Dept: CT IMAGING | Age: 66
End: 2021-12-18
Payer: MEDICARE

## 2021-12-18 VITALS
RESPIRATION RATE: 18 BRPM | OXYGEN SATURATION: 96 % | HEIGHT: 63 IN | WEIGHT: 139.2 LBS | BODY MASS INDEX: 24.66 KG/M2 | SYSTOLIC BLOOD PRESSURE: 165 MMHG | HEART RATE: 72 BPM | DIASTOLIC BLOOD PRESSURE: 67 MMHG | TEMPERATURE: 97 F

## 2021-12-18 LAB
ANION GAP SERPL CALCULATED.3IONS-SCNC: 10 MMOL/L (ref 3–16)
BUN BLDV-MCNC: 16 MG/DL (ref 7–20)
CALCIUM SERPL-MCNC: 8.4 MG/DL (ref 8.3–10.6)
CHLORIDE BLD-SCNC: 103 MMOL/L (ref 99–110)
CHOLESTEROL, TOTAL: 104 MG/DL (ref 0–199)
CO2: 23 MMOL/L (ref 21–32)
CREAT SERPL-MCNC: 0.7 MG/DL (ref 0.6–1.2)
EKG ATRIAL RATE: 72 BPM
EKG DIAGNOSIS: NORMAL
EKG P AXIS: 76 DEGREES
EKG P-R INTERVAL: 166 MS
EKG Q-T INTERVAL: 416 MS
EKG QRS DURATION: 136 MS
EKG QTC CALCULATION (BAZETT): 455 MS
EKG R AXIS: -59 DEGREES
EKG T AXIS: 167 DEGREES
EKG VENTRICULAR RATE: 72 BPM
GFR AFRICAN AMERICAN: >60
GFR NON-AFRICAN AMERICAN: >60
GLUCOSE BLD-MCNC: 142 MG/DL (ref 70–99)
GLUCOSE BLD-MCNC: 151 MG/DL (ref 70–99)
GLUCOSE BLD-MCNC: 152 MG/DL (ref 70–99)
GLUCOSE BLD-MCNC: 163 MG/DL (ref 70–99)
GLUCOSE BLD-MCNC: 178 MG/DL (ref 70–99)
HCT VFR BLD CALC: 28.9 % (ref 36–48)
HCT VFR BLD CALC: 31.5 % (ref 36–48)
HDLC SERPL-MCNC: 21 MG/DL (ref 40–60)
HEMOGLOBIN: 8.9 G/DL (ref 12–16)
HEMOGLOBIN: 9.6 G/DL (ref 12–16)
LDL CHOLESTEROL CALCULATED: 44 MG/DL
PERFORMED ON: ABNORMAL
POTASSIUM SERPL-SCNC: 3.6 MMOL/L (ref 3.5–5.1)
SODIUM BLD-SCNC: 136 MMOL/L (ref 136–145)
TRIGL SERPL-MCNC: 193 MG/DL (ref 0–150)
VLDLC SERPL CALC-MCNC: 39 MG/DL

## 2021-12-18 PROCEDURE — 80048 BASIC METABOLIC PNL TOTAL CA: CPT

## 2021-12-18 PROCEDURE — 7100000010 HC PHASE II RECOVERY - FIRST 15 MIN: Performed by: INTERNAL MEDICINE

## 2021-12-18 PROCEDURE — 85018 HEMOGLOBIN: CPT

## 2021-12-18 PROCEDURE — 85014 HEMATOCRIT: CPT

## 2021-12-18 PROCEDURE — 99152 MOD SED SAME PHYS/QHP 5/>YRS: CPT | Performed by: INTERNAL MEDICINE

## 2021-12-18 PROCEDURE — 80061 LIPID PANEL: CPT

## 2021-12-18 PROCEDURE — 6370000000 HC RX 637 (ALT 250 FOR IP): Performed by: INTERNAL MEDICINE

## 2021-12-18 PROCEDURE — 6360000002 HC RX W HCPCS: Performed by: INTERNAL MEDICINE

## 2021-12-18 PROCEDURE — 99153 MOD SED SAME PHYS/QHP EA: CPT | Performed by: INTERNAL MEDICINE

## 2021-12-18 PROCEDURE — 7100000011 HC PHASE II RECOVERY - ADDTL 15 MIN: Performed by: INTERNAL MEDICINE

## 2021-12-18 PROCEDURE — 94640 AIRWAY INHALATION TREATMENT: CPT

## 2021-12-18 PROCEDURE — 99217 PR OBSERVATION CARE DISCHARGE MANAGEMENT: CPT | Performed by: INTERNAL MEDICINE

## 2021-12-18 PROCEDURE — 99215 OFFICE O/P EST HI 40 MIN: CPT | Performed by: INTERNAL MEDICINE

## 2021-12-18 PROCEDURE — G0378 HOSPITAL OBSERVATION PER HR: HCPCS

## 2021-12-18 PROCEDURE — 88342 IMHCHEM/IMCYTCHM 1ST ANTB: CPT

## 2021-12-18 PROCEDURE — 3609012400 HC EGD TRANSORAL BIOPSY SINGLE/MULTIPLE: Performed by: INTERNAL MEDICINE

## 2021-12-18 PROCEDURE — 3609010300 HC COLONOSCOPY W/BIOPSY SINGLE/MULTIPLE: Performed by: INTERNAL MEDICINE

## 2021-12-18 PROCEDURE — 93010 ELECTROCARDIOGRAM REPORT: CPT | Performed by: INTERNAL MEDICINE

## 2021-12-18 PROCEDURE — 6360000004 HC RX CONTRAST MEDICATION: Performed by: INTERNAL MEDICINE

## 2021-12-18 PROCEDURE — 93005 ELECTROCARDIOGRAM TRACING: CPT | Performed by: INTERNAL MEDICINE

## 2021-12-18 PROCEDURE — 94761 N-INVAS EAR/PLS OXIMETRY MLT: CPT

## 2021-12-18 PROCEDURE — 6370000000 HC RX 637 (ALT 250 FOR IP): Performed by: PHYSICIAN ASSISTANT

## 2021-12-18 PROCEDURE — 88305 TISSUE EXAM BY PATHOLOGIST: CPT

## 2021-12-18 PROCEDURE — 2709999900 HC NON-CHARGEABLE SUPPLY: Performed by: INTERNAL MEDICINE

## 2021-12-18 PROCEDURE — 2580000003 HC RX 258: Performed by: INTERNAL MEDICINE

## 2021-12-18 PROCEDURE — 3609010600 HC COLONOSCOPY POLYPECTOMY SNARE/COLD BIOPSY: Performed by: INTERNAL MEDICINE

## 2021-12-18 PROCEDURE — 74177 CT ABD & PELVIS W/CONTRAST: CPT

## 2021-12-18 PROCEDURE — 36415 COLL VENOUS BLD VENIPUNCTURE: CPT

## 2021-12-18 RX ORDER — MIDAZOLAM HYDROCHLORIDE 5 MG/ML
INJECTION INTRAMUSCULAR; INTRAVENOUS PRN
Status: DISCONTINUED | OUTPATIENT
Start: 2021-12-18 | End: 2021-12-18 | Stop reason: ALTCHOICE

## 2021-12-18 RX ORDER — FENTANYL CITRATE 50 UG/ML
INJECTION, SOLUTION INTRAMUSCULAR; INTRAVENOUS PRN
Status: DISCONTINUED | OUTPATIENT
Start: 2021-12-18 | End: 2021-12-18 | Stop reason: ALTCHOICE

## 2021-12-18 RX ORDER — PANTOPRAZOLE SODIUM 40 MG/1
40 TABLET, DELAYED RELEASE ORAL DAILY
Qty: 30 TABLET | Refills: 2 | Status: ON HOLD | OUTPATIENT
Start: 2021-12-18 | End: 2022-06-27

## 2021-12-18 RX ORDER — IPRATROPIUM BROMIDE AND ALBUTEROL SULFATE 2.5; .5 MG/3ML; MG/3ML
1 SOLUTION RESPIRATORY (INHALATION) 2 TIMES DAILY
Status: DISCONTINUED | OUTPATIENT
Start: 2021-12-18 | End: 2021-12-18 | Stop reason: HOSPADM

## 2021-12-18 RX ADMIN — POLYETHYLENE GLYCOL 3350 119 G: 17 POWDER, FOR SOLUTION ORAL at 02:54

## 2021-12-18 RX ADMIN — INSULIN LISPRO 2 UNITS: 100 INJECTION, SOLUTION INTRAVENOUS; SUBCUTANEOUS at 13:11

## 2021-12-18 RX ADMIN — METOPROLOL SUCCINATE 50 MG: 50 TABLET, EXTENDED RELEASE ORAL at 10:02

## 2021-12-18 RX ADMIN — INSULIN LISPRO 2 UNITS: 100 INJECTION, SOLUTION INTRAVENOUS; SUBCUTANEOUS at 10:03

## 2021-12-18 RX ADMIN — IRON SUCROSE 100 MG: 20 INJECTION, SOLUTION INTRAVENOUS at 11:12

## 2021-12-18 RX ADMIN — LISINOPRIL 20 MG: 20 TABLET ORAL at 10:02

## 2021-12-18 RX ADMIN — IPRATROPIUM BROMIDE AND ALBUTEROL SULFATE 1 AMPULE: .5; 3 SOLUTION RESPIRATORY (INHALATION) at 07:12

## 2021-12-18 RX ADMIN — IOPAMIDOL 75 ML: 755 INJECTION, SOLUTION INTRAVENOUS at 09:30

## 2021-12-18 ASSESSMENT — PAIN SCALES - GENERAL: PAINLEVEL_OUTOF10: 0

## 2021-12-18 NOTE — H&P
History and Physical / Pre-Sedation Assessment    Patient:  Padmini Mcdowell   :   1955     Intended Procedure:  EGD+Colon    HPI: 77year old female with a history of HTN, HLD, DM, CHF, atrial fibrillation, cervical cancer, COPD, emphysema, and psoriasis admitted with symptomatic anemia and positive heme occult    Past Medical History:   Diagnosis Date    Atrial fibrillation (HCC)     Bronchitis chronic     Cancer ([address]     cervical    CHF (congestive heart failure) ([address]     Diabetes mellitus ([address]     Emphysema of lung ([address]     Hypercholesteremia     Hypertension     Microcytic anemia 3/23/2017    Psoriasis      Past Surgical History:   Procedure Laterality Date    CERVICAL [address] SURGERY      CHOLECYSTECTOMY      DIAGNOSTIC CARDIAC CATH LAB PROCEDURE  2011    angiogram with 30% blockage    HYSTERECTOMY      KIDNEY STONE SURGERY      TONSILLECTOMY      UPPER GASTROINTESTINAL ENDOSCOPY N/A 10/29/2019    EGD BIOPSY performed by Myranda Adames DO at [address] 10/11/2020    EGD BIOPSY performed by Myranda Adames DO at [address]  10/11/2020    EGD CONTROL HEMORRHAGE performed by Myranda Adames DO at [address] [facility]       Medications reviewed  Prior to Admission medications    Medication Sig Start Date End Date Taking?  Authorizing Provider   ALBUTEROL IN Inhale into the lungs   Yes Historical Provider, MD   ipratropium-albuterol (DUONEB) 0.5-2.5 (3) MG/3ML SOLN nebulizer solution Inhale 3 mLs into the lungs every 6 hours as needed for Shortness of Breath 19 Yes Amanda Rodriguez MD   ferrous sulfate (FE TABS) 325 (65 Fe) MG EC tablet Take 1 tablet by mouth 2 times daily 10/13/20   Amanda Rodriguez MD   pantoprazole (PROTONIX) 40 MG tablet Take 1 tablet by mouth daily 9/18/20 10/18/20  Amanda Rodriguez MD   metoprolol succinate (Christy Favors XL) 50 MG extended release tablet Take 1 tablet by mouth daily 9/18/20   Janet Masterson MD   lisinopril (PRINIVIL;ZESTRIL) 20 MG tablet Take 1 tablet by mouth daily 9/18/20   Janet Masterson MD   blood glucose test strips (DARVIN CONTOUR NEXT TEST) strip Test three times daily. DX:E11.9 9/18/20   Janet Masterson MD   glimepiride (AMARYL) 4 MG tablet Take 0.5 tablets by mouth every morning (before breakfast) 9/18/20   Janet Masterson MD   metFORMIN (GLUCOPHAGE) 500 MG tablet Take 1 tablet by mouth 2 times daily (with meals) 9/18/20   Janet Masterson MD   AGAMATRIX ULTRA-THIN LANCETS MISC Check sugars daily after each meal 10/30/19   STEPHY Ortiz   Blood Glucose Monitoring Suppl (AGAMATRIX AMP) ISABELLA Check BG daily after meals 10/30/19   STEPHY Ortiz   [employer]S Lancets MISC 1 each by Does not apply route 3 times daily 5/2/18   Ce Quintanilla PA-C        Allergies: Allergies   Allergen Reactions    Morphine Other (See Comments)     Bad headache    Chantix [Varenicline Tartrate]      BAD DREAMS    Percocet [Oxycodone-Acetaminophen] Nausea And Vomiting       Nurses notes reviewed and agreed. Physical Exam:  Vital Signs: BP (!) 172/70   Pulse 81   Temp 97.9 °F (36.6 °C) (Temporal)   Resp 20   Ht 5' 3\" (1.6 m)   Wt 139 lb 3.2 oz (63.1 kg)   SpO2 100%   BMI 24.66 kg/m²    Airway: Mallampati: II (soft palate, uvula, fauces visible)  Pulmonary:Normal  Cardiac:Normal  Abdomen:Normal    Pre-Procedure Assessment / Plan:  ASA: Class 3 - A patient with severe systemic disease that limits activity but is not incapacitating  Level of Sedation Plan: Moderate sedation  Post Procedure plan: Return to same level of care    I assessed the patient and find that the patient is in satisfactory condition to proceed with the planned procedure and sedation plan.     I have explained the risk, benefits, and alternatives to the procedure; the patient understands and agrees to proceed.        Tim Ya MD  12/18/2021

## 2021-12-18 NOTE — CARE COORDINATION
DISCHARGE ORDER  Date/Time 2021 1:50 PM  Completed by: Katharine Benito RN, Case Management    Patient Name: Polly Marsh      : 1955  Admitting Diagnosis: Chest pain [R07.9]  Noncompliance with medication regimen [Z91.14]  Uncontrolled hypertension [I10]  Gastrointestinal hemorrhage, unspecified gastrointestinal hemorrhage type [K92.2]  Anemia, unspecified type [D64.9]  Uncontrolled type 2 diabetes mellitus with hyperglycemia ([facility] [facility] [E11.65]  Chest pain, unspecified type [R07.9]      Admit order Date and Status:obs  (verify MD's last order for status of admission)      Noted discharge order. If applicable PT/OT recommendation at Discharge: na  DME recommendation by PT/OT:na  Confirmed discharge plan  (pt)    Discharge Plan: Reviewed chart. Writer spoke with pt via TC and pt declines any d/c needs at this time. Family is in the parking lot to transport the pt to home. Reviewed chart. Role of discharge planner explained and patient verbalized understanding. Discharge order is noted. Has Home O2 in place on admit:  No  Informed of need to bring portable home O2 tank on day of discharge for nursing to connect prior to leaving:   Not Indicated  Verbalized agreement/Understanding:   Not Indicated  Pt is being d/c'd to home today. Pt's O2 sats are 96% on RA. Discharge timeout done with DANIELLE Munoz. All discharge needs and concerns addressed.

## 2021-12-18 NOTE — PROGRESS NOTES
[address] Daily Progress Note      Admit Date:  12/16/2021    Subjective:  Ms. Indu Colon is seen by cardiology for chest pain. This patient was seen by Trav Pak NP yesterday with following [place]    HISTORY OF PRESENT ILLNESS:  Kayla Andersen is a 77y.o. year old female with past medical history of  Anemia, SVT, CHF with mildly reduced EF, Mild AS, PFO, , Cervical Cancer, DM, emphysema, anemia  presented to [facility] with complaints of chest pain described as a burning sensation for the last few weeks. The chest burning would radiate across her chest into the back at times. Chest burning improves with pepto and at times with 1/2 tab of protonix. Denies any associated shortness of breath. Complains of leg fatigue with exertion over the last several weeks.      ER work up notable for elevated blood pressures, afebrile, Elevated Pro-BNP 3434, negative troponin x3.    EKG - NSR RBBB; Chest CT 12/16/21- negative for PE,+  cardiomegaly, CAD, clear lungs, Hgb 7.4--> 6.2; with positive occult stool.      ROS:  12 point ROS negative in all areas as listed below except as in [place]  Constitutional, EENT, pulmonary, GI, , Musculoskeletal, skin, neurological, hematological, endocrine, Psychiatric    Past Medical History:   Diagnosis Date    Atrial fibrillation (HCC)     Bronchitis chronic     Cancer ([address]     cervical    CHF (congestive heart failure) ([address]     Diabetes mellitus ([address]     Emphysema of lung ([address]     Hypercholesteremia     Hypertension     Microcytic anemia 3/23/2017    Psoriasis      Past Surgical History:   Procedure Laterality Date    CERVICAL [address] SURGERY  2004    CHOLECYSTECTOMY      DIAGNOSTIC CARDIAC CATH LAB PROCEDURE  4/2011    angiogram with 30% blockage    HYSTERECTOMY      KIDNEY STONE SURGERY      TONSILLECTOMY      UPPER GASTROINTESTINAL ENDOSCOPY N/A 10/29/2019    EGD BIOPSY performed by Holly Black DO at [address] [facility] ondansetron **OR** ondansetron, acetaminophen **OR** acetaminophen, polyethylene glycol, glucose, dextrose, glucagon (rDNA), dextrose, sodium chloride, regadenoson, ipratropium-albuterol    Lab Data:  CBC:   Recent Labs     12/16/21 1944 12/16/21 1944 12/17/21  0556 12/17/21  0556 12/17/21  1845 12/17/21  2255 12/18/21  0506   WBC 7.5  --  4.7  --   --   --   --    HGB 7.4*   < > 6.2*   < > 9.5* 9.1* 8.9*   HCT 24.4*   < > 20.9*   < > 30.2* 28.9* 28.9*   MCV 65.4*  --  65.2*  --   --   --   --      --  231  --   --   --   --     < > = values in this interval not displayed. BMP:   Recent Labs     12/16/21 1944 12/18/21  0506    136   K 4.1 3.6    103   CO2 26 23   BUN 19 16   CREATININE 0.8 0.7     LIVER PROFILE:   Recent Labs     12/16/21 1944   AST 17   ALT 12   BILITOT 0.3   ALKPHOS 83     PT/INR: No results for input(s): PROTIME, INR in the last 72 hours. APTT: No results for input(s): APTT in the last 72 hours. BNP:  No results for input(s): BNP in the last 72 hours. IMAGING:     Echo 10/28/2019    Summary   Left ventricular systolic function is mildly reduced to low normal with   ejection fraction estimated at 45-50 %.   There is moderate concentric left ventricular hypertrophy.   Grade II diastolic dysfunction with elevated filing pressure.   The left atrium is mildly dilated.   The right atrium is mildly dilated.   Mild posterior mitral annular calcification is present.   Mild mitral regurgitation.   Mild aortic stenosis is present.   Mild aortic regurgitation is present.   Moderate tricuspid regurgitation.   Normal systolic pulmonary artery pressure (SPAP) estimated at 33 mmHg (RA   pressure 8 mmHg).    Moderate pulmonic regurgitation present.   The atrial septum appears lipomatous.  Dulce Dellen is an atrial septal aneurysm seen.   A bubble study was performed and shows evidence of right to left shunting   consistent with a patent foramen ovale or atrial septal defect.     Lexiscan

## 2021-12-18 NOTE — BRIEF OP NOTE
Brief Postoperative Note      Patient: Everton Hawkins  YOB: 1955  MRN: [id number]    Date of Procedure: 12/18/2021    Pre-Op Diagnosis: GI Bleed    Post-Op Diagnosis: gastritis, small colon polyps, diverticulosis, hemorrhoids, no active bleeding       Procedure(s):  EGD BIOPSY  COLONOSCOPY WITH BIOPSY  COLONOSCOPY POLYPECTOMY SNARE/COLD BIOPSY    Surgeon(s):  Desiree Hernandez MD    Assistant:  * No surgical staff found *    Anesthesia: IV Sedation    Estimated Blood Loss (mL): Minimal    Complications: None    Specimens:   ID Type Source Tests Collected by Time Destination   A :  Tissue Biopsy SURGICAL PATHOLOGY Desiree Hernandez MD 12/18/2021 0813    B :  Tissue Biopsy SURGICAL PATHOLOGY Desiree Hernandez MD 12/18/2021 0813    C :  Tissue Biopsy SURGICAL PATHOLOGY Desiree Hernandez MD 12/18/2021 0827        Implants:  * No implants in log *      Drains: * No LDAs found *    Findings: gastritis, small colon polyps, diverticulosis, hemorrhoids, no active bleeding    Recommendation  1. Continue supportive care  2. PPI daily  3. Await pathology  4. Check CT abd/pelvis  5. IV iron x 1  6.  Ok to d/c if CT negative    Electronically signed by Desiree Hernandez MD on 12/18/2021 at 8:49 AM

## 2021-12-18 NOTE — OP NOTE
[address]                 [address] [facility] [place] [address]                                OPERATIVE REPORT    PATIENT NAME: Torres Thakur                      :        1955  MED REC NO:   [id number]                          ROOM:       0201  ACCOUNT NO:   [de-identified]                           ADMIT DATE: 2021  PROVIDER:     Maria Alejandra Perez MD    DATE OF PROCEDURE:  2021    PREPROCEDURE DIAGNOSES:  1. Symptomatic anemia. 2.  Heme-positive stool. POSTPROCEDURE DIAGNOSES:  1.  Mild gastritis. 2.  Otherwise, normal EGD. 3.  Single 3 mm cecal polyp. 4.  Three rectal polyps. 5.  Sigmoid diverticulosis. 6.  Internal hemorrhoids. 7.  No active bleeding. 8.  No source of anemia identified on this exam.    PROCEDURES:  1. EGD with biopsy. 2.  Colonoscopy to cecum with snare polypectomy. SURGEON:  Maria Alejandra Perez MD    MEDICATIONS:  Versed 9 mg, fentanyl 100 mcg. PROCEDURE INDICATIONS:  A 70-year-old female with history of  hypertension, hyperlipidemia, diabetes, congestive heart failure, atrial  fibrillation, cervical cancer, COPD, emphysema, and psoriasis, admitted  with symptomatic anemia and positive Hemoccult stool. She has previous  history of gastritis and H. pylori infection. She also has a history of  colon polyps and her last colonoscopy was in 2019. Given the recurrent  anemia requiring 2 units of packed RBCs transfusion, EGD and colonoscopy  are being repeated in light of heme-positive stool. PROCEDURE DETAILS:  Informed consent obtained after discussing risks,  benefits, and alternatives. Full history and physical was performed. The patient was classified as ASA class III. Medications were given to  achieve adequate sedation. Cardiopulmonary status was continuously  monitored throughout the procedure. The patient was placed in left  lateral decubitus position.   Once adequately sedated, a standard upper  gastroscope was inserted in the mouth and advanced under direct  visualization to the second portion of the duodenum. Entire mucosa of  the esophagus, stomach (retroflexed and forward views), duodenum (bulb,  sweep and second portion) were examined carefully during withdrawal.    While the patient is lying in left lateral this position, the bed was  repositioned, a standard pediatric colonoscope was inserted in the anus  and advanced to the cecum identified by landmarks of appendiceal orifice  and IC valve. The terminal ileum was briefly intubated for  visualization. The entire mucosa of the cecum, ascending colon,  transverse colon, descending colon, sigmoid colon, and rectum were  examined carefully during withdrawal.  The patient tolerated the  procedure well without any difficulties. The colon prep was fair to  good. FINDINGS:    ESOPHAGUS:  The entire esophagus appeared normal.  There was no evidence  of inflammation, ulcers, strictures or De Guzman's. STOMACH:  There was evidence of mild gastritis in the antrum  characterized by erythema and granularity. Biopsies were taken to rule  out H. pylori. Retroflexed view of the stomach was normal.    DUODENUM:  Examined portion of the duodenum appeared normal.  Biopsies  were taken from second portion to rule out celiac sprue. RECTUM:  The digital rectal exam was normal.  No masses were felt. COLON:  There was a single 2 mm sessile polyp in the cecum. This was  completely resected using biopsy forceps. There were three additional  sessile 4-5 mm polyps in the rectum, all three polyps were completely  resected and retrieved using snare polypectomy method. There was  evidence of mild-to-moderate sigmoid diverticulosis. Otherwise,  visualized terminal ileum and colon mucosa appeared normal and healthy  without any evidence of inflammation, ulcers, pseudomembranes, polyps,  or masses.   Retroflexed view of the rectum showed small internal  hemorrhoids. No active bleeding identified on today's exam.    SUMMARY:  1. Gastritis. 2.  Colon polyps. 3.  Sigmoid diverticulosis. 4.  Internal hemorrhoids. 5.  No active bleeding. 6.  No source of anemia identified on this exam.    RECOMMENDATIONS:  1. Return the patient to floor for continuous medical care. 2.  Await pathology results. 3.  Continue PPI daily. 4.  Resume diet and advance as tolerated. 5.  Check CT scan of the abdomen and pelvis to rule out other  intra-abdominal pathology. 6.  Consider IV iron therapy prior to discharge. 7.  Okay to discharge from GI standpoint. 8.  Followup with repeat CBC in 4 weeks upon discharge.     EBL: <5mL    Brandon Nichole MD    D: 12/18/2021 8:47:50       T: 12/18/2021 8:50:35     [email or web address]  Job#: [id number]     Doc#: [id number]    CC:  MD Rustam Delcid MD

## 2021-12-18 NOTE — DISCHARGE SUMMARY
Name:  Nieves Gr  Room:  0201/0201-01  MRN:    [id number]    IM Discharge Summary    Discharging Physician:  Stefani Bradley MD    Admit: 12/16/2021    Discharge:      PCP      Ciara Duval MD    Diagnoses and hospital course  this Admission      Microcytic anemia    -Stool occult positive  -Hgb 7.4 -> 6.2   -Transfused x2 units with improvement to 9   --GI consulted and had EGD showing mild gastritis and colon showing internal hemorrhoids only. No evidence of GI bleed  - might need hematology fw as outpt   - defer to PCP      #Atypical chest pain but concerning for CAD given multiple risk factors and prior abnormal echo and recommendations stress test.  -CT chest negative for PE  -EKG does not display signs of acute ischemia  -Troponin x3 negative  -consulted cardiology  - planned for stress test as outpt     #DM2  Resume home meds     #Afib  -Not currently in A. fib   -Continue Toprol-XL  -No anticoagulation for severe anemia      #Chronic combined CHF  -No signs and symptoms of overload  -Stable, monitor     #HTN  -Continue lisinopril  -BP trending down, stable at this time  -Monitor     #COPD no AE  -No signs of exacerbation  -Dyspnea and cough likely related to above  -Home inhalers held consider restart if clinically indicated     #HLD  -Continue statin     #history of anemia requiring transfusion   #history of GI bleed   #history of cervical CA              HPI     77 y.o. female with a.fib, chronic combined CHF, COPD, history of anemia requiring transfusion, history of GI bleed, HLD, HTN and hx of cervical CA who presented to [facility] ED with complaint of burning chest pain. Patient endorses burning of left upper chest wall radiating across to the right and into her left shoulder blade x2 weeks. This has been accompanied by nonproductive cough and weakness. She also endorses lightheadedness, dizziness and shortness of breath.  She does states she has dark stool which is chronic and unchanged for her, she is on iron supplementation. She denies nicolas blood per mouth or rectum. She denies abdominal pain or distention, nausea/vomiting. She states that she had an episode similar to this early this year and was found to be anemic. The previous episode did not have the same burning chest pain. She had a colonoscopy last year where several polyps were removed. In the ED her fecal occult was positive and she was found to have hemoglobin of 7.4. She was admitted for further care. Physical Exam at Discharge:          General: Francesco Nuñez female, healthy appearing  Awake, alert and oriented. Appears to be not in any distress  Mucous Membranes:  Pink , anicteric  Neck: No JVD, no carotid bruit, no thyromegaly  Chest:  Clear to auscultation bilaterally, no added sounds  Cardiovascular:  RRR S1S2 heard, no murmurs or gallops  Abdomen:  Soft, undistended, non tender, no organomegaly, BS present  Extremities: No edema or cyanosis.  Distal pulses well felt  Neurological : grossly normal        Radiology (Please Review Full Report for Details)     EKG  Normal sinus rhythm  Right atrial enlargement  Right bundle branch block  Left anterior fascicular block  ** Bifascicular block **  Left ventricular hypertrophy with repolarization abnormality  Cannot rule out Septal infarct (cited on or before 09-OCT-2020)  Abnormal ECG  When compared with ECG of 09-OCT-2020 23:51,  No significant change was found       RADIOLOGY  CT CHEST PULMONARY EMBOLISM W CONTRAST   Final Result   No central to segmental branch pulmonary emboli.       No acute pleuroparenchymal disease       RECOMMENDATIONS:   Unavailable           XR CHEST PORTABLE   Final Result   No acute cardiopulmonary disease is identified.       Stable, borderline cardiomegaly.              Pertinent previous results reviewed   ECHO 10/29/19   Summary   Left ventricular systolic function is mildly reduced to low normal with   ejection fraction estimated at 45-50 %.   There is moderate concentric left ventricular hypertrophy.   Grade II diastolic dysfunction with elevated filing pressure.   The left atrium is mildly dilated.   The right atrium is mildly dilated.   Mild posterior mitral annular calcification is present.   Mild mitral regurgitation.   Mild aortic stenosis is present.   Mild aortic regurgitation is present.   Moderate tricuspid regurgitation.   Normal systolic pulmonary artery pressure (SPAP) estimated at 33 mmHg (RA   pressure 8 mmHg).  Moderate pulmonic regurgitation present.   The atrial septum appears lipomatous.  Robertha Josh is an atrial septal aneurysm seen.   A bubble study was performed and shows evidence of right to left shunting   consistent with a patent foramen ovale or atrial septal defect.           EGD and colonoscopy     1. Mild gastritis. 2.  Otherwise, normal EGD. 3.  Single 3 mm cecal polyp. 4.  Three rectal polyps. 5.  Sigmoid diverticulosis. 6.  Internal hemorrhoids. 7.  No active bleeding. 8.  No source of anemia identified on this exam.      Consults:    1. GI   2. cardiology                  Recent Labs     12/16/21 1944 12/16/21  1944 12/17/21  0556 12/17/21  1845 12/17/21  2255 12/18/21  0506 12/18/21  1047   WBC 7.5  --  4.7  --   --   --   --    HGB 7.4*   < > 6.2*   < > 9.1* 8.9* 9.6*   HCT 24.4*   < > 20.9*   < > 28.9* 28.9* 31.5*   MCV 65.4*  --  65.2*  --   --   --   --      --  231  --   --   --   --     < > = values in this interval not displayed.        Recent Labs     12/16/21 1944 12/18/21  0506    136   K 4.1 3.6    103   CO2 26 23   BUN 19 16   CREATININE 0.8 0.7       CBC:  Lab Results   Component Value Date    WBC 4.7 12/17/2021    HGB 9.6 12/18/2021    HCT 31.5 12/18/2021    MCV 65.2 12/17/2021     12/17/2021    NEUTOPHILPCT 85.3 12/17/2021    LYMPHOPCT 11.2 12/17/2021    MONOPCT 3.0 12/17/2021    EOSPCT 2.8 02/09/2012    BASOPCT 0.4 12/17/2021    NEUTROABS 4.0 12/17/2021    LYMPHSABS 0.5 12/17/2021 MONOSABS 0.1 12/17/2021    EOSABS 0.0 12/17/2021    BASOSABS 0.0 12/17/2021     BNP:   Lab Results   Component Value Date     12/18/2021    K 3.6 12/18/2021    K 4.1 12/16/2021    CO2 23 12/18/2021    BUN 16 12/18/2021    CREATININE 0.7 12/18/2021    CALCIUM 8.4 12/18/2021                Medication List      CONTINUE taking these medications    AgaMatrix AMP Adri  Check BG daily after meals     ALBUTEROL IN     blood glucose test strips strip  Commonly known as: SpendC[employer] Contour Next Test  Test three times daily. DX:E11.9     * [employer] Lancets Misc  1 each by Does not apply route 3 times daily     * AgaMatrix Ultra-Thin Lancets Misc  Check sugars daily after each meal     ferrous sulfate 325 (65 Fe) MG EC tablet  Commonly known as: Fe Tabs  Take 1 tablet by mouth 2 times daily     glimepiride 4 MG tablet  Commonly known as: AMARYL  Take 0.5 tablets by mouth every morning (before breakfast)     ipratropium-albuterol 0.5-2.5 (3) MG/3ML Soln nebulizer solution  Commonly known as: DUONEB  Inhale 3 mLs into the lungs every 6 hours as needed for Shortness of Breath     lisinopril 20 MG tablet  Commonly known as: PRINIVIL;ZESTRIL  Take 1 tablet by mouth daily     metFORMIN 500 MG tablet  Commonly known as: GLUCOPHAGE  Take 1 tablet by mouth 2 times daily (with meals)     metoprolol succinate 50 MG extended release tablet  Commonly known as: TOPROL XL  Take 1 tablet by mouth daily     pantoprazole 40 MG tablet  Commonly known as: PROTONIX  Take 1 tablet by mouth daily         * This list has 2 medication(s) that are the same as other medications prescribed for you. Read the directions carefully, and ask your doctor or other care provider to review them with you.                Where to Get Your Medications      These medications were sent to [facility], [address]  [address] [address] [place] [address] [address], [address]    Phone: [phone]   · pantoprazole 40 MG tablet           Discharge Condition/Location: stable/home     Follow Up:    PCP in 1 weeks      Time Spent on discharge is more than 28 minutes in the examination, evaluation, counseling and review of medications and discharge plan.       Jhonny Downey MD, 12/18/2021 1:16 PM

## 2021-12-18 NOTE — PROGRESS NOTES
12/17/21 1900   RT Protocol   History Pulmonary Disease 2   Respiratory pattern 0   Breath sounds 2   Cough 0   Indications for Bronchodilator Therapy Decreased or absent breath sounds   Bronchodilator Assessment Score 4   RT Inhaler-Nebulizer Bronchodilator Protocol Note    There is a bronchodilator order in the chart from a provider indicating to follow the RT Bronchodilator Protocol and there is an Initiate RT Inhaler-Nebulizer Bronchodilator Protocol order as well (see protocol at bottom of note). CXR Findings:  XR CHEST PORTABLE    Result Date: 12/16/2021  No acute cardiopulmonary disease is identified. Stable, borderline cardiomegaly. The findings from the last RT Protocol Assessment were as follows:   History Pulmonary Disease: Chronic pulmonary disease  Respiratory Pattern: Regular pattern and RR 12-20 bpm  Breath Sounds: Slightly diminished and/or crackles  Cough: Strong, spontaneous, non-productive  Indication for Bronchodilator Therapy: Decreased or absent breath sounds  Bronchodilator Assessment Score: 4    Aerosolized bronchodilator medication orders have been revised according to the RT Inhaler-Nebulizer Bronchodilator Protocol below. Respiratory Therapist to perform RT Therapy Protocol Assessment initially then follow the protocol. Repeat RT Therapy Protocol Assessment PRN for score 0-3 or on second treatment, BID, and PRN for scores above 3. No Indications - adjust the frequency to every 6 hours PRN wheezing or bronchospasm, if no treatments needed after 48 hours then discontinue using Per Protocol order mode. If indication present, adjust the RT bronchodilator orders based on the Bronchodilator Assessment Score as indicated below.   Use Inhaler orders unless patient has one or more of the following: on home nebulizer, not able to hold breath for 10 seconds, is not alert and oriented, cannot activate and use MDI correctly, or respiratory rate 25 breaths per minute or more, then use the equivalent nebulizer order(s) with same Frequency and PRN reasons based on the score. If a patient is on this medication at home then do not decrease Frequency below that used at home. 0-3 - enter or revise RT bronchodilator order(s) to equivalent RT Bronchodilator order with Frequency of every 4 hours PRN for wheezing or increased work of breathing using Per Protocol order mode. 4-6 - enter or revise RT Bronchodilator order(s) to two equivalent RT bronchodilator orders with one order with BID Frequency and one order with Frequency of every 4 hours PRN wheezing or increased work of breathing using Per Protocol order mode. 7-10 - enter or revise RT Bronchodilator order(s) to two equivalent RT bronchodilator orders with one order with TID Frequency and one order with Frequency of every 4 hours PRN wheezing or increased work of breathing using Per Protocol order mode. 11-13 - enter or revise RT Bronchodilator order(s) to one equivalent RT bronchodilator order with QID Frequency and an Albuterol order with Frequency of every 4 hours PRN wheezing or increased work of breathing using Per Protocol order mode. Greater than 13 - enter or revise RT Bronchodilator order(s) to one equivalent RT bronchodilator order with every 4 hours Frequency and an Albuterol order with Frequency of every 2 hours PRN wheezing or increased work of breathing using Per Protocol order mode.      Electronically signed by Rosalin Peabody, RCP on 12/17/2021 at 7:07 PM

## 2021-12-18 NOTE — PLAN OF CARE
Problem: Falls - Risk of:  Goal: Will remain free from falls  Description: Will remain free from falls  12/18/2021 1129 by Mary Vidal RN  Outcome: Ongoing  12/18/2021 0505 by Denette Hamman, RN  Outcome: Ongoing  Goal: Absence of physical injury  Description: Absence of physical injury  12/18/2021 1129 by Mary Vidal RN  Outcome: Ongoing  12/18/2021 0505 by Denette Hamman, RN  Outcome: Ongoing     Problem: Safety:  Goal: Free from accidental physical injury  Description: Free from accidental physical injury  Outcome: Ongoing  Goal: Free from intentional harm  Description: Free from intentional harm  Outcome: Ongoing     Problem: Daily Care:  Goal: Daily care needs are met  Description: Daily care needs are met  Outcome: Ongoing

## 2021-12-18 NOTE — FLOWSHEET NOTE
12/18/21 0945   Vital Signs   Temp 97 °F (36.1 °C)   Temp Source Oral   Pulse 72   Heart Rate Source Monitor   Resp 18   BP (!) 165/67   BP Location Left upper arm   Level of Consciousness Alert (0)   MEWS Score 1   Patient Currently in Pain No   Pain Assessment   Pain Assessment 0-10   Pain Level 0   Oxygen Therapy   SpO2 96 %   O2 Device None (Room air)   Am assessment complete-see flowsheet. Morning meds given see MAR. Call light/bedside table within reach. No further needs at this time.

## 2021-12-18 NOTE — PROGRESS NOTES
Prescription,Protonix,sent to [address] [place] [address] and discharge instructions given. Pt verbalized understanding denies any questions/ needs at this time. Staff walked pt to vehicle for discharge home.

## 2021-12-18 NOTE — CONSULTS
REASON FOR CONSULTATION/CC: Chest pain     CONSULTING PHYSICIAN: Dr. Jimmy Posada:  Jian Valentine is a 77y.o. year old female with past medical history of Afib, CHF, Cervical Cancer, DM, emphysema, anemia  presented to [facility] with complaints of chest pain described as a burning sensation for the last few weeks. The chest burning would radiate across her chest into the back at times. Chest burning improves with pepto and at times with 1/2 tab of protonix. Denies any associated shortness of breath. Complains of leg fatigue with exertion over the last several weeks. ER work up notable for elevated blood pressures, afebrile, Elevated Pro-BNP 3434, negative troponin x3. EKG - NSR RBBB; Chest CT 12/16/21- negative for PE,+  cardiomegaly, CAD, clear lungs, Hgb 7.4--> 6.2; with positive occult stool.      PAST MEDICAL HISTORY:  Past Medical History:   Diagnosis Date    Atrial fibrillation ([facility]     Bronchitis chronic     Cancer (HCC)     cervical    CHF (congestive heart failure) (HCC)     Diabetes mellitus ([facility]     Emphysema of lung ([facility]     Hypercholesteremia     Hypertension     Microcytic anemia 3/23/2017    Psoriasis      PAST SURGICAL HISTORY:  Past Surgical History:   Procedure Laterality Date    CERVICAL [address] SURGERY  2004    CHOLECYSTECTOMY      DIAGNOSTIC CARDIAC CATH LAB PROCEDURE  4/2011    angiogram with 30% blockage    HYSTERECTOMY      KIDNEY STONE SURGERY      TONSILLECTOMY      UPPER GASTROINTESTINAL ENDOSCOPY N/A 10/29/2019    EGD BIOPSY performed by Jeremy Douglas DO at [address] N/A 10/11/2020    EGD BIOPSY performed by Jeremy Douglas DO at [facility] ENDOSCOPY  10/11/2020    EGD CONTROL HEMORRHAGE performed by Jeremy Douglas DO at [address]:  family history includes Asthma in her daughter; Cancer in her brother, brother, father, and sister; Diabetes in her brother and mother; Heart Disease in her mother; Heart Failure in her mother; High Blood Pressure in her mother; Hypertension in her brother, brother, and mother. SOCIAL HISTORY:   reports that she has been smoking cigarettes. She has a 16.00 pack-year smoking history. She has never used smokeless tobacco.    Scheduled Meds:   ipratropium-albuterol  1 ampule Inhalation BID    iron sucrose (VENOFER) iv piggyback 100 mL (Admin over 60 minutes)  100 mg IntraVENous Once    metoprolol succinate  50 mg Oral Daily    lisinopril  20 mg Oral Daily    sodium chloride flush  5-40 mL IntraVENous 2 times per day    atorvastatin  40 mg Oral Nightly    insulin lispro  0-12 Units SubCUTAneous Q4H    influenza virus vaccine  0.5 mL IntraMUSCular Prior to discharge     Continuous Infusions:   sodium chloride      dextrose      sodium chloride 50 mL/hr at 12/17/21 1304    sodium chloride      pantoprazole 8 mg/hr (12/17/21 1305)     PRN Meds:  midazolam, fentaNYL, sodium chloride flush, sodium chloride, ondansetron **OR** ondansetron, acetaminophen **OR** acetaminophen, polyethylene glycol, glucose, dextrose, glucagon (rDNA), dextrose, sodium chloride, regadenoson, ipratropium-albuterol    ALLERGIES:  Patient is allergic to morphine, chantix [varenicline tartrate], and percocet [oxycodone-acetaminophen]. REVIEW OF SYSTEMS:  Constitutional: Negative for fever  HENT: Negative for sore throat  Eyes: Negative for redness   Respiratory: Negative for dyspnea, +  cough  Cardiovascular: + for chest burning   Gastrointestinal: Negative for vomiting, diarrhea   Genitourinary: Negative for hematuria   Musculoskeletal: Negative for arthralgias   Neurological: Negative for syncope, negative for dizziness    PHYSICAL EXAM:  Blood pressure (!) 123/43, pulse 63, temperature 97.9 °F (36.6 °C), temperature source Temporal, resp.  rate 18, height 5' 3\" (1.6 m), weight 139 lb 3.2 oz (63.1 dysfunction with elevated filing pressure. The left atrium is mildly dilated. The right atrium is mildly dilated. Mild posterior mitral annular calcification is present. Mild mitral regurgitation. Mild aortic stenosis is present. Mild aortic regurgitation is present. Moderate tricuspid regurgitation. Normal systolic pulmonary artery pressure (SPAP) estimated at 33 mmHg (RA   pressure 8 mmHg). Moderate pulmonic regurgitation present. The atrial septum appears lipomatous. There is an atrial septal aneurysm seen. A bubble study was performed and shows evidence of right to left shunting   consistent with a patent foramen ovale or atrial septal defect. Lexiscan stress test 11/17/2014  Findings: The patient underwent a standard Lexiscan infusion stress test. Dr. Iman Akers was in attendance. Uniform distribution of radioactivity is seen within the left ventricular myocardium on both the stress and rest images. The left ventricular chamber size is normal. On the gated images, uniform wall motion and normal systolic wall thickening is seen. Decreased contractility is noted. The estimated ejection fraction is 43%. ASSESSMENT:  Chest burning, troponin neg x3- suspect gastritis  HTN  Anemia- with positive FOBT  Hx of GI bleed   Chronic combined heart failure- appears compensated   Coronary artery disease- noted on CT scan   Tobacco abuse    PLAN:  Hold aspirin given anemia requiring blood transfusion  Continue lisinopril 20mg daily   Continue toprol 50mg daily   Continue lipitor    Anemia evaluation underway. No further cardiac testing recommended at this time. Recommend correction and stabilization of anemia prior to completing stress testing. Can consider outpatient stress testing pending clinical response. Patient doesn't want stress test this admission either.      Darian Morley CNP, 12/18/2021, 9:00 AM    I personally reviewed the H& P labs diagnosis and Rx plan with Darian Morley NP  Agree

## 2021-12-18 NOTE — PROGRESS NOTES
RT Inhaler-Nebulizer Bronchodilator Protocol Note    There is a bronchodilator order in the chart from a provider indicating to follow the RT Bronchodilator Protocol and there is an Initiate RT Inhaler-Nebulizer Bronchodilator Protocol order as well (see protocol at bottom of note). CXR Findings:  XR CHEST PORTABLE    Result Date: 12/16/2021  No acute cardiopulmonary disease is identified. Stable, borderline cardiomegaly. The findings from the last RT Protocol Assessment were as follows:   History Pulmonary Disease: (P) Chronic pulmonary disease  Respiratory Pattern: (P) Regular pattern and RR 12-20 bpm  Breath Sounds: (P) Slightly diminished and/or crackles  Cough: (P) Strong, spontaneous, non-productive  Indication for Bronchodilator Therapy: (P) Decreased or absent breath sounds  Bronchodilator Assessment Score: (P) 4    Aerosolized bronchodilator medication orders have been revised according to the RT Inhaler-Nebulizer Bronchodilator Protocol below. Respiratory Therapist to perform RT Therapy Protocol Assessment initially then follow the protocol. Repeat RT Therapy Protocol Assessment PRN for score 0-3 or on second treatment, BID, and PRN for scores above 3. No Indications - adjust the frequency to every 6 hours PRN wheezing or bronchospasm, if no treatments needed after 48 hours then discontinue using Per Protocol order mode. If indication present, adjust the RT bronchodilator orders based on the Bronchodilator Assessment Score as indicated below. Use Inhaler orders unless patient has one or more of the following: on home nebulizer, not able to hold breath for 10 seconds, is not alert and oriented, cannot activate and use MDI correctly, or respiratory rate 25 breaths per minute or more, then use the equivalent nebulizer order(s) with same Frequency and PRN reasons based on the score. If a patient is on this medication at home then do not decrease Frequency below that used at home.     0-3 - enter or revise RT bronchodilator order(s) to equivalent RT Bronchodilator order with Frequency of every 4 hours PRN for wheezing or increased work of breathing using Per Protocol order mode. 4-6 - enter or revise RT Bronchodilator order(s) to two equivalent RT bronchodilator orders with one order with BID Frequency and one order with Frequency of every 4 hours PRN wheezing or increased work of breathing using Per Protocol order mode. 7-10 - enter or revise RT Bronchodilator order(s) to two equivalent RT bronchodilator orders with one order with TID Frequency and one order with Frequency of every 4 hours PRN wheezing or increased work of breathing using Per Protocol order mode. 11-13 - enter or revise RT Bronchodilator order(s) to one equivalent RT bronchodilator order with QID Frequency and an Albuterol order with Frequency of every 4 hours PRN wheezing or increased work of breathing using Per Protocol order mode. Greater than 13 - enter or revise RT Bronchodilator order(s) to one equivalent RT bronchodilator order with every 4 hours Frequency and an Albuterol order with Frequency of every 2 hours PRN wheezing or increased work of breathing using Per Protocol order mode.          Electronically signed by Licha Sorensen RCP on 12/18/2021 at 8:12 AM

## 2021-12-20 ENCOUNTER — TELEPHONE (OUTPATIENT)
Dept: INTERNAL MEDICINE CLINIC | Age: 66
End: 2021-12-20

## 2021-12-20 NOTE — TELEPHONE ENCOUNTER
[facility] Transitions Initial Follow Up Call    Outreach made within 2 business days of discharge: Yes    Patient: Solis Parra Patient : 1955   MRN: [id number]  Reason for Admission: There are no discharge diagnoses documented for the most recent discharge. Discharge Date: 21       Spoke with: Noel Goodson  Discharge department/facility: [facility] Interactive Patient Contact:  Was patient able to fill all prescriptions: Yes  Was patient instructed to bring all medications to the follow-up visit: Yes  Is patient taking all medications as directed in the discharge summary? Yes  Does patient understand their discharge instructions: Yes  Does patient have questions or concerns that need addressed prior to 7-14 day follow up office visit: no    Scheduled appointment with PCP within 7-14 days    Follow Up  No future appointments.     Renetta RAY

## 2022-01-14 NOTE — PLAN OF CARE
Consult called at [phone] and spoke with answering service. Dr. Jennings Agent on call. Discharged

## 2022-01-18 NOTE — PROGRESS NOTES
[facility]   Cardiac Consultation    Referring Provider:  Ld Rodriguez MD     No chief complaint on file. History of Present Illness:  Bryant Beltran 1955 presents today for hospital follow up. She has PMH AFib, CHF, cervical cancer, DM, COPD, chronic bronchitis, HTN, HLD, and microcytic anemia. Note ECHO 11/17/14 EF 42%; HK of the basal and mid inferoseptal wall. moderate concentric LVH, grade I DD; Nodular aortic sclerosis;  mild-to-moderate AR. Most recent [facility] nuclear stress test 11/17/14 No evident Lexiscan induced cardiac ischemia. EF=43%. Most recent Echo 10/28/2019 EF=45-50%; mod cLVH; grade II DD; mod TR; mod GA; PFO or atrial septal defect. She was admitted 12/16-12/18/2021 for chest pain and heme-positive stool. ED workup noted BNP 3434. Troponin negative x3. EKG NSR RBBB. Chest CT 12/16/21 negative for PE, + cardiomegaly, + CAD, clear lungs. Hgb 7.4->6.2. EGD was normal and colonoscopy noted internal hemorrhoids and polyps. Today, she reports ***     Past Medical History:   has a past medical history of Atrial fibrillation ([address] Bronchitis chronic, Cancer ([address] CHF (congestive heart failure) ([address] Diabetes mellitus ([address] Emphysema of lung ([address] Hypercholesteremia, Hypertension, Microcytic anemia, and Psoriasis. Surgical History:   has a past surgical history that includes Hysterectomy; Cholecystectomy; Tonsillectomy; Cervical disc surgery (2004); Diagnostic Cardiac Cath Lab Procedure (4/2011); Kidney stone surgery; Upper gastrointestinal endoscopy (N/A, 10/29/2019); Upper gastrointestinal endoscopy (N/A, 10/11/2020); Upper gastrointestinal endoscopy (10/11/2020); Upper gastrointestinal endoscopy (N/A, 12/18/2021); Colonoscopy (N/A, 12/18/2021); and Colonoscopy (N/A, 12/18/2021). Social History:   reports that she has been smoking cigarettes. She has a 16.00 pack-year smoking history.  She has never used smokeless tobacco. She reports that she does not drink alcohol and does not use drugs. Family History:  family history includes Asthma in her daughter; Cancer in her brother, brother, father, and sister; Diabetes in her brother and mother; Heart Disease in her mother; Heart Failure in her mother; High Blood Pressure in her mother; Hypertension in her brother, brother, and mother. Home Medications:  Prior to Admission medications    Medication Sig Start Date End Date Taking? Authorizing Provider   pantoprazole (PROTONIX) 40 MG tablet Take 1 tablet by mouth daily 12/18/21 1/17/22  Ne Ibrahim MD   ALBUTEROL IN Inhale into the lungs    Historical Provider, MD   ferrous sulfate (FE TABS) 325 (65 Fe) MG EC tablet Take 1 tablet by mouth 2 times daily 10/13/20   Lo Zavala, MD   metoprolol succinate (TOPROL XL) 50 MG extended release tablet Take 1 tablet by mouth daily 9/18/20   Lo Zavala, MD   lisinopril (PRINIVIL;ZESTRIL) 20 MG tablet Take 1 tablet by mouth daily 9/18/20   Lo Zavala, MD   blood glucose test strips (DARVIN CONTOUR NEXT TEST) strip Test three times daily.  DX:E11.9 9/18/20   Lo Zavala, MD   glimepiride (AMARYL) 4 MG tablet Take 0.5 tablets by mouth every morning (before breakfast) 9/18/20   Lo Zavala, MD   metFORMIN (GLUCOPHAGE) 500 MG tablet Take 1 tablet by mouth 2 times daily (with meals) 9/18/20   Lo Zavala MD   ipratropium-albuterol (DUONEB) 0.5-2.5 (3) MG/3ML SOLN nebulizer solution Inhale 3 mLs into the lungs every 6 hours as needed for Shortness of Breath 12/4/19 12/16/21  Lo Zavala MD   AGAMATRIX ULTRA-THIN LANCETS MISC Check sugars daily after each meal 10/30/19   STEPHY Draper   Blood Glucose Monitoring Suppl (AGAMATRIX AMP) ISABELLA Check BG daily after meals 10/30/19   STEPHY Draper   [employer] Lancets MISC 1 each by Does not apply route 3 times daily 5/2/18   Jeffry Lawler PA-C        Allergies:  Morphine, Chantix [varenicline tartrate], and Percocet [oxycodone-acetaminophen]     Review of Systems:   · Constitutional: there has been no unanticipated weight loss. There's been no change in energy level, sleep pattern, or activity level. · Eyes: No visual changes or diplopia. No scleral icterus. · ENT: No Headaches, hearing loss or vertigo. No mouth sores or sore throat. · Cardiovascular: Reviewed in HPI  · Respiratory: No cough or wheezing, no sputum production. No hematemesis. · Gastrointestinal: No abdominal pain, appetite loss, blood in stools. No change in bowel or bladder habits. · Genitourinary: No dysuria, trouble voiding, or hematuria. · Musculoskeletal:  No gait disturbance, weakness or joint complaints. · Integumentary: No rash or pruritis. · Neurological: No headache, diplopia, change in muscle strength, numbness or tingling. No change in gait, balance, coordination, mood, affect, memory, mentation, behavior. · Psychiatric: No anxiety, no depression. · Endocrine: No malaise, fatigue or temperature intolerance. No excessive thirst, fluid intake, or urination. No tremor. · Hematologic/Lymphatic: No abnormal bruising or bleeding, blood clots or swollen lymph nodes. · Allergic/Immunologic: No nasal congestion or hives. Physical Examination:    There were no vitals filed for this visit. Constitutional and General Appearance: NAD   Respiratory:  · Normal excursion and expansion without use of accessory muscles  · Resp Auscultation: Normal breath sounds without dullness  Cardiovascular:  · The apical impulses not displaced  · Heart tones are crisp and normal  · Cervical veins are not engorged  · The carotid upstroke is normal in amplitude and contour without delay or bruit  · Normal S1S2, No S3, No Murmur  · Peripheral pulses are symmetrical and full  · There is no clubbing, cyanosis of the extremities.   · No edema  · Femoral Arteries: 2+ and equal  · Pedal Pulses: 2+ and equal Abdomen:  · No masses or tenderness  · Liver/Spleen: No Abnormalities Noted  Neurological/Psychiatric:  · Alert and oriented in all spheres  · Moves all extremities well  · Exhibits normal gait balance and coordination  · No abnormalities of mood, affect, memory, mentation, or behavior are noted  Skin:  · Skin: warm and dry. Lab Results   Component Value Date     12/18/2021    K 3.6 12/18/2021     12/18/2021    CO2 23 12/18/2021    BUN 16 12/18/2021    CREATININE 0.7 12/18/2021    GLUCOSE 151 (H) 12/18/2021    CALCIUM 8.4 12/18/2021    PROT 7.8 12/16/2021    LABALBU 4.4 12/16/2021    BILITOT 0.3 12/16/2021    ALKPHOS 83 12/16/2021    AST 17 12/16/2021    ALT 12 12/16/2021    LABGLOM >60 12/18/2021    GFRAA >60 12/18/2021    AGRATIO 1.3 12/16/2021    GLOB 2.9 10/09/2020       Lab Results   Component Value Date    WBC 4.7 12/17/2021    HGB 9.6 (L) 12/18/2021    HCT 31.5 (L) 12/18/2021    MCV 65.2 (L) 12/17/2021     12/17/2021     Lab Results   Component Value Date    TSH 1.64 11/28/2011     Lab Results   Component Value Date    CHOL 104 12/18/2021    CHOL 175 02/28/2020    CHOL 188 11/16/2014     Lab Results   Component Value Date    TRIG 193 (H) 12/18/2021    TRIG 233 (H) 02/28/2020    TRIG 337 (H) 11/16/2014     Lab Results   Component Value Date    HDL 21 (L) 12/18/2021    HDL 29 (L) 12/17/2021    HDL 31 (L) 02/28/2020     Lab Results   Component Value Date    LDLCALC 44 12/18/2021    LDLCALC 66 12/17/2021    LDLCALC 97 02/28/2020     Lab Results   Component Value Date    LABVLDL 39 12/18/2021    LABVLDL 24 12/17/2021    LABVLDL 47 02/28/2020     No results found for: CHOLHDLRATIO      Assessment:     No diagnosis found. Plan:    ***    ***    Cost of prescription medications and patient compliance have been reviewed with patient. All questions answered. Thank you for allowing me to participate in the care of this individual.      Bill Reese.  Cara Arreola M.D., [facility]

## 2022-01-20 ENCOUNTER — OFFICE VISIT (OUTPATIENT)
Dept: CARDIOLOGY CLINIC | Age: 67
End: 2022-01-20
Payer: MEDICARE

## 2022-01-20 ENCOUNTER — HOSPITAL ENCOUNTER (OUTPATIENT)
Age: 67
Discharge: HOME | End: 2022-01-20
Payer: MEDICARE

## 2022-01-20 VITALS
SYSTOLIC BLOOD PRESSURE: 138 MMHG | TEMPERATURE: 97.9 F | BODY MASS INDEX: 24.63 KG/M2 | DIASTOLIC BLOOD PRESSURE: 68 MMHG | OXYGEN SATURATION: 95 % | WEIGHT: 139 LBS | HEART RATE: 94 BPM | HEIGHT: 63 IN

## 2022-01-20 DIAGNOSIS — R06.02 SOB (SHORTNESS OF BREATH): ICD-10-CM

## 2022-01-20 DIAGNOSIS — I25.9 CHEST PAIN DUE TO MYOCARDIAL ISCHEMIA, UNSPECIFIED ISCHEMIC CHEST PAIN TYPE: Primary | ICD-10-CM

## 2022-01-20 DIAGNOSIS — Z72.0 TOBACCO ABUSE: ICD-10-CM

## 2022-01-20 PROCEDURE — G8427 DOCREV CUR MEDS BY ELIG CLIN: HCPCS | Performed by: INTERNAL MEDICINE

## 2022-01-20 PROCEDURE — G8484 FLU IMMUNIZE NO ADMIN: HCPCS | Performed by: INTERNAL MEDICINE

## 2022-01-20 PROCEDURE — 99214 OFFICE O/P EST MOD 30 MIN: CPT | Performed by: INTERNAL MEDICINE

## 2022-01-20 PROCEDURE — 4040F PNEUMOC VAC/ADMIN/RCVD: CPT | Performed by: INTERNAL MEDICINE

## 2022-01-20 PROCEDURE — 4004F PT TOBACCO SCREEN RCVD TLK: CPT | Performed by: INTERNAL MEDICINE

## 2022-01-20 PROCEDURE — 1090F PRES/ABSN URINE INCON ASSESS: CPT | Performed by: INTERNAL MEDICINE

## 2022-01-20 PROCEDURE — 83013 H PYLORI (C-13) BREATH: CPT

## 2022-01-20 PROCEDURE — G8400 PT W/DXA NO RESULTS DOC: HCPCS | Performed by: INTERNAL MEDICINE

## 2022-01-20 PROCEDURE — G8420 CALC BMI NORM PARAMETERS: HCPCS | Performed by: INTERNAL MEDICINE

## 2022-01-20 PROCEDURE — 85025 COMPLETE CBC W/AUTO DIFF WBC: CPT

## 2022-01-20 PROCEDURE — 1123F ACP DISCUSS/DSCN MKR DOCD: CPT | Performed by: INTERNAL MEDICINE

## 2022-01-20 PROCEDURE — 3017F COLORECTAL CA SCREEN DOC REV: CPT | Performed by: INTERNAL MEDICINE

## 2022-01-20 NOTE — PATIENT INSTRUCTIONS
Plan:   1. Echocardiogram to evaluate heart function and valves   2. Recommend a stress test- Lexiscan Myoview  3. Follow up with me based on testing    Your provider has ordered testing for further evaluation. An order/prescription has been included in your paper work.  To schedule outpatient testing, contact Central Scheduling by calling [address] [place] [address] ([phone]).

## 2022-01-20 NOTE — PROGRESS NOTES
[address]   Cardiac Consultation    Referring Provider:  [facility], MD     Chief Complaint   Patient presents with    New Patient     gets burning in the left side of the chest    Hypertension    Coronary Artery Disease    Congestive Heart Failure    Follow-Up from Hospital      Subjective: Geremias Li is here today for hospital follow up for chest pain. History of Present Illness:  Geremias Li 1955 presents today for hospital follow up. She has PMH prior afib (could not find EKG's to confirm), reported CHF, cervical cancer, DM, COPD, chronic bronchitis, HTN, HLD, and microcytic anemia. Note ECHO 11/17/14 EF 42%; HK of the basal and mid inferoseptal wall. moderate concentric LVH, grade I DD; Nodular aortic sclerosis;  mild-to-moderate AR. Most recent [facility] nuclear stress test 11/17/14 negative for ischemia; EF=43%. Most recent Echo 10/28/2019 EF=45-50%; mod cLVH; grade II DD; mod TR; mod ND. She was admitted 12/16-12/18/2021 for chest pain and heme-positive stool. ED workup noted BNP 3434. Troponin negative x3. EKG NSR; RBBB; left axis; LVH; ST abnl anterolateral c/w possible ischema (no change 9/20). Chest CT 12/16/21 negative for PE, + cardiomegaly, + CAD, clear lungs. Hgb 7.4->6.2. Transfused 2U PRBC. EGD showed gastritis and colonoscopy noted internal hemorrhoids and polyps. Today, she reports she has been placed on antibiotics per GI due to having H pylori. She states she continues to have chest pain on the last side of her chest which last for minutes. Vague pain; no radiation; no associated symptoms. Chest pain started around 6 months ago. She has a dry cough today while in the office that she states is related to wearing a mask as well as her COPD. Patient currently denies any weight gain, edema, palpitations, dizziness, and syncope.       Past Medical History:   has a past medical history of Atrial fibrillation ([facility] [address] Bronchitis chronic, Cancer [facility]), CHF (congestive heart failure) ([facility] [address] Diabetes mellitus ([facility] [address] Emphysema of lung ([facility] [address] Hypercholesteremia, Hypertension, Microcytic anemia, and Psoriasis. Surgical History:   has a past surgical history that includes Hysterectomy; Cholecystectomy; Tonsillectomy; Cervical disc surgery (2004); Diagnostic Cardiac Cath Lab Procedure (4/2011); Kidney stone surgery; Upper gastrointestinal endoscopy (N/A, 10/29/2019); Upper gastrointestinal endoscopy (N/A, 10/11/2020); Upper gastrointestinal endoscopy (10/11/2020); Upper gastrointestinal endoscopy (N/A, 12/18/2021); Colonoscopy (N/A, 12/18/2021); and Colonoscopy (N/A, 12/18/2021). Social History:   reports that she has been smoking cigarettes. She has a 16.00 pack-year smoking history. She has never used smokeless tobacco. She reports that she does not drink alcohol and does not use drugs. Family History:  family history includes Asthma in her daughter; Cancer in her brother, brother, father, and sister; Diabetes in her brother and mother; Heart Disease in her mother; Heart Failure in her mother; High Blood Pressure in her mother; Hypertension in her brother, brother, and mother. Home Medications:  Prior to Admission medications    Medication Sig Start Date End Date Taking? Authorizing Provider   pantoprazole (PROTONIX) 40 MG tablet Take 1 tablet by mouth daily 12/18/21 1/20/22 Yes Michael Drake MD   ALBUTEROL IN Inhale into the lungs   Yes Historical Provider, MD   blood glucose test strips (DARVIN CONTOUR NEXT TEST) strip Test three times daily.  DX:E11.9 9/18/20  Yes Jen Mercer MD   ipratropium-albuterol (DUONEB) 0.5-2.5 (3) MG/3ML SOLN nebulizer solution Inhale 3 mLs into the lungs every 6 hours as needed for Shortness of Breath 12/4/19 1/20/22 Yes Jen Mercer MD   AGAMATRIX ULTRA-THIN LANCETS MISC Check sugars daily after each meal 10/30/19  Yes STEPHY Zaragoza   Blood Glucose Monitoring Suppl (AGAMATRIX AMP) ISABELLA Check BG daily after meals 10/30/19  Yes STEPHY Rowe   [facility] Lancets MISC 1 each by Does not apply route 3 times daily 5/2/18  Yes Ragini Desir PA-C   ferrous sulfate (FE TABS) 325 (65 Fe) MG EC tablet Take 1 tablet by mouth 2 times daily  Patient not taking: Reported on 1/20/2022 10/13/20   Ted Higuera MD   metoprolol succinate (TOPROL XL) 50 MG extended release tablet Take 1 tablet by mouth daily  Patient not taking: Reported on 1/20/2022 9/18/20   Ted Higuera MD   lisinopril (PRINIVIL;ZESTRIL) 20 MG tablet Take 1 tablet by mouth daily  Patient not taking: Reported on 1/20/2022 9/18/20   Ted Higuera MD   glimepiride (AMARYL) 4 MG tablet Take 0.5 tablets by mouth every morning (before breakfast)  Patient not taking: Reported on 1/20/2022 9/18/20   Ted Higuera MD   metFORMIN (GLUCOPHAGE) 500 MG tablet Take 1 tablet by mouth 2 times daily (with meals)  Patient not taking: Reported on 1/20/2022 9/18/20   Ted Higuera MD        Allergies:  Morphine, Chantix [varenicline tartrate], and Percocet [oxycodone-acetaminophen]     Review of Systems:   · Constitutional: there has been no unanticipated weight loss. There's been no change in energy level, sleep pattern, or activity level. · Eyes: No visual changes or diplopia. No scleral icterus. · ENT: No Headaches, hearing loss or vertigo. No mouth sores or sore throat. · Cardiovascular: Reviewed in HPI  · Respiratory: No cough or wheezing, no sputum production. No hematemesis. · Gastrointestinal: No abdominal pain, appetite loss, blood in stools. No change in bowel or bladder habits. · Genitourinary: No dysuria, trouble voiding, or hematuria. · Musculoskeletal:  No gait disturbance, weakness or joint complaints. · Integumentary: No rash or pruritis. · Neurological: No headache, diplopia, change in muscle strength, numbness or tingling.  No change in gait, balance, coordination, mood, affect, memory, mentation, behavior. · Psychiatric: No anxiety, no depression. · Endocrine: No malaise, fatigue or temperature intolerance. No excessive thirst, fluid intake, or urination. No tremor. · Hematologic/Lymphatic: No abnormal bruising or bleeding, blood clots or swollen lymph nodes. · Allergic/Immunologic: No nasal congestion or hives. Physical Examination:    Vitals:    01/20/22 1457   BP: 138/68   Pulse: 94   Temp: 97.9 °F (36.6 °C)   SpO2: 95%        Constitutional and General Appearance: NAD   Respiratory:  · Normal excursion and expansion without use of accessory muscles  · Resp Auscultation: Normal breath sounds without dullness- cough   Cardiovascular:  · The apical impulses not displaced  · Heart tones are crisp and normal  · Cervical veins are not engorged  · The carotid upstroke is normal in amplitude and contour without delay or bruit  · Normal S1S2, No S3, +soft NENA  · Peripheral pulses are symmetrical and full  · There is no clubbing, cyanosis of the extremities. · No edema  · Femoral Arteries: 2+ and equal  · Pedal Pulses: 2+ and equal   Abdomen:  · No masses or tenderness  · Liver/Spleen: No Abnormalities Noted  Neurological/Psychiatric:  · Alert and oriented in all spheres  · Moves all extremities well  · Exhibits normal gait balance and coordination  · No abnormalities of mood, affect, memory, mentation, or behavior are noted  Skin:  · Skin: warm and dry.     Lab Results   Component Value Date     12/18/2021    K 3.6 12/18/2021     12/18/2021    CO2 23 12/18/2021    BUN 16 12/18/2021    CREATININE 0.7 12/18/2021    GLUCOSE 151 (H) 12/18/2021    CALCIUM 8.4 12/18/2021    PROT 7.8 12/16/2021    LABALBU 4.4 12/16/2021    BILITOT 0.3 12/16/2021    ALKPHOS 83 12/16/2021    AST 17 12/16/2021    ALT 12 12/16/2021    LABGLOM >60 12/18/2021    GFRAA >60 12/18/2021    AGRATIO 1.3 12/16/2021    GLOB 2.9 10/09/2020       Lab Results   Component Value Date    WBC 4.7 12/17/2021    HGB 9.6 (L) 12/18/2021    HCT 31.5 (L) 12/18/2021    MCV 65.2 (L) 12/17/2021     12/17/2021     Lab Results   Component Value Date    TSH 1.64 11/28/2011     Lab Results   Component Value Date    CHOL 104 12/18/2021    CHOL 175 02/28/2020    CHOL 188 11/16/2014     Lab Results   Component Value Date    TRIG 193 (H) 12/18/2021    TRIG 233 (H) 02/28/2020    TRIG 337 (H) 11/16/2014     Lab Results   Component Value Date    HDL 21 (L) 12/18/2021    HDL 29 (L) 12/17/2021    HDL 31 (L) 02/28/2020     Lab Results   Component Value Date    LDLCALC 44 12/18/2021    LDLCALC 66 12/17/2021    LDLCALC 97 02/28/2020     Lab Results   Component Value Date    LABVLDL 39 12/18/2021    LABVLDL 24 12/17/2021    LABVLDL 47 02/28/2020     No results found for: CHOLHDLRATIO      Assessment:   1. Chest pain: Unspecified CP with CAD RF's including age, HTN, HLD, tobacco abuse. Prior testing shows mild undefined cardiomyopathy without ischemia. Most recent [facility] nuclear stress test 11/17/14 negative for ischemia; EF=43%. Most recent Echo 10/28/2019 EF=45-50%. Now with CP symptoms and she merits repeat non-invasive cardiac evaluation to check for ischemia and reassess LVEF. 2. Microcytic anemia- Transfused x2 units with improvement in Hgb---GI consulted and had EGD showing mild gastritis and colon showing internal hemorrhoids only. No evidence of GI bleed. Follow GI doc and CBC. NO aspirin due to recent anemia and transfusion. Reports 3 episodes of anemia in past. I recommend heme/onc evaluation. 3. SOB/COPD- cough today. Need to check ECHO to reassess LVEF. 4. Hypertension: Adequately controlled and will continue current medical regimen. 5. Hyperlipidemia: I personally reviewed most recent lipids from 12/18/21 in Epic (see above). At goal except for chronically low HDL. Not currently on statin and no indication currently. 6. Atrial fibrillation Noted in history but not found on any EKG's.  Follow clinically. No need for [facility]      Plan:   1. Echocardiogram to evaluate heart function and valves   2. Recommend a stress test- Lexiscan Myoview due to chest pain and risk factors   3. Follow up with me based on testing. Results will determine if needs f/u. Scribe's attestation: This note was scribed in the presence of Dr. Deanna Rodriguez MD, By Mike Rodrigues RN    I, Dr. Deanna Rodriguez, personally performed the services described in this documentation, as scribed by the above signed scribe in my presence. It is both accurate and complete to my knowledge. I agree with the details independently gathered by the clinical support staff, while the remaining scribed note accurately describes my personal service to the patient. Cost of prescription medications and patient compliance have been reviewed with patient. All questions answered. Thank you for allowing me to participate in the care of this individual.      Cristofer Costa M.D., [facility]

## 2022-01-21 LAB
BASOPHILS ABSOLUTE: 0.1 K/UL (ref 0–0.2)
BASOPHILS RELATIVE PERCENT: 1.7 %
EOSINOPHILS ABSOLUTE: 0.1 K/UL (ref 0–0.6)
EOSINOPHILS RELATIVE PERCENT: 2.6 %
HCT VFR BLD CALC: 32 % (ref 36–48)
HEMATOLOGY PATH CONSULT: NO
HEMOGLOBIN: 9.8 G/DL (ref 12–16)
LYMPHOCYTES ABSOLUTE: 0.7 K/UL (ref 1–5.1)
LYMPHOCYTES RELATIVE PERCENT: 13 %
MCH RBC QN AUTO: 20.8 PG (ref 26–34)
MCHC RBC AUTO-ENTMCNC: 30.5 G/DL (ref 31–36)
MCV RBC AUTO: 68 FL (ref 80–100)
MONOCYTES ABSOLUTE: 0.8 K/UL (ref 0–1.3)
MONOCYTES RELATIVE PERCENT: 14.2 %
NEUTROPHILS ABSOLUTE: 3.7 K/UL (ref 1.7–7.7)
NEUTROPHILS RELATIVE PERCENT: 68.5 %
PDW BLD-RTO: 22.8 % (ref 12.4–15.4)
PLATELET # BLD: 265 K/UL (ref 135–450)
PMV BLD AUTO: 8.9 FL (ref 5–10.5)
RBC # BLD: 4.71 M/UL (ref 4–5.2)
WBC # BLD: 5.5 K/UL (ref 4–11)

## 2022-01-22 LAB — H PYLORI BREATH TEST: NEGATIVE

## 2022-05-24 NOTE — PROGRESS NOTES
Patient encouraged to continue drinking bowel prep. Stools are now mostly clear with brown pieces. Patient states understanding. Pt noted to be taking off old paper scrubs, provided with new pair at this time        Mumtaz Dos Santos, JERO  05/24/22 1937

## 2022-06-25 ENCOUNTER — APPOINTMENT (OUTPATIENT)
Dept: GENERAL RADIOLOGY | Age: 67
DRG: 378 | End: 2022-06-25
Payer: MEDICARE

## 2022-06-25 ENCOUNTER — HOSPITAL ENCOUNTER (INPATIENT)
Age: 67
LOS: 2 days | Discharge: HOME | DRG: 378 | End: 2022-06-27
Attending: STUDENT IN AN ORGANIZED HEALTH CARE EDUCATION/TRAINING PROGRAM | Admitting: INTERNAL MEDICINE
Payer: MEDICARE

## 2022-06-25 DIAGNOSIS — R25.2 LEG CRAMPS: ICD-10-CM

## 2022-06-25 DIAGNOSIS — D64.9 ANEMIA, UNSPECIFIED TYPE: Primary | ICD-10-CM

## 2022-06-25 DIAGNOSIS — I50.20 SYSTOLIC CONGESTIVE HEART FAILURE, UNSPECIFIED HF CHRONICITY (HCC): ICD-10-CM

## 2022-06-25 DIAGNOSIS — K92.2 GASTROINTESTINAL HEMORRHAGE, UNSPECIFIED GASTROINTESTINAL HEMORRHAGE TYPE: ICD-10-CM

## 2022-06-25 DIAGNOSIS — R19.5 GUAIAC POSITIVE STOOLS: ICD-10-CM

## 2022-06-25 LAB
A/G RATIO: 1.7 (ref 1.1–2.2)
ALBUMIN SERPL-MCNC: 4.6 G/DL (ref 3.4–5)
ALP BLD-CCNC: 82 U/L (ref 40–129)
ALT SERPL-CCNC: 11 U/L (ref 10–40)
ANION GAP SERPL CALCULATED.3IONS-SCNC: 11 MMOL/L (ref 3–16)
ANISOCYTOSIS: ABNORMAL
AST SERPL-CCNC: 15 U/L (ref 15–37)
BASOPHILS ABSOLUTE: 0.1 K/UL (ref 0–0.2)
BASOPHILS RELATIVE PERCENT: 1.4 %
BILIRUB SERPL-MCNC: <0.2 MG/DL (ref 0–1)
BUN BLDV-MCNC: 17 MG/DL (ref 7–20)
CALCIUM SERPL-MCNC: 9.4 MG/DL (ref 8.3–10.6)
CHLORIDE BLD-SCNC: 100 MMOL/L (ref 99–110)
CO2: 27 MMOL/L (ref 21–32)
CREAT SERPL-MCNC: 1 MG/DL (ref 0.6–1.2)
D DIMER: 0.53 UG/ML FEU (ref 0–0.6)
EOSINOPHILS ABSOLUTE: 0.2 K/UL (ref 0–0.6)
EOSINOPHILS RELATIVE PERCENT: 2.9 %
GFR AFRICAN AMERICAN: >60
GFR NON-AFRICAN AMERICAN: 55
GLUCOSE BLD-MCNC: 139 MG/DL (ref 70–99)
GLUCOSE BLD-MCNC: 313 MG/DL (ref 70–99)
HCT VFR BLD CALC: 25.4 % (ref 36–48)
HEMOGLOBIN: 7.6 G/DL (ref 12–16)
INFLUENZA A: NOT DETECTED
INFLUENZA B: NOT DETECTED
INR BLD: 1.13 (ref 0.87–1.14)
LYMPHOCYTES ABSOLUTE: 1.1 K/UL (ref 1–5.1)
LYMPHOCYTES RELATIVE PERCENT: 15.6 %
MCH RBC QN AUTO: 18.4 PG (ref 26–34)
MCHC RBC AUTO-ENTMCNC: 29.9 G/DL (ref 31–36)
MCV RBC AUTO: 61.5 FL (ref 80–100)
MICROCYTES: ABNORMAL
MONOCYTES ABSOLUTE: 0.9 K/UL (ref 0–1.3)
MONOCYTES RELATIVE PERCENT: 12.1 %
NEUTROPHILS ABSOLUTE: 4.8 K/UL (ref 1.7–7.7)
NEUTROPHILS RELATIVE PERCENT: 68 %
OCCULT BLOOD DIAGNOSTIC: ABNORMAL
OVALOCYTES: ABNORMAL
PDW BLD-RTO: 20.4 % (ref 12.4–15.4)
PERFORMED ON: ABNORMAL
PLATELET # BLD: 311 K/UL (ref 135–450)
PLATELET SLIDE REVIEW: ADEQUATE
PMV BLD AUTO: 8.2 FL (ref 5–10.5)
POIKILOCYTES: ABNORMAL
POLYCHROMASIA: ABNORMAL
POTASSIUM REFLEX MAGNESIUM: 4.1 MMOL/L (ref 3.5–5.1)
PROTHROMBIN TIME: 14.3 SEC (ref 11.7–14.5)
RBC # BLD: 4.12 M/UL (ref 4–5.2)
SARS-COV-2 RNA, RT PCR: NOT DETECTED
SLIDE REVIEW: ABNORMAL
SODIUM BLD-SCNC: 138 MMOL/L (ref 136–145)
TOTAL PROTEIN: 7.3 G/DL (ref 6.4–8.2)
TROPONIN: <0.01 NG/ML
TSH REFLEX: 1.15 UIU/ML (ref 0.27–4.2)
WBC # BLD: 7.1 K/UL (ref 4–11)

## 2022-06-25 PROCEDURE — 6370000000 HC RX 637 (ALT 250 FOR IP): Performed by: STUDENT IN AN ORGANIZED HEALTH CARE EDUCATION/TRAINING PROGRAM

## 2022-06-25 PROCEDURE — 96374 THER/PROPH/DIAG INJ IV PUSH: CPT

## 2022-06-25 PROCEDURE — 80053 COMPREHEN METABOLIC PANEL: CPT

## 2022-06-25 PROCEDURE — 83550 IRON BINDING TEST: CPT

## 2022-06-25 PROCEDURE — 71045 X-RAY EXAM CHEST 1 VIEW: CPT

## 2022-06-25 PROCEDURE — 6370000000 HC RX 637 (ALT 250 FOR IP): Performed by: INTERNAL MEDICINE

## 2022-06-25 PROCEDURE — 1200000000 HC SEMI PRIVATE

## 2022-06-25 PROCEDURE — 83036 HEMOGLOBIN GLYCOSYLATED A1C: CPT

## 2022-06-25 PROCEDURE — 83010 ASSAY OF HAPTOGLOBIN QUANT: CPT

## 2022-06-25 PROCEDURE — 84484 ASSAY OF TROPONIN QUANT: CPT

## 2022-06-25 PROCEDURE — C9113 INJ PANTOPRAZOLE SODIUM, VIA: HCPCS | Performed by: NURSE PRACTITIONER

## 2022-06-25 PROCEDURE — 99285 EMERGENCY DEPT VISIT HI MDM: CPT

## 2022-06-25 PROCEDURE — 93005 ELECTROCARDIOGRAM TRACING: CPT | Performed by: NURSE PRACTITIONER

## 2022-06-25 PROCEDURE — 6360000002 HC RX W HCPCS: Performed by: NURSE PRACTITIONER

## 2022-06-25 PROCEDURE — 87636 SARSCOV2 & INF A&B AMP PRB: CPT

## 2022-06-25 PROCEDURE — 82270 OCCULT BLOOD FECES: CPT

## 2022-06-25 PROCEDURE — 85379 FIBRIN DEGRADATION QUANT: CPT

## 2022-06-25 PROCEDURE — 82728 ASSAY OF FERRITIN: CPT

## 2022-06-25 PROCEDURE — 83540 ASSAY OF IRON: CPT

## 2022-06-25 PROCEDURE — 85610 PROTHROMBIN TIME: CPT

## 2022-06-25 PROCEDURE — 82607 VITAMIN B-12: CPT

## 2022-06-25 PROCEDURE — 85025 COMPLETE CBC W/AUTO DIFF WBC: CPT

## 2022-06-25 PROCEDURE — 84443 ASSAY THYROID STIM HORMONE: CPT

## 2022-06-25 PROCEDURE — 82746 ASSAY OF FOLIC ACID SERUM: CPT

## 2022-06-25 PROCEDURE — 36415 COLL VENOUS BLD VENIPUNCTURE: CPT

## 2022-06-25 RX ORDER — LISINOPRIL 10 MG/1
10 TABLET ORAL ONCE
Status: COMPLETED | OUTPATIENT
Start: 2022-06-25 | End: 2022-06-26

## 2022-06-25 RX ORDER — SODIUM CHLORIDE 9 MG/ML
INJECTION, SOLUTION INTRAVENOUS CONTINUOUS
Status: DISCONTINUED | OUTPATIENT
Start: 2022-06-25 | End: 2022-06-27 | Stop reason: HOSPADM

## 2022-06-25 RX ORDER — PANTOPRAZOLE SODIUM 40 MG/10ML
40 INJECTION, POWDER, LYOPHILIZED, FOR SOLUTION INTRAVENOUS ONCE
Status: COMPLETED | OUTPATIENT
Start: 2022-06-25 | End: 2022-06-25

## 2022-06-25 RX ORDER — DEXTROSE MONOHYDRATE 50 MG/ML
100 INJECTION, SOLUTION INTRAVENOUS PRN
Status: DISCONTINUED | OUTPATIENT
Start: 2022-06-25 | End: 2022-06-27 | Stop reason: HOSPADM

## 2022-06-25 RX ORDER — PANTOPRAZOLE SODIUM 40 MG/1
40 TABLET, DELAYED RELEASE ORAL DAILY
Status: ON HOLD | COMMUNITY
End: 2022-06-27 | Stop reason: HOSPADM

## 2022-06-25 RX ORDER — ALBUTEROL SULFATE 90 UG/1
2 AEROSOL, METERED RESPIRATORY (INHALATION) EVERY 4 HOURS PRN
Status: DISCONTINUED | OUTPATIENT
Start: 2022-06-26 | End: 2022-06-27 | Stop reason: HOSPADM

## 2022-06-25 RX ORDER — GABAPENTIN 100 MG/1
100 CAPSULE ORAL ONCE
Status: COMPLETED | OUTPATIENT
Start: 2022-06-25 | End: 2022-06-25

## 2022-06-25 RX ORDER — ONDANSETRON 4 MG/1
4 TABLET, ORALLY DISINTEGRATING ORAL EVERY 8 HOURS PRN
Status: DISCONTINUED | OUTPATIENT
Start: 2022-06-25 | End: 2022-06-27 | Stop reason: HOSPADM

## 2022-06-25 RX ORDER — SODIUM CHLORIDE 9 MG/ML
INJECTION, SOLUTION INTRAVENOUS PRN
Status: DISCONTINUED | OUTPATIENT
Start: 2022-06-25 | End: 2022-06-27 | Stop reason: HOSPADM

## 2022-06-25 RX ORDER — IPRATROPIUM BROMIDE AND ALBUTEROL SULFATE 2.5; .5 MG/3ML; MG/3ML
3 SOLUTION RESPIRATORY (INHALATION) EVERY 6 HOURS PRN
Status: DISCONTINUED | OUTPATIENT
Start: 2022-06-25 | End: 2022-06-25

## 2022-06-25 RX ORDER — PANTOPRAZOLE SODIUM 40 MG/10ML
40 INJECTION, POWDER, LYOPHILIZED, FOR SOLUTION INTRAVENOUS 2 TIMES DAILY
Status: DISCONTINUED | OUTPATIENT
Start: 2022-06-26 | End: 2022-06-27 | Stop reason: HOSPADM

## 2022-06-25 RX ORDER — LISINOPRIL 20 MG/1
20 TABLET ORAL DAILY
Status: DISCONTINUED | OUTPATIENT
Start: 2022-06-26 | End: 2022-06-27 | Stop reason: HOSPADM

## 2022-06-25 RX ORDER — INSULIN LISPRO 100 [IU]/ML
0-6 INJECTION, SOLUTION INTRAVENOUS; SUBCUTANEOUS EVERY 6 HOURS
Status: DISCONTINUED | OUTPATIENT
Start: 2022-06-26 | End: 2022-06-27 | Stop reason: HOSPADM

## 2022-06-25 RX ORDER — ACETAMINOPHEN 650 MG/1
650 SUPPOSITORY RECTAL EVERY 6 HOURS PRN
Status: DISCONTINUED | OUTPATIENT
Start: 2022-06-25 | End: 2022-06-27 | Stop reason: HOSPADM

## 2022-06-25 RX ORDER — ALBUTEROL SULFATE 90 UG/1
2 AEROSOL, METERED RESPIRATORY (INHALATION) 3 TIMES DAILY
Status: DISCONTINUED | OUTPATIENT
Start: 2022-06-26 | End: 2022-06-26

## 2022-06-25 RX ORDER — ONDANSETRON 2 MG/ML
4 INJECTION INTRAMUSCULAR; INTRAVENOUS EVERY 6 HOURS PRN
Status: DISCONTINUED | OUTPATIENT
Start: 2022-06-25 | End: 2022-06-27 | Stop reason: HOSPADM

## 2022-06-25 RX ORDER — FERROUS SULFATE 325(65) MG
325 TABLET ORAL 2 TIMES DAILY WITH MEALS
Status: DISCONTINUED | OUTPATIENT
Start: 2022-06-26 | End: 2022-06-26

## 2022-06-25 RX ORDER — SODIUM CHLORIDE 0.9 % (FLUSH) 0.9 %
5-40 SYRINGE (ML) INJECTION EVERY 12 HOURS SCHEDULED
Status: DISCONTINUED | OUTPATIENT
Start: 2022-06-25 | End: 2022-06-27 | Stop reason: HOSPADM

## 2022-06-25 RX ORDER — METOPROLOL SUCCINATE 50 MG/1
50 TABLET, EXTENDED RELEASE ORAL DAILY
Status: DISCONTINUED | OUTPATIENT
Start: 2022-06-26 | End: 2022-06-27 | Stop reason: HOSPADM

## 2022-06-25 RX ORDER — ACETAMINOPHEN 325 MG/1
650 TABLET ORAL EVERY 6 HOURS PRN
Status: DISCONTINUED | OUTPATIENT
Start: 2022-06-25 | End: 2022-06-27 | Stop reason: HOSPADM

## 2022-06-25 RX ORDER — IPRATROPIUM BROMIDE AND ALBUTEROL SULFATE 2.5; .5 MG/3ML; MG/3ML
3 SOLUTION RESPIRATORY (INHALATION) EVERY 6 HOURS PRN
Status: DISCONTINUED | OUTPATIENT
Start: 2022-06-25 | End: 2022-06-27 | Stop reason: HOSPADM

## 2022-06-25 RX ORDER — ALBUTEROL SULFATE 90 UG/1
2 AEROSOL, METERED RESPIRATORY (INHALATION) EVERY 6 HOURS PRN
Status: DISCONTINUED | OUTPATIENT
Start: 2022-06-25 | End: 2022-06-25

## 2022-06-25 RX ORDER — SODIUM CHLORIDE 0.9 % (FLUSH) 0.9 %
5-40 SYRINGE (ML) INJECTION PRN
Status: DISCONTINUED | OUTPATIENT
Start: 2022-06-25 | End: 2022-06-27 | Stop reason: HOSPADM

## 2022-06-25 RX ADMIN — PANTOPRAZOLE SODIUM 40 MG: 40 INJECTION, POWDER, FOR SOLUTION INTRAVENOUS at 19:59

## 2022-06-25 RX ADMIN — Medication 2 PUFF: at 23:15

## 2022-06-25 RX ADMIN — GABAPENTIN 100 MG: 100 CAPSULE ORAL at 21:03

## 2022-06-25 ASSESSMENT — ENCOUNTER SYMPTOMS
NAUSEA: 0
VOMITING: 0
RHINORRHEA: 0
SHORTNESS OF BREATH: 0
SORE THROAT: 0
BLOOD IN STOOL: 0
DIARRHEA: 0
COUGH: 0
EYE PAIN: 0
ABDOMINAL PAIN: 0
BACK PAIN: 0

## 2022-06-25 ASSESSMENT — PAIN DESCRIPTION - PAIN TYPE
TYPE: ACUTE PAIN
TYPE: ACUTE PAIN;CHRONIC PAIN

## 2022-06-25 ASSESSMENT — PAIN DESCRIPTION - DESCRIPTORS: DESCRIPTORS: SORE

## 2022-06-25 ASSESSMENT — PAIN DESCRIPTION - LOCATION
LOCATION: CHEST
LOCATION: LEG

## 2022-06-25 ASSESSMENT — PAIN SCALES - GENERAL
PAINLEVEL_OUTOF10: 6
PAINLEVEL_OUTOF10: 6

## 2022-06-25 ASSESSMENT — PAIN DESCRIPTION - ORIENTATION: ORIENTATION: RIGHT;LEFT

## 2022-06-25 ASSESSMENT — PAIN - FUNCTIONAL ASSESSMENT
PAIN_FUNCTIONAL_ASSESSMENT: 0-10
PAIN_FUNCTIONAL_ASSESSMENT: ACTIVITIES ARE NOT PREVENTED

## 2022-06-25 ASSESSMENT — PAIN DESCRIPTION - FREQUENCY: FREQUENCY: INTERMITTENT

## 2022-06-25 NOTE — LETTER
[id number]  Phone: [phone]             June 27, 2022    Patient: Sam Patiño   YOB: 1955   Date of Visit: 6/25/2022       To Whom It May Concern:    Sam Patiño was seen and treated in our facility  beginning 6/25/2022 until 6/27/2022. She may return to work on 6/29/22.       Sincerely,       Vic Matthews RN         Signature:__________________________________

## 2022-06-25 NOTE — ED PROVIDER NOTES
Kim 50        Pt Name: Cornelia Dickens  MRN: [id number]  Justingfsheba 1955  Date of evaluation: 6/25/2022  Provider: GOLDEN Vargas - CNP  PCP: Jia Sorensen MD  Note Started: 4:54 PM EDT      KARISSA. I have evaluated this patient. My supervising physician was available for consultation. Triage CHIEF COMPLAINT       Chief Complaint   Patient presents with    Other     reports pain and swelling in LLE this AM> reports for months feet and legs have been achy. can only walk short distances without them hurting too much. last seen here a year ago for same dx'd with CHF but didn;t follow up         HISTORY OF PRESENT ILLNESS   (Location/Symptom, Timing/Onset, Context/Setting, Quality, Duration, Modifying Factors, Severity)  Note limiting factors. Chief Complaint: Bilateral chronic leg pain for the last several months and some left calf swelling in the last 2 to 3 days. Cornelia Wheeler is a 79 y.o. female who presents to the emergency department symptoms of left calf swelling for the last 2 or 3 days. No erythema. Reported some tenderness in the back part of her calf. Denies any paleness of the extremity. States that she has had issues with chronic leg pain for several months reported that symptoms seem to be worse at night and with ambulation. States that she has no significant swelling involving either leg. Reported that she believes the left calf is mildly swollen which seems to be new for the last 2 or 3 days. Denies any symptoms of knee pain or hip pain. No recent trauma. No other acute complaints at this time. She denies shortness of breath or chest pain. Nursing Notes were all reviewed and agreed with or any disagreements were addressed in the HPI. REVIEW OF SYSTEMS    (2-9 systems for level 4, 10 or more for level 5)     Review of Systems   Constitutional: Negative for chills, diaphoresis and fever.    HENT: Negative for congestion, ear pain, rhinorrhea and sore throat. Eyes: Negative for pain and visual disturbance. Respiratory: Negative for cough and shortness of breath. Cardiovascular: Negative for chest pain and leg swelling. Gastrointestinal: Negative for abdominal pain, blood in stool, diarrhea, nausea and vomiting. Genitourinary: Negative for difficulty urinating, dysuria, flank pain and frequency. Musculoskeletal: Negative for back pain and neck pain. Bilateral leg pain times months with new onset left calf swelling the last 2 to 3 days   Skin: Negative for rash and wound. Neurological: Negative for dizziness and light-headedness.        PAST MEDICAL HISTORY     Past Medical History:   Diagnosis Date    Atrial fibrillation ([address]     Bronchitis chronic     Cancer ([address]     cervical    CHF (congestive heart failure) (HCC)     Diabetes mellitus ([address]     Emphysema of lung ([address]     Hypercholesteremia     Hypertension     Microcytic anemia 3/23/2017    Psoriasis        SURGICAL HISTORY     Past Surgical History:   Procedure Laterality Date    CERVICAL [address] SURGERY  2004    CHOLECYSTECTOMY      COLONOSCOPY N/A 12/18/2021    COLONOSCOPY WITH BIOPSY performed by Dolores Goyal MD at [facility] N/A 12/18/2021    COLONOSCOPY POLYPECTOMY SNARE/COLD BIOPSY performed by Dolores Goyal MD at [facility] CATH LAB PROCEDURE  4/2011    angiogram with 30% blockage    HYSTERECTOMY (CERVIX STATUS UNKNOWN)      KIDNEY STONE SURGERY      TONSILLECTOMY      UPPER GASTROINTESTINAL ENDOSCOPY N/A 10/29/2019    EGD BIOPSY performed by Conchita Corbin DO at [place] [address] N/A 10/11/2020    EGD BIOPSY performed by Conchita Corbin DO at [place] [address]  10/11/2020    EGD CONTROL HEMORRHAGE performed by Conchita [facility]bin DO at [facility] ENDOSCOPY    UPPER GASTROINTESTINAL ENDOSCOPY N/A 12/18/2021    EGD BIOPSY performed by Christinaa Elizondo MD at [place] [address] N/A 6/26/2022    EGD BIOPSY performed by Breanna Turcios DO at [facility] ENDOSCOPY  6/26/2022    EGD CONTROL HEMORRHAGE performed by Breanna Turcios DO at [address] [place] [address]       Current Discharge Medication List      CONTINUE these medications which have NOT CHANGED    Details   pantoprazole (PROTONIX) 40 MG tablet Take 40 mg by mouth daily      ALBUTEROL IN Inhale into the lungs      ferrous sulfate (FE TABS) 325 (65 Fe) MG EC tablet Take 1 tablet by mouth 2 times daily  Qty: 1 tablet, Refills: 0      metoprolol succinate (TOPROL XL) 50 MG extended release tablet Take 1 tablet by mouth daily  Qty: 30 tablet, Refills: 2      lisinopril (PRINIVIL;ZESTRIL) 20 MG tablet Take 1 tablet by mouth daily  Qty: 30 tablet, Refills: 2    Associated Diagnoses: Systolic congestive heart failure, unspecified HF chronicity (HCC)      blood glucose test strips (Zykis CONTOUR NEXT TEST) strip Test three times daily.  DX:E11.9  Qty: 100 each, Refills: 11      glimepiride (AMARYL) 4 MG tablet Take 0.5 tablets by mouth every morning (before breakfast)  Qty: 15 tablet, Refills: 2      metFORMIN (GLUCOPHAGE) 500 MG tablet Take 1 tablet by mouth 2 times daily (with meals)  Qty: 60 tablet, Refills: 2      ipratropium-albuterol (DUONEB) 0.5-2.5 (3) MG/3ML SOLN nebulizer solution Inhale 3 mLs into the lungs every 6 hours as needed for Shortness of Breath  Qty: 360 mL, Refills: 2      !! AGAMATRIX ULTRA-THIN LANCETS MISC Check sugars daily after each meal  Qty: 100 each, Refills: 0      Blood Glucose Monitoring Suppl (AGAMATRIX AMP) ISABELLA Check BG daily after meals  Qty: 1 Device, Refills: 0      !! [employer] Lancets MISC 1 each by Does not apply route 3 times daily  Qty: 100 each, Refills: 0       !! - Potential duplicate medications found. Please discuss with provider. ALLERGIES     Morphine, Chantix [varenicline tartrate], and Percocet [oxycodone-acetaminophen]    FAMILYHISTORY       Family History   Problem Relation Age of Onset    Diabetes Mother     Heart Disease Mother     High Blood Pressure Mother     Hypertension Mother     Heart Failure Mother     Cancer Father     Cancer Sister     Diabetes Brother     Cancer Brother     Cancer Brother     Asthma Daughter     Hypertension Brother     Hypertension Brother     Emphysema Neg Hx         SOCIAL HISTORY       Social History     Socioeconomic History    Marital status:      Spouse name: None    Number of children: None    Years of education: None    Highest education level: None   Occupational History    None   Tobacco Use    Smoking status: Current Every Day Smoker     Packs/day: 0.50     Years: 32.00     Pack years: 16.00     Types: Cigarettes    Smokeless tobacco: Never Used   Vaping Use    Vaping Use: Never used   Substance and Sexual Activity    Alcohol use: No    Drug use: No    Sexual activity: Yes     Partners: Male   Other Topics Concern    None   Social History Narrative    None     Social Determinants of Health     Financial Resource Strain:     Difficulty of Paying Living Expenses: Not on file   Food Insecurity:     Worried About Running Out of Food in the Last Year: Not on file    Luis Miguel of Food in the Last Year: Not on file   Transportation Needs:     Lack of Transportation (Medical): Not on file    Lack of Transportation (Non-Medical):  Not on file   Physical Activity:     Days of Exercise per Week: Not on file    Minutes of Exercise per Session: Not on file   Stress:     Feeling of Stress : Not on file   Social Connections:     Frequency of Communication with Friends and Family: Not on file    Frequency of Social Gatherings with Friends and Family: Not on file    Attends Jehovah's witness Services: Not on file    Active Member of Clubs or Organizations: Not on file    Attends Club or Organization Meetings: Not on file    Marital Status: Not on file   Intimate Partner Violence:     Fear of Current or Ex-Partner: Not on file    Emotionally Abused: Not on file    Physically Abused: Not on file    Sexually Abused: Not on file   Housing Stability:     Unable to Pay for Housing in the Last Year: Not on file    Number of [place] in the Last Year: Not on file    Unstable Housing in the Last Year: Not on file       SCREENINGS    [place] Coma Scale  Eye Opening: Spontaneous  Best Verbal Response: Oriented  Best Motor Response: Obeys commands  [place] Coma Scale Score: 15        PHYSICAL EXAM    (up to 7 for level 4, 8 or more for level 5)     ED Triage Vitals [06/25/22 1610]   BP Temp Temp src Heart Rate Resp SpO2 Height Weight   (!) 191/80 97 °F (36.1 °C) -- (!) 102 18 99 % 5' 3\" (1.6 m) 140 lb (63.5 kg)       Physical Exam  Vitals and nursing note reviewed. Exam conducted with a chaperone present. Constitutional:       Appearance: Normal appearance. She is not toxic-appearing or diaphoretic. HENT:      Head: Normocephalic and atraumatic. Nose: Nose normal.   Eyes:      General:         Right eye: No discharge. Left eye: No discharge. Cardiovascular:      Rate and Rhythm: Normal rate and regular rhythm. Pulses: Normal pulses. Heart sounds: No murmur heard. Pulmonary:      Effort: Pulmonary effort is normal. No respiratory distress. Breath sounds: No wheezing or rhonchi. Abdominal:      Palpations: Abdomen is soft. Tenderness: There is no abdominal tenderness. There is no guarding or rebound. Genitourinary:     Rectum: Guaiac result positive. Comments: At bedside during rectal examination. No rectal tenderness. No hemorrhoid. Dark brown stool. Heme positive. Musculoskeletal:         General: No swelling or tenderness. Normal range of motion. Cervical back: Normal range of motion and neck supple. Right lower leg: Normal. No swelling, tenderness or bony tenderness. No edema. Left lower leg: Normal. No swelling, tenderness or bony tenderness. No edema. Comments: 2+ dorsalis pedis and posterior tibial pulses noted in the bilateral lower extremities. Skin:     General: Skin is warm and dry. Capillary Refill: Capillary refill takes less than 2 seconds. Findings: No bruising or erythema. Neurological:      General: No focal deficit present. Mental Status: She is alert and oriented to person, place, and time. Motor: No weakness.    Psychiatric:         Mood and Affect: Mood normal.         Behavior: Behavior normal.         DIAGNOSTIC RESULTS   LABS:    Labs Reviewed   CBC WITH AUTO DIFFERENTIAL - Abnormal; Notable for the following components:       Result Value    Hemoglobin 7.6 (*)     Hematocrit 25.4 (*)     MCV 61.5 (*)     MCH 18.4 (*)     MCHC 29.9 (*)     RDW 20.4 (*)     Anisocytosis 1+ (*)     Microcytes 1+ (*)     Polychromasia Occasional (*)     Poikilocytes Occasional (*)     Ovalocytes Occasional (*)     All other components within normal limits   COMPREHENSIVE METABOLIC PANEL W/ REFLEX TO MG FOR LOW K - Abnormal; Notable for the following components:    Glucose 139 (*)     GFR Non- 55 (*)     All other components within normal limits   BLOOD OCCULT STOOL DIAGNOSTIC - Abnormal; Notable for the following components:    Occult Blood Diagnostic   (*)     Value: Result: POSITIVE  Normal range: Negative      All other components within normal limits    Narrative:     ORDER#: [id number]                          ORDERED BY: Maribel DANIELS  SOURCE: Stool                              COLLECTED:  06/25/22 18:35  ANTIBIOTICS AT [place].:                      RECEIVED :  06/25/22 18:38   IRON AND TIBC - Abnormal; Notable for the following components:    Iron 13 (*)     TIBC 472 (*)     Iron Saturation 3 (*) All other components within normal limits   FERRITIN - Abnormal; Notable for the following components:    Ferritin 7.7 (*)     All other components within normal limits   HEMOGLOBIN AND HEMATOCRIT - Abnormal; Notable for the following components:    Hemoglobin 7.0 (*)     Hematocrit 24.2 (*)     All other components within normal limits   IRON AND TIBC - Abnormal; Notable for the following components:    Iron 14 (*)     Iron Saturation 3 (*)     All other components within normal limits    Narrative:     Collection has been rescheduled by Rafia Riddle at 06/26/2022 04:01 Reason: Get   all labs at 0600 per RN Radha Reaves   COMPREHENSIVE METABOLIC PANEL W/ REFLEX TO MG FOR LOW K - Abnormal; Notable for the following components:    Glucose 178 (*)     All other components within normal limits    Narrative:     Collection has been rescheduled by Rafia Riddle at 06/26/2022 04:01 Reason: Get   all labs at 0600 per DANIELLE Reaves   CBC WITH AUTO DIFFERENTIAL - Abnormal; Notable for the following components:    RBC 3.98 (*)     Hemoglobin 7.1 (*)     Hematocrit 24.6 (*)     MCV 61.9 (*)     MCH 17.9 (*)     MCHC 29.0 (*)     RDW 20.6 (*)     All other components within normal limits    Narrative:     Collection has been rescheduled by Rafia Riddle at 06/26/2022 04:01 Reason: Get   all labs at 0600 per RN [address] [place] [address] - Abnormal; Notable for the following components:    Hemoglobin 7.0 (*)     Hematocrit 24.3 (*)     All other components within normal limits   POCT GLUCOSE - Abnormal; Notable for the following components:    POC Glucose 313 (*)     All other components within normal limits   POCT GLUCOSE - Abnormal; Notable for the following components:    POC Glucose 209 (*)     All other components within normal limits   POCT GLUCOSE - Abnormal; Notable for the following components:    POC Glucose 158 (*)     All other components within normal limits   COVID-19 & INFLUENZA COMBO   D-DIMER, QUANTITATIVE   PROTIME-INR   TROPONIN   TSH WITH REFLEX   HAPTOGLOBIN   VITAMIN B12 & FOLATE   PROTIME-INR    Narrative:     Collection has been rescheduled by Grady Ross at 06/26/2022 04:01 Reason: Get   all labs at 0600 per DANIELLE Barillas   APTT    Narrative:     Collection has been rescheduled by Grady Ross at 06/26/2022 04:01 Reason: Get   all labs at 0600 per DANIELLE Barillas   HEMOGLOBIN A1C   [address] AND HEMATOCRIT   POCT GLUCOSE   POCT GLUCOSE   POCT GLUCOSE   POCT GLUCOSE   POCT GLUCOSE   POCT GLUCOSE       When ordered, only abnormal lab results are displayed. All other labs were within normal range or not returned as of this dictation. EKG: When ordered, EKG's are interpreted by the Emergency Department Physician in the absence of a cardiologist.  Please see their note for interpretation of EKG. RADIOLOGY:   Non-plain film images such as CT, Ultrasound and MRI are read by the radiologist. Balwinder Crooks radiographic images are visualized andpreliminarily interpreted by the  ED Provider with the below findings:        Interpretation perthe Radiologist below, if available at the time of this note:    XR CHEST PORTABLE   Final Result   Clear lungs. Cardiomegaly. No acute cardiopulmonary abnormality. No results found.       PROCEDURES   Unless otherwise noted below, none     Procedures    CRITICAL CARE TIME   N/A    CONSULTS:  IP CONSULT TO GI  IP CONSULT TO HOSPITALIST  IP CONSULT TO SPIRITUAL SERVICES  IP CONSULT TO GI      EMERGENCY DEPARTMENT COURSE and DIFFERENTIAL DIAGNOSIS/MDM:   Vitals:    Vitals:    06/26/22 1322 06/26/22 1326 06/26/22 1400 06/26/22 1457   BP: (!) 126/48 (!) 131/46 127/62    Pulse: 66 69 72 64   Resp: 18 22 16 16   Temp:   98 °F (36.7 °C)    TempSrc:   Oral    SpO2: 99% 95% 95% 99%   Weight:       Height:           Patient was given thefollowing medications:  Medications   lisinopril (PRINIVIL;ZESTRIL) tablet 20 mg (20 mg Oral Given 6/26/22 1354)   metoprolol succinate (TOPROL XL) extended release tablet 50 mg (50 mg Oral Given 6/26/22 1353)   pantoprazole (PROTONIX) injection 40 mg (40 mg IntraVENous Given 6/26/22 0852)   glucose chewable tablet 16 g (has no administration in time range)   dextrose bolus 10% 125 mL (has no administration in time range)     Or   dextrose bolus 10% 250 mL (has no administration in time range)   glucagon (rDNA) injection 1 mg (has no administration in time range)   dextrose 5 % solution (has no administration in time range)   sodium chloride flush 0.9 % injection 5-40 mL (10 mLs IntraVENous Given 6/26/22 0852)   sodium chloride flush 0.9 % injection 5-40 mL (has no administration in time range)   0.9 % sodium chloride infusion (has no administration in time range)   ondansetron (ZOFRAN-ODT) disintegrating tablet 4 mg (has no administration in time range)     Or   ondansetron (ZOFRAN) injection 4 mg (has no administration in time range)   acetaminophen (TYLENOL) tablet 650 mg (has no administration in time range)     Or   acetaminophen (TYLENOL) suppository 650 mg (has no administration in time range)   0.9 % sodium chloride infusion ( IntraVENous New Bag 6/26/22 0011)   insulin lispro (HUMALOG) injection vial 0-6 Units (0 Units SubCUTAneous Not Given 6/26/22 1307)   albuterol sulfate HFA (PROVENTIL;VENTOLIN;PROAIR) 108 (90 Base) MCG/ACT inhaler 2 puff (has no administration in time range)   albuterol sulfate HFA (PROVENTIL;VENTOLIN;PROAIR) 108 (90 Base) MCG/ACT inhaler 2 puff (2 puffs Inhalation Given 6/26/22 1457)   ipratropium-albuterol (DUONEB) nebulizer solution 3 mL (has no administration in time range)   cloNIDine (CATAPRES) tablet 0.1 mg (has no administration in time range)   iron sucrose (VENOFER) 200 mg in sodium chloride 0.9 % 100 mL IVPB (0 mg IntraVENous Stopped 6/26/22 1505)   bisacodyl (DULCOLAX) EC tablet 20 mg (has no administration in time range)   polyethylene glycol (GLYCOLAX) packet 119 g (has no administration in time range)   polyethylene glycol (GLYCOLAX) packet 119 g (has no administration in time range)   pantoprazole (PROTONIX) injection 40 mg (40 mg IntraVENous Given 6/25/22 1959)   gabapentin (NEURONTIN) capsule 100 mg (100 mg Oral Given 6/25/22 2103)   lisinopril (PRINIVIL;ZESTRIL) tablet 10 mg (10 mg Oral Given 6/26/22 0004)   metoprolol tartrate (LOPRESSOR) tablet 25 mg (25 mg Oral Given 6/26/22 0003)         Is this patient to be included in the SEP-1 Core Measure due to severe sepsis or septic shock? No   Exclusion criteria - the patient is NOT to be included for SEP-1 Core Measure due to: Infection is not suspected    Was noted to have heme positive stool. She was noted to have a hemoglobin of 7.6. She is noted to have chronic anemia but a level of 7.6 is lower than her usual baseline. Reported that she is also had some general weakness. Her leg symptoms really seem to be chronic and have been ongoing now for the last several months. Have lower suspicion for further acute etiology involving the legs as far as an emergent standpoint. I do believe she needs to follow-up but as far as an admission criteria more concerned about the low hemoglobin level and the heme positive stool. I did talk with GI and the plan is she will be admitted to the hospital service and she will be evaluated by other services tomorrow including possible but not limited to colonoscopy. I have spoken with the hospitalist and they are currently evaluating the patient for admission. FINAL IMPRESSION      1. Anemia, unspecified type    2. Guaiac positive stools    3. Gastrointestinal hemorrhage, unspecified gastrointestinal hemorrhage type          DISPOSITION/PLAN   DISPOSITION Admitted 06/25/2022 09:58:53 PM      PATIENT REFERREDTO:  No follow-up provider specified.     DISCHARGE MEDICATIONS:  Current Discharge Medication List          DISCONTINUED MEDICATIONS:  Current Discharge Medication List                 (Please note that portions ofthis note were completed with a voice recognition program.  Efforts were made to edit the dictations but occasionally words are mis-transcribed.)    GOLDEN Vargas CNP (electronically signed)            GOLDEN Vargas CNP  06/26/22 6007

## 2022-06-25 NOTE — ED NOTES
Report given to [address] [place] [address] Street     Vinod Steve, RN  06/25/22 Marilynn Menendez

## 2022-06-26 LAB
A/G RATIO: 1.5 (ref 1.1–2.2)
ABO/RH: NORMAL
ALBUMIN SERPL-MCNC: 3.9 G/DL (ref 3.4–5)
ALP BLD-CCNC: 71 U/L (ref 40–129)
ALT SERPL-CCNC: 10 U/L (ref 10–40)
ANION GAP SERPL CALCULATED.3IONS-SCNC: 10 MMOL/L (ref 3–16)
ANTIBODY SCREEN: NORMAL
APTT: 30.4 SEC (ref 23–34.3)
AST SERPL-CCNC: 15 U/L (ref 15–37)
BASOPHILS ABSOLUTE: 0.1 K/UL (ref 0–0.2)
BASOPHILS RELATIVE PERCENT: 1.7 %
BILIRUB SERPL-MCNC: <0.2 MG/DL (ref 0–1)
BUN BLDV-MCNC: 17 MG/DL (ref 7–20)
CALCIUM SERPL-MCNC: 8.7 MG/DL (ref 8.3–10.6)
CHLORIDE BLD-SCNC: 101 MMOL/L (ref 99–110)
CO2: 26 MMOL/L (ref 21–32)
CREAT SERPL-MCNC: 0.7 MG/DL (ref 0.6–1.2)
EKG ATRIAL RATE: 82 BPM
EKG DIAGNOSIS: NORMAL
EKG P AXIS: 68 DEGREES
EKG P-R INTERVAL: 162 MS
EKG Q-T INTERVAL: 424 MS
EKG QRS DURATION: 140 MS
EKG QTC CALCULATION (BAZETT): 495 MS
EKG R AXIS: -56 DEGREES
EKG T AXIS: 134 DEGREES
EKG VENTRICULAR RATE: 82 BPM
EOSINOPHILS ABSOLUTE: 0.2 K/UL (ref 0–0.6)
EOSINOPHILS RELATIVE PERCENT: 3.6 %
FERRITIN: 7.7 NG/ML (ref 15–150)
FOLATE: 13.03 NG/ML (ref 4.78–24.2)
GFR AFRICAN AMERICAN: >60
GFR NON-AFRICAN AMERICAN: >60
GLUCOSE BLD-MCNC: 118 MG/DL (ref 70–99)
GLUCOSE BLD-MCNC: 124 MG/DL (ref 70–99)
GLUCOSE BLD-MCNC: 158 MG/DL (ref 70–99)
GLUCOSE BLD-MCNC: 178 MG/DL (ref 70–99)
GLUCOSE BLD-MCNC: 209 MG/DL (ref 70–99)
HAPTOGLOBIN: 159 MG/DL (ref 30–200)
HCT VFR BLD CALC: 22.6 % (ref 36–48)
HCT VFR BLD CALC: 24.2 % (ref 36–48)
HCT VFR BLD CALC: 24.3 % (ref 36–48)
HCT VFR BLD CALC: 24.6 % (ref 36–48)
HEMOGLOBIN: 6.5 G/DL (ref 12–16)
HEMOGLOBIN: 7 G/DL (ref 12–16)
HEMOGLOBIN: 7 G/DL (ref 12–16)
HEMOGLOBIN: 7.1 G/DL (ref 12–16)
INR BLD: 1.14 (ref 0.87–1.14)
IRON SATURATION: 3 % (ref 15–50)
IRON SATURATION: 3 % (ref 15–50)
IRON: 13 UG/DL (ref 37–145)
IRON: 14 UG/DL (ref 37–145)
LYMPHOCYTES ABSOLUTE: 1.2 K/UL (ref 1–5.1)
LYMPHOCYTES RELATIVE PERCENT: 20.1 %
MCH RBC QN AUTO: 17.9 PG (ref 26–34)
MCHC RBC AUTO-ENTMCNC: 29 G/DL (ref 31–36)
MCV RBC AUTO: 61.9 FL (ref 80–100)
MONOCYTES ABSOLUTE: 0.7 K/UL (ref 0–1.3)
MONOCYTES RELATIVE PERCENT: 12.8 %
NEUTROPHILS ABSOLUTE: 3.6 K/UL (ref 1.7–7.7)
NEUTROPHILS RELATIVE PERCENT: 61.8 %
PDW BLD-RTO: 20.6 % (ref 12.4–15.4)
PERFORMED ON: ABNORMAL
PLATELET # BLD: 282 K/UL (ref 135–450)
PMV BLD AUTO: 9.4 FL (ref 5–10.5)
POTASSIUM REFLEX MAGNESIUM: 3.8 MMOL/L (ref 3.5–5.1)
PROTHROMBIN TIME: 14.4 SEC (ref 11.7–14.5)
RBC # BLD: 3.98 M/UL (ref 4–5.2)
SODIUM BLD-SCNC: 137 MMOL/L (ref 136–145)
TOTAL IRON BINDING CAPACITY: 401 UG/DL (ref 260–445)
TOTAL IRON BINDING CAPACITY: 472 UG/DL (ref 260–445)
TOTAL PROTEIN: 6.5 G/DL (ref 6.4–8.2)
VITAMIN B-12: 354 PG/ML (ref 211–911)
WBC # BLD: 5.8 K/UL (ref 4–11)

## 2022-06-26 PROCEDURE — 85610 PROTHROMBIN TIME: CPT

## 2022-06-26 PROCEDURE — 85014 HEMATOCRIT: CPT

## 2022-06-26 PROCEDURE — 99153 MOD SED SAME PHYS/QHP EA: CPT | Performed by: INTERNAL MEDICINE

## 2022-06-26 PROCEDURE — 0W3P8ZZ CONTROL BLEEDING IN GASTROINTESTINAL TRACT, VIA NATURAL OR ARTIFICIAL OPENING ENDOSCOPIC: ICD-10-PCS | Performed by: INTERNAL MEDICINE

## 2022-06-26 PROCEDURE — 99233 SBSQ HOSP IP/OBS HIGH 50: CPT | Performed by: INTERNAL MEDICINE

## 2022-06-26 PROCEDURE — 3609013000 HC EGD TRANSORAL CONTROL BLEEDING ANY METHOD: Performed by: INTERNAL MEDICINE

## 2022-06-26 PROCEDURE — 86850 RBC ANTIBODY SCREEN: CPT

## 2022-06-26 PROCEDURE — 86923 COMPATIBILITY TEST ELECTRIC: CPT

## 2022-06-26 PROCEDURE — 7100000011 HC PHASE II RECOVERY - ADDTL 15 MIN: Performed by: INTERNAL MEDICINE

## 2022-06-26 PROCEDURE — 93010 ELECTROCARDIOGRAM REPORT: CPT | Performed by: INTERNAL MEDICINE

## 2022-06-26 PROCEDURE — 83540 ASSAY OF IRON: CPT

## 2022-06-26 PROCEDURE — 0DB98ZX EXCISION OF DUODENUM, VIA NATURAL OR ARTIFICIAL OPENING ENDOSCOPIC, DIAGNOSTIC: ICD-10-PCS | Performed by: INTERNAL MEDICINE

## 2022-06-26 PROCEDURE — 1200000000 HC SEMI PRIVATE

## 2022-06-26 PROCEDURE — 80053 COMPREHEN METABOLIC PANEL: CPT

## 2022-06-26 PROCEDURE — 2500000003 HC RX 250 WO HCPCS: Performed by: INTERNAL MEDICINE

## 2022-06-26 PROCEDURE — P9016 RBC LEUKOCYTES REDUCED: HCPCS

## 2022-06-26 PROCEDURE — 0DB68ZX EXCISION OF STOMACH, VIA NATURAL OR ARTIFICIAL OPENING ENDOSCOPIC, DIAGNOSTIC: ICD-10-PCS | Performed by: INTERNAL MEDICINE

## 2022-06-26 PROCEDURE — 85018 HEMOGLOBIN: CPT

## 2022-06-26 PROCEDURE — 83550 IRON BINDING TEST: CPT

## 2022-06-26 PROCEDURE — 86900 BLOOD TYPING SEROLOGIC ABO: CPT

## 2022-06-26 PROCEDURE — 36415 COLL VENOUS BLD VENIPUNCTURE: CPT

## 2022-06-26 PROCEDURE — 6370000000 HC RX 637 (ALT 250 FOR IP): Performed by: INTERNAL MEDICINE

## 2022-06-26 PROCEDURE — 86901 BLOOD TYPING SEROLOGIC RH(D): CPT

## 2022-06-26 PROCEDURE — C9113 INJ PANTOPRAZOLE SODIUM, VIA: HCPCS | Performed by: INTERNAL MEDICINE

## 2022-06-26 PROCEDURE — 6360000002 HC RX W HCPCS: Performed by: INTERNAL MEDICINE

## 2022-06-26 PROCEDURE — 3609012400 HC EGD TRANSORAL BIOPSY SINGLE/MULTIPLE: Performed by: INTERNAL MEDICINE

## 2022-06-26 PROCEDURE — 2720000010 HC SURG SUPPLY STERILE: Performed by: INTERNAL MEDICINE

## 2022-06-26 PROCEDURE — 85730 THROMBOPLASTIN TIME PARTIAL: CPT

## 2022-06-26 PROCEDURE — 2580000003 HC RX 258: Performed by: INTERNAL MEDICINE

## 2022-06-26 PROCEDURE — 94640 AIRWAY INHALATION TREATMENT: CPT

## 2022-06-26 PROCEDURE — 99152 MOD SED SAME PHYS/QHP 5/>YRS: CPT | Performed by: INTERNAL MEDICINE

## 2022-06-26 PROCEDURE — 88305 TISSUE EXAM BY PATHOLOGIST: CPT

## 2022-06-26 PROCEDURE — 88342 IMHCHEM/IMCYTCHM 1ST ANTB: CPT

## 2022-06-26 PROCEDURE — 85025 COMPLETE CBC W/AUTO DIFF WBC: CPT

## 2022-06-26 PROCEDURE — 7100000010 HC PHASE II RECOVERY - FIRST 15 MIN: Performed by: INTERNAL MEDICINE

## 2022-06-26 PROCEDURE — 2709999900 HC NON-CHARGEABLE SUPPLY: Performed by: INTERNAL MEDICINE

## 2022-06-26 RX ORDER — POLYETHYLENE GLYCOL 3350 17 G/17G
119 POWDER, FOR SOLUTION ORAL ONCE
Status: DISCONTINUED | OUTPATIENT
Start: 2022-06-26 | End: 2022-06-27 | Stop reason: HOSPADM

## 2022-06-26 RX ORDER — POLYETHYLENE GLYCOL 3350 17 G/17G
119 POWDER, FOR SOLUTION ORAL ONCE
Status: DISCONTINUED | OUTPATIENT
Start: 2022-06-27 | End: 2022-06-27 | Stop reason: HOSPADM

## 2022-06-26 RX ORDER — MIDAZOLAM HYDROCHLORIDE 5 MG/ML
INJECTION INTRAMUSCULAR; INTRAVENOUS PRN
Status: DISCONTINUED | OUTPATIENT
Start: 2022-06-26 | End: 2022-06-26 | Stop reason: ALTCHOICE

## 2022-06-26 RX ORDER — ALBUTEROL SULFATE 90 UG/1
2 AEROSOL, METERED RESPIRATORY (INHALATION) 2 TIMES DAILY
Status: DISCONTINUED | OUTPATIENT
Start: 2022-06-27 | End: 2022-06-27 | Stop reason: HOSPADM

## 2022-06-26 RX ORDER — CLONIDINE HYDROCHLORIDE 0.1 MG/1
0.1 TABLET ORAL EVERY 4 HOURS PRN
Status: DISCONTINUED | OUTPATIENT
Start: 2022-06-26 | End: 2022-06-27 | Stop reason: HOSPADM

## 2022-06-26 RX ORDER — GABAPENTIN 100 MG/1
100 CAPSULE ORAL NIGHTLY
Status: DISCONTINUED | OUTPATIENT
Start: 2022-06-26 | End: 2022-06-27 | Stop reason: HOSPADM

## 2022-06-26 RX ORDER — FENTANYL CITRATE 50 UG/ML
INJECTION, SOLUTION INTRAMUSCULAR; INTRAVENOUS PRN
Status: DISCONTINUED | OUTPATIENT
Start: 2022-06-26 | End: 2022-06-26 | Stop reason: ALTCHOICE

## 2022-06-26 RX ORDER — SODIUM CHLORIDE 9 MG/ML
INJECTION, SOLUTION INTRAVENOUS PRN
Status: DISCONTINUED | OUTPATIENT
Start: 2022-06-26 | End: 2022-06-27 | Stop reason: HOSPADM

## 2022-06-26 RX ADMIN — INSULIN LISPRO 4 UNITS: 100 INJECTION, SOLUTION INTRAVENOUS; SUBCUTANEOUS at 00:05

## 2022-06-26 RX ADMIN — Medication 2 PUFF: at 14:57

## 2022-06-26 RX ADMIN — SODIUM CHLORIDE: 9 INJECTION, SOLUTION INTRAVENOUS at 00:11

## 2022-06-26 RX ADMIN — LISINOPRIL 20 MG: 20 TABLET ORAL at 13:54

## 2022-06-26 RX ADMIN — Medication 2 PUFF: at 07:22

## 2022-06-26 RX ADMIN — Medication 10 ML: at 08:52

## 2022-06-26 RX ADMIN — Medication 10 ML: at 00:04

## 2022-06-26 RX ADMIN — ACETAMINOPHEN 650 MG: 325 TABLET ORAL at 20:20

## 2022-06-26 RX ADMIN — LISINOPRIL 10 MG: 10 TABLET ORAL at 00:04

## 2022-06-26 RX ADMIN — Medication 2 PUFF: at 19:39

## 2022-06-26 RX ADMIN — GABAPENTIN 100 MG: 100 CAPSULE ORAL at 20:59

## 2022-06-26 RX ADMIN — PANTOPRAZOLE SODIUM 40 MG: 40 INJECTION, POWDER, FOR SOLUTION INTRAVENOUS at 08:52

## 2022-06-26 RX ADMIN — METOPROLOL TARTRATE 25 MG: 25 TABLET, FILM COATED ORAL at 00:03

## 2022-06-26 RX ADMIN — Medication 10 ML: at 20:14

## 2022-06-26 RX ADMIN — BISACODYL 20 MG: 5 TABLET, COATED ORAL at 19:40

## 2022-06-26 RX ADMIN — METOPROLOL SUCCINATE 50 MG: 50 TABLET, EXTENDED RELEASE ORAL at 13:53

## 2022-06-26 RX ADMIN — IRON SUCROSE 200 MG: 20 INJECTION, SOLUTION INTRAVENOUS at 13:55

## 2022-06-26 RX ADMIN — PANTOPRAZOLE SODIUM 40 MG: 40 INJECTION, POWDER, FOR SOLUTION INTRAVENOUS at 20:14

## 2022-06-26 ASSESSMENT — PAIN DESCRIPTION - ORIENTATION: ORIENTATION: RIGHT;LEFT

## 2022-06-26 ASSESSMENT — PAIN DESCRIPTION - LOCATION: LOCATION: LEG

## 2022-06-26 ASSESSMENT — PAIN SCALES - GENERAL: PAINLEVEL_OUTOF10: 4

## 2022-06-26 NOTE — PLAN OF CARE
Problem: Discharge Planning  Goal: Discharge to home or other facility with appropriate resources  Outcome: Progressing  Flowsheets (Taken 6/25/2022 8288)  Discharge to home or other facility with appropriate resources: Identify discharge learning needs (meds, wound care, etc)     Problem: Pain  Goal: Verbalizes/displays adequate comfort level or baseline comfort level  Outcome: Progressing     Problem: Safety - Adult  Goal: Free from fall injury  Outcome: Progressing

## 2022-06-26 NOTE — PROGRESS NOTES
Spoke to Annie Curtis in lab who states they can run TSH, Haptoglobin, Iron & TIBC, Ferritin and Vit B12 & Folate off of blood already collected.

## 2022-06-26 NOTE — H&P
Gastroenterology Preop Assessment    Patient:   My Sawyer   :    1955   Facility:   [facility]  Referring/PCP: Court Delvalle MD  Date:     2022    Subjective:   Procedure: egd    HPI/Reason for procedure:  Iron deficiency anemia    Past Medical History:   Diagnosis Date    Atrial fibrillation ([address]     Bronchitis chronic     Cancer ([address]     cervical    CHF (congestive heart failure) ([address]     Diabetes mellitus ([address]     Emphysema of lung ([address]     Hypercholesteremia     Hypertension     Microcytic anemia 3/23/2017    Psoriasis      Past Surgical History:   Procedure Laterality Date    CERVICAL DISC SURGERY      CHOLECYSTECTOMY      COLONOSCOPY N/A 2021    COLONOSCOPY WITH BIOPSY performed by Clemente Burnett MD at [place] [address] N/A 2021    COLONOSCOPY POLYPECTOMY SNARE/COLD BIOPSY performed by Clemente Burnett MD at [facility] CATH LAB PROCEDURE  2011    angiogram with 30% blockage    HYSTERECTOMY (CERVIX STATUS UNKNOWN)      KIDNEY STONE SURGERY      TONSILLECTOMY      UPPER GASTROINTESTINAL ENDOSCOPY N/A 10/29/2019    EGD BIOPSY performed by Rizwan Pantoja DO at [address] 10/11/2020    EGD BIOPSY performed by Rizwan Pantoja DO at [address]  10/11/2020    EGD CONTROL HEMORRHAGE performed by Rizwan Pantoja DO at [facility] ENDOSCOPY N/A 2021    EGD BIOPSY performed by Clemente Burnett MD at [address]:   Social History     Tobacco Use    Smoking status: Current Every Day Smoker     Packs/day: 0.50     Years: 32.00     Pack years: 16.00     Types: Cigarettes    Smokeless tobacco: Never Used   Substance Use Topics    Alcohol use: No     Family:   Family History   Problem Relation Age of Onset    Diabetes Mother     Heart Disease Mother     High Blood Pressure Mother     Hypertension Mother     Heart Failure Mother     Cancer Father     Cancer Sister     Diabetes Brother     Cancer Brother     Cancer Brother     Asthma Daughter     Hypertension Brother     Hypertension Brother     Emphysema Neg Hx        Scheduled Medications:    iron sucrose (VENOFER) iv piggyback 100 mL (Admin over 60 minutes)  200 mg IntraVENous Q24H    lisinopril  20 mg Oral Daily    metoprolol succinate  50 mg Oral Daily    pantoprazole  40 mg IntraVENous BID    sodium chloride flush  5-40 mL IntraVENous 2 times per day    insulin lispro  0-6 Units SubCUTAneous Q6H    albuterol sulfate HFA  2 puff Inhalation TID       Current Medications:    Prior to Admission medications    Medication Sig Start Date End Date Taking? Authorizing Provider   pantoprazole (PROTONIX) 40 MG tablet Take 40 mg by mouth daily   Yes Historical Provider, MD   pantoprazole (PROTONIX) 40 MG tablet Take 1 tablet by mouth daily 12/18/21 1/20/22  Francisco Iniguez MD   ALBUTEROL IN Inhale into the lungs    Historical Provider, MD   ferrous sulfate (FE TABS) 325 (65 Fe) MG EC tablet Take 1 tablet by mouth 2 times daily  Patient not taking: Reported on 1/20/2022 10/13/20   Earl Bustillo MD   metoprolol succinate (TOPROL XL) 50 MG extended release tablet Take 1 tablet by mouth daily  Patient not taking: Reported on 1/20/2022 9/18/20   Earl Bustillo MD   lisinopril (PRINIVIL;ZESTRIL) 20 MG tablet Take 1 tablet by mouth daily  Patient not taking: Reported on 1/20/2022 9/18/20   Earl Bustillo MD   blood glucose test strips (DARVIN CONTOUR NEXT TEST) strip Test three times daily.  DX:E11.9 9/18/20   Earl Bustillo MD   glimepiride (AMARYL) 4 MG tablet Take 0.5 tablets by mouth every morning (before breakfast)  Patient not taking: Reported on 6/25/2022 9/18/20   Earl Bustillo MD metFORMIN (GLUCOPHAGE) 500 MG tablet Take 1 tablet by mouth 2 times daily (with meals)  Patient not taking: Reported on 1/20/2022 9/18/20   Korin Pyle MD   ipratropium-albuterol (DUONEB) 0.5-2.5 (3) MG/3ML SOLN nebulizer solution Inhale 3 mLs into the lungs every 6 hours as needed for Shortness of Breath 12/4/19 1/20/22  Korin Pyle MD   AGAMATRIX ULTRA-THIN LANCETS MISC Check sugars daily after each meal  Patient not taking: Reported on 6/25/2022 10/30/19   Norrine Habermann, PA   Blood Glucose Monitoring Suppl (AGAMATRIX AMP) ISABELLA Check BG daily after meals  Patient not taking: Reported on 6/25/2022 10/30/19   Norrine Habermann, PA   [employer] Lancets MISC 1 each by Does not apply route 3 times daily  Patient not taking: Reported on 6/25/2022 5/2/18   Dewayne Gandhi PA-C         Current Facility-Administered Medications:     cloNIDine (CATAPRES) tablet 0.1 mg, 0.1 mg, Oral, Q4H PRN, Adam Salcedo MD    iron sucrose (VENOFER) 200 mg in sodium chloride 0.9 % 100 mL IVPB, 200 mg, IntraVENous, Q24H, Alannah Logan MD    lisinopril (PRINIVIL;ZESTRIL) tablet 20 mg, 20 mg, Oral, Daily, Adam Salcedo MD    metoprolol succinate (TOPROL XL) extended release tablet 50 mg, 50 mg, Oral, Daily, Adam Salcedo MD    pantoprazole (PROTONIX) injection 40 mg, 40 mg, IntraVENous, BID, Adam Salcedo MD, 40 mg at 06/26/22 3310    glucose chewable tablet 16 g, 4 tablet, Oral, PRN, Adam Salcedo MD    dextrose bolus 10% 125 mL, 125 mL, IntraVENous, PRN **OR** dextrose bolus 10% 250 mL, 250 mL, IntraVENous, PRN, Adam Salcedo MD    glucagon (rDNA) injection 1 mg, 1 mg, IntraMUSCular, PRN, Adam Salcedo MD    dextrose 5 % solution, 100 mL/hr, IntraVENous, PRN, Adam Salcedo MD    sodium chloride flush 0.9 % injection 5-40 mL, 5-40 mL, IntraVENous, 2 times per day, Adam Salcedo MD, 10 mL at 06/26/22 0852    sodium chloride flush 0.9 % injection 5-40 mL, 5-40 mL, IntraVENous, PRN, Mega Owusu MD    0.9 % sodium chloride infusion, , IntraVENous, PRN, Mega Owusu MD    ondansetron (ZOFRAN-ODT) disintegrating tablet 4 mg, 4 mg, Oral, Q8H PRN **OR** ondansetron (ZOFRAN) injection 4 mg, 4 mg, IntraVENous, Q6H PRN, Mega Owusu MD    acetaminophen (TYLENOL) tablet 650 mg, 650 mg, Oral, Q6H PRN **OR** acetaminophen (TYLENOL) suppository 650 mg, 650 mg, Rectal, Q6H PRN, Mega Owusu MD    0.9 % sodium chloride infusion, , IntraVENous, Continuous, Mega wOusu MD, Last Rate: 100 mL/hr at 06/26/22 0011, New Bag at 06/26/22 0011    insulin lispro (HUMALOG) injection vial 0-6 Units, 0-6 Units, SubCUTAneous, Q6H, Mega Owusu MD, 4 Units at 06/26/22 0005    albuterol sulfate HFA (PROVENTIL;VENTOLIN;PROAIR) 108 (90 Base) MCG/ACT inhaler 2 puff, 2 puff, Inhalation, Q4H PRN, Tavares Euceda MD    albuterol sulfate HFA (PROVENTIL;VENTOLIN;PROAIR) 108 (90 Base) MCG/ACT inhaler 2 puff, 2 puff, Inhalation, TID, Tavares Euceda MD, 2 puff at 06/26/22 0722    ipratropium-albuterol (DUONEB) nebulizer solution 3 mL, 3 mL, Inhalation, Q6H PRN, Tavares Euceda MD      Infusions:    dextrose      sodium chloride      sodium chloride 100 mL/hr at 06/26/22 0011     PRN Medications: cloNIDine, glucose, dextrose bolus **OR** dextrose bolus, glucagon (rDNA), dextrose, sodium chloride flush, sodium chloride, ondansetron **OR** ondansetron, acetaminophen **OR** acetaminophen, albuterol sulfate HFA, ipratropium-albuterol  Allergies:    Allergies   Allergen Reactions    Morphine Other (See Comments)     Bad headache    Chantix [Varenicline Tartrate]      BAD DREAMS    Percocet [Oxycodone-Acetaminophen] Nausea And Vomiting         Objective:     Physical Exam:   BP (!) 155/55   Pulse 68   Temp 97.7 °F (36.5 °C) (Oral)   Resp 18   Ht 5' 3\" (1.6 m)   Wt 138 lb 9.6 oz (62.9 kg)   SpO2 98%   BMI 24.55 kg/m²     HEENT: NCAT  Lungs: CTAB  CV: RRR  Abd: soft, ntd  Ext: dpi    Lab and Imaging Review   Labs:  CBC:   Recent Labs     06/25/22 1650 06/26/22  0604 06/26/22  1008   WBC 7.1 5.8  --    HGB 7.6* 7.1* 7.0*   HCT 25.4* 24.6* 24.2*   MCV 61.5* 61.9*  --     282  --      BMP:   Recent Labs     06/25/22 1650 06/26/22  0604    137   K 4.1 3.8    101   CO2 27 26   BUN 17 17   CREATININE 1.0 0.7     LIVER PROFILE:   Recent Labs     06/25/22 1650 06/26/22  0604   AST 15 15   ALT 11 10   PROT 7.3 6.5   BILITOT <0.2 <0.2   ALKPHOS 82 71     PT/INR:   Recent Labs     06/25/22 1650 06/26/22  0604   INR 1.13 1.14       Pre-Procedure Assessment / Plan:  ASA: Class 3 - A patient with severe systemic disease that limits activity but is not incapacitating  Airway: Mallampati: II (soft palate, uvula, fauces visible)  Level of Sedation Plan: Moderate sedation  Post Procedure plan: Return to same level of care      Plan:   1. egd    I assessed the patient and find that the patient is in satisfactory condition to proceed with the planned procedure and sedation plan. I have explained the risk, benefits, and alternatives to the procedure; the patient understands and agrees to proceed.        Oliverio Ham DO  6/26/2022

## 2022-06-26 NOTE — ED PROVIDER NOTES
I independently examined and evaluated [facility]. I personally saw the patient and performed a substantive portion of the visit including all aspects of the medical decision making. In brief, this 49-year-old female is presenting with leg cramping and swelling of the left lower extremity. She also endorses burning and numbness in her feet bilaterally. Endorses some chest discomfort that she describes as reflux. Denies any abdominal pain or shortness of breath. States that the pain in her legs is worse with ambulating. Does report previous GI bleeds and requirement for blood transfusions. Was told that she had gastritis and colonic polyps that may have been the cause of this. Focused exam revealed   General: Alert, no acute distress, patient resting comfortably   Skin: warm, intact, no pallor noted   Head: Normocephalic, atraumatic   Eye: Normal conjunctiva   Cardiac: Normal peripheral perfusion  Respiratory: No acute distress   Musculoskeletal: No deformity, full ROM. Neurological: alert and oriented, normal sensory and motor observed. Psychiatric: Cooperative    ED course: Lab work obtained and patient does have an acute on chronic anemia with a hemoglobin of 7.6, a drop of over 2 g since January. Stool appears brown, per KARISSA and is Hemoccult positive. She is given 40 mg IV pantoprazole. Due to her pain in her feet that does sound neuropathic, she is given 100 mg gabapentin. Discussed with gastroenterology by KARISSA, and was recommend admission for EGD tomorrow. Patient admitted for further treatment and evaluation. ECG    The Ekg interpreted by me shows sinus rhythm with a rate of 82 bpm.  Right bundle branch block and left anterior fascicular block. No acute injury pattern. , , QTc 495. No significant change from prior EKG dated 12/18/2021.     All diagnostic, treatment, and disposition decisions were made by myself in conjunction with the advanced practice yes provider. For all further details of the patient's emergency department visit, please see the advanced practice provider's documentation. Comment: Please note this report has been produced using speech recognition software and may contain errors related to that system including errors in grammar, punctuation, and spelling, as well as words and phrases that may be inappropriate. If there are any questions or concerns please feel free to contact the dictating provider for clarification.         Wilfred Gutierres, DO  06/25/22 5359

## 2022-06-26 NOTE — PLAN OF CARE
Problem: Discharge Planning  Goal: Discharge to home or other facility with appropriate resources  6/26/2022 1004 by Sangeetha Tarango RN  Outcome: Progressing     Problem: Pain  Goal: Verbalizes/displays adequate comfort level or baseline comfort level  6/26/2022 1004 by Sangeetha Tarango RN  Outcome: Progressing     Problem: Safety - Adult  Goal: Free from fall injury  6/26/2022 1004 by Sangeetha Tarango RN  Outcome: Progressing     Problem: Chronic Conditions and Co-morbidities  Goal: Patient's chronic conditions and co-morbidity symptoms are monitored and maintained or improved  Outcome: Progressing

## 2022-06-26 NOTE — PROGRESS NOTES
RT Inhaler-Nebulizer Bronchodilator Protocol Note    There is a bronchodilator order in the chart from a provider indicating to follow the RT Bronchodilator Protocol and there is an Initiate RT Inhaler-Nebulizer Bronchodilator Protocol order as well (see protocol at bottom of note). CXR Findings:  XR CHEST PORTABLE    Result Date: 6/25/2022  Clear lungs. Cardiomegaly. No acute cardiopulmonary abnormality. The findings from the last RT Protocol Assessment were as follows:   History Pulmonary Disease: Chronic pulmonary disease  Respiratory Pattern: Regular pattern and RR 12-20 bpm  Breath Sounds: Clear breath sounds  Cough: Strong, spontaneous, non-productive  Indication for Bronchodilator Therapy: Decreased or absent breath sounds  Bronchodilator Assessment Score: 2    Aerosolized bronchodilator medication orders have been revised according to the RT Inhaler-Nebulizer Bronchodilator Protocol below. Respiratory Therapist to perform RT Therapy Protocol Assessment initially then follow the protocol. Repeat RT Therapy Protocol Assessment PRN for score 0-3 or on second treatment, BID, and PRN for scores above 3. No Indications - adjust the frequency to every 6 hours PRN wheezing or bronchospasm, if no treatments needed after 48 hours then discontinue using Per Protocol order mode. If indication present, adjust the RT bronchodilator orders based on the Bronchodilator Assessment Score as indicated below. Use Inhaler orders unless patient has one or more of the following: on home nebulizer, not able to hold breath for 10 seconds, is not alert and oriented, cannot activate and use MDI correctly, or respiratory rate 25 breaths per minute or more, then use the equivalent nebulizer order(s) with same Frequency and PRN reasons based on the score. If a patient is on this medication at home then do not decrease Frequency below that used at home.     0-3 - enter or revise RT bronchodilator order(s) to equivalent RT Bronchodilator order with Frequency of every 4 hours PRN for wheezing or increased work of breathing using Per Protocol order mode. 4-6 - enter or revise RT Bronchodilator order(s) to two equivalent RT bronchodilator orders with one order with BID Frequency and one order with Frequency of every 4 hours PRN wheezing or increased work of breathing using Per Protocol order mode. 7-10 - enter or revise RT Bronchodilator order(s) to two equivalent RT bronchodilator orders with one order with TID Frequency and one order with Frequency of every 4 hours PRN wheezing or increased work of breathing using Per Protocol order mode. 11-13 - enter or revise RT Bronchodilator order(s) to one equivalent RT bronchodilator order with QID Frequency and an Albuterol order with Frequency of every 4 hours PRN wheezing or increased work of breathing using Per Protocol order mode. Greater than 13 - enter or revise RT Bronchodilator order(s) to one equivalent RT bronchodilator order with every 4 hours Frequency and an Albuterol order with Frequency of every 2 hours PRN wheezing or increased work of breathing using Per Protocol order mode.          Electronically signed by Andrey Ibanez RCP on 6/26/2022 at 7:44 PM

## 2022-06-26 NOTE — PROGRESS NOTES
4 Eyes Skin Assessment     The patient is being assess for   Admission    I agree that 2 RN's have performed a thorough Head to Toe Skin Assessment on the patient. ALL assessment sites listed below have been assessed. Areas assessed for pressure by both nurses:   [x]   Head, Face, and Ears   [x]   Shoulders, Back, and Chest, Abdomen  [x]   Arms, Elbows, and Hands   [x]   Coccyx, Sacrum, and Ischium  [x]   Legs, Feet, and Heels        Skin Assessed Under all Medical Devices by both nurses:  N/A              All Mepilex Borders were peeled back and area peeked at by both nurses:  No: N/A             **SHARE this note so that the co-signing nurse is able to place an eSignature**    Co-signer eSignature: Electronically signed by Kimani Poole RN on 6/26/22 at 5:41 AM EDT    Does the Patient have Skin Breakdown related to pressure?   No          Kasi Prevention initiated:  No   Wound Care Orders initiated:  No      WOC nurse consulted for Pressure Injury (Stage 3,4, Unstageable, DTI, NWPT, Complex wounds)and New or Established Ostomies:  No      Primary Nurse eSignature: Electronically signed by Michelle Bateman RN on 6/26/22 at 5:43 AM EDT

## 2022-06-26 NOTE — ED NOTES
Glen sent to Dr. German Adams at 2033     Essence Hampstead  06/25/22 2033    Glen completed with call back from Dr. German Adams at 2115     Essence Hampstead  06/25/22 2125

## 2022-06-26 NOTE — OP NOTE
Esophagogastroduodenoscopy Note    Patient:   Pollo Garnica    YOB: 1955    Facility:     [address] ENDOSCOPY  [address] [address]   [Inpatient]   Referring/PCP: Lady Susie MD    Procedure:   Esophagogastroduodenoscopy with control of bleeding  Date:     6/26/2022   Endoscopist:  Hilary Stein DO     Preoperative Diagnosis:   GI bleeding    Postoperative Diagnosis:  same    Anesthesia:  Versed 9 mg IV, fentanyl 100 mcg IV; Glucagon 50    Estimated blood loss: Minimal    Complications: None    Description of Procedure:  Informed consent was obtained from the patient after explanation of the procedure including indications, description of the procedure,  benefits and possible risks and complications of the procedure, and alternatives. Questions were answered. The patient's history was reviewed and a directed physical examination was performed prior to the procedure. Patient was monitored throughout the procedure with pulse oximetry and periodic assessment of vital signs. Patient was sedated as noted above. The Nursing staff and I performed a time out. With the patient in the left lateral decubitus position, the Olympus videoendoscope was placed in the patient's mouth and under direct visualization passed into the esophagus. The scope was ultimately passed to the second portion of the duodenum. Visualization was performed during both introduction and withdrawal of the endoscope and retroflexed view of the proximal stomach was obtained. Findings[de-identified]   Esophagus: normal. The findings do not support a diagnosis of De Guzman's Esophagus. Stomach: Two small AVMs seen and ablated. Biopsies taken from the antrum  Duodenum: Several small AVMs were seen and ablated. Biopsies taken from the small bowel. Recommendations:   Monitor for further bleeding.   Plan colonoscopy    Hilary Stein DO    [facility] Office   [facility] [address] [place] [address]     Phone: [phone]     Fax: [phone]

## 2022-06-26 NOTE — PROGRESS NOTES
Patient to Recovery sedated with 100mcg Fentanyl, 9 mg Versed. Patient also given 0.5 mg Glucagon. PAtient asleep, awakable with call. SPO2 100% on RA. Vital signs stable.

## 2022-06-26 NOTE — PROGRESS NOTES
IM Progress Note    Admit Date:  6/25/2022  1    Interval history:  Leg swelling, anemia    Subjective:  Ms. Jolie Muhammad seen up in bed, feels ok except for chronic issues of exertional dyspnea, leg pains     Tried to see PCP twice but unable to get an appt  Has not taken BP meds for a long time, but taking PPI and iron   Denies any black stools or brpr      Objective:   BP (!) 143/52   Pulse 68   Temp 97.9 °F (36.6 °C) (Oral)   Resp 18   Ht 5' 3\" (1.6 m)   Wt 138 lb 9.6 oz (62.9 kg)   SpO2 98%   BMI 24.55 kg/m²   No intake or output data in the 24 hours ending 06/26/22 0737    Physical Exam:        General: elderly female up in bed,  Awake, alert and oriented. Appears to be not in any distress  Mucous Membranes:  Pink , anicteric  Neck: No JVD, no carotid bruit, no thyromegaly  Chest:  Clear to auscultation bilaterally, no added sounds  Cardiovascular:  RRR S1S2 heard, no murmurs or gallops  Abdomen:  Soft, undistended, non tender, no organomegaly, BS present  Extremities: No edema or cyanosis.  Diminished pulses in both feet  But skin appears well perfused and pink in color    Neurological : grossly normal        Medications:   Scheduled Medications:    ferrous sulfate  325 mg Oral BID WC    lisinopril  20 mg Oral Daily    metoprolol succinate  50 mg Oral Daily    pantoprazole  40 mg IntraVENous BID    sodium chloride flush  5-40 mL IntraVENous 2 times per day    insulin lispro  0-6 Units SubCUTAneous Q6H    albuterol sulfate HFA  2 puff Inhalation TID     I   dextrose      sodium chloride      sodium chloride 100 mL/hr at 06/26/22 0011     cloNIDine, glucose, dextrose bolus **OR** dextrose bolus, glucagon (rDNA), dextrose, sodium chloride flush, sodium chloride, ondansetron **OR** ondansetron, acetaminophen **OR** acetaminophen, albuterol sulfate HFA, ipratropium-albuterol    Lab Data:  Recent Labs     06/25/22  1650 06/26/22  0604   WBC 7.1 5.8   HGB 7.6* 7.1*   HCT 25.4* 24.6*   MCV 61.5* 61.9*  282     Recent Labs     06/25/22  1650 06/26/22  0604    137   K 4.1 3.8    101   CO2 27 26   BUN 17 17   CREATININE 1.0 0.7     Recent Labs     06/25/22  1650   TROPONINI <0.01       Coagulation:   Lab Results   Component Value Date    INR 1.14 06/26/2022    APTT 30.4 06/26/2022     Cardiac markers:   Lab Results   Component Value Date    TROPONINI <0.01 06/25/2022         Lab Results   Component Value Date    ALT 10 06/26/2022    AST 15 06/26/2022    ALKPHOS 71 06/26/2022    BILITOT <0.2 06/26/2022       Lab Results   Component Value Date    INR 1.14 06/26/2022    INR 1.13 06/25/2022    INR 1.32 (H) 10/28/2019    PROTIME 14.4 06/26/2022    PROTIME 14.3 06/25/2022    PROTIME 15.0 (H) 10/28/2019       Radiology    Chest xray       Cultures:       Assessment & Plan:       GI bleed, hemoccult +, HGB currently 7.6, last was 9.8 on 1/20/22. Multiple very similar presentations since 2019. Last was in 12/2021. EGD at that time gastritis - Will plan to transfuse for HGB < 7.0. IV PPI BID, H&H q6h, GI consulted     chronic  blood loss on chronic Fe def anemia, as above. Microcytic/hypochronic. Long Hx of Fe def anemia. Chk anemia studies. start on IV venofer    Bilat lower leg pain, sub acute (going on for months). Subjective L lower leg swelling x1-2d, not appreciated clinically. Benign exam with diminished pulses , not cool to touch or dusky color. D-dimer nml. Unclear etiology. RLS? PAD? HTN uncontrolled, resume previous documented lisinopril and metoprolol    DM2, initial , , took self off all Rx. Has not checked BGT's at home in some time. Previously on oral Rx only (metformin glimepiride). She may need regimen reordered at [place]. Chk A1c, add Low SSI q6h for now. Medication non compliance Pt has not seen her PCP (Dr. Ellis Velasco per Epic) in over a year. She took herself off of most of her Rx including her DM, Fe supp, BP medications.  She reports only medications that she has been taking is her PRN albuterol INH and her Protonix. Hx COPD, not in acute exacerbation. Not previously on maintenance Rx (alb rescue INH and PRN Duoneb NEB).   Reordered.           DVT Prophylaxis: SCD  Diet: NPO w/ sips and chips  Full code      Clinton Andrews MD, 6/26/2022 7:37 AM

## 2022-06-26 NOTE — PROGRESS NOTES
Call placed to [facility] spoke to [facility] to give report. Parris SANTOS clinical took pt to [facility] via wheelchair in stable condition.

## 2022-06-26 NOTE — H&P
Hospital Medicine History & Physical      PCP: Denzel Tompkins MD    Date of Service: Pt seen/examined on 6/25/22 and admitted on 6/25/22 to Inpatient    Chief Complaint   Patient presents with    Other     reports pain and swelling in LLE this AM> reports for months feet and legs have been achy. can only walk short distances without them hurting too much. last seen here a year ago for same dx'd with CHF but didn;t follow up       History Of Present Illness: The patient is a 79 y.o. female with PMH below, presents with bilat lower leg pain, L lower leg swelling. Pt reports that she has had bilat leg, especially lower legs, pain for months. Exacerbated by at night/laying down and with ambulation. Helped sometimes by rest.  It does not sound like the pain portion of her legs has changed acutely. She reports that she came to the ER bc she felt like her L lower leg was more swollen that her R. She had incidental finding of drop of her HGB. Subsequent guaiac was positive. She has hx of multiple admits since 2019 for being guaiac + and anemia. Last was in 12/2021. She has had multiple EGD and colonoscopies w/o clear identification of source. She also has hx of iron def anemia. She has not seen her PCP in over a year. She has taken herself off of several of her medications including her supplemental iron. The only Rx she is still taking are her Protonix and her PRN albuterol.       Past Medical History:        Diagnosis Date    Atrial fibrillation ([address]     Bronchitis chronic     Cancer ([address]     cervical    CHF (congestive heart failure) ([address]     Diabetes mellitus ([address]     Emphysema of lung ([address]     Hypercholesteremia     Hypertension     Microcytic anemia 3/23/2017    Psoriasis        Past Surgical History:        Procedure Laterality Date    CERVICAL DISC SURGERY  2004    CHOLECYSTECTOMY      COLONOSCOPY N/A 12/18/2021    COLONOSCOPY WITH BIOPSY performed by Day Donnelly, MD at [facility] N/A 12/18/2021    COLONOSCOPY POLYPECTOMY SNARE/COLD BIOPSY performed by Delmy Haider MD at [address] [place] [address] CATH LAB PROCEDURE  4/2011    angiogram with 30% blockage    HYSTERECTOMY (CERVIX STATUS UNKNOWN)      KIDNEY STONE SURGERY      TONSILLECTOMY      UPPER GASTROINTESTINAL ENDOSCOPY N/A 10/29/2019    EGD BIOPSY performed by Susan Arevalo DO at [facility] N/RICCO 10/11/2020    EGD BIOPSY performed by Susan Arevalo DO at [facility]  10/11/2020    EGD CONTROL HEMORRHAGE performed by Susan Arevalo DO at [facility] N/A 12/18/2021    EGD BIOPSY performed by Delmy Haider MD at [address] [place] [address] Real       Medications Prior to Admission:    Prior to Admission medications    Medication Sig Start Date End Date Taking? Authorizing Provider   pantoprazole (PROTONIX) 40 MG tablet Take 40 mg by mouth daily   Yes Historical Provider, MD   pantoprazole (PROTONIX) 40 MG tablet Take 1 tablet by mouth daily 12/18/21 1/20/22  Christiano Lawrence MD   ALBUTEROL IN Inhale into the lungs    Historical Provider, MD   ferrous sulfate (FE TABS) 325 (65 Fe) MG EC tablet Take 1 tablet by mouth 2 times daily  Patient not taking: Reported on 1/20/2022 10/13/20   Hamilton Hassan MD   metoprolol succinate (TOPROL XL) 50 MG extended release tablet Take 1 tablet by mouth daily  Patient not taking: Reported on 1/20/2022 9/18/20   Hamilton Hassan MD   lisinopril (PRINIVIL;ZESTRIL) 20 MG tablet Take 1 tablet by mouth daily  Patient not taking: Reported on 1/20/2022 9/18/20   Hamilton Hassan MD   blood glucose test strips (DARVIN CONTOUR NEXT TEST) strip Test three times daily.  DX:E11.9 9/18/20   Hamilton Hassan MD   glimepiride (AMARYL) 4 MG tablet Take 0.5 tablets by mouth every morning (before breakfast)  Patient not taking: Reported on 6/25/2022 9/18/20   German Grey MD   metFORMIN (GLUCOPHAGE) 500 MG tablet Take 1 tablet by mouth 2 times daily (with meals)  Patient not taking: Reported on 1/20/2022 9/18/20   German Grey MD   ipratropium-albuterol (DUONEB) 0.5-2.5 (3) MG/3ML SOLN nebulizer solution Inhale 3 mLs into the lungs every 6 hours as needed for Shortness of Breath 12/4/19 1/20/22  German Grey MD   AGAMATRIX ULTRA-THIN LANCETS MISC Check sugars daily after each meal  Patient not taking: Reported on 6/25/2022 10/30/19   STEPHY Baumann   Blood Glucose Monitoring Suppl (AGAMATRIX AMP) ISABELLA Check BG daily after meals  Patient not taking: Reported on 6/25/2022 10/30/19   STEPHY Baumann   [employer] Lancets MISC 1 each by Does not apply route 3 times daily  Patient not taking: Reported on 6/25/2022 5/2/18   Elia Esparza PA-C       Allergies:  Morphine, Chantix [varenicline tartrate], and Percocet [oxycodone-acetaminophen]    Social History:    TOBACCO:   reports that she has been smoking cigarettes. She has a 16.00 pack-year smoking history. She has never used smokeless tobacco.  ETOH:   reports no history of alcohol use. Family History:  Reviewed in detail and negative for DM, Early CAD, Cancer (except as below).  Positive as follows:        Problem Relation Age of Onset    Diabetes Mother     Heart Disease Mother     High Blood Pressure Mother     Hypertension Mother     Heart Failure Mother     Cancer Father     Cancer Sister     Diabetes Brother     Cancer Brother     Cancer Brother     Asthma Daughter     Hypertension Brother     Hypertension Brother     Emphysema Neg Hx        REVIEW OF SYSTEMS:   Pertinent positives/negatives as follows: bilat lower leg pain, L lower leg swelling, and as discussed in HPI, otherwise a complete ROS performed and all other systems are negative  PHYSICAL EXAM PERFORMED:    BP (!) 180/59 Pulse 92   Temp 97.4 °F (36.3 °C)   Resp 16   Ht 5' 3\" (1.6 m)   Wt 140 lb (63.5 kg)   SpO2 98%   BMI 24.80 kg/m²     GEN:  A&Ox3, NAD. Appears pale. HEENT:  NC/AT,EOMI, MMM, no erythema/exudates or visible masses. CVS:  Normal S1,S2. RRR. Without M/G/R.   LUNG:   CTA-B. no wheezes, rales or rhonchi  ABD:  Soft, ND/NT, BS+ x4. Without G/R.  EXT: 2+ pulses throughout including popliteal, PT and DP bilat, no c/c/e. Bilat calf ttp compression. Brisk cap refill. PSY:  Thought process intact, affect appropriate. NTAA:  CN III-XII intact, moves all 4 spontaneously, sensory grossly intact. SKIN: No rash or lesions on visible skin. Chart review shows recent radiographs:  XR CHEST PORTABLE    Result Date: 2022  EXAMINATION: ONE XRAY VIEW OF THE CHEST 2022 6:04 pm COMPARISON: 2021. HISTORY: ORDERING SYSTEM PROVIDED HISTORY: general weakness TECHNOLOGIST PROVIDED HISTORY: Reason for exam:->general weakness Reason for Exam: general weakness FINDINGS: A portable upright frontal view chest radiograph was obtained. The heart is enlarged. The mediastinal contour and pleural spaces are otherwise within normal limits. The lungs are grossly clear. There is no focal consolidation or pneumothorax. The pulmonary vascular pattern is within normal limits. No acute thoracic osseous abnormality. Clear lungs. Cardiomegaly. No acute cardiopulmonary abnormality. Last EGD on 21 results:   [address]                 [address] [address]                                 OPERATIVE REPORT     PATIENT NAME: Shan Huddleston                      :        1955  MED REC NO:   [id number]                          ROOM:       0201  ACCOUNT NO:   [de-identified]                           ADMIT DATE: 2021  PROVIDER:     Grace Antonio MD     DATE OF PROCEDURE:  2021     PREPROCEDURE DIAGNOSES:  1. Symptomatic anemia.   2.  Heme-positive stool.     POSTPROCEDURE DIAGNOSES:  1.  Mild gastritis. 2.  Otherwise, normal EGD. 3.  Single 3 mm cecal polyp. 4.  Three rectal polyps. 5.  Sigmoid diverticulosis. 6.  Internal hemorrhoids. 7.  No active bleeding. 8.  No source of anemia identified on this exam.     PROCEDURES:  1. EGD with biopsy. 2.  Colonoscopy to cecum with snare polypectomy. SURGEON:  Levi Romero MD    EGD on 10/9/20 results (Dr. Kailyn Philippe):  Findings[de-identified]   Esophagus: normal. The findings do not support a diagnosis of De Guzman's Esophagus. 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 Biopsies were obtained of the angularis and the nodules and linear erythema were ablated with APC  Duodenum: normal    EGD on 10/28/19 results (Dr. Kailyn Philippe):  Findings[de-identified]   Esophagus: normal. The findings do not support a diagnosis of De Guzman's Esophagus. Stomach: abnormal: Gastric erosions seen in the antrum.   Biopsies obtained  Duodenum: normal    EKG:    EKG 12 Lead [[id number]]    Collected: 06/25/22 2004    Updated: 06/25/22 2013     Ventricular Rate 82 BPM    Atrial Rate 82 BPM    P-R Interval 162 ms    QRS Duration 140 ms    Q-T Interval 424 ms    QTc Calculation (Bazett) 495 ms    P Axis 68 degrees    R Axis -56 degrees    T Axis 134 degrees    Diagnosis Normal sinus rhythmPossible Left atrial enlargementRight bundle branch blockLeft anterior fascicular block Bifascicular block Left ventricular hypertrophy with repolarization abnormalityCannot rule out Septal infarct (cited on or before 09-OCT-2020)Abnormal ECGWhen compared with ECG of 18-DEC-2021 09:41,T wave inversion no longer evident in Inferior leads     CBC:  Recent Labs     06/25/22  1650   WBC 7.1   HGB 7.6*   HCT 25.4*           RENAL  Recent Labs     06/25/22  1650      K 4.1      CO2 27   BUN 17   CREATININE 1.0   GLUCOSE 139*       Hemoglobin a1c:  Lab Results   Component Value Date    LABA1C 7.6 09/18/2020    LABA1C 8.6 02/28/2020    LABA1C 7.5 10/28/2019       LFT'S:  Recent Labs     06/25/22  1650   AST 15   ALT 11   BILITOT <0.2   ALKPHOS 82       COAG:  Recent Labs     06/25/22  1650   INR 1.13       CARDIAC ENZYMES:   Recent Labs     06/25/22  1650   TROPONINI <0.01     Lab Results   Component Value Date    PROBNP 3,434 (H) 12/16/2021    PROBNP 4,859 (H) 10/09/2020    PROBNP 4,057 (H) 10/28/2019     Results for Jessica Torres (MRN [id number]) as of 6/25/2022 21:10   Ref. Range 3/23/2017 14:11 10/28/2019 12:48 10/12/2020 04:28   Ferritin Latest Ref Range: 15.0 - 150.0 ng/mL 5.4 (L) 7.9 (L) 14.6 (L)   Iron Latest Ref Range: 37 - 145 ug/dL 26 (L) 14 (L) 34 (L)   Iron Saturation Latest Ref Range: 15 - 50 % 5 (L) 3 (L) 8 (L)   TIBC Latest Ref Range: 260 - 445 ug/dL 516 (H) 538 (H) 444   Transferrin Latest Ref Range: 200.0 - 360.0 mg/dL 428.0 (H)  318.0   FOLATE, FOLAT Latest Ref Range: 4.78 - 24.20 ng/mL 13.25 13.92    Vitamin B-12 Latest Ref Range: 211 - 911 pg/mL 483 462      PHYSICIAN CERTIFICATION  I certify that Julia Toledo is expected to be hospitalized for 2 midnights based on the following assessment and plan:    ASSESSMENT/PLAN:        GI bleed, hemoccult +, HGB currently 7.6, last was 9.8 on 1/20/22. Multiple very similar presentations since 2019. Last was in 12/202. EGD at that time gastritis - see previous EGD summaries above. Will plan to transfuse for HGB < 7.0. IV PPI BID, H&H q6h, GI c/s. Admit to 2w w/ tele. Acute blood loss on chronic Fe def anemia, as above. Microcytic/hypochronic. Long Hx of Fe def anemia. Chk anemia studies. Resume Fe supp. Bilat lower leg pain, sub acute (going on for months). Subjective L lower leg swelling x1-2d, not appreciated clinically. Benign exam (w/ 2+ pulses to BLE, good cap refill) w/o edema, venous distension/stasis changes, not cool to touch or dusky color. D-dimer nml. Unclear etiology. RLS? PAD? DS mismatch 2/2 anemia?   Ordered bilat JANA's. Defer to DD for further w/u. HTN uncontrolled, resume previous documented lisinopril and metop. DM2, initial , presumably uncontrolled, took self off all Rx. Has not checked BGT's at home in some time. Previously on oral Rx only (metformin glimepiride). She may need regimen reordered at [place]. Chk A1c, add Low SSI q6h for now. Medication compliance problem. Pt has not seen her PCP (Dr. Estrellita Park per Epic) in over a year. She took herself off of most of her Rx including her DM, Fe supp, BP medications. She reports only medications that she has been taking is her PRN albuterol INH and her Protonix. Hx COPD, not in acute exacerbation. Not previously on maintenance Rx (alb rescue INH and PRN Duoneb NEB). Reordered. Hx CHF, a fib, HLD. Resume BB. Currently in NSR.  CHF appears compensated. Not previously on statin or AC. Was Dx w/ CHF but never followed up for this. DVT Prophylaxis: SCD  Diet: NPO w/ sips and chips  Code Status: Full Code   PT/OT Eval Status: Will order if needed and as patient condition allows  Dispo - Admit to inpatient 2w w/ tele. Fareed Weber MD    Thank you Kelly Morris MD for the opportunity to be involved in this patient's care. If you have any questions or concerns please feel free to contact me via the [employer] at [phone]. This chart was generated using the [address] [place] [address] [address] system. I created this record but it may contain dictation errors given the limitations of this technology.

## 2022-06-26 NOTE — PROGRESS NOTES
RT Inhaler-Nebulizer Bronchodilator Protocol Note    There is a bronchodilator order in the chart from a provider indicating to follow the RT Bronchodilator Protocol and there is an Initiate RT Inhaler-Nebulizer Bronchodilator Protocol order as well (see protocol at bottom of note). CXR Findings:  XR CHEST PORTABLE    Result Date: 6/25/2022  Clear lungs. Cardiomegaly. No acute cardiopulmonary abnormality. The findings from the last RT Protocol Assessment were as follows:   History Pulmonary Disease: Chronic pulmonary disease  Respiratory Pattern: Regular pattern and RR 12-20 bpm  Breath Sounds: Inspiratory and expiratory or bilateral wheezing and/or rhonchi  Cough: Strong, spontaneous, non-productive  Indication for Bronchodilator Therapy: Wheezing associated with pulm disorder  Bronchodilator Assessment Score: 8    Aerosolized bronchodilator medication orders have been revised according to the RT Inhaler-Nebulizer Bronchodilator Protocol below. Respiratory Therapist to perform RT Therapy Protocol Assessment initially then follow the protocol. Repeat RT Therapy Protocol Assessment PRN for score 0-3 or on second treatment, BID, and PRN for scores above 3. No Indications - adjust the frequency to every 6 hours PRN wheezing or bronchospasm, if no treatments needed after 48 hours then discontinue using Per Protocol order mode. If indication present, adjust the RT bronchodilator orders based on the Bronchodilator Assessment Score as indicated below. Use Inhaler orders unless patient has one or more of the following: on home nebulizer, not able to hold breath for 10 seconds, is not alert and oriented, cannot activate and use MDI correctly, or respiratory rate 25 breaths per minute or more, then use the equivalent nebulizer order(s) with same Frequency and PRN reasons based on the score. If a patient is on this medication at home then do not decrease Frequency below that used at home.     0-3 - enter or revise RT bronchodilator order(s) to equivalent RT Bronchodilator order with Frequency of every 4 hours PRN for wheezing or increased work of breathing using Per Protocol order mode. 4-6 - enter or revise RT Bronchodilator order(s) to two equivalent RT bronchodilator orders with one order with BID Frequency and one order with Frequency of every 4 hours PRN wheezing or increased work of breathing using Per Protocol order mode. 7-10 - enter or revise RT Bronchodilator order(s) to two equivalent RT bronchodilator orders with one order with TID Frequency and one order with Frequency of every 4 hours PRN wheezing or increased work of breathing using Per Protocol order mode. 11-13 - enter or revise RT Bronchodilator order(s) to one equivalent RT bronchodilator order with QID Frequency and an Albuterol order with Frequency of every 4 hours PRN wheezing or increased work of breathing using Per Protocol order mode. Greater than 13 - enter or revise RT Bronchodilator order(s) to one equivalent RT bronchodilator order with every 4 hours Frequency and an Albuterol order with Frequency of every 2 hours PRN wheezing or increased work of breathing using Per Protocol order mode.          Electronically signed by Stephany Shah RCP on 6/25/2022 at 11:45 PM

## 2022-06-26 NOTE — PROGRESS NOTES
Pt A/O in bed and quiet. Denies any needs at this time. SR up x2, bed in lowest position,   wheels locked,  bed alarm on Call light and bedside table in easy reach.    Robel Suh, RN

## 2022-06-27 VITALS
HEIGHT: 63 IN | WEIGHT: 138.6 LBS | HEART RATE: 74 BPM | OXYGEN SATURATION: 99 % | SYSTOLIC BLOOD PRESSURE: 145 MMHG | TEMPERATURE: 98.1 F | DIASTOLIC BLOOD PRESSURE: 57 MMHG | BODY MASS INDEX: 24.56 KG/M2 | RESPIRATION RATE: 18 BRPM

## 2022-06-27 LAB
BLOOD BANK DISPENSE STATUS: NORMAL
BLOOD BANK PRODUCT CODE: NORMAL
BPU ID: NORMAL
DESCRIPTION BLOOD BANK: NORMAL
ESTIMATED AVERAGE GLUCOSE: 148.5 MG/DL
GLUCOSE BLD-MCNC: 134 MG/DL (ref 70–99)
GLUCOSE BLD-MCNC: 136 MG/DL (ref 70–99)
GLUCOSE BLD-MCNC: 145 MG/DL (ref 70–99)
HBA1C MFR BLD: 6.8 %
HCT VFR BLD CALC: 26.1 % (ref 36–48)
HCT VFR BLD CALC: 29.1 % (ref 36–48)
HEMATOLOGY PATH CONSULT: NORMAL
HEMOGLOBIN: 7.9 G/DL (ref 12–16)
HEMOGLOBIN: 8.7 G/DL (ref 12–16)
PERFORMED ON: ABNORMAL
TROPONIN: <0.01 NG/ML

## 2022-06-27 PROCEDURE — 36430 TRANSFUSION BLD/BLD COMPNT: CPT

## 2022-06-27 PROCEDURE — 36415 COLL VENOUS BLD VENIPUNCTURE: CPT

## 2022-06-27 PROCEDURE — 6370000000 HC RX 637 (ALT 250 FOR IP): Performed by: INTERNAL MEDICINE

## 2022-06-27 PROCEDURE — 85018 HEMOGLOBIN: CPT

## 2022-06-27 PROCEDURE — 6360000002 HC RX W HCPCS: Performed by: INTERNAL MEDICINE

## 2022-06-27 PROCEDURE — 99239 HOSP IP/OBS DSCHRG MGMT >30: CPT | Performed by: INTERNAL MEDICINE

## 2022-06-27 PROCEDURE — 2580000003 HC RX 258: Performed by: INTERNAL MEDICINE

## 2022-06-27 PROCEDURE — 84484 ASSAY OF TROPONIN QUANT: CPT

## 2022-06-27 PROCEDURE — 94640 AIRWAY INHALATION TREATMENT: CPT

## 2022-06-27 PROCEDURE — C9113 INJ PANTOPRAZOLE SODIUM, VIA: HCPCS | Performed by: INTERNAL MEDICINE

## 2022-06-27 PROCEDURE — 85014 HEMATOCRIT: CPT

## 2022-06-27 RX ORDER — SODIUM CHLORIDE 0.9 % (FLUSH) 0.9 %
5-40 SYRINGE (ML) INJECTION EVERY 12 HOURS SCHEDULED
Status: CANCELLED | OUTPATIENT
Start: 2022-06-27

## 2022-06-27 RX ORDER — AMLODIPINE BESYLATE 5 MG/1
5 TABLET ORAL NIGHTLY
Qty: 30 TABLET | Refills: 0 | Status: SHIPPED | OUTPATIENT
Start: 2022-06-27 | End: 2022-07-11 | Stop reason: SDUPTHER

## 2022-06-27 RX ORDER — AMLODIPINE BESYLATE 5 MG/1
5 TABLET ORAL DAILY
Status: DISCONTINUED | OUTPATIENT
Start: 2022-06-27 | End: 2022-06-27 | Stop reason: HOSPADM

## 2022-06-27 RX ORDER — SODIUM CHLORIDE 0.9 % (FLUSH) 0.9 %
5-40 SYRINGE (ML) INJECTION PRN
Status: CANCELLED | OUTPATIENT
Start: 2022-06-27

## 2022-06-27 RX ORDER — POLYETHYLENE GLYCOL 3350 17 G/17G
119 POWDER, FOR SOLUTION ORAL ONCE
Status: DISCONTINUED | OUTPATIENT
Start: 2022-06-28 | End: 2022-06-27

## 2022-06-27 RX ORDER — GABAPENTIN 100 MG/1
200 CAPSULE ORAL NIGHTLY
Qty: 60 CAPSULE | Refills: 0 | Status: SHIPPED | OUTPATIENT
Start: 2022-06-27 | End: 2022-07-11 | Stop reason: SDUPTHER

## 2022-06-27 RX ORDER — SODIUM CHLORIDE, SODIUM LACTATE, POTASSIUM CHLORIDE, CALCIUM CHLORIDE 600; 310; 30; 20 MG/100ML; MG/100ML; MG/100ML; MG/100ML
INJECTION, SOLUTION INTRAVENOUS CONTINUOUS
Status: CANCELLED | OUTPATIENT
Start: 2022-06-27

## 2022-06-27 RX ORDER — LISINOPRIL 20 MG/1
20 TABLET ORAL DAILY
Qty: 30 TABLET | Refills: 2 | Status: SHIPPED | OUTPATIENT
Start: 2022-06-27 | End: 2022-07-11 | Stop reason: SDUPTHER

## 2022-06-27 RX ORDER — SODIUM CHLORIDE 9 MG/ML
INJECTION, SOLUTION INTRAVENOUS PRN
Status: CANCELLED | OUTPATIENT
Start: 2022-06-27

## 2022-06-27 RX ORDER — METOPROLOL SUCCINATE 50 MG/1
50 TABLET, EXTENDED RELEASE ORAL DAILY
Qty: 30 TABLET | Refills: 2 | Status: SHIPPED | OUTPATIENT
Start: 2022-06-27 | End: 2022-07-11 | Stop reason: SDUPTHER

## 2022-06-27 RX ORDER — LANOLIN ALCOHOL/MO/W.PET/CERES
325 CREAM (GRAM) TOPICAL
Qty: 90 TABLET | Refills: 0 | Status: SHIPPED | OUTPATIENT
Start: 2022-06-27

## 2022-06-27 RX ORDER — PANTOPRAZOLE SODIUM 40 MG/1
40 TABLET, DELAYED RELEASE ORAL DAILY
Qty: 30 TABLET | Refills: 2 | Status: SHIPPED | OUTPATIENT
Start: 2022-06-27 | End: 2022-07-11 | Stop reason: SDUPTHER

## 2022-06-27 RX ORDER — POLYETHYLENE GLYCOL 3350 17 G/17G
119 POWDER, FOR SOLUTION ORAL ONCE
Status: DISCONTINUED | OUTPATIENT
Start: 2022-06-27 | End: 2022-06-27

## 2022-06-27 RX ADMIN — SODIUM CHLORIDE: 9 INJECTION, SOLUTION INTRAVENOUS at 05:54

## 2022-06-27 RX ADMIN — METOPROLOL SUCCINATE 50 MG: 50 TABLET, EXTENDED RELEASE ORAL at 10:02

## 2022-06-27 RX ADMIN — Medication 2 PUFF: at 07:16

## 2022-06-27 RX ADMIN — IRON SUCROSE 200 MG: 20 INJECTION, SOLUTION INTRAVENOUS at 10:11

## 2022-06-27 RX ADMIN — AMLODIPINE BESYLATE 5 MG: 5 TABLET ORAL at 10:02

## 2022-06-27 RX ADMIN — LISINOPRIL 20 MG: 20 TABLET ORAL at 10:03

## 2022-06-27 RX ADMIN — PANTOPRAZOLE SODIUM 40 MG: 40 INJECTION, POWDER, FOR SOLUTION INTRAVENOUS at 10:02

## 2022-06-27 ASSESSMENT — PAIN DESCRIPTION - LOCATION: LOCATION: CHEST

## 2022-06-27 ASSESSMENT — PAIN DESCRIPTION - ORIENTATION: ORIENTATION: RIGHT

## 2022-06-27 ASSESSMENT — PAIN SCALES - GENERAL: PAINLEVEL_OUTOF10: 8

## 2022-06-27 ASSESSMENT — PAIN DESCRIPTION - DESCRIPTORS: DESCRIPTORS: PATIENT UNABLE TO DESCRIBE

## 2022-06-27 ASSESSMENT — PAIN DESCRIPTION - PAIN TYPE: TYPE: ACUTE PAIN

## 2022-06-27 NOTE — ACP (ADVANCE CARE PLANNING)
Advance Care Planning     General Advance Care Planning (ACP) Conversation    Date of Conversation: 6/25/2022  Conducted with: Patient with Decision Making Capacity    Healthcare Decision Maker:    Primary Decision Maker: Arlin Johnston - Child - [phone]    Secondary Decision Maker: isidro rausch - Child - [phone]  Click here to complete Healthcare Decision Makers including selection of the Healthcare Decision Maker Relationship (ie \"Primary\"). Today we documented Decision Maker(s) consistent with Legal Next of Kin hierarchy.     Content/Action Overview:  Reviewed DNR/DNI and patient elects Full Code (Attempt Resuscitation)      Length of Voluntary ACP Conversation in minutes:  <16 minutes (Non-Billable)    Brenna Gilbert RN

## 2022-06-27 NOTE — PROGRESS NOTES
RT Inhaler-Nebulizer Bronchodilator Protocol Note    There is a bronchodilator order in the chart from a provider indicating to follow the RT Bronchodilator Protocol and there is an Initiate RT Inhaler-Nebulizer Bronchodilator Protocol order as well (see protocol at bottom of note). CXR Findings:  XR CHEST PORTABLE    Result Date: 6/25/2022  Clear lungs. Cardiomegaly. No acute cardiopulmonary abnormality. The findings from the last RT Protocol Assessment were as follows:   History Pulmonary Disease: (P) Chronic pulmonary disease  Respiratory Pattern: (P) Regular pattern and RR 12-20 bpm  Breath Sounds: (P) Slightly diminished and/or crackles  Cough: (P) Strong, spontaneous, non-productive  Indication for Bronchodilator Therapy: (P) Decreased or absent breath sounds  Bronchodilator Assessment Score: (P) 4    Aerosolized bronchodilator medication orders have been revised according to the RT Inhaler-Nebulizer Bronchodilator Protocol below. Respiratory Therapist to perform RT Therapy Protocol Assessment initially then follow the protocol. Repeat RT Therapy Protocol Assessment PRN for score 0-3 or on second treatment, BID, and PRN for scores above 3. No Indications - adjust the frequency to every 6 hours PRN wheezing or bronchospasm, if no treatments needed after 48 hours then discontinue using Per Protocol order mode. If indication present, adjust the RT bronchodilator orders based on the Bronchodilator Assessment Score as indicated below. Use Inhaler orders unless patient has one or more of the following: on home nebulizer, not able to hold breath for 10 seconds, is not alert and oriented, cannot activate and use MDI correctly, or respiratory rate 25 breaths per minute or more, then use the equivalent nebulizer order(s) with same Frequency and PRN reasons based on the score. If a patient is on this medication at home then do not decrease Frequency below that used at home.     0-3 - enter or revise RT bronchodilator order(s) to equivalent RT Bronchodilator order with Frequency of every 4 hours PRN for wheezing or increased work of breathing using Per Protocol order mode. 4-6 - enter or revise RT Bronchodilator order(s) to two equivalent RT bronchodilator orders with one order with BID Frequency and one order with Frequency of every 4 hours PRN wheezing or increased work of breathing using Per Protocol order mode. 7-10 - enter or revise RT Bronchodilator order(s) to two equivalent RT bronchodilator orders with one order with TID Frequency and one order with Frequency of every 4 hours PRN wheezing or increased work of breathing using Per Protocol order mode. 11-13 - enter or revise RT Bronchodilator order(s) to one equivalent RT bronchodilator order with QID Frequency and an Albuterol order with Frequency of every 4 hours PRN wheezing or increased work of breathing using Per Protocol order mode. Greater than 13 - enter or revise RT Bronchodilator order(s) to one equivalent RT bronchodilator order with every 4 hours Frequency and an Albuterol order with Frequency of every 2 hours PRN wheezing or increased work of breathing using Per Protocol order mode.          Electronically signed by Nikkie Garcia RCP on 6/27/2022 at 8:26 AM

## 2022-06-27 NOTE — CARE COORDINATION
Case Management Assessment  Initial Evaluation      Patient Name: Vicki Howard  YOB: 1955  Diagnosis: GI bleed [K92.2]  Guaiac positive stools [R19.5]  Anemia, unspecified type [D64.9]  Date / Time: 6/25/2022  4:13 PM    Admission status/Date: Inpatient 6/25/2022  Chart Reviewed: Yes      Patient Interviewed: Yes   Family Interviewed:  No      Hospitalization in the last 30 days:  No      Health Care Decision Maker :   Primary Decision Maker: PrestonArlin - Child - [phone]    Secondary Decision Maker: [email or web address] - Child - [phone]       Who do you trust or have selected to make healthcare decisions for you      Met with: Patient at bedside. Current PCP: Israel Serrano MD    Financial  Medicare  Precert required for SNF : N          3 night stay required - N    ADLS  Support Systems/Care Needs: Family Members  Transportation: self    Meal Preparation: self    Housing  Living Arrangements: Lives in home with son  Steps: 3 [place]ISOL  Intent for return to present living arrangements: Yes  Identified Issues: None at this time.     Home Care Information  Active with Home Health Care : No Agency:(Services)  Type of Home Care Services: None  Passport/Waiver : No  :                      Phone Number:    Passport/Waiver Services: n/a           Durable Medical Equiptment   DME Provider: n/a   Equipment:   Walker___Cane___RTS___ BSC___Shower Chair___Hospital Bed___W/C____Other________  02 at ____Liter(s)---wears(frequency)_______ HHN ___ CPAP___ BiPap___   N/A__x__      Home O2 Use :  No    If No for home O2---if presently on O2 during hospitalization:  No  if yes CM to follow for potential DC O2 need  Informed of need for care provider to bring portable home O2 tank on day of discharge for nursing to connect prior to leaving:   Not Indicated  Verbalized agreement/Understanding:   Not Indicated    Community Service Affiliation  Dialysis:  No · Agency:  · Location:  · Dialysis Schedule:  · Phone:   · Fax: Other Community Services: (ex:PT/OT,Mental Health,Wound Clinic, Cardio/Pul [address]) None    DISCHARGE PLAN: Explained Case Management role/services. CM reviewed chart and met with patient at bedside to discuss discharge needs and plan. Patient lives in home with son. ASH and active . Employed part-time. Plans to return home. No needs identified for discharge intervention. CM to follow peripherally.     Silvina Burrows RN

## 2022-06-27 NOTE — PROGRESS NOTES
IV removed and discharge instructions completed. All questions were answered to patients satisfaction. Transport request placed. all personal belongs packed. No further needs noted.

## 2022-06-27 NOTE — FLOWSHEET NOTE
06/26/22 2000   Vital Signs   Temp 98.1 °F (36.7 °C)   Temp Source Oral   Heart Rate 68   Resp 16   BP (!) 146/62   BP Location Left upper arm   MAP (Calculated) 90   Level of Consciousness Alert (0)   MEWS Score 1   Oxygen Therapy   SpO2 97 %   O2 Device None (Room air)   Pt awake, alert and oriented times 4. Aware she is NPO after [place] for colonoscopy. PO dulcolax given by jonelle RN at [address]. Pt does not want to take her miralax because she is already having liquid stools. Explained to pt need to have liquid stools that are clear. Pt again stated she wasn't taking anything else for bowel prep. Pt wasn't aware of colonoscopy in AM. Pt wants to wait to sign consent until she talks to GI dr. Cedrick Dangelo c/o demi lower leg pain 4/10. Tylenol 2 po given. Pt stated she was given gabapentin last night and it helped.  Will perfectserve nocturnist. Lana Smith, RN

## 2022-06-27 NOTE — PROGRESS NOTES
PROGRESS NOTE  S:67 yrs Patient  admitted on 6/25/2022 with GI bleed [K92.2]  Guaiac positive stools [R19.5]  Anemia, unspecified type [D64.9] . Today she complains of bloating. She is hungry. She is passing brown BMs without sign of bleeding. She was unaware she needed to take Miralax in order to have colonoscopy today. Exam:   Vitals:    06/27/22 1002   BP: (!) 168/44   Pulse:    Resp:    Temp:    SpO2:       General appearance: alert, appears older than stated age, cooperative, no distress and syndromic appearance - chronically ill appearing  HEENT: Neck supple with midline trachea  Neck: no adenopathy and supple, symmetrical, trachea midline  Lungs: clear to auscultation bilaterally  Heart: regular rate and rhythm, S1, S2 normal, no murmur, click, rub or gallop  Abdomen: soft, non-tender; bowel sounds normal; no masses,  no organomegaly  Extremities: extremities normal, atraumatic, no cyanosis or edema     Medications: Reviewed    Labs:  CBC:   Recent Labs     06/25/22 1650 06/25/22 1650 06/26/22  0604 06/26/22  1008 06/26/22  2205 06/27/22  0426 06/27/22  1020   WBC 7.1  --  5.8  --   --   --   --    HGB 7.6*   < > 7.1*   < > 6.5* 7.9* 8.7*   HCT 25.4*   < > 24.6*   < > 22.6* 26.1* 29.1*   MCV 61.5*  --  61.9*  --   --   --   --      --  282  --   --   --   --     < > = values in this interval not displayed.      BMP:   Recent Labs     06/25/22 1650 06/26/22  0604    137   K 4.1 3.8    101   CO2 27 26   BUN 17 17   CREATININE 1.0 0.7     LIVER PROFILE:   Recent Labs     06/25/22 1650 06/26/22 0604   AST 15 15   ALT 11 10   PROT 7.3 6.5   BILITOT <0.2 <0.2   ALKPHOS 82 71     PT/INR:   Recent Labs     06/25/22 1650 06/26/22 0604   INR 1.13 1.14       Procedure:             Esophagogastroduodenoscopy with control of bleeding  Date:                                  6/26/2022   Endoscopist:                    Nicola Baez DO   Preoperative Diagnosis:   GI bleeding  Postoperative Diagnosis:  same    Attending Supervising [de-identified] Attestation Statement  The patient is a 79 y.o. female. I have performed a history and physical examination of the patient. I discussed the case with my physician assistant Jayesh Vickers PA-C    I reviewed the patient's Past Medical History, Past Surgical History, Medications, and Allergies. Physical Exam:  Vitals:    06/27/22 0727 06/27/22 0831 06/27/22 1002 06/27/22 1505   BP:  (!) 168/44 (!) 168/44 (!) 145/57   Pulse:  73  74   Resp:  18  18   Temp:  98.1 °F (36.7 °C)  98.1 °F (36.7 °C)   TempSrc:  Oral  Oral   SpO2: 94% 97%  99%   Weight:       Height:           Physical Examination: General appearance - in mild to moderate distress  Mental status - alert, oriented to person, place, and time  Eyes - sclera anicteric  Neck - supple, no significant adenopathy  Chest - no tachypnea, retractions or cyanosis  Heart - normal rate and regular rhythm  Abdomen - soft, nontender, nondistended, no masses or organomegaly  Extremities - no pedal edema         Impression: 79year old female with a history of DM, HLD, HTN, CHF, A fib, cervical cancer, COPD, emphysema, and psoriasis admitted with iron deficiency anemia s/p 1 U PRBCs. EGD showed AVMs s/p APC. Recommendation:  1. Continue supportive care  2. Monitor Hgb  3. Observe for signs of bleeding  4. Monitor and document output  5. Transfuse as needed if Hgb < 7.0  6. Continue Pantoprazole 40 mg qAM   7. Continue IV iron supplementation  8. Await pathology results   9. Advance diet as tolerated  10. Ok to d/c from GI standpoint  11. Outpatient colonoscopy   12. Repeat CBC in 4 weeks       Jo Ann Tarango PA-C  12:39 PM 6/27/2022                        26-year-old female with history of DM, HTN, HLD, CHF, A fib, COPD, cervical cancer, emphysema, and psoriasis, admitted with symptomatic anemia and positive Hemoccult stool. EGD with control of bleeding yesterday. No active bleeding    Continue supportive care. PPI qd. Continue IV iron therapy. Ok to d/c from GI standpoint. Outpatient colonoscopy upon discharge.     Taylor Latham MD          [phone]  [phone]

## 2022-06-27 NOTE — PLAN OF CARE
Problem: Discharge Planning  Goal: Discharge to home or other facility with appropriate resources  Outcome: Progressing  Flowsheets (Taken 6/27/2022 6152)  Discharge to home or other facility with appropriate resources:   Identify barriers to discharge with patient and caregiver   Arrange for needed discharge resources and transportation as appropriate   Identify discharge learning needs (meds, wound care, etc)   Arrange for interpreters to assist at discharge as needed   Refer to discharge planning if patient needs post-hospital services based on physician order or complex needs related to functional status, cognitive ability or social support system     Problem: Pain  Goal: Verbalizes/displays adequate comfort level or baseline comfort level  Outcome: Progressing     Problem: Safety - Adult  Goal: Free from fall injury  Outcome: Progressing     Problem: Chronic Conditions and Co-morbidities  Goal: Patient's chronic conditions and co-morbidity symptoms are monitored and maintained or improved  Outcome: Progressing  Flowsheets (Taken 6/27/2022 0953)  Care Plan - Patient's Chronic Conditions and Co-Morbidity Symptoms are Monitored and Maintained or Improved:   Monitor and assess patient's chronic conditions and comorbid symptoms for stability, deterioration, or improvement   Collaborate with multidisciplinary team to address chronic and comorbid conditions and prevent exacerbation or deterioration   Update acute care plan with appropriate goals if chronic or comorbid symptoms are exacerbated and prevent overall improvement and discharge

## 2022-06-27 NOTE — PROGRESS NOTES
AM assessment completed and vital signs completed, see flowsheet. Patient is alert & oriented. No complaints voiced. Patient denies further needs. Side rails up X 2. Bed in the lowest position. Call light within reach.

## 2022-06-27 NOTE — PROGRESS NOTES
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 Marleen Linda seen up in bed, feels ok except for chronic issues of leg pains  Unable to finish prep due to being confused about miralax intake            Objective:   BP (!) 168/44   Pulse 73   Temp 98.1 °F (36.7 °C) (Oral)   Resp 18   Ht 5' 3\" (1.6 m)   Wt 138 lb 9.6 oz (62.9 kg)   SpO2 97%   BMI 24.55 kg/m²       Intake/Output Summary (Last 24 hours) at 6/27/2022 1126  Last data filed at 6/27/2022 0330  Gross per 24 hour   Intake 2304.77 ml   Output --   Net 2304.77 ml       Physical Exam:        General: elderly female up in bed,  Awake, alert and oriented. Appears to be not in any distress  Mucous Membranes:  Pink , anicteric  Neck: No JVD, no carotid bruit, no thyromegaly  Chest:  Clear to auscultation bilaterally, no added sounds  Cardiovascular:  RRR S1S2 heard, no murmurs or gallops  Abdomen:  Soft, undistended, non tender, no organomegaly, BS present  Extremities: No edema or cyanosis.  Diminished pulses in both feet  But skin appears well perfused and pink in color    Neurological : grossly normal        Medications:   Scheduled Medications:    GI cocktail   Oral Once    amLODIPine  5 mg Oral Daily    bisacodyl  20 mg Oral Once    polyethylene glycol  119 g Oral Once    [START ON 6/28/2022] polyethylene glycol  119 g Oral Once    iron sucrose (VENOFER) iv piggyback 100 mL (Admin over 60 minutes)  200 mg IntraVENous Q24H    polyethylene glycol  119 g Oral Once    polyethylene glycol  119 g Oral Once    albuterol sulfate HFA  2 puff Inhalation BID    gabapentin  100 mg Oral Nightly    lisinopril  20 mg Oral Daily    metoprolol succinate  50 mg Oral Daily    pantoprazole  40 mg IntraVENous BID    sodium chloride flush  5-40 mL IntraVENous 2 times per day    insulin lispro  0-6 Units SubCUTAneous Q6H     I   sodium chloride      dextrose      sodium chloride      sodium chloride 100 mL/hr at 06/27/22 0554     cloNIDine, sodium chloride, glucose, dextrose bolus **OR** dextrose bolus, glucagon (rDNA), dextrose, sodium chloride flush, sodium chloride, ondansetron **OR** ondansetron, acetaminophen **OR** acetaminophen, albuterol sulfate HFA, ipratropium-albuterol    Lab Data:  Recent Labs     06/25/22  1650 06/25/22  1650 06/26/22  0604 06/26/22  1008 06/26/22  2205 06/27/22  0426 06/27/22  1020   WBC 7.1  --  5.8  --   --   --   --    HGB 7.6*   < > 7.1*   < > 6.5* 7.9* 8.7*   HCT 25.4*   < > 24.6*   < > 22.6* 26.1* 29.1*   MCV 61.5*  --  61.9*  --   --   --   --      --  282  --   --   --   --     < > = values in this interval not displayed. Recent Labs     06/25/22 1650 06/26/22  0604    137   K 4.1 3.8    101   CO2 27 26   BUN 17 17   CREATININE 1.0 0.7     Recent Labs     06/25/22 1650 06/27/22  0426   TROPONINI <0.01 <0.01       Coagulation:   Lab Results   Component Value Date    INR 1.14 06/26/2022    APTT 30.4 06/26/2022     Cardiac markers:   Lab Results   Component Value Date    TROPONINI <0.01 06/27/2022         Lab Results   Component Value Date    ALT 10 06/26/2022    AST 15 06/26/2022    ALKPHOS 71 06/26/2022    BILITOT <0.2 06/26/2022       Lab Results   Component Value Date    INR 1.14 06/26/2022    INR 1.13 06/25/2022    INR 1.32 (H) 10/28/2019    PROTIME 14.4 06/26/2022    PROTIME 14.3 06/25/2022    PROTIME 15.0 (H) 10/28/2019       Radiology    Chest xray       Cultures:       Assessment & Plan:      Acute on chronic  GI bleed - sec to AVM  ACute on chronic blood loss anemia    , HGB on adm at 7.6, last was 9.8 on 1/20/22. Multiple very similar presentations since 2019. Last was in 12/2021.   GI consulted and started on PPI EGD with several gastric and duodenal AVM s.p ablation   Transfused PRBC 1 unit to keep above 7  Started venofer for iron def  Planned for colonoscopy today but unable to finish prep  No bleeding noted. Hb at 8. 7. wishes to go home and do colonoscopy as outpt  If GI is ok , will dc home on iron supplements      Bilat lower leg pain, sub acute (going on for months). Subjective L lower leg swelling x1-2d, not appreciated clinically. Benign exam with diminished pulses , not cool to touch or dusky color. D-dimer nml. Unclear etiology. RLS? PAD? recommend JANA as outpt    HTN uncontrolled, resume previous documented lisinopril , resume and add norvasc     DM2, initial , , took self off all Rx. A1c at 6.8  Has not checked BGT's at home in some time. Previously on oral Rx only (metformin glimepiride). recommend PCP f/w for meds  Low carb diet    Medication non compliance Pt has not seen her PCP (Dr. Elder Vargas per Epic) in over a year. She took herself off of most of her Rx including her DM, Fe supp, BP medications. She reports only medications that she has been taking is her PRN albuterol INH and her Protonix. Hx COPD, not in acute exacerbation. Not previously on maintenance Rx (alb rescue INH and PRN Duoneb NEB).   Reordered.           DVT Prophylaxis: SCD  Diet: clear  Full code      Ankit Manning MD, 6/27/2022 11:26 AM

## 2022-06-27 NOTE — FLOWSHEET NOTE
06/27/22 0715   Vital Signs   Temp 97.7 °F (36.5 °C)   Temp Source Oral   Heart Rate 72   Heart Rate Source Monitor   Resp 18   BP (!) 180/57   BP Location Left upper arm   MAP (Calculated) 98   Level of Consciousness Alert (0)   MEWS Score 1   Pain Assessment   Pain Assessment 0-10   Pain Level 8   Pain Location Chest   Pain Orientation Right   Pain Descriptors Patient unable to describe   Pain Type Acute pain   Oxygen Therapy   SpO2 96 %   O2 Device None (Room air)      RT found this RN to report that the patient was reports chest pain on the right side. Patient was assessed, vital signs listed above. MD notified. Troponin ordered and GI cocktail PRN. Within 5 minutes patient reported the the RN that the pain was gone. MD notified.

## 2022-06-27 NOTE — PROGRESS NOTES
Care taken over from [facility]. Pt resting. Resp e/e. No needs. Will monitor.  Goldy Bray RN

## 2022-06-27 NOTE — PROGRESS NOTES
Blood consent obtained from pt. Pt is ordered 1 unit of PRBC. Pt has had blood transfusions before with no complications.  Yany Coats RN

## 2022-06-27 NOTE — FLOWSHEET NOTE
AD's consult. Patient's wishes noted in most recent ACP note, if patient wishes to have assistance with Power of  for Healthcare or Living will, please contact Spiritual Care. Phillip Zavala  Thank you for consulting Spiritual Care    If you need a  for emotional, spiritual or comfort care,   -or- assistance with [address] or Living Will forms,  please dial \"0\" and ask for the [address] [place] [address] on-call to be paged.     You may also call us directly:  [phone] Irizarry  [phone] ([phone]) Manjula Quach  [phone]) Outpatient

## 2022-06-27 NOTE — PROGRESS NOTES
Gabapentin po given to pt per order. Pt having loose brown stools in commode but still refused to take miralax.  Chavez Castellanos RN

## 2022-06-28 ENCOUNTER — TELEPHONE (OUTPATIENT)
Dept: INTERNAL MEDICINE CLINIC | Age: 67
End: 2022-06-28

## 2022-06-28 NOTE — TELEPHONE ENCOUNTER
----- Message from Stephanie Knutson MD sent at 6/28/2022  3:36 PM EDT -----  Yes  ----- Message -----  From: ROBERTA ARREOLA  Sent: 6/28/2022   3:15 PM EDT  To: Stephanie Knutson MD    Next Monday is 7/4 and we are off. Ok to wait till 7/11? You start at 1:00 pm that day  ----- Message -----  From: Stephanie Knutson MD  Sent: 6/28/2022   3:08 PM EDT  To: ROBERTA ARREOLA    Can you work her in  ----- Message -----  From: ROBERTA ARREOLA  Sent: 6/28/2022  10:48 AM EDT  To: Stephanie Knutson MD    Patient was discharged yesterday from the hospital.  Patient needing a hospital follow, but the only days she can do is Monday. Please advise.

## 2022-06-28 NOTE — DISCHARGE SUMMARY
Name:  Molly Mendosa  Room:  0209/0209-02  MRN:    [id number]    Discharge Summary      This discharge summary is in conjunction with a complete physical exam done on the day of discharge. Discharging Physician: Dr. Swanson Pat: 6/25/2022  Discharge:  6/27/2022    HPI:    The patient is a 79 y.o. female with PMH below, presents with bilat lower leg pain, L lower leg swelling. Pt reports that she has had bilat leg, especially lower legs, pain for months. Exacerbated by at night/laying down and with ambulation. Helped sometimes by rest.  It does not sound like the pain portion of her legs has changed acutely. She reports that she came to the ER bc she felt like her L lower leg was more swollen that her R. She had incidental finding of drop of her HGB. Subsequent guaiac was positive. She has hx of multiple admits since 2019 for being guaiac + and anemia. Last was in 12/2021. She has had multiple EGD and colonoscopies w/o clear identification of source. She also has hx of iron def anemia. She has not seen her PCP in over a year. She has taken herself off of several of her medications including her supplemental iron. The only Rx she is still taking are her Protonix and her PRN albuterol. Diagnoses this Admission and Hospital Course       Acute on chronic  GI bleed - sec to AVM  ACute on chronic blood loss anemia     , HGB on adm at 7.6, last was 9.8 on 1/20/22.  Multiple very similar presentations since 2019.  Last was in 12/2021. 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 Hb at 8. 7. wishes to go home and do colonoscopy as outpt  If GI is ok , will dc home on iron supplements        Bilat lower leg pain, sub acute (going on for months).  Subjective L lower leg swelling x1-2d, not appreciated clinically.  Benign exam with diminished pulses , not cool to touch or dusky color.  D-dimer nml.  Unclear etiology.  RLS?  PAD?  recommend JANA as outpt     HTN uncontrolled, resume previous documented lisinopril , resume and add norvasc      DM2, initial , , took self off all Rx.  A1c at 6.8  Has not checked BGT's at home in some time.  Previously on oral Rx only (metformin glimepiride).  recommend PCP f/w for meds  Low carb diet     Medication non compliance Pt has not seen her PCP (Dr. Dinorah Jerome per Epic) in over a year. Letitia Reeder took herself off of most of her Rx including her DM, Fe supp, BP medications. She reports only medications that she has been taking is her PRN albuterol INH and her Protonix.      Hx COPD, not in acute exacerbation.  Not previously on maintenance Rx (alb rescue INH and PRN Duoneb NEB).  Reordered.             DVT Prophylaxis: SCD    Procedures (Please Review Full Report for Details)  EGD  Esophagus: normal. The findings do not support a diagnosis of De Guzman's Esophagus. Stomach: Two small AVMs seen and ablated. Biopsies taken from the antrum  Duodenum: Several small AVMs were seen and ablated. Biopsies taken from the small bowel. Consults    GI      Physical Exam at Discharge:    BP (!) 145/57   Pulse 74   Temp 98.1 °F (36.7 °C) (Oral)   Resp 18   Ht 5' 3\" (1.6 m)   Wt 138 lb 9.6 oz (62.9 kg)   SpO2 99%   BMI 24.55 kg/m²     See today's progress note    CBC: Recent Labs     06/25/22  1650 06/25/22  1650 06/26/22  0604 06/26/22  1008 06/26/22  2205 06/27/22  0426 06/27/22  1020   WBC 7.1  --  5.8  --   --   --   --    HGB 7.6*   < > 7.1*   < > 6.5* 7.9* 8.7*   HCT 25.4*   < > 24.6*   < > 22.6* 26.1* 29.1*   MCV 61.5*  --  61.9*  --   --   --   --      --  282  --   --   --   --     < > = values in this interval not displayed.      BMP:   Recent Labs     06/25/22  1650 06/26/22  0604    137   K 4.1 3.8    101   CO2 27 26   BUN 17 17   CREATININE 1.0 0.7 LIVER PROFILE:   Recent Labs     06/25/22  1650 06/26/22  0604   AST 15 15   ALT 11 10   BILITOT <0.2 <0.2   ALKPHOS 82 71     PT/INR:   Recent Labs     06/25/22 1650 06/26/22  0604   PROTIME 14.3 14.4   INR 1.13 1.14     APTT:   Recent Labs     06/26/22  0604   APTT 30.4         CARDIAC ENZYMES  Recent Labs     06/25/22 1650 06/27/22  0426   TROPONINI <0.01 <0.01         RADIOLOGY  XR CHEST PORTABLE   Final Result   Clear lungs. Cardiomegaly. No acute cardiopulmonary abnormality. VL JANA BILATERAL LIMITED 1-2 LEVELS    (Results Pending)         Discharge Medications     Medication List      START taking these medications    amLODIPine 5 MG tablet  Commonly known as: NORVASC  Take 1 tablet by mouth at bedtime     gabapentin 100 MG capsule  Commonly known as: NEURONTIN  Take 2 capsules by mouth nightly for 30 days. CHANGE how you take these medications    ferrous sulfate 325 (65 Fe) MG EC tablet  Commonly known as: Fe Tabs  Take 1 tablet by mouth daily (with breakfast)  What changed: when to take this     pantoprazole 40 MG tablet  Commonly known as: PROTONIX  Take 1 tablet by mouth daily  What changed: Another medication with the same name was removed. Continue taking this medication, and follow the directions you see here. CONTINUE taking these medications    [employer] AMP Adri  Check BG daily after meals     ALBUTEROL IN     blood glucose test strips strip  Commonly known as: Lindsey Contour Next Test  Test three times daily.  DX:E11.9     * [employer] Lancets Misc  1 each by Does not apply route 3 times daily     * [employer] Ultra-Thin Lancets Misc  Check sugars daily after each meal     ipratropium-albuterol 0.5-2.5 (3) MG/3ML Soln nebulizer solution  Commonly known as: DUONEB  Inhale 3 mLs into the lungs every 6 hours as needed for Shortness of Breath     lisinopril 20 MG tablet  Commonly known as: PRINIVIL;ZESTRIL  Take 1 tablet by mouth daily     metoprolol succinate 50 MG extended release tablet  Commonly known as: TOPROL XL  Take 1 tablet by mouth daily         * This list has 2 medication(s) that are the same as other medications prescribed for you. Read the directions carefully, and ask your doctor or other care provider to review them with you. STOP taking these medications    glimepiride 4 MG tablet  Commonly known as: AMARYL     metFORMIN 500 MG tablet  Commonly known as: GLUCOPHAGE           Where to Get Your Medications      These medications were sent to [address] [address], [address] [facility] [address]    Phone: [phone]   · amLODIPine 5 MG tablet  · ferrous sulfate 325 (65 Fe) MG EC tablet  · gabapentin 100 MG capsule  · lisinopril 20 MG tablet  · metoprolol succinate 50 MG extended release tablet  · pantoprazole 40 MG tablet           Discharged in stable condition to home. Follow Up: Follow up with PCP.     Christiano Lawrence MD, 7/13/2022 8:25 AM

## 2022-06-28 NOTE — TELEPHONE ENCOUNTER
[facility] Transitions Initial Follow Up Call    Outreach made within 2 business days of discharge: Yes    Patient: Natalie Webster Patient : 1955   MRN: [id number]  Reason for Admission: There are no discharge diagnoses documented for the most recent discharge. Discharge Date: 22       Spoke with: 2022 @ 9:36 am:  Attempted to contact patient, unable to leave message. 2022 @ 10:48 am: Spoke to patient     Discharge department/facility: [facility]     [facility] Interactive Patient Contact:  Was patient able to fill all prescriptions: Yes  Was patient instructed to bring all medications to the follow-up visit: Yes  Is patient taking all medications as directed in the discharge summary? Yes  Does patient understand their discharge instructions: Yes  Does patient have questions or concerns that need addressed prior to 7-14 day follow up office visit: no    Scheduled appointment with PCP within 7-14 days    Follow Up  No future appointments.     Renetta Patino

## 2022-07-11 ENCOUNTER — OFFICE VISIT (OUTPATIENT)
Dept: INTERNAL MEDICINE CLINIC | Age: 67
End: 2022-07-11

## 2022-07-11 VITALS
DIASTOLIC BLOOD PRESSURE: 80 MMHG | RESPIRATION RATE: 12 BRPM | WEIGHT: 134 LBS | BODY MASS INDEX: 23.74 KG/M2 | HEART RATE: 70 BPM | HEIGHT: 63 IN | SYSTOLIC BLOOD PRESSURE: 128 MMHG

## 2022-07-11 DIAGNOSIS — K92.2 GASTROINTESTINAL HEMORRHAGE, UNSPECIFIED GASTROINTESTINAL HEMORRHAGE TYPE: ICD-10-CM

## 2022-07-11 DIAGNOSIS — I35.0 MILD AORTIC STENOSIS: ICD-10-CM

## 2022-07-11 DIAGNOSIS — Z09 HOSPITAL DISCHARGE FOLLOW-UP: Primary | ICD-10-CM

## 2022-07-11 DIAGNOSIS — K31.819 AVM (ARTERIOVENOUS MALFORMATION) OF DUODENUM, ACQUIRED: ICD-10-CM

## 2022-07-11 DIAGNOSIS — I50.20 SYSTOLIC CONGESTIVE HEART FAILURE, UNSPECIFIED HF CHRONICITY (HCC): ICD-10-CM

## 2022-07-11 DIAGNOSIS — I73.9 INTERMITTENT CLAUDICATION (HCC): ICD-10-CM

## 2022-07-11 PROCEDURE — 99495 TRANSJ CARE MGMT MOD F2F 14D: CPT | Performed by: INTERNAL MEDICINE

## 2022-07-11 PROCEDURE — 1111F DSCHRG MED/CURRENT MED MERGE: CPT | Performed by: INTERNAL MEDICINE

## 2022-07-11 RX ORDER — AMLODIPINE BESYLATE 5 MG/1
5 TABLET ORAL NIGHTLY
Qty: 30 TABLET | Refills: 0 | Status: SHIPPED | OUTPATIENT
Start: 2022-07-11 | End: 2022-08-29 | Stop reason: SDUPTHER

## 2022-07-11 RX ORDER — GABAPENTIN 100 MG/1
200 CAPSULE ORAL NIGHTLY
Qty: 60 CAPSULE | Refills: 0 | Status: SHIPPED | OUTPATIENT
Start: 2022-07-27 | End: 2022-08-29 | Stop reason: SDUPTHER

## 2022-07-11 RX ORDER — METOPROLOL SUCCINATE 50 MG/1
50 TABLET, EXTENDED RELEASE ORAL DAILY
Qty: 30 TABLET | Refills: 2 | Status: SHIPPED | OUTPATIENT
Start: 2022-07-11 | End: 2022-08-29 | Stop reason: SDUPTHER

## 2022-07-11 RX ORDER — PANTOPRAZOLE SODIUM 40 MG/1
40 TABLET, DELAYED RELEASE ORAL DAILY
Qty: 30 TABLET | Refills: 2 | Status: SHIPPED | OUTPATIENT
Start: 2022-07-11 | End: 2022-08-29 | Stop reason: SDUPTHER

## 2022-07-11 RX ORDER — LISINOPRIL 20 MG/1
20 TABLET ORAL DAILY
Qty: 30 TABLET | Refills: 2 | Status: SHIPPED | OUTPATIENT
Start: 2022-07-11 | End: 2022-08-29 | Stop reason: SDUPTHER

## 2022-07-11 NOTE — PROGRESS NOTES
Post-Discharge Transitional Care  Follow Up      Ruel Bui   YOB: 1955    Date of Office Visit:  7/11/2022  Date of Hospital Admission: 6/25/22  Date of Hospital Discharge: 6/27/22  Risk of hospital readmission (high >=14%. Medium >=10%) :Readmission Risk Score: 12.6 ( )      Care management risk score Rising risk (score 2-5) and Complex Care (Scores >=6): 4     Non face to face  following discharge, date last encounter closed (first attempt may have been earlier): 6/28/2022 10:49 AM    Call initiated 2 business days of discharge: Yes    ASSESSMENT/PLAN:   Hospital discharge follow-up  Systolic congestive heart failure, unspecified HF chronicity (HCC)  -     lisinopril (PRINIVIL;ZESTRIL) 20 MG tablet; Take 1 tablet by mouth daily, Disp-30 tablet, R-2Normal  Intermittent claudication (HCC)  -     VL DUP LOWER EXTREMITY ARTERIES BILATERAL; Future  Mild aortic stenosis  -     ECHO 2D WO Color Doppler Complete; Future  Gastrointestinal hemorrhage, unspecified gastrointestinal hemorrhage type  -     Comprehensive Metabolic Panel; Future  -     CBC with Auto Differential; Future  -     Uric Acid; Future  -     AFL - Gilda Perla MD, Gastroenterology (ERCP & EUS), [facility]  AVM (arteriovenous malformation) of duodenum, acquired      Reviewed hospital notes. Check labs  Counselled to stop smoking. ECHO with 2 D doppler  Vascular doppler of legs  GI hemorrhage resolved. Diagnostic colonoscopy ordered      Medical Decision Making: moderate complexity  No follow-ups on file. Subjective:   HPI:  Follow up of Hospital problems/diagnosis(es):   The patient is a 79 y.o. female with PMH below, presents with bilat lower leg pain, L lower leg swelling. Pt reports that she has had bilat leg, especially lower legs, pain for months. Exacerbated by at night/laying down and with ambulation. Helped sometimes by rest.  It does not sound like the pain portion of her legs has changed acutely. She reports that she came to the ER bc she felt like her L lower leg was more swollen that her R. She had incidental finding of drop of her HGB. Subsequent guaiac was positive. She has hx of multiple admits since 2019 for being guaiac + and anemia. Last was in 12/2021. She has had multiple EGD and colonoscopies w/o clear identification of source. She also has hx of iron def anemia. She has not seen her PCP in over a year. She has taken herself off of several of her medications including her supplemental iron. The only Rx she is still taking are her Protonix and her PRN albuterol. She had one unit of PRBC. She had an EGD. AVM was found. GI saw patient. She needs a colonoscopy. She has leg pain. OP JANA recommended. She is still smoking. Inpatient course: Discharge summary reviewed- see chart. Interval history/Current status: improved. Patient Active Problem List   Diagnosis    Diabetes mellitus type 2, diet-controlled ([address]    Benign essential HTN    Hypercholesteremia    Pulmonary nodule    COPD (chronic obstructive pulmonary disease) ([address]    Chest pain    Depression    Iron deficiency anemia    Gastrointestinal hemorrhage    Prolonged Q-T interval on ECG    Exertional dyspnea    Cardiomyopathy ([address]    Thrombocytosis    Primary insomnia    Acute GI bleeding    Near syncope    Anemia    Chronic systolic congestive heart failure ([address]    Noncompliance with medication regimen    Uncontrolled type 2 diabetes mellitus with hyperglycemia (HCC)    Tobacco abuse    GI bleed    Leg cramps       Medications listed as ordered at the time of discharge from hospital     Medication List          Accurate as of July 11, 2022  2:08 PM. If you have any questions, ask your nurse or doctor.             CONTINUE taking these medications    AgaMatrix AMP Adri  Check BG daily after meals     ALBUTEROL IN     amLODIPine 5 MG tablet  Commonly known as: NORVASC  Take 1 tablet by mouth at bedtime     blood glucose test strips strip  Commonly known as: [facility] Contour Next Test  Test three times daily. DX:E11.9     * [employer] Lancets Misc  1 each by Does not apply route 3 times daily     * AgaMatrix Ultra-Thin Lancets Misc  Check sugars daily after each meal     ferrous sulfate 325 (65 Fe) MG EC tablet  Commonly known as: Fe Tabs  Take 1 tablet by mouth daily (with breakfast)     gabapentin 100 MG capsule  Commonly known as: NEURONTIN  Take 2 capsules by mouth nightly for 30 days. ipratropium-albuterol 0.5-2.5 (3) MG/3ML Soln nebulizer solution  Commonly known as: DUONEB  Inhale 3 mLs into the lungs every 6 hours as needed for Shortness of Breath     lisinopril 20 MG tablet  Commonly known as: PRINIVIL;ZESTRIL  Take 1 tablet by mouth daily     metoprolol succinate 50 MG extended release tablet  Commonly known as: TOPROL XL  Take 1 tablet by mouth daily     pantoprazole 40 MG tablet  Commonly known as: PROTONIX  Take 1 tablet by mouth daily         * This list has 2 medication(s) that are the same as other medications prescribed for you. Read the directions carefully, and ask your doctor or other care provider to review them with you. Medications marked \"taking\" at this time  No outpatient medications have been marked as taking for the 7/11/22 encounter (Office Visit) with Maribel Jennings MD.        Medications patient taking as of now reconciled against medications ordered at time of hospital discharge: Yes    A comprehensive review of systems was negative except for what was noted in the HPI.     Objective:    /80 (Site: Left Upper Arm, Position: Sitting, Cuff Size: Medium Adult)   Pulse 70   Resp 12   Ht 5' 3\" (1.6 m)   Wt 134 lb (60.8 kg)   BMI 23.74 kg/m²   General Appearance: alert and oriented to person, place and time, well developed and well- nourished, in no acute distress  Skin: warm and dry, no rash or erythema  Head: normocephalic and atraumatic  Eyes: pupils equal, round, and reactive to light, extraocular eye movements intact, conjunctivae normal  ENT: tympanic membrane, external ear and ear canal normal bilaterally, nose without deformity, nasal mucosa and turbinates normal without polyps  Neck: supple and non-tender without mass, no thyromegaly or thyroid nodules, no cervical lymphadenopathy  Pulmonary/Chest: clear to auscultation bilaterally- no wheezes, rales or rhonchi, normal air movement, no respiratory distress  Cardiovascular: normal rate, regular rhythm, normal S1 and S2, ESM aortic area, rubs, clicks, or gallops, distal pulses diminished, Bilateral carotid bruits  Abdomen: soft, non-tender, non-distended, normal bowel sounds, no masses or organomegaly  Extremities: no cyanosis, clubbing or edema  Musculoskeletal: normal range of motion, no joint swelling, deformity or tenderness  Neurologic: reflexes normal and symmetric, no cranial nerve deficit, gait, coordination and speech normal      ECHO 10/29/2019  Conclusions      Summary   Left ventricular systolic function is mildly reduced to low normal with   ejection fraction estimated at 45-50 %. There is moderate concentric left ventricular hypertrophy. Grade II diastolic dysfunction with elevated filing pressure. The left atrium is mildly dilated. The right atrium is mildly dilated. Mild posterior mitral annular calcification is present. Mild mitral regurgitation. Mild aortic stenosis is present. Mild aortic regurgitation is present. Moderate tricuspid regurgitation. Normal systolic pulmonary artery pressure (SPAP) estimated at 33 mmHg (RA   pressure 8 mmHg). Moderate pulmonic regurgitation present. The atrial septum appears lipomatous. There is an atrial septal aneurysm seen. A bubble study was performed and shows evidence of right to left shunting   consistent with a patent foramen ovale or atrial septal defect.       Signature ------------------------------------------------------------------   Electronically signed by Janna Ga MD, [facility]   (Interpreting physician) on 10/29/2019 at 04:23 PM    An electronic signature was used to authenticate this note.   --Addison Begum MD

## 2022-07-12 LAB
A/G RATIO: 1.3 (ref 1.1–2.2)
ALBUMIN SERPL-MCNC: 4.3 G/DL (ref 3.4–5)
ALP BLD-CCNC: 81 U/L (ref 40–129)
ALT SERPL-CCNC: 13 U/L (ref 10–40)
ANION GAP SERPL CALCULATED.3IONS-SCNC: 14 MMOL/L (ref 3–16)
AST SERPL-CCNC: 17 U/L (ref 15–37)
BASOPHILS ABSOLUTE: 0.1 K/UL (ref 0–0.2)
BASOPHILS RELATIVE PERCENT: 2 %
BILIRUB SERPL-MCNC: <0.2 MG/DL (ref 0–1)
BUN BLDV-MCNC: 22 MG/DL (ref 7–20)
CALCIUM SERPL-MCNC: 9.6 MG/DL (ref 8.3–10.6)
CHLORIDE BLD-SCNC: 101 MMOL/L (ref 99–110)
CO2: 25 MMOL/L (ref 21–32)
CREAT SERPL-MCNC: 0.9 MG/DL (ref 0.6–1.2)
EOSINOPHILS ABSOLUTE: 0.2 K/UL (ref 0–0.6)
EOSINOPHILS RELATIVE PERCENT: 2.6 %
GFR AFRICAN AMERICAN: >60
GFR NON-AFRICAN AMERICAN: >60
GLUCOSE BLD-MCNC: 125 MG/DL (ref 70–99)
HCT VFR BLD CALC: 36.7 % (ref 36–48)
HEMOGLOBIN: 11.1 G/DL (ref 12–16)
LYMPHOCYTES ABSOLUTE: 1.4 K/UL (ref 1–5.1)
LYMPHOCYTES RELATIVE PERCENT: 19.1 %
MCH RBC QN AUTO: 21.6 PG (ref 26–34)
MCHC RBC AUTO-ENTMCNC: 30.2 G/DL (ref 31–36)
MCV RBC AUTO: 71.6 FL (ref 80–100)
MONOCYTES ABSOLUTE: 0.7 K/UL (ref 0–1.3)
MONOCYTES RELATIVE PERCENT: 10.3 %
NEUTROPHILS ABSOLUTE: 4.7 K/UL (ref 1.7–7.7)
NEUTROPHILS RELATIVE PERCENT: 66 %
PDW BLD-RTO: 29.2 % (ref 12.4–15.4)
PLATELET # BLD: 328 K/UL (ref 135–450)
PMV BLD AUTO: 9.3 FL (ref 5–10.5)
POTASSIUM SERPL-SCNC: 4.9 MMOL/L (ref 3.5–5.1)
RBC # BLD: 5.12 M/UL (ref 4–5.2)
SODIUM BLD-SCNC: 140 MMOL/L (ref 136–145)
TOTAL PROTEIN: 7.6 G/DL (ref 6.4–8.2)
URIC ACID, SERUM: 5 MG/DL (ref 2.6–6)
WBC # BLD: 7.1 K/UL (ref 4–11)

## 2022-08-04 ENCOUNTER — HOSPITAL ENCOUNTER (OUTPATIENT)
Dept: NON INVASIVE DIAGNOSTICS | Age: 67
Discharge: HOME | End: 2022-08-04
Payer: MEDICARE

## 2022-08-04 ENCOUNTER — HOSPITAL ENCOUNTER (OUTPATIENT)
Dept: VASCULAR LAB | Age: 67
Discharge: HOME | End: 2022-08-04
Payer: MEDICARE

## 2022-08-04 DIAGNOSIS — I25.9 CHEST PAIN DUE TO MYOCARDIAL ISCHEMIA, UNSPECIFIED ISCHEMIC CHEST PAIN TYPE: ICD-10-CM

## 2022-08-04 DIAGNOSIS — I73.9 INTERMITTENT CLAUDICATION (HCC): ICD-10-CM

## 2022-08-04 DIAGNOSIS — R06.02 SOB (SHORTNESS OF BREATH): ICD-10-CM

## 2022-08-04 LAB
LV EF: 48 %
LVEF MODALITY: NORMAL

## 2022-08-04 PROCEDURE — 93925 LOWER EXTREMITY STUDY: CPT

## 2022-08-04 PROCEDURE — 93306 TTE W/DOPPLER COMPLETE: CPT

## 2022-08-19 ENCOUNTER — TELEPHONE (OUTPATIENT)
Dept: INTERNAL MEDICINE CLINIC | Age: 67
End: 2022-08-19

## 2022-08-19 RX ORDER — CILOSTAZOL 100 MG/1
TABLET ORAL
Qty: 42 TABLET | Refills: 0 | Status: SHIPPED | OUTPATIENT
Start: 2022-08-19 | End: 2022-09-12

## 2022-08-19 NOTE — TELEPHONE ENCOUNTER
----- Message from Joseph Collins MD sent at 8/19/2022  1:42 PM EDT -----  Mild peripheral vascular disease. Start aspirin 81 mg a day. Start Pletal 100 mg tablet half a tablet twice daily for 2 weeks followed by 1 full tablet twice daily.

## 2022-08-29 ENCOUNTER — OFFICE VISIT (OUTPATIENT)
Dept: INTERNAL MEDICINE CLINIC | Age: 67
End: 2022-08-29

## 2022-08-29 VITALS
HEIGHT: 63 IN | SYSTOLIC BLOOD PRESSURE: 130 MMHG | DIASTOLIC BLOOD PRESSURE: 70 MMHG | WEIGHT: 139 LBS | BODY MASS INDEX: 24.63 KG/M2 | RESPIRATION RATE: 12 BRPM | HEART RATE: 70 BPM

## 2022-08-29 DIAGNOSIS — E11.65 UNCONTROLLED TYPE 2 DIABETES MELLITUS WITH HYPERGLYCEMIA (HCC): Primary | ICD-10-CM

## 2022-08-29 DIAGNOSIS — Z12.31 ENCOUNTER FOR SCREENING MAMMOGRAM FOR MALIGNANT NEOPLASM OF BREAST: ICD-10-CM

## 2022-08-29 DIAGNOSIS — J44.9 CHRONIC OBSTRUCTIVE PULMONARY DISEASE, UNSPECIFIED COPD TYPE (HCC): ICD-10-CM

## 2022-08-29 DIAGNOSIS — I73.9 PERIPHERAL VASCULAR DISEASE (HCC): ICD-10-CM

## 2022-08-29 DIAGNOSIS — I50.20 SYSTOLIC CONGESTIVE HEART FAILURE, UNSPECIFIED HF CHRONICITY (HCC): ICD-10-CM

## 2022-08-29 LAB
BILIRUBIN, POC: NORMAL
BLOOD URINE, POC: NORMAL
CLARITY, POC: NORMAL
COLOR, POC: NORMAL
CREATININE URINE: 207.9 MG/DL (ref 28–259)
GLUCOSE URINE, POC: NORMAL
KETONES, POC: NORMAL
LEUKOCYTE EST, POC: NORMAL
MICROALBUMIN UR-MCNC: 11.5 MG/DL
MICROALBUMIN/CREAT UR-RTO: 55.3 MG/G (ref 0–30)
NITRITE, POC: NORMAL
PH, POC: NORMAL
PROTEIN, POC: NORMAL
SPECIFIC GRAVITY, POC: NORMAL
UROBILINOGEN, POC: NORMAL

## 2022-08-29 PROCEDURE — 3017F COLORECTAL CA SCREEN DOC REV: CPT | Performed by: INTERNAL MEDICINE

## 2022-08-29 PROCEDURE — 3023F SPIROM DOC REV: CPT | Performed by: INTERNAL MEDICINE

## 2022-08-29 PROCEDURE — 4004F PT TOBACCO SCREEN RCVD TLK: CPT | Performed by: INTERNAL MEDICINE

## 2022-08-29 PROCEDURE — 2022F DILAT RTA XM EVC RTNOPTHY: CPT | Performed by: INTERNAL MEDICINE

## 2022-08-29 PROCEDURE — 99213 OFFICE O/P EST LOW 20 MIN: CPT | Performed by: INTERNAL MEDICINE

## 2022-08-29 PROCEDURE — G8400 PT W/DXA NO RESULTS DOC: HCPCS | Performed by: INTERNAL MEDICINE

## 2022-08-29 PROCEDURE — 3044F HG A1C LEVEL LT 7.0%: CPT | Performed by: INTERNAL MEDICINE

## 2022-08-29 PROCEDURE — G8427 DOCREV CUR MEDS BY ELIG CLIN: HCPCS | Performed by: INTERNAL MEDICINE

## 2022-08-29 PROCEDURE — G8420 CALC BMI NORM PARAMETERS: HCPCS | Performed by: INTERNAL MEDICINE

## 2022-08-29 PROCEDURE — 81002 URINALYSIS NONAUTO W/O SCOPE: CPT | Performed by: INTERNAL MEDICINE

## 2022-08-29 PROCEDURE — 1090F PRES/ABSN URINE INCON ASSESS: CPT | Performed by: INTERNAL MEDICINE

## 2022-08-29 PROCEDURE — 1123F ACP DISCUSS/DSCN MKR DOCD: CPT | Performed by: INTERNAL MEDICINE

## 2022-08-29 RX ORDER — GABAPENTIN 100 MG/1
200 CAPSULE ORAL NIGHTLY
Qty: 180 CAPSULE | Refills: 0 | Status: SHIPPED
Start: 2022-08-29 | End: 2022-10-10 | Stop reason: DRUGHIGH

## 2022-08-29 RX ORDER — LISINOPRIL 20 MG/1
20 TABLET ORAL DAILY
Qty: 90 TABLET | Refills: 0 | Status: SHIPPED | OUTPATIENT
Start: 2022-08-29

## 2022-08-29 RX ORDER — METOPROLOL SUCCINATE 50 MG/1
50 TABLET, EXTENDED RELEASE ORAL DAILY
Qty: 90 TABLET | Refills: 0 | Status: SHIPPED | OUTPATIENT
Start: 2022-08-29

## 2022-08-29 RX ORDER — AMLODIPINE BESYLATE 5 MG/1
5 TABLET ORAL NIGHTLY
Qty: 90 TABLET | Refills: 0 | Status: SHIPPED | OUTPATIENT
Start: 2022-08-29

## 2022-08-29 RX ORDER — PANTOPRAZOLE SODIUM 40 MG/1
40 TABLET, DELAYED RELEASE ORAL DAILY
Qty: 90 TABLET | Refills: 0 | Status: SHIPPED | OUTPATIENT
Start: 2022-08-29

## 2022-08-29 ASSESSMENT — PATIENT HEALTH QUESTIONNAIRE - PHQ9
2. FEELING DOWN, DEPRESSED OR HOPELESS: 0
SUM OF ALL RESPONSES TO PHQ QUESTIONS 1-9: 0
1. LITTLE INTEREST OR PLEASURE IN DOING THINGS: 0
SUM OF ALL RESPONSES TO PHQ9 QUESTIONS 1 & 2: 0
SUM OF ALL RESPONSES TO PHQ QUESTIONS 1-9: 0

## 2022-08-29 NOTE — PROGRESS NOTES
Subjective:      Patient ID: Cherri Otto is a 79 y.o. female. HPI    Patient is here for follow up. She had leg pain. Arterial Doppler showed PAD. She is taking Pletal. It has not helped yet. She still smokes. She has DM. Sugars are doing better. On a diet. Review of Systems  See hpi    There are no changes to past medical history, family history, social history or review of systems(except as noted in the history section) since prior note (all reviewed with patient). Current Outpatient Medications   Medication Instructions    AGAMATRIX ULTRA-THIN LANCETS MISC Check sugars daily after each meal    ALBUTEROL IN Inhalation    amLODIPine (NORVASC) 5 mg, Oral, Nightly    Blood Glucose Monitoring Suppl (AGAMATRIX AMP) ISABELLA Check BG daily after meals    blood glucose test strips (DARVIN CONTOUR NEXT TEST) strip Test three times daily. DX:E11.9    cilostazol (PLETAL) 100 MG tablet Take half a tablet twice daily for 2 weeks followed by 1 full tablet twice daily. CVS Lancets MISC 1 each, Does not apply, 3 TIMES DAILY    ferrous sulfate (FE TABS) 325 mg, Oral, DAILY WITH BREAKFAST    gabapentin (NEURONTIN) 200 mg, Oral, NIGHTLY    ipratropium-albuterol (DUONEB) 0.5-2.5 (3) MG/3ML SOLN nebulizer solution 3 mLs, Inhalation, EVERY 6 HOURS PRN    lisinopril (PRINIVIL;ZESTRIL) 20 mg, Oral, DAILY    metoprolol succinate (TOPROL XL) 50 mg, Oral, DAILY    pantoprazole (PROTONIX) 40 mg, Oral, DAILY         /70 (Site: Right Upper Arm, Position: Sitting, Cuff Size: Medium Adult)   Pulse 70   Resp 12   Ht 5' 3\" (1.6 m)   Wt 139 lb (63 kg)   BMI 24.62 kg/m²      Objective:   Physical Exam  Constitutional:       Appearance: She is well-developed. HENT:      Head: Normocephalic and atraumatic. Eyes:      Pupils: Pupils are equal, round, and reactive to light. Neck:      Thyroid: No thyromegaly. Cardiovascular:      Rate and Rhythm: Normal rate and regular rhythm. Heart sounds: Normal heart sounds.  No murmur heard. Pulmonary:      Effort: Pulmonary effort is normal.      Breath sounds: Normal breath sounds. No wheezing. Musculoskeletal:      Cervical back: Normal range of motion and neck supple. Assessment:       Diagnosis Orders   1. Uncontrolled type 2 diabetes mellitus with hyperglycemia (HCC)   DIABETES FOOT EXAM    Microalbumin / Creatinine Urine Ratio    POCT Urinalysis no Micro    AFL - Matt Hernandez MD, Ophthalmology, [facility]      2. Systolic congestive heart failure, unspecified HF chronicity ([facility])  lisinopril (PRINIVIL;ZESTRIL) 20 MG tablet      3. Encounter for screening mammogram for malignant neoplasm of breast  Mammography screening bilateral      4. Chronic obstructive pulmonary disease, unspecified COPD type ([address]        5. Peripheral vascular disease ([facility]               Plan:      #  DM type 2 doing better. Eye exam ordered. #  PAD. Continue Pletal.    # COPD stable    #  Mammogram ordered. # HF stable.         Chaparro Cao MD

## 2022-09-12 RX ORDER — CILOSTAZOL 100 MG/1
TABLET ORAL
Qty: 60 TABLET | Refills: 2 | Status: SHIPPED | OUTPATIENT
Start: 2022-09-12

## 2022-10-10 ENCOUNTER — OFFICE VISIT (OUTPATIENT)
Dept: INTERNAL MEDICINE CLINIC | Age: 67
End: 2022-10-10

## 2022-10-10 VITALS
HEIGHT: 63 IN | DIASTOLIC BLOOD PRESSURE: 80 MMHG | RESPIRATION RATE: 12 BRPM | HEART RATE: 70 BPM | SYSTOLIC BLOOD PRESSURE: 130 MMHG | BODY MASS INDEX: 24.63 KG/M2 | WEIGHT: 139 LBS

## 2022-10-10 DIAGNOSIS — J44.9 CHRONIC OBSTRUCTIVE PULMONARY DISEASE, UNSPECIFIED COPD TYPE (HCC): ICD-10-CM

## 2022-10-10 DIAGNOSIS — I42.0 DILATED CARDIOMYOPATHY (HCC): ICD-10-CM

## 2022-10-10 DIAGNOSIS — E11.9 DIABETES MELLITUS TYPE 2, DIET-CONTROLLED (HCC): ICD-10-CM

## 2022-10-10 DIAGNOSIS — E11.9 DIABETES MELLITUS TYPE 2, DIET-CONTROLLED (HCC): Primary | ICD-10-CM

## 2022-10-10 DIAGNOSIS — I73.9 PAD (PERIPHERAL ARTERY DISEASE) (HCC): ICD-10-CM

## 2022-10-10 DIAGNOSIS — Z72.0 TOBACCO ABUSE: ICD-10-CM

## 2022-10-10 DIAGNOSIS — Z23 NEED FOR INFLUENZA VACCINATION: ICD-10-CM

## 2022-10-10 DIAGNOSIS — F51.01 PRIMARY INSOMNIA: ICD-10-CM

## 2022-10-10 DIAGNOSIS — I10 BENIGN ESSENTIAL HTN: ICD-10-CM

## 2022-10-10 DIAGNOSIS — I50.22 CHRONIC SYSTOLIC CONGESTIVE HEART FAILURE (HCC): ICD-10-CM

## 2022-10-10 LAB
A/G RATIO: 1.4 (ref 1.1–2.2)
ALBUMIN SERPL-MCNC: 4.3 G/DL (ref 3.4–5)
ALP BLD-CCNC: 87 U/L (ref 40–129)
ALT SERPL-CCNC: 15 U/L (ref 10–40)
ANION GAP SERPL CALCULATED.3IONS-SCNC: 14 MMOL/L (ref 3–16)
AST SERPL-CCNC: 15 U/L (ref 15–37)
BASOPHILS ABSOLUTE: 0.1 K/UL (ref 0–0.2)
BASOPHILS RELATIVE PERCENT: 0.9 %
BILIRUB SERPL-MCNC: <0.2 MG/DL (ref 0–1)
BUN BLDV-MCNC: 17 MG/DL (ref 7–20)
CALCIUM SERPL-MCNC: 9 MG/DL (ref 8.3–10.6)
CHLORIDE BLD-SCNC: 97 MMOL/L (ref 99–110)
CO2: 24 MMOL/L (ref 21–32)
CREAT SERPL-MCNC: 0.8 MG/DL (ref 0.6–1.2)
EOSINOPHILS ABSOLUTE: 0.2 K/UL (ref 0–0.6)
EOSINOPHILS RELATIVE PERCENT: 2.7 %
GFR AFRICAN AMERICAN: >60
GFR NON-AFRICAN AMERICAN: >60
GLUCOSE BLD-MCNC: 378 MG/DL (ref 70–99)
HCT VFR BLD CALC: 29.1 % (ref 36–48)
HEMATOLOGY PATH CONSULT: NO
HEMOGLOBIN: 8.5 G/DL (ref 12–16)
LYMPHOCYTES ABSOLUTE: 1.3 K/UL (ref 1–5.1)
LYMPHOCYTES RELATIVE PERCENT: 18.9 %
MCH RBC QN AUTO: 19.4 PG (ref 26–34)
MCHC RBC AUTO-ENTMCNC: 29 G/DL (ref 31–36)
MCV RBC AUTO: 66.9 FL (ref 80–100)
MONOCYTES ABSOLUTE: 0.6 K/UL (ref 0–1.3)
MONOCYTES RELATIVE PERCENT: 9.2 %
NEUTROPHILS ABSOLUTE: 4.8 K/UL (ref 1.7–7.7)
NEUTROPHILS RELATIVE PERCENT: 68.3 %
PDW BLD-RTO: 20.7 % (ref 12.4–15.4)
PLATELET # BLD: 329 K/UL (ref 135–450)
PMV BLD AUTO: 8.9 FL (ref 5–10.5)
POTASSIUM SERPL-SCNC: 4.7 MMOL/L (ref 3.5–5.1)
RBC # BLD: 4.35 M/UL (ref 4–5.2)
SODIUM BLD-SCNC: 135 MMOL/L (ref 136–145)
TOTAL PROTEIN: 7.3 G/DL (ref 6.4–8.2)
URIC ACID, SERUM: 4.1 MG/DL (ref 2.6–6)
WBC # BLD: 7 K/UL (ref 4–11)

## 2022-10-10 PROCEDURE — 4004F PT TOBACCO SCREEN RCVD TLK: CPT | Performed by: INTERNAL MEDICINE

## 2022-10-10 PROCEDURE — 3023F SPIROM DOC REV: CPT | Performed by: INTERNAL MEDICINE

## 2022-10-10 PROCEDURE — G8420 CALC BMI NORM PARAMETERS: HCPCS | Performed by: INTERNAL MEDICINE

## 2022-10-10 PROCEDURE — 90662 IIV NO PRSV INCREASED AG IM: CPT | Performed by: INTERNAL MEDICINE

## 2022-10-10 PROCEDURE — G8427 DOCREV CUR MEDS BY ELIG CLIN: HCPCS | Performed by: INTERNAL MEDICINE

## 2022-10-10 PROCEDURE — G0008 ADMIN INFLUENZA VIRUS VAC: HCPCS | Performed by: INTERNAL MEDICINE

## 2022-10-10 PROCEDURE — 1123F ACP DISCUSS/DSCN MKR DOCD: CPT | Performed by: INTERNAL MEDICINE

## 2022-10-10 PROCEDURE — 3044F HG A1C LEVEL LT 7.0%: CPT | Performed by: INTERNAL MEDICINE

## 2022-10-10 PROCEDURE — G8400 PT W/DXA NO RESULTS DOC: HCPCS | Performed by: INTERNAL MEDICINE

## 2022-10-10 PROCEDURE — 3017F COLORECTAL CA SCREEN DOC REV: CPT | Performed by: INTERNAL MEDICINE

## 2022-10-10 PROCEDURE — 99214 OFFICE O/P EST MOD 30 MIN: CPT | Performed by: INTERNAL MEDICINE

## 2022-10-10 PROCEDURE — 2022F DILAT RTA XM EVC RTNOPTHY: CPT | Performed by: INTERNAL MEDICINE

## 2022-10-10 PROCEDURE — G8484 FLU IMMUNIZE NO ADMIN: HCPCS | Performed by: INTERNAL MEDICINE

## 2022-10-10 PROCEDURE — 1090F PRES/ABSN URINE INCON ASSESS: CPT | Performed by: INTERNAL MEDICINE

## 2022-10-10 RX ORDER — TRAZODONE HYDROCHLORIDE 50 MG/1
50 TABLET ORAL NIGHTLY
Qty: 90 TABLET | Refills: 1 | Status: SHIPPED | OUTPATIENT
Start: 2022-10-10

## 2022-10-10 RX ORDER — GABAPENTIN 300 MG/1
300 CAPSULE ORAL NIGHTLY
Qty: 90 CAPSULE | Refills: 0 | Status: SHIPPED | OUTPATIENT
Start: 2022-10-10 | End: 2023-01-08

## 2022-10-10 ASSESSMENT — ENCOUNTER SYMPTOMS
RHINORRHEA: 0
SHORTNESS OF BREATH: 0
ABDOMINAL PAIN: 0
VOMITING: 0
WHEEZING: 0
NAUSEA: 0

## 2022-10-10 NOTE — PROGRESS NOTES
Subjective:      Patient ID: Steve Killian is a 79 y.o. female. HPI    Patient is here for follow up. Diabetes Mellitus Type 2: Current symptoms/problems include none. Medication compliance:  compliant most of the time  Diabetic diet compliance:  compliant most of the time,  Weight trend: stable  Current exercise: no regular exercise    Home blood sugar records: fasting range: 160 mg/dl  Any episodes of hypoglycemia? no  Eye exam current (within one year): no   reports that she has been smoking cigarettes. She has a 16.00 pack-year smoking history. She has never used smokeless tobacco.   Daily Aspirin? Yes  Known diabetic complications: peripheral vascular disease    Hypertension:  Blood pressure typically runs normal outside of the office. She is adherent to a low sodium diet. Patient denies chest pain, dry cough, fatigue. Antihypertensive medication side effects: no medication side effects noted. Use of agents associated with hypertension: none. Hyperlipidemia:  none. Lab Results   Component Value Date    LABA1C 6.8 06/25/2022    LABA1C 7.6 09/18/2020    LABA1C 8.6 02/28/2020     Lab Results   Component Value Date    LABMICR 11.50 (H) 08/29/2022    CREATININE 0.9 07/11/2022     Lab Results   Component Value Date    ALT 13 07/11/2022    AST 17 07/11/2022     No components found for: CHLPL  Lab Results   Component Value Date    TRIG 193 (H) 12/18/2021     Lab Results   Component Value Date    HDL 21 (L) 12/18/2021     Lab Results   Component Value Date/Time    LDLCALC 44 12/18/2021 05:06 AM    LDLDIRECT 101 11/16/2014 04:23 AM      She has PAD. Her legs are still hurting. She has mild AS. She has chronic systolic CHF. It is stable. Review of Systems   Constitutional:  Negative for appetite change and fatigue. HENT:  Negative for postnasal drip and rhinorrhea. Respiratory:  Negative for shortness of breath and wheezing.     Cardiovascular:  Negative for chest pain and palpitations. Gastrointestinal:  Negative for abdominal pain, nausea and vomiting. Musculoskeletal:  Negative for joint swelling. Skin:  Negative for rash. Neurological:  Negative for light-headedness. Psychiatric/Behavioral:  Negative for sleep disturbance. There are no changes to past medical history, family history, social history or review of systems(except as noted in the history section) since prior note (all reviewed with patient). Current Outpatient Medications   Medication Instructions    AGAMATRIX ULTRA-THIN LANCETS MISC Check sugars daily after each meal    ALBUTEROL IN Inhalation    amLODIPine (NORVASC) 5 mg, Oral, Nightly    Blood Glucose Monitoring Suppl (AGAMATRIX AMP) ISABELLA Check BG daily after meals    blood glucose test strips (DARVIN CONTOUR NEXT TEST) strip Test three times daily. DX:E11.9    cilostazol (PLETAL) 100 MG tablet TAKE ONE TABLET BY MOUTH TWICE A DAY    CVS Lancets MISC 1 each, Does not apply, 3 TIMES DAILY    ferrous sulfate (FE TABS) 325 mg, Oral, DAILY WITH BREAKFAST    gabapentin (NEURONTIN) 300 mg, Oral, NIGHTLY, Intended supply: 30 days    Handicap Placard MISC Does not apply    ipratropium-albuterol (DUONEB) 0.5-2.5 (3) MG/3ML SOLN nebulizer solution 3 mLs, Inhalation, EVERY 6 HOURS PRN    lisinopril (PRINIVIL;ZESTRIL) 20 mg, Oral, DAILY    metoprolol succinate (TOPROL XL) 50 mg, Oral, DAILY    pantoprazole (PROTONIX) 40 mg, Oral, DAILY    traZODone (DESYREL) 50 mg, Oral, NIGHTLY         /80 (Site: Right Upper Arm, Position: Sitting, Cuff Size: Medium Adult)   Pulse 70   Resp 12   Ht 5' 3\" (1.6 m)   Wt 139 lb (63 kg)   BMI 24.62 kg/m²     Objective:   Physical Exam  Constitutional:       Appearance: She is well-developed. HENT:      Head: Normocephalic. Eyes:      Conjunctiva/sclera: Conjunctivae normal.      Pupils: Pupils are equal, round, and reactive to light. Neck:      Thyroid: No thyroid mass or thyromegaly.       Vascular: Carotid bruit present. No JVD. Trachea: Trachea normal.   Cardiovascular:      Rate and Rhythm: Normal rate and regular rhythm. Pulses:           Carotid pulses are 1+ on the right side and 1+ on the left side. Heart sounds: Murmur (ESM) heard. Systolic murmur is present with a grade of 3/6. No gallop. Pulmonary:      Effort: Pulmonary effort is normal. No respiratory distress. Breath sounds: Normal breath sounds. No wheezing or rales. Abdominal:      General: Bowel sounds are normal. There is no distension. Palpations: Abdomen is soft. There is no hepatomegaly, splenomegaly or mass. Tenderness: There is no abdominal tenderness. Musculoskeletal:         General: Normal range of motion. Cervical back: Normal range of motion and neck supple. Lymphadenopathy:      Cervical: No cervical adenopathy. Skin:     General: Skin is warm and dry. Findings: No rash. Neurological:      Mental Status: She is alert and oriented to person, place, and time. Cranial Nerves: No cranial nerve deficit. Deep Tendon Reflexes: Reflexes are normal and symmetric. Psychiatric:         Behavior: Behavior normal.         Thought Content: Thought content normal.         Judgment: Judgment normal.         ECHO 8/4/22      Conclusions      Summary   Left ventricular cavity size is normal with moderate concentric left   ventricular hypertrophy. The left ventricular systolic function is mildly reduced with an ejection   fraction of 45-50 %. Basal inferior/basal inferoseptal hypokinesis. Grade II diastolic dysfunction with elevated left ventricular filling   pressure. The right ventricle is normal in size and function. Thickening/calcification of the non-coronary cusp similar in appearance to   the previous images 10/2019. The left atrium is mildly dilated. Mild mitral regurgitation. Mild aortic regurgitation.       No significant changes noted compared with the prior study performed   10/29/2019. Signature      ------------------------------------------------------------------   Electronically signed by Anabel Hopkins MD (Interpreting   physician) on 08/04/2022 at 05:29 PM   ------------------------------------------------------------------     Assessment:       Diagnosis Orders   1. Diabetes mellitus type 2, diet-controlled ([facility])  CBC with Auto Differential    Comprehensive Metabolic Panel    Uric Acid    Hemoglobin A1C      2. PAD (peripheral artery disease) ([facility])  CTA Lower Extremity      3. Benign essential HTN        4. Chronic obstructive pulmonary disease, unspecified COPD type ([facility]        5. Dilated cardiomyopathy ([facility]        6. Primary insomnia        7. Chronic systolic congestive heart failure ([facility]        8. Tobacco abuse        9. Need for influenza vaccination  Influenza, FLUZONE HIGH-DOSE, (age 72 y+), IM, Preservative Free, 0.7 mL             Plan:      # DM type 2. Controlled. #  PAD. Still having pain. Check CTA of lower extremity. #  HTN well controlled. #  Chronic systolic CHF. Stable. #  Insomnia. Try Trazodone. #  COPD. Stable. #  Counselled to stop smoking. #  BP controlled.            Chirag Morales MD

## 2022-10-11 ENCOUNTER — TELEPHONE (OUTPATIENT)
Dept: INTERNAL MEDICINE CLINIC | Age: 67
End: 2022-10-11

## 2022-10-11 DIAGNOSIS — D50.9 IRON DEFICIENCY ANEMIA, UNSPECIFIED IRON DEFICIENCY ANEMIA TYPE: ICD-10-CM

## 2022-10-11 DIAGNOSIS — K92.2 GASTROINTESTINAL HEMORRHAGE, UNSPECIFIED GASTROINTESTINAL HEMORRHAGE TYPE: Primary | ICD-10-CM

## 2022-10-11 LAB
ESTIMATED AVERAGE GLUCOSE: 234.6 MG/DL
HBA1C MFR BLD: 9.8 %

## 2022-10-11 RX ORDER — FERRIC CARBOXYMALTOSE INJECTION 50 MG/ML
INJECTION, SOLUTION INTRAVENOUS
Qty: 15 ML | Refills: 1 | Status: SHIPPED | OUTPATIENT
Start: 2022-10-11 | End: 2022-10-13 | Stop reason: SDUPTHER

## 2022-10-11 NOTE — TELEPHONE ENCOUNTER
----- Message from Hector Vargas MD sent at 10/11/2022  2:59 PM EDT -----  Start sugars very high  Start Jardiance 25 mg po daily  She is anemic again and that is causing her fatigue  Set her up for injectofer injections 750 mg once weekly for two shots

## 2022-10-13 RX ORDER — SODIUM CHLORIDE 9 MG/ML
5-250 INJECTION, SOLUTION INTRAVENOUS PRN
Status: CANCELLED | OUTPATIENT
Start: 2022-10-21

## 2022-10-13 RX ORDER — SODIUM CHLORIDE 9 MG/ML
INJECTION, SOLUTION INTRAVENOUS CONTINUOUS
Status: CANCELLED
Start: 2022-10-21

## 2022-10-13 RX ORDER — FERRIC CARBOXYMALTOSE INJECTION 50 MG/ML
INJECTION, SOLUTION INTRAVENOUS
Qty: 15 ML | Refills: 1 | Status: SHIPPED | OUTPATIENT
Start: 2022-10-13

## 2022-10-19 ENCOUNTER — TELEPHONE (OUTPATIENT)
Dept: INTERNAL MEDICINE CLINIC | Age: 67
End: 2022-10-19

## 2022-10-19 DIAGNOSIS — I73.9 PAD (PERIPHERAL ARTERY DISEASE) (HCC): Primary | ICD-10-CM

## 2022-10-19 NOTE — TELEPHONE ENCOUNTER
----- Message from Sarah Ibarra MD sent at 10/19/2022  1:17 PM EDT -----  Contact: Iain Chambers / scheduling [phone]  with  ----- Message -----  From: Maddi Quinn  Sent: 10/19/2022  12:15 PM EDT  To: Sarah Ibarra MD    Caller states she needs to know if the patients cta lower extremely needs to be done with or without contrast and for a new order to be put into her file for them to schedule her appointment.

## 2022-10-21 ENCOUNTER — HOSPITAL ENCOUNTER (OUTPATIENT)
Dept: NURSING | Age: 67
Setting detail: INFUSION SERIES
Discharge: HOME | End: 2022-10-21
Payer: MEDICARE

## 2022-10-21 ENCOUNTER — HOSPITAL ENCOUNTER (OUTPATIENT)
Dept: MAMMOGRAPHY | Age: 67
Discharge: HOME | End: 2022-10-21
Payer: MEDICARE

## 2022-10-21 VITALS
WEIGHT: 139 LBS | HEART RATE: 83 BPM | TEMPERATURE: 98 F | HEIGHT: 63 IN | BODY MASS INDEX: 24.63 KG/M2 | SYSTOLIC BLOOD PRESSURE: 115 MMHG | DIASTOLIC BLOOD PRESSURE: 54 MMHG | RESPIRATION RATE: 16 BRPM

## 2022-10-21 DIAGNOSIS — K92.2 GASTROINTESTINAL HEMORRHAGE, UNSPECIFIED GASTROINTESTINAL HEMORRHAGE TYPE: Primary | ICD-10-CM

## 2022-10-21 DIAGNOSIS — D50.9 IRON DEFICIENCY ANEMIA, UNSPECIFIED IRON DEFICIENCY ANEMIA TYPE: ICD-10-CM

## 2022-10-21 DIAGNOSIS — Z12.31 ENCOUNTER FOR SCREENING MAMMOGRAM FOR MALIGNANT NEOPLASM OF BREAST: ICD-10-CM

## 2022-10-21 PROCEDURE — 96365 THER/PROPH/DIAG IV INF INIT: CPT

## 2022-10-21 PROCEDURE — 6360000002 HC RX W HCPCS: Performed by: INTERNAL MEDICINE

## 2022-10-21 PROCEDURE — 99203 OFFICE O/P NEW LOW 30 MIN: CPT

## 2022-10-21 PROCEDURE — 77067 SCR MAMMO BI INCL CAD: CPT

## 2022-10-21 PROCEDURE — 2580000003 HC RX 258: Performed by: INTERNAL MEDICINE

## 2022-10-21 RX ORDER — SODIUM CHLORIDE 9 MG/ML
INJECTION, SOLUTION INTRAVENOUS CONTINUOUS
Status: CANCELLED
Start: 2022-10-28

## 2022-10-21 RX ORDER — SODIUM CHLORIDE 9 MG/ML
INJECTION, SOLUTION INTRAVENOUS CONTINUOUS
Status: DISCONTINUED | OUTPATIENT
Start: 2022-10-21 | End: 2022-10-22 | Stop reason: HOSPADM

## 2022-10-21 RX ADMIN — FERRIC CARBOXYMALTOSE INJECTION 750 MG: 50 INJECTION, SOLUTION INTRAVENOUS at 09:29

## 2022-10-21 RX ADMIN — SODIUM CHLORIDE: 9 INJECTION, SOLUTION INTRAVENOUS at 09:28

## 2022-10-21 ASSESSMENT — PAIN DESCRIPTION - FREQUENCY: FREQUENCY: INTERMITTENT

## 2022-10-21 ASSESSMENT — PAIN SCALES - GENERAL: PAINLEVEL_OUTOF10: 8

## 2022-10-21 ASSESSMENT — PAIN DESCRIPTION - ORIENTATION: ORIENTATION: RIGHT;LEFT

## 2022-10-21 ASSESSMENT — PAIN DESCRIPTION - DESCRIPTORS: DESCRIPTORS: TINGLING;THROBBING

## 2022-10-21 ASSESSMENT — PAIN DESCRIPTION - PAIN TYPE: TYPE: CHRONIC PAIN

## 2022-10-21 ASSESSMENT — PAIN DESCRIPTION - LOCATION: LOCATION: HIP;LEG

## 2022-10-21 NOTE — PROGRESS NOTES
No changes noted  Pt reports that she is feeling good and is breathing fine  IV removed  Cath tip intact  Pressure then pressure dressing applied to site and secured with a coban dressing  IV site unremarkable  Pt darien well  Lungs CTA   Discharge instructions reviewed with pt and copy given  Understanding verbalized then pt discharged ambulatory in stable condition

## 2022-10-21 NOTE — PROGRESS NOTES
Injectafer infused without any signs of reactions  Tubing was flushed with NS  Tubing disconnected from pt and pt was informed I will remove IV closer to discharge  Pt receptive and reports that she is feeling fine  No changes noted  Will continue to monitor

## 2022-10-21 NOTE — PROGRESS NOTES
Pt here ambulatory for the 1st dose of injectafer   Pt reports that she has had iron infusions before and didn't have any trouble with them  Pt reports she is tired all the times and likes to crunch on ice  Infusion process discussed with pt and she is aware of 30  min monitoring time after the infusion is completed today  Pt denies need for any extra print outs on the injectafer today or any further questions or concerns about the infusion  Pt reporting pain rated an 8 out of 10 in her geraldine hips and legs at present time  IV started # 22 started in [address] [place] [address] on 2nd attempt per myself without difficulty then Injectafer 750 mg IVPB started   Pt darien well  Will monitor

## 2022-10-22 ENCOUNTER — TELEPHONE (OUTPATIENT)
Dept: MAMMOGRAPHY | Age: 67
End: 2022-10-22

## 2022-10-28 ENCOUNTER — HOSPITAL ENCOUNTER (OUTPATIENT)
Dept: NURSING | Age: 67
Setting detail: INFUSION SERIES
Discharge: HOME | End: 2022-10-28
Payer: MEDICARE

## 2022-10-28 VITALS
SYSTOLIC BLOOD PRESSURE: 138 MMHG | DIASTOLIC BLOOD PRESSURE: 69 MMHG | HEART RATE: 85 BPM | RESPIRATION RATE: 16 BRPM | TEMPERATURE: 98.5 F

## 2022-10-28 DIAGNOSIS — D50.9 IRON DEFICIENCY ANEMIA, UNSPECIFIED IRON DEFICIENCY ANEMIA TYPE: ICD-10-CM

## 2022-10-28 DIAGNOSIS — K92.2 GASTROINTESTINAL HEMORRHAGE, UNSPECIFIED GASTROINTESTINAL HEMORRHAGE TYPE: Primary | ICD-10-CM

## 2022-10-28 PROCEDURE — 6360000002 HC RX W HCPCS: Performed by: INTERNAL MEDICINE

## 2022-10-28 PROCEDURE — 2580000003 HC RX 258: Performed by: INTERNAL MEDICINE

## 2022-10-28 PROCEDURE — 96365 THER/PROPH/DIAG IV INF INIT: CPT

## 2022-10-28 PROCEDURE — 99211 OFF/OP EST MAY X REQ PHY/QHP: CPT

## 2022-10-28 RX ORDER — SODIUM CHLORIDE 9 MG/ML
INJECTION, SOLUTION INTRAVENOUS CONTINUOUS
Status: DISCONTINUED | OUTPATIENT
Start: 2022-10-28 | End: 2022-10-29 | Stop reason: HOSPADM

## 2022-10-28 RX ORDER — SODIUM CHLORIDE 9 MG/ML
INJECTION, SOLUTION INTRAVENOUS CONTINUOUS
Status: CANCELLED
Start: 2022-10-28

## 2022-10-28 RX ADMIN — FERRIC CARBOXYMALTOSE INJECTION 750 MG: 50 INJECTION, SOLUTION INTRAVENOUS at 11:30

## 2022-10-28 RX ADMIN — SODIUM CHLORIDE: 9 INJECTION, SOLUTION INTRAVENOUS at 11:29

## 2022-10-28 NOTE — PROGRESS NOTES
Patient ambulated to treatment room without complications. Pt states she is feeling \" tired and cold \" Discussed Iron infusion with patient. Patient verbalized understanding. Vitals within Defined limits. Lung sounds Clear, unlabored, equal , Heart sounds S1,S2, regular. IV started in left wrist with 22 ga. Blood return noted, Flushed, Alcohol cap applied. Injectafer infusion started at 1114 infusing at 530 ml/hr. Patient tolerating start without complications. Injectafer infusion completed at 1226 Patient tolerated well without any complications. IV Removed, Dressing applied. Patient ambulated to [facility] after signing discharge papers.

## 2022-10-29 ENCOUNTER — HOSPITAL ENCOUNTER (OUTPATIENT)
Dept: CT IMAGING | Age: 67
Discharge: HOME | End: 2022-10-29
Payer: MEDICARE

## 2022-10-29 DIAGNOSIS — I73.9 PAD (PERIPHERAL ARTERY DISEASE) (HCC): ICD-10-CM

## 2022-10-29 PROCEDURE — 6360000004 HC RX CONTRAST MEDICATION: Performed by: INTERNAL MEDICINE

## 2022-10-29 PROCEDURE — 75635 CT ANGIO ABDOMINAL ARTERIES: CPT

## 2022-10-29 RX ADMIN — IOPAMIDOL 100 ML: 755 INJECTION, SOLUTION INTRAVENOUS at 06:40

## 2022-11-09 ENCOUNTER — TELEPHONE (OUTPATIENT)
Dept: VASCULAR SURGERY | Age: 67
End: 2022-11-09

## 2022-11-09 NOTE — TELEPHONE ENCOUNTER
Called patient and no vm and called daughter and left message need to move apt. From 11/21/22  to 11/18/2022. [email or web address]

## 2022-11-16 RX ORDER — AMLODIPINE BESYLATE 5 MG/1
TABLET ORAL
Qty: 90 TABLET | Refills: 0 | Status: SHIPPED | OUTPATIENT
Start: 2022-11-16

## 2022-11-16 RX ORDER — PANTOPRAZOLE SODIUM 40 MG/1
TABLET, DELAYED RELEASE ORAL
Qty: 90 TABLET | Refills: 0 | Status: SHIPPED | OUTPATIENT
Start: 2022-11-16

## 2022-11-18 ENCOUNTER — OFFICE VISIT (OUTPATIENT)
Dept: VASCULAR SURGERY | Age: 67
End: 2022-11-18
Payer: MEDICARE

## 2022-11-18 VITALS
WEIGHT: 139 LBS | DIASTOLIC BLOOD PRESSURE: 60 MMHG | SYSTOLIC BLOOD PRESSURE: 128 MMHG | HEIGHT: 63 IN | BODY MASS INDEX: 24.63 KG/M2

## 2022-11-18 DIAGNOSIS — I73.9 CLAUDICATION (HCC): Primary | ICD-10-CM

## 2022-11-18 PROCEDURE — 99203 OFFICE O/P NEW LOW 30 MIN: CPT | Performed by: SURGERY

## 2022-11-18 PROCEDURE — 3074F SYST BP LT 130 MM HG: CPT | Performed by: SURGERY

## 2022-11-18 PROCEDURE — 3078F DIAST BP <80 MM HG: CPT | Performed by: SURGERY

## 2022-11-18 NOTE — PROGRESS NOTES
Outpatient Consultation / H&P    Date of Consultation:  11/18/2022    PCP:  Abiola Wright MD     Referring Provider:  Dr. Michael Finney     Chief Complaint:   Chief Complaint   Patient presents with    Other     Patient ref by Dr Michael Finney for PVD. CTA DONE 10/29/22. pamlr        History of Present Illness: We are asked to see this patient in consultation by Dr. Michael Finney regarding PVD. Laura Alvarez is a 79 y.o. female who reports pain with walking. Pain starts in lower back radiating down both legs requiring her to stop walking. She often feels a stabbing pain in R thigh and left calf even at rest.  She denies any foot pain. She did have Duplex and CTA showing mild arterial disease. She is a current everyday smoker. She also has a mass seen on kidney and is schedule to see Urology.          Past Medical History:  Past Medical History:   Diagnosis Date    Atrial fibrillation ([address]     Bronchitis chronic     Cancer ([address]     cervical    CHF (congestive heart failure) ([address]     Diabetes mellitus ([address]     Emphysema of lung ([address]     Hypercholesteremia     Hypertension     Microcytic anemia 03/23/2017    Psoriasis        Past Surgical History:  Past Surgical History:   Procedure Laterality Date    CERVICAL DISC SURGERY  2004    CHOLECYSTECTOMY      COLONOSCOPY N/A 12/18/2021    COLONOSCOPY WITH BIOPSY performed by Silas Lawrence MD at [facility] N/A 12/18/2021    COLONOSCOPY POLYPECTOMY SNARE/COLD BIOPSY performed by Silas Lawrence MD at [address] [place] [address] CATH LAB PROCEDURE  04/2011    angiogram with 30% blockage    HYSTERECTOMY (CERVIX STATUS UNKNOWN)      KIDNEY STONE SURGERY      TONSILLECTOMY      UPPER GASTROINTESTINAL ENDOSCOPY N/A 10/29/2019    EGD BIOPSY performed by Yahaira Cabrera DO at [address] N/A 10/11/2020    EGD BIOPSY performed by Yahaira Cabrera DO at [address] ENDOSCOPY    UPPER GASTROINTESTINAL ENDOSCOPY  10/11/2020    EGD CONTROL HEMORRHAGE performed by Chloe Martines DO at [address] N/A 12/18/2021    EGD BIOPSY performed by Barbara Muller MD at [address] N/A 06/26/2022    EGD BIOPSY performed by Chloe Martines DO at [address]  06/26/2022    EGD CONTROL HEMORRHAGE performed by Chloe Martines DO at [address] Medications:   Prior to Admission medications    Medication Sig Start Date End Date Taking? Authorizing Provider   amLODIPine (NORVASC) 5 MG tablet TAKE ONE TABLET BY MOUTH AT BEDTIME 11/16/22  Yes Jessica Richardson MD   pantoprazole (PROTONIX) 40 MG tablet TAKE ONE TABLET BY MOUTH DAILY 11/16/22  Yes Jessica Richardson MD   ferric carboxymaltose (INJECTAFER) 750 MG/15ML SOLN IV solution Inject 750 mg once weekly for 2 doses. 10/13/22  Yes Jessica Richardson MD   empagliflozin (JARDIANCE) 25 MG tablet Take 1 tablet by mouth daily 10/11/22  Yes Jessica Richardson MD   gabapentin (NEURONTIN) 300 MG capsule Take 1 capsule by mouth nightly for 90 days.  Intended supply: 30 days 10/10/22 1/8/23 Yes Jessica Richardson MD   traZODone (DESYREL) 50 MG tablet Take 1 tablet by mouth nightly 10/10/22  Yes Jessica Richardson MD   cilostazol (PLETAL) 100 MG tablet TAKE ONE TABLET BY MOUTH TWICE A DAY 9/12/22  Yes Jessica Richardson MD   lisinopril (PRINIVIL;ZESTRIL) 20 MG tablet Take 1 tablet by mouth daily 8/29/22  Yes Jessica Richardson MD   metoprolol succinate (TOPROL XL) 50 MG extended release tablet Take 1 tablet by mouth daily 8/29/22  Yes Jessica Richardson MD   Handicap Placard MISC by Does not apply route 8/29/22  Yes Jessica Richardson MD   ferrous sulfate (FE TABS) 325 (65 Fe) MG EC tablet Take 1 tablet by mouth daily (with Heart Disease Mother     High Blood Pressure Mother     Hypertension Mother     Heart Failure Mother     Cancer Father     Cancer Sister     Diabetes Brother     Cancer Brother     Cancer Brother     Asthma Daughter     Hypertension Brother     Hypertension Brother     Emphysema Neg Hx        Review of Systems:  A 14 point review of systems was completed. Pertinent positives identified in the HPI, all other review of systems negative. Physical Examination:    /60 (Site: Right Upper Arm)   Ht 5' 3\" (1.6 m)   Wt 139 lb (63 kg)   BMI 24.62 kg/m²     Weight: 139 lb (63 kg)       General appearance: NAD  Eyes: PERRLA  Neck: no JVD, no lymphadenopathy. Respiratory: effort is unlabored, no crackles, wheezes or rubs. Cardiovascular: regular, no murmur. No carotid bruits. No edema or varicosities. Pulses:   RIGHT 2 2    LEFT 2 2    GI: abdomen soft, nondistended, no organomegaly. Musculoskeletal: strength and tone normal.  Extremities: warm and pink. Skin: no dermatitis or ulceration. Neuro/psychiatric: grossly intact. MEDICAL DECISION MAKING/TESTING      Arterial Duplex:    Right Impression   The right ankle/brachial index is 0.84(the DP is 136, the PT is 134mmHg). There is normal multiphasic flow at the common femoral artery indicating no   significant aorto-iliac inflow disease. There is atherosclerotic plaque seen within the common femoral artery   consistent with < 50% stenosis. Elevated velocities at the mid superficial femoral artery indicate a > 50%   stenosis by velocity criteria (velocity went from 123 to 371 cm/s). Elevated velocities at the distal superficial femoral artery indicate a > 50%   stenosis by velocity criteria (velocity went from 140 to 293 cm/s). Abnormal monophasic flow is seen involving the lower extremity. See chart   below. The tibial arteries are patent at the ankle.    Left Impression   The left ankle/brachial index is 0.88(the DP is 142, the PT is 134mmHg). There is normal multiphasic flow at the common femoral artery indicating no   significant aorto-iliac inflow disease. There is atherosclerotic plaque seen within the common femoral artery   consistent with <50% stenosis. Elevated velocities at the mid superficial femoral artery indicate a >50%   stenosis by velocity criteria (velocity went from 119 to 306 cm/s). Elevated velocities at the distal superficial femoral artery indicate a >50%   stenosis by velocity criteria (velocity went from 58.2 to 124 cm/s). The tibial arteries are patent at the ankle. There is no previous exam for comparison. Conclusions        Summary        Mild bilateral arterial disease secondary to superficial femoral artery    stenoses. Signature        ------------------------------------------------------------------    Electronically signed by Yeimi Heredia MD (Interpreting    physician) on 08/05/2022 at 09:22 PM    ------------------------------------------------------------------         Assessment:      Diagnosis Orders   1. Claudication [facility])          Patient has very mild arterial disease as noted on physiologic arterial study. Claudication symptoms neurogenic not Vascular. Recommendations/Plan:    No further arterial testing or intervention. Recommend spinal w/u and referral to spine specialist.    Smoking cessation.        Lindsay Sanders MD, FACS

## 2022-11-28 NOTE — PROGRESS NOTES
Lanza Retort    Age 79 y.o.    female    1955    MRN [id number]    12/8/2022  Arrival Time_____________  OR Time____________[address]     Procedure(s):  ROBOTIC LEFT PARTIAL NEPHRECTOMY                      General   Surgeon(s):  Juliette Aguilar, MD      DAY ADMIT ___  SDS/OP ___  OUTPT IN BED ___        Phone [phone] (home)     PCP _____________________ Phone_________________ Epic ( ) Epic CE ( ) Appt ________    ADDITIONAL INFO __________________________________ Cardio/Consult _____________    NOTES _____________________________________________________________________    ____________________________________________________________________________    PAT APPT DATE:________ TIME: ________  FAXED QAD: _______  (__) H&P w/ Hospitalist  __________________________________________________________________________  Preop Nurse phone screen complete: _____________  (__) CBC     (__) W/ DIFF ___________     (__) Hgb A1C    ___________  (__) CHEST X RAY   __________  (__) LIPID PROFILE  ___________  (__) EKG   __________  (__) PT/PTT   ___________  (__) PFT's   __________  (__) BMP   ___________  (__) CAROTIDS  __________  (__) CMP   ___________  (__) VEIN MAPPING  __________  (__) U/A   ___________  (__) HISTORY & PHYSICAL __________  (__) URINE C & S  ___________  (__) CARDIAC CLEARANCE __________  (__) U/A W/ FLEX  ___________  (__) PULM.  CLEARANCE __________  (__) SERUM PREGNANCY ___________  (__) Check Epic DOS orders __________  (__) TYPE & SCREEN __________repeat ( ) (__)  __________________ __________  (__) ALBUMIN   ___________  (__)  __________________ __________  (__) TRANSFERRIN  ___________  (__)  __________________ __________  (__) LIVER PROFILE  ___________  (__)  __________________ __________  (__) MRSA NASAL SWAB ___________  (__) URINE PREG DOS __________  (__) SED RATE  ___________  (__) BLOOD SUGAR DOS __________  (__) C-REACTIVE PROTEIN ___________    (__) VITAMIN D HYDROXY ___________  (__) BLOOD THINNERS __________    (__) ACE/ ARBS: _____________________     (__) BETABLOCKERS __________________

## 2022-11-30 ENCOUNTER — HOSPITAL ENCOUNTER (OUTPATIENT)
Age: 67
Discharge: HOME | End: 2022-11-30
Payer: MEDICARE

## 2022-11-30 LAB
A/G RATIO: 1.3 (ref 1.1–2.2)
ALBUMIN SERPL-MCNC: 4.6 G/DL (ref 3.4–5)
ALP BLD-CCNC: 105 U/L (ref 40–129)
ALT SERPL-CCNC: 18 U/L (ref 10–40)
ANION GAP SERPL CALCULATED.3IONS-SCNC: 17 MMOL/L (ref 3–16)
AST SERPL-CCNC: 20 U/L (ref 15–37)
BACTERIA: ABNORMAL /HPF
BILIRUB SERPL-MCNC: <0.2 MG/DL (ref 0–1)
BILIRUBIN URINE: NEGATIVE
BLOOD, URINE: NEGATIVE
BUN BLDV-MCNC: 14 MG/DL (ref 7–20)
CALCIUM SERPL-MCNC: 9.5 MG/DL (ref 8.3–10.6)
CHLORIDE BLD-SCNC: 98 MMOL/L (ref 99–110)
CLARITY: CLEAR
CO2: 21 MMOL/L (ref 21–32)
COLOR: YELLOW
CREAT SERPL-MCNC: 0.6 MG/DL (ref 0.6–1.2)
EKG ATRIAL RATE: 64 BPM
EKG DIAGNOSIS: NORMAL
EKG P AXIS: 69 DEGREES
EKG P-R INTERVAL: 170 MS
EKG Q-T INTERVAL: 444 MS
EKG QRS DURATION: 140 MS
EKG QTC CALCULATION (BAZETT): 458 MS
EKG R AXIS: -58 DEGREES
EKG T AXIS: 134 DEGREES
EKG VENTRICULAR RATE: 64 BPM
EPITHELIAL CELLS, UA: ABNORMAL /HPF (ref 0–5)
GFR SERPL CREATININE-BSD FRML MDRD: >60 ML/MIN/{1.73_M2}
GLUCOSE BLD-MCNC: 208 MG/DL (ref 70–99)
GLUCOSE URINE: 500 MG/DL
HCT VFR BLD CALC: 41.3 % (ref 36–48)
HEMOGLOBIN: 13 G/DL (ref 12–16)
HYALINE CASTS: ABNORMAL /LPF (ref 0–2)
KETONES, URINE: NEGATIVE MG/DL
LEUKOCYTE ESTERASE, URINE: NEGATIVE
MCH RBC QN AUTO: 25.2 PG (ref 26–34)
MCHC RBC AUTO-ENTMCNC: 31.4 G/DL (ref 31–36)
MCV RBC AUTO: 80.2 FL (ref 80–100)
MICROSCOPIC EXAMINATION: YES
MUCUS: ABNORMAL /LPF
NITRITE, URINE: NEGATIVE
PDW BLD-RTO: 26.2 % (ref 12.4–15.4)
PH UA: 5.5 (ref 5–8)
PLATELET # BLD: 204 K/UL (ref 135–450)
PLATELET SLIDE REVIEW: ADEQUATE
PMV BLD AUTO: 9.6 FL (ref 5–10.5)
POTASSIUM SERPL-SCNC: 4.3 MMOL/L (ref 3.5–5.1)
PROTEIN UA: 30 MG/DL
RBC # BLD: 5.15 M/UL (ref 4–5.2)
RBC UA: ABNORMAL /HPF (ref 0–4)
SLIDE REVIEW: ABNORMAL
SODIUM BLD-SCNC: 136 MMOL/L (ref 136–145)
SPECIFIC GRAVITY UA: >=1.03 (ref 1–1.03)
TOTAL PROTEIN: 8.1 G/DL (ref 6.4–8.2)
URINE TYPE: ABNORMAL
UROBILINOGEN, URINE: 0.2 E.U./DL
WBC # BLD: 4.8 K/UL (ref 4–11)
WBC UA: ABNORMAL /HPF (ref 0–5)

## 2022-11-30 PROCEDURE — 93010 ELECTROCARDIOGRAM REPORT: CPT | Performed by: INTERNAL MEDICINE

## 2022-11-30 PROCEDURE — 80053 COMPREHEN METABOLIC PANEL: CPT

## 2022-11-30 PROCEDURE — 36415 COLL VENOUS BLD VENIPUNCTURE: CPT

## 2022-11-30 PROCEDURE — 87086 URINE CULTURE/COLONY COUNT: CPT

## 2022-11-30 PROCEDURE — 93005 ELECTROCARDIOGRAM TRACING: CPT | Performed by: UROLOGY

## 2022-11-30 PROCEDURE — 85027 COMPLETE CBC AUTOMATED: CPT

## 2022-11-30 PROCEDURE — 81001 URINALYSIS AUTO W/SCOPE: CPT

## 2022-12-02 ENCOUNTER — OFFICE VISIT (OUTPATIENT)
Dept: INTERNAL MEDICINE CLINIC | Age: 67
End: 2022-12-02

## 2022-12-02 VITALS
BODY MASS INDEX: 23.92 KG/M2 | WEIGHT: 135 LBS | DIASTOLIC BLOOD PRESSURE: 70 MMHG | HEIGHT: 63 IN | SYSTOLIC BLOOD PRESSURE: 140 MMHG | RESPIRATION RATE: 14 BRPM | HEART RATE: 64 BPM

## 2022-12-02 DIAGNOSIS — I42.0 DILATED CARDIOMYOPATHY (HCC): ICD-10-CM

## 2022-12-02 DIAGNOSIS — I73.9 PAD (PERIPHERAL ARTERY DISEASE) (HCC): ICD-10-CM

## 2022-12-02 DIAGNOSIS — I73.9 PERIPHERAL VASCULAR DISEASE (HCC): ICD-10-CM

## 2022-12-02 DIAGNOSIS — E11.9 DIABETES MELLITUS TYPE 2, DIET-CONTROLLED (HCC): ICD-10-CM

## 2022-12-02 DIAGNOSIS — D50.9 IRON DEFICIENCY ANEMIA, UNSPECIFIED IRON DEFICIENCY ANEMIA TYPE: ICD-10-CM

## 2022-12-02 DIAGNOSIS — I50.22 CHRONIC SYSTOLIC CONGESTIVE HEART FAILURE (HCC): ICD-10-CM

## 2022-12-02 DIAGNOSIS — J44.9 CHRONIC OBSTRUCTIVE PULMONARY DISEASE, UNSPECIFIED COPD TYPE (HCC): ICD-10-CM

## 2022-12-02 DIAGNOSIS — I35.0 MILD AORTIC STENOSIS: ICD-10-CM

## 2022-12-02 DIAGNOSIS — N28.89 RENAL MASS: Primary | ICD-10-CM

## 2022-12-02 DIAGNOSIS — K31.819 AVM (ARTERIOVENOUS MALFORMATION) OF DUODENUM, ACQUIRED: ICD-10-CM

## 2022-12-02 DIAGNOSIS — I73.9 INTERMITTENT CLAUDICATION (HCC): ICD-10-CM

## 2022-12-02 DIAGNOSIS — I10 BENIGN ESSENTIAL HTN: ICD-10-CM

## 2022-12-02 DIAGNOSIS — Z01.818 PRE-OP EXAM: ICD-10-CM

## 2022-12-02 PROCEDURE — 3074F SYST BP LT 130 MM HG: CPT | Performed by: NURSE PRACTITIONER

## 2022-12-02 PROCEDURE — 99213 OFFICE O/P EST LOW 20 MIN: CPT | Performed by: NURSE PRACTITIONER

## 2022-12-02 PROCEDURE — 3078F DIAST BP <80 MM HG: CPT | Performed by: NURSE PRACTITIONER

## 2022-12-02 PROCEDURE — 3046F HEMOGLOBIN A1C LEVEL >9.0%: CPT | Performed by: NURSE PRACTITIONER

## 2022-12-02 PROCEDURE — 1123F ACP DISCUSS/DSCN MKR DOCD: CPT | Performed by: NURSE PRACTITIONER

## 2022-12-02 NOTE — PROGRESS NOTES
Subjective:      Garrett Viveros is a 79 y.o. female who presents to the office today for a preoperative consultation at the request of surgeon Dr. Blanca Verma who plans on performing Robotic left partial nephrectomy. Planned anesthesia is General and MAC.        Past Medical History:   Diagnosis Date    Atrial fibrillation ([address]     Bronchitis chronic     Cancer ([address]     cervical    CHF (congestive heart failure) ([address]     Diabetes mellitus ([address]     Emphysema of lung ([address]     History of blood transfusion     Hypercholesteremia     Hypertension     Microcytic anemia 03/23/2017    Psoriasis      Patient Active Problem List    Diagnosis Date Noted    Chest pain 09/21/2012    Leg cramps     GI bleed 06/25/2022    Tobacco abuse 01/20/2022    Noncompliance with medication regimen     Uncontrolled type 2 diabetes mellitus with hyperglycemia ([address]     Anemia     Chronic systolic congestive heart failure ([address]     Near syncope     Acute GI bleeding 10/10/2020    Primary insomnia 02/28/2020    Thrombocytosis 11/20/2019    Cardiomyopathy ([address]     Exertional dyspnea     Gastrointestinal hemorrhage     Prolonged Q-T interval on ECG     Iron deficiency anemia 03/23/2017    Depression 01/17/2013    COPD (chronic obstructive pulmonary disease) ([address] 02/09/2012    Pulmonary nodule 10/28/2011    Diabetes mellitus type 2, diet-controlled ([address]     Benign essential HTN     Hypercholesteremia      Past Surgical History:   Procedure Laterality Date    CERVICAL DISC SURGERY  2004    CHOLECYSTECTOMY      COLONOSCOPY N/A 12/18/2021    COLONOSCOPY WITH BIOPSY performed by Cydney Yeung MD at [facility] N/A 12/18/2021    COLONOSCOPY POLYPECTOMY SNARE/COLD BIOPSY performed by Cydney Yeung MD at [address] [place] [address] Ave CATH LAB PROCEDURE  04/2011    angiogram with 30% blockage    HYSTERECTOMY (CERVIX STATUS UNKNOWN)      KIDNEY STONE SURGERY      TONSILLECTOMY      UPPER GASTROINTESTINAL ENDOSCOPY N/A 10/29/2019    EGD BIOPSY performed by Wilfrido Crane DO at [facility] N/A 10/11/2020    EGD BIOPSY performed by Wilfrido Crane DO at [facility]  10/11/2020    EGD CONTROL HEMORRHAGE performed by Wilfrido Crane DO at [facility] N/A 12/18/2021    EGD BIOPSY performed by Ilya Barahona MD at [facility] N/A 06/26/2022    EGD BIOPSY performed by Wilfrido Crane DO at [facility]  06/26/2022    EGD CONTROL HEMORRHAGE performed by Wilfrido Crane DO at [address]     Family History   Problem Relation Age of Onset    Diabetes Mother     Heart Disease Mother     High Blood Pressure Mother     Hypertension Mother     Heart Failure Mother     Cancer Father     Cancer Sister     Diabetes Brother     Cancer Brother     Cancer Brother     Asthma Daughter     Hypertension Brother     Hypertension Brother     Emphysema Neg Hx      Social History     Socioeconomic History    Marital status:    Tobacco Use    Smoking status: Every Day     Packs/day: 0.50     Years: 32.00     Pack years: 16.00     Types: Cigarettes    Smokeless tobacco: Never   Vaping Use    Vaping Use: Never used   Substance and Sexual Activity    Alcohol use: No    Drug use: No    Sexual activity: Yes     Partners: Male     Current Outpatient Medications   Medication Sig Dispense Refill    amLODIPine (NORVASC) 5 MG tablet TAKE ONE TABLET BY MOUTH AT BEDTIME (Patient taking differently: Take 5 mg by mouth nightly) 90 tablet 0    pantoprazole (PROTONIX) 40 MG tablet TAKE ONE TABLET BY MOUTH DAILY 90 tablet 0    ferric carboxymaltose (INJECTAFER) 750 MG/15ML SOLN IV solution Inject 750 mg once weekly for 2 doses.  15 mL 1    empagliflozin (JARDIANCE) 25 MG tablet Take 1 tablet by mouth daily 90 tablet 1    gabapentin (NEURONTIN) 300 MG capsule Take 1 capsule by mouth nightly for 90 days. Intended supply: 30 days 90 capsule 0    traZODone (DESYREL) 50 MG tablet Take 1 tablet by mouth nightly 90 tablet 1    cilostazol (PLETAL) 100 MG tablet TAKE ONE TABLET BY MOUTH TWICE A DAY 60 tablet 2    lisinopril (PRINIVIL;ZESTRIL) 20 MG tablet Take 1 tablet by mouth daily 90 tablet 0    metoprolol succinate (TOPROL XL) 50 MG extended release tablet Take 1 tablet by mouth daily 90 tablet 0    Handicap Placard MISC by Does not apply route 1 each 0    ALBUTEROL IN Inhale 1 puff into the lungs as needed      blood glucose test strips (iMeigu CONTOUR NEXT TEST) strip Test three times daily. DX:E11.9 100 each 11    ipratropium-albuterol (DUONEB) 0.5-2.5 (3) MG/3ML SOLN nebulizer solution Inhale 3 mLs into the lungs every 6 hours as needed for Shortness of Breath 360 mL 2    AGAMATRIX ULTRA-THIN LANCETS MISC Check sugars daily after each meal (Patient not taking: No sig reported) 100 each 0    Blood Glucose Monitoring Suppl (AGAMATRIX AMP) ISABELLA Check BG daily after meals 1 Device 0    [employer] Lancets MISC 1 each by Does not apply route 3 times daily 100 each 0     No current facility-administered medications for this visit. Allergies   Allergen Reactions    Morphine Other (See Comments)     Bad headache    Oxycodone Hcl     Chantix [Varenicline Tartrate]      BAD DREAMS    Percocet [Oxycodone-Acetaminophen] Nausea And Vomiting     Review of Systems  A comprehensive review of systems was negative. Planned anesthesia: General; MAC  Known anesthesia problems: denies  Bleeding risk:  on Pletal  Personal or FH of DVT/PE:  denies  Patient objection to receiving blood products: No     Objective:     Vitals:    12/02/22 0931   BP: (!) 140/70   Pulse: 64   Resp: 14       Gen: No distress. Alert. Eyes: PERRL. No sclera icterus. No conjunctival injection. ENT: No discharge. Pharynx clear. Neck: No JVD. + Carotid Bruit. Trachea midline. Resp: No accessory muscle use. No crackles. No wheezes. No rhonchi. CV: Regular rate. Regular rhythm. +murmur. No rub. No edema. Capillary Refill: Brisk,< 3 seconds   Peripheral Pulses: +2 palpable, equal bilaterally   GI: Non-tender. Non-distended. Normal bowel sounds. No hernia. Skin: Warm and dry. No nodule on exposed extremities. No rash on exposed extremities. M/S: No cyanosis. No joint deformity. No clubbing. Neuro: Awake. Grossly nonfocal    Psych: Oriented x 3. No anxiety or agitation         Assessment:       Diagnosis Orders   1. Renal mass        2. Pre-op exam        3. PAD (peripheral artery disease) ([address]        4. Iron deficiency anemia, unspecified iron deficiency anemia type        5. Diabetes mellitus type 2, diet-controlled ([address]        6. Benign essential HTN        7. Chronic obstructive pulmonary disease, unspecified COPD type ([address]        8. Dilated cardiomyopathy ([address]        9. Chronic systolic congestive heart failure ([address]        10. Peripheral vascular disease ([address]        11. Intermittent claudication ([address]        12. Mild aortic stenosis        13. AVM (arteriovenous malformation) of duodenum, acquired              79 y.o. female with planned surgery as above. Plan:     Patient medically cleared for above planned procedure.     Caty White FNP-C  12/2/2022

## 2022-12-04 LAB — URINE CULTURE, ROUTINE: NORMAL

## 2022-12-07 ENCOUNTER — ANESTHESIA EVENT (OUTPATIENT)
Dept: OPERATING ROOM | Age: 67
End: 2022-12-07
Payer: MEDICARE

## 2022-12-08 ENCOUNTER — HOSPITAL ENCOUNTER (INPATIENT)
Age: 67
LOS: 1 days | Discharge: HOME | DRG: 657 | End: 2022-12-09
Attending: UROLOGY | Admitting: UROLOGY
Payer: MEDICARE

## 2022-12-08 ENCOUNTER — ANESTHESIA (OUTPATIENT)
Dept: OPERATING ROOM | Age: 67
End: 2022-12-08
Payer: MEDICARE

## 2022-12-08 DIAGNOSIS — D50.0 IRON DEFICIENCY ANEMIA DUE TO CHRONIC BLOOD LOSS: Primary | ICD-10-CM

## 2022-12-08 DIAGNOSIS — D41.02 NEOPLASM OF UNCERTAIN BEHAVIOR OF LEFT KIDNEY: ICD-10-CM

## 2022-12-08 LAB
ABO/RH: NORMAL
ANTIBODY SCREEN: NORMAL
GLUCOSE BLD-MCNC: 160 MG/DL (ref 70–99)
GLUCOSE BLD-MCNC: 238 MG/DL (ref 70–99)
GLUCOSE BLD-MCNC: 246 MG/DL (ref 70–99)
GLUCOSE BLD-MCNC: 354 MG/DL (ref 70–99)
HCT VFR BLD CALC: 43.4 % (ref 36–48)
HEMOGLOBIN: 13.9 G/DL (ref 12–16)
PERFORMED ON: ABNORMAL

## 2022-12-08 PROCEDURE — 2580000003 HC RX 258: Performed by: UROLOGY

## 2022-12-08 PROCEDURE — 86901 BLOOD TYPING SEROLOGIC RH(D): CPT

## 2022-12-08 PROCEDURE — 3700000000 HC ANESTHESIA ATTENDED CARE: Performed by: UROLOGY

## 2022-12-08 PROCEDURE — 2580000003 HC RX 258: Performed by: ANESTHESIOLOGY

## 2022-12-08 PROCEDURE — 2500000003 HC RX 250 WO HCPCS: Performed by: UROLOGY

## 2022-12-08 PROCEDURE — 6360000002 HC RX W HCPCS: Performed by: UROLOGY

## 2022-12-08 PROCEDURE — 2500000003 HC RX 250 WO HCPCS: Performed by: NURSE ANESTHETIST, CERTIFIED REGISTERED

## 2022-12-08 PROCEDURE — 2709999900 HC NON-CHARGEABLE SUPPLY: Performed by: UROLOGY

## 2022-12-08 PROCEDURE — 3700000001 HC ADD 15 MINUTES (ANESTHESIA): Performed by: UROLOGY

## 2022-12-08 PROCEDURE — 7100000000 HC PACU RECOVERY - FIRST 15 MIN: Performed by: UROLOGY

## 2022-12-08 PROCEDURE — 6360000002 HC RX W HCPCS: Performed by: NURSE ANESTHETIST, CERTIFIED REGISTERED

## 2022-12-08 PROCEDURE — 7100000001 HC PACU RECOVERY - ADDTL 15 MIN: Performed by: UROLOGY

## 2022-12-08 PROCEDURE — 85018 HEMOGLOBIN: CPT

## 2022-12-08 PROCEDURE — 6370000000 HC RX 637 (ALT 250 FOR IP): Performed by: UROLOGY

## 2022-12-08 PROCEDURE — 85014 HEMATOCRIT: CPT

## 2022-12-08 PROCEDURE — 88307 TISSUE EXAM BY PATHOLOGIST: CPT

## 2022-12-08 PROCEDURE — 86900 BLOOD TYPING SEROLOGIC ABO: CPT

## 2022-12-08 PROCEDURE — 0TB14ZZ EXCISION OF LEFT KIDNEY, PERCUTANEOUS ENDOSCOPIC APPROACH: ICD-10-PCS | Performed by: UROLOGY

## 2022-12-08 PROCEDURE — 3600000019 HC SURGERY ROBOT ADDTL 15MIN: Performed by: UROLOGY

## 2022-12-08 PROCEDURE — 6360000002 HC RX W HCPCS: Performed by: ANESTHESIOLOGY

## 2022-12-08 PROCEDURE — 8E0W4CZ ROBOTIC ASSISTED PROCEDURE OF TRUNK REGION, PERCUTANEOUS ENDOSCOPIC APPROACH: ICD-10-PCS | Performed by: UROLOGY

## 2022-12-08 PROCEDURE — 3600000009 HC SURGERY ROBOT BASE: Performed by: UROLOGY

## 2022-12-08 PROCEDURE — 36415 COLL VENOUS BLD VENIPUNCTURE: CPT

## 2022-12-08 PROCEDURE — S2900 ROBOTIC SURGICAL SYSTEM: HCPCS | Performed by: UROLOGY

## 2022-12-08 PROCEDURE — 1200000000 HC SEMI PRIVATE

## 2022-12-08 PROCEDURE — 86850 RBC ANTIBODY SCREEN: CPT

## 2022-12-08 PROCEDURE — A4217 STERILE WATER/SALINE, 500 ML: HCPCS | Performed by: UROLOGY

## 2022-12-08 RX ORDER — SODIUM CHLORIDE 0.9 % (FLUSH) 0.9 %
5-40 SYRINGE (ML) INJECTION EVERY 12 HOURS SCHEDULED
Status: DISCONTINUED | OUTPATIENT
Start: 2022-12-08 | End: 2022-12-08 | Stop reason: HOSPADM

## 2022-12-08 RX ORDER — SENNA AND DOCUSATE SODIUM 50; 8.6 MG/1; MG/1
1 TABLET, FILM COATED ORAL DAILY
Qty: 14 TABLET | Refills: 0 | Status: SHIPPED | OUTPATIENT
Start: 2022-12-08 | End: 2022-12-22

## 2022-12-08 RX ORDER — ONDANSETRON 2 MG/ML
4 INJECTION INTRAMUSCULAR; INTRAVENOUS
Status: DISCONTINUED | OUTPATIENT
Start: 2022-12-08 | End: 2022-12-08 | Stop reason: HOSPADM

## 2022-12-08 RX ORDER — DEXAMETHASONE SODIUM PHOSPHATE 4 MG/ML
4 INJECTION, SOLUTION INTRA-ARTICULAR; INTRALESIONAL; INTRAMUSCULAR; INTRAVENOUS; SOFT TISSUE
Status: DISCONTINUED | OUTPATIENT
Start: 2022-12-08 | End: 2022-12-08 | Stop reason: HOSPADM

## 2022-12-08 RX ORDER — ALBUTEROL SULFATE 90 UG/1
1 AEROSOL, METERED RESPIRATORY (INHALATION) EVERY 6 HOURS PRN
Status: DISCONTINUED | OUTPATIENT
Start: 2022-12-08 | End: 2022-12-09 | Stop reason: HOSPADM

## 2022-12-08 RX ORDER — POLYETHYLENE GLYCOL 3350 17 G/17G
17 POWDER, FOR SOLUTION ORAL DAILY PRN
Status: DISCONTINUED | OUTPATIENT
Start: 2022-12-08 | End: 2022-12-09 | Stop reason: HOSPADM

## 2022-12-08 RX ORDER — DIPHENHYDRAMINE HYDROCHLORIDE 50 MG/ML
25 INJECTION INTRAMUSCULAR; INTRAVENOUS EVERY 6 HOURS PRN
Status: DISCONTINUED | OUTPATIENT
Start: 2022-12-08 | End: 2022-12-09 | Stop reason: HOSPADM

## 2022-12-08 RX ORDER — OXYCODONE HYDROCHLORIDE 5 MG/1
5 TABLET ORAL
Status: DISCONTINUED | OUTPATIENT
Start: 2022-12-08 | End: 2022-12-08 | Stop reason: HOSPADM

## 2022-12-08 RX ORDER — OXYCODONE HYDROCHLORIDE 5 MG/1
10 TABLET ORAL EVERY 4 HOURS PRN
Status: DISCONTINUED | OUTPATIENT
Start: 2022-12-08 | End: 2022-12-09 | Stop reason: HOSPADM

## 2022-12-08 RX ORDER — SODIUM CHLORIDE 9 MG/ML
INJECTION, SOLUTION INTRAVENOUS PRN
Status: DISCONTINUED | OUTPATIENT
Start: 2022-12-08 | End: 2022-12-08 | Stop reason: HOSPADM

## 2022-12-08 RX ORDER — SENNA AND DOCUSATE SODIUM 50; 8.6 MG/1; MG/1
1 TABLET, FILM COATED ORAL 2 TIMES DAILY
Status: DISCONTINUED | OUTPATIENT
Start: 2022-12-08 | End: 2022-12-09 | Stop reason: HOSPADM

## 2022-12-08 RX ORDER — IPRATROPIUM BROMIDE AND ALBUTEROL SULFATE 2.5; .5 MG/3ML; MG/3ML
1 SOLUTION RESPIRATORY (INHALATION)
Status: DISCONTINUED | OUTPATIENT
Start: 2022-12-08 | End: 2022-12-08 | Stop reason: HOSPADM

## 2022-12-08 RX ORDER — AMLODIPINE BESYLATE 5 MG/1
5 TABLET ORAL NIGHTLY
Status: DISCONTINUED | OUTPATIENT
Start: 2022-12-08 | End: 2022-12-09 | Stop reason: HOSPADM

## 2022-12-08 RX ORDER — SODIUM CHLORIDE 9 MG/ML
INJECTION, SOLUTION INTRAVENOUS PRN
Status: DISCONTINUED | OUTPATIENT
Start: 2022-12-08 | End: 2022-12-09 | Stop reason: HOSPADM

## 2022-12-08 RX ORDER — PANTOPRAZOLE SODIUM 40 MG/1
40 TABLET, DELAYED RELEASE ORAL DAILY
Status: DISCONTINUED | OUTPATIENT
Start: 2022-12-08 | End: 2022-12-09 | Stop reason: HOSPADM

## 2022-12-08 RX ORDER — ROCURONIUM BROMIDE 10 MG/ML
INJECTION, SOLUTION INTRAVENOUS PRN
Status: DISCONTINUED | OUTPATIENT
Start: 2022-12-08 | End: 2022-12-08 | Stop reason: SDUPTHER

## 2022-12-08 RX ORDER — LIDOCAINE HYDROCHLORIDE 10 MG/ML
0.3 INJECTION, SOLUTION EPIDURAL; INFILTRATION; INTRACAUDAL; PERINEURAL
Status: DISCONTINUED | OUTPATIENT
Start: 2022-12-08 | End: 2022-12-08 | Stop reason: HOSPADM

## 2022-12-08 RX ORDER — LIDOCAINE HYDROCHLORIDE 20 MG/ML
INJECTION, SOLUTION INFILTRATION; PERINEURAL PRN
Status: DISCONTINUED | OUTPATIENT
Start: 2022-12-08 | End: 2022-12-08 | Stop reason: SDUPTHER

## 2022-12-08 RX ORDER — FENTANYL CITRATE 50 UG/ML
INJECTION, SOLUTION INTRAMUSCULAR; INTRAVENOUS PRN
Status: DISCONTINUED | OUTPATIENT
Start: 2022-12-08 | End: 2022-12-08 | Stop reason: SDUPTHER

## 2022-12-08 RX ORDER — SODIUM CHLORIDE 0.9 % (FLUSH) 0.9 %
5-40 SYRINGE (ML) INJECTION EVERY 12 HOURS SCHEDULED
Status: DISCONTINUED | OUTPATIENT
Start: 2022-12-08 | End: 2022-12-09 | Stop reason: HOSPADM

## 2022-12-08 RX ORDER — ONDANSETRON 4 MG/1
4 TABLET, ORALLY DISINTEGRATING ORAL EVERY 8 HOURS PRN
Status: DISCONTINUED | OUTPATIENT
Start: 2022-12-08 | End: 2022-12-09 | Stop reason: HOSPADM

## 2022-12-08 RX ORDER — DEXTROSE MONOHYDRATE 100 MG/ML
INJECTION, SOLUTION INTRAVENOUS CONTINUOUS PRN
Status: DISCONTINUED | OUTPATIENT
Start: 2022-12-08 | End: 2022-12-09 | Stop reason: HOSPADM

## 2022-12-08 RX ORDER — METOPROLOL SUCCINATE 50 MG/1
50 TABLET, EXTENDED RELEASE ORAL DAILY
Status: DISCONTINUED | OUTPATIENT
Start: 2022-12-09 | End: 2022-12-09 | Stop reason: HOSPADM

## 2022-12-08 RX ORDER — INSULIN LISPRO 100 [IU]/ML
0-4 INJECTION, SOLUTION INTRAVENOUS; SUBCUTANEOUS NIGHTLY
Status: DISCONTINUED | OUTPATIENT
Start: 2022-12-08 | End: 2022-12-09 | Stop reason: HOSPADM

## 2022-12-08 RX ORDER — SODIUM CHLORIDE 9 MG/ML
INJECTION, SOLUTION INTRAVENOUS CONTINUOUS
Status: DISCONTINUED | OUTPATIENT
Start: 2022-12-08 | End: 2022-12-09 | Stop reason: HOSPADM

## 2022-12-08 RX ORDER — LISINOPRIL 20 MG/1
20 TABLET ORAL DAILY
Status: DISCONTINUED | OUTPATIENT
Start: 2022-12-09 | End: 2022-12-09 | Stop reason: HOSPADM

## 2022-12-08 RX ORDER — MAGNESIUM HYDROXIDE 1200 MG/15ML
LIQUID ORAL CONTINUOUS PRN
Status: COMPLETED | OUTPATIENT
Start: 2022-12-08 | End: 2022-12-08

## 2022-12-08 RX ORDER — PROPOFOL 10 MG/ML
INJECTION, EMULSION INTRAVENOUS PRN
Status: DISCONTINUED | OUTPATIENT
Start: 2022-12-08 | End: 2022-12-08 | Stop reason: SDUPTHER

## 2022-12-08 RX ORDER — OXYCODONE HYDROCHLORIDE AND ACETAMINOPHEN 5; 325 MG/1; MG/1
1 TABLET ORAL EVERY 8 HOURS PRN
Qty: 9 TABLET | Refills: 0 | Status: SHIPPED | OUTPATIENT
Start: 2022-12-08 | End: 2022-12-09 | Stop reason: HOSPADM

## 2022-12-08 RX ORDER — MEPERIDINE HYDROCHLORIDE 50 MG/ML
12.5 INJECTION INTRAMUSCULAR; INTRAVENOUS; SUBCUTANEOUS EVERY 5 MIN PRN
Status: DISCONTINUED | OUTPATIENT
Start: 2022-12-08 | End: 2022-12-08 | Stop reason: HOSPADM

## 2022-12-08 RX ORDER — HYDRALAZINE HYDROCHLORIDE 20 MG/ML
10 INJECTION INTRAMUSCULAR; INTRAVENOUS
Status: COMPLETED | OUTPATIENT
Start: 2022-12-08 | End: 2022-12-08

## 2022-12-08 RX ORDER — BUPIVACAINE HYDROCHLORIDE 5 MG/ML
INJECTION, SOLUTION EPIDURAL; INTRACAUDAL PRN
Status: DISCONTINUED | OUTPATIENT
Start: 2022-12-08 | End: 2022-12-08 | Stop reason: ALTCHOICE

## 2022-12-08 RX ORDER — OXYCODONE HYDROCHLORIDE 5 MG/1
5 TABLET ORAL EVERY 4 HOURS PRN
Status: DISCONTINUED | OUTPATIENT
Start: 2022-12-08 | End: 2022-12-09 | Stop reason: HOSPADM

## 2022-12-08 RX ORDER — GABAPENTIN 300 MG/1
300 CAPSULE ORAL NIGHTLY
Status: DISCONTINUED | OUTPATIENT
Start: 2022-12-08 | End: 2022-12-09 | Stop reason: HOSPADM

## 2022-12-08 RX ORDER — SODIUM CHLORIDE, SODIUM LACTATE, POTASSIUM CHLORIDE, CALCIUM CHLORIDE 600; 310; 30; 20 MG/100ML; MG/100ML; MG/100ML; MG/100ML
INJECTION, SOLUTION INTRAVENOUS CONTINUOUS
Status: DISCONTINUED | OUTPATIENT
Start: 2022-12-08 | End: 2022-12-09 | Stop reason: HOSPADM

## 2022-12-08 RX ORDER — SODIUM CHLORIDE 0.9 % (FLUSH) 0.9 %
5-40 SYRINGE (ML) INJECTION PRN
Status: DISCONTINUED | OUTPATIENT
Start: 2022-12-08 | End: 2022-12-08 | Stop reason: HOSPADM

## 2022-12-08 RX ORDER — PROCHLORPERAZINE EDISYLATE 5 MG/ML
2.5 INJECTION INTRAMUSCULAR; INTRAVENOUS ONCE
Status: COMPLETED | OUTPATIENT
Start: 2022-12-08 | End: 2022-12-08

## 2022-12-08 RX ORDER — KETOROLAC TROMETHAMINE 30 MG/ML
15 INJECTION, SOLUTION INTRAMUSCULAR; INTRAVENOUS ONCE
Status: COMPLETED | OUTPATIENT
Start: 2022-12-08 | End: 2022-12-08

## 2022-12-08 RX ORDER — ACETAMINOPHEN 325 MG/1
650 TABLET ORAL
Status: DISCONTINUED | OUTPATIENT
Start: 2022-12-08 | End: 2022-12-08 | Stop reason: HOSPADM

## 2022-12-08 RX ORDER — MIDAZOLAM HYDROCHLORIDE 1 MG/ML
2 INJECTION INTRAMUSCULAR; INTRAVENOUS
Status: COMPLETED | OUTPATIENT
Start: 2022-12-08 | End: 2022-12-08

## 2022-12-08 RX ORDER — SODIUM CHLORIDE, SODIUM LACTATE, POTASSIUM CHLORIDE, CALCIUM CHLORIDE 600; 310; 30; 20 MG/100ML; MG/100ML; MG/100ML; MG/100ML
INJECTION, SOLUTION INTRAVENOUS CONTINUOUS
Status: DISCONTINUED | OUTPATIENT
Start: 2022-12-08 | End: 2022-12-08 | Stop reason: HOSPADM

## 2022-12-08 RX ORDER — INSULIN LISPRO 100 [IU]/ML
0-4 INJECTION, SOLUTION INTRAVENOUS; SUBCUTANEOUS
Status: DISCONTINUED | OUTPATIENT
Start: 2022-12-08 | End: 2022-12-09 | Stop reason: HOSPADM

## 2022-12-08 RX ORDER — ENOXAPARIN SODIUM 100 MG/ML
40 INJECTION SUBCUTANEOUS DAILY
Status: DISCONTINUED | OUTPATIENT
Start: 2022-12-08 | End: 2022-12-09 | Stop reason: HOSPADM

## 2022-12-08 RX ORDER — ACETAMINOPHEN 325 MG/1
650 TABLET ORAL EVERY 6 HOURS
Status: DISCONTINUED | OUTPATIENT
Start: 2022-12-08 | End: 2022-12-09 | Stop reason: HOSPADM

## 2022-12-08 RX ORDER — GLYCOPYRROLATE 0.2 MG/ML
INJECTION INTRAMUSCULAR; INTRAVENOUS PRN
Status: DISCONTINUED | OUTPATIENT
Start: 2022-12-08 | End: 2022-12-08 | Stop reason: SDUPTHER

## 2022-12-08 RX ORDER — TRAZODONE HYDROCHLORIDE 50 MG/1
50 TABLET ORAL NIGHTLY
Status: DISCONTINUED | OUTPATIENT
Start: 2022-12-08 | End: 2022-12-09 | Stop reason: HOSPADM

## 2022-12-08 RX ORDER — DIPHENHYDRAMINE HYDROCHLORIDE 50 MG/ML
12.5 INJECTION INTRAMUSCULAR; INTRAVENOUS
Status: COMPLETED | OUTPATIENT
Start: 2022-12-08 | End: 2022-12-08

## 2022-12-08 RX ORDER — SODIUM CHLORIDE 0.9 % (FLUSH) 0.9 %
10 SYRINGE (ML) INJECTION PRN
Status: DISCONTINUED | OUTPATIENT
Start: 2022-12-08 | End: 2022-12-09 | Stop reason: HOSPADM

## 2022-12-08 RX ORDER — IPRATROPIUM BROMIDE AND ALBUTEROL SULFATE 2.5; .5 MG/3ML; MG/3ML
3 SOLUTION RESPIRATORY (INHALATION) EVERY 6 HOURS PRN
Status: DISCONTINUED | OUTPATIENT
Start: 2022-12-08 | End: 2022-12-09 | Stop reason: HOSPADM

## 2022-12-08 RX ORDER — DEXAMETHASONE SODIUM PHOSPHATE 4 MG/ML
INJECTION, SOLUTION INTRA-ARTICULAR; INTRALESIONAL; INTRAMUSCULAR; INTRAVENOUS; SOFT TISSUE PRN
Status: DISCONTINUED | OUTPATIENT
Start: 2022-12-08 | End: 2022-12-08 | Stop reason: SDUPTHER

## 2022-12-08 RX ORDER — ONDANSETRON 2 MG/ML
4 INJECTION INTRAMUSCULAR; INTRAVENOUS EVERY 6 HOURS PRN
Status: DISCONTINUED | OUTPATIENT
Start: 2022-12-08 | End: 2022-12-09 | Stop reason: HOSPADM

## 2022-12-08 RX ADMIN — HYDROMORPHONE HYDROCHLORIDE 0.5 MG: 0.5 INJECTION, SOLUTION INTRAMUSCULAR; INTRAVENOUS; SUBCUTANEOUS at 16:38

## 2022-12-08 RX ADMIN — SODIUM CHLORIDE, POTASSIUM CHLORIDE, SODIUM LACTATE AND CALCIUM CHLORIDE: 600; 310; 30; 20 INJECTION, SOLUTION INTRAVENOUS at 15:56

## 2022-12-08 RX ADMIN — HYDRALAZINE HYDROCHLORIDE 10 MG: 20 INJECTION INTRAMUSCULAR; INTRAVENOUS at 15:45

## 2022-12-08 RX ADMIN — SODIUM CHLORIDE: 9 INJECTION, SOLUTION INTRAVENOUS at 13:14

## 2022-12-08 RX ADMIN — CEFAZOLIN 2 G: 10 INJECTION, POWDER, FOR SOLUTION INTRAVENOUS at 13:06

## 2022-12-08 RX ADMIN — HYDRALAZINE HYDROCHLORIDE 10 MG: 20 INJECTION INTRAMUSCULAR; INTRAVENOUS at 16:05

## 2022-12-08 RX ADMIN — SUGAMMADEX 200 MG: 100 INJECTION, SOLUTION INTRAVENOUS at 15:22

## 2022-12-08 RX ADMIN — PHENYLEPHRINE HYDROCHLORIDE 100 MCG: 10 INJECTION INTRAVENOUS at 13:59

## 2022-12-08 RX ADMIN — PHENYLEPHRINE HYDROCHLORIDE 50 MCG: 10 INJECTION INTRAVENOUS at 13:57

## 2022-12-08 RX ADMIN — ROCURONIUM BROMIDE 10 MG: 10 SOLUTION INTRAVENOUS at 13:57

## 2022-12-08 RX ADMIN — FENTANYL CITRATE 50 MCG: 50 INJECTION INTRAMUSCULAR; INTRAVENOUS at 13:04

## 2022-12-08 RX ADMIN — DEXAMETHASONE SODIUM PHOSPHATE 4 MG: 4 INJECTION, SOLUTION INTRAMUSCULAR; INTRAVENOUS at 13:55

## 2022-12-08 RX ADMIN — ROCURONIUM BROMIDE 50 MG: 10 SOLUTION INTRAVENOUS at 13:06

## 2022-12-08 RX ADMIN — PANTOPRAZOLE SODIUM 40 MG: 40 TABLET, DELAYED RELEASE ORAL at 18:06

## 2022-12-08 RX ADMIN — PHENYLEPHRINE HYDROCHLORIDE 50 MCG: 10 INJECTION INTRAVENOUS at 14:29

## 2022-12-08 RX ADMIN — PHENYLEPHRINE HYDROCHLORIDE 50 MCG: 10 INJECTION INTRAVENOUS at 14:12

## 2022-12-08 RX ADMIN — PHENYLEPHRINE HYDROCHLORIDE 100 MCG: 10 INJECTION INTRAVENOUS at 13:18

## 2022-12-08 RX ADMIN — PHENYLEPHRINE HYDROCHLORIDE 100 MCG: 10 INJECTION INTRAVENOUS at 13:22

## 2022-12-08 RX ADMIN — FENTANYL CITRATE 50 MCG: 50 INJECTION INTRAMUSCULAR; INTRAVENOUS at 15:25

## 2022-12-08 RX ADMIN — TRAZODONE HYDROCHLORIDE 50 MG: 50 TABLET ORAL at 20:52

## 2022-12-08 RX ADMIN — PHENYLEPHRINE HYDROCHLORIDE 100 MCG: 10 INJECTION INTRAVENOUS at 13:24

## 2022-12-08 RX ADMIN — MIDAZOLAM 2 MG: 1 INJECTION INTRAMUSCULAR; INTRAVENOUS at 15:39

## 2022-12-08 RX ADMIN — SODIUM CHLORIDE: 9 INJECTION, SOLUTION INTRAVENOUS at 18:12

## 2022-12-08 RX ADMIN — PROPOFOL 120 MG: 10 INJECTION, EMULSION INTRAVENOUS at 13:06

## 2022-12-08 RX ADMIN — LIDOCAINE HYDROCHLORIDE 100 MG: 20 INJECTION, SOLUTION INFILTRATION; PERINEURAL at 13:06

## 2022-12-08 RX ADMIN — GLYCOPYRROLATE 0.2 MG: 0.2 INJECTION, SOLUTION INTRAMUSCULAR; INTRAVENOUS at 13:28

## 2022-12-08 RX ADMIN — DOCUSATE SODIUM 50 MG AND SENNOSIDES 8.6 MG 1 TABLET: 8.6; 5 TABLET, FILM COATED ORAL at 20:51

## 2022-12-08 RX ADMIN — AMLODIPINE BESYLATE 5 MG: 5 TABLET ORAL at 20:51

## 2022-12-08 RX ADMIN — CEFAZOLIN 1000 MG: 1 INJECTION, POWDER, FOR SOLUTION INTRAMUSCULAR; INTRAVENOUS at 20:55

## 2022-12-08 RX ADMIN — KETOROLAC TROMETHAMINE 15 MG: 30 INJECTION, SOLUTION INTRAMUSCULAR at 17:12

## 2022-12-08 RX ADMIN — ENOXAPARIN SODIUM 40 MG: 100 INJECTION SUBCUTANEOUS at 18:06

## 2022-12-08 RX ADMIN — PHENYLEPHRINE HYDROCHLORIDE 50 MCG: 10 INJECTION INTRAVENOUS at 14:20

## 2022-12-08 RX ADMIN — SODIUM CHLORIDE, SODIUM LACTATE, POTASSIUM CHLORIDE, AND CALCIUM CHLORIDE: .6; .31; .03; .02 INJECTION, SOLUTION INTRAVENOUS at 13:02

## 2022-12-08 RX ADMIN — HYDROMORPHONE HYDROCHLORIDE 0.25 MG: 1 INJECTION, SOLUTION INTRAMUSCULAR; INTRAVENOUS; SUBCUTANEOUS at 20:51

## 2022-12-08 RX ADMIN — FENTANYL CITRATE 50 MCG: 50 INJECTION INTRAMUSCULAR; INTRAVENOUS at 13:42

## 2022-12-08 RX ADMIN — FENTANYL CITRATE 50 MCG: 50 INJECTION INTRAMUSCULAR; INTRAVENOUS at 13:06

## 2022-12-08 RX ADMIN — HYDROMORPHONE HYDROCHLORIDE 0.5 MG: 0.5 INJECTION, SOLUTION INTRAMUSCULAR; INTRAVENOUS; SUBCUTANEOUS at 15:54

## 2022-12-08 RX ADMIN — INSULIN LISPRO 1 UNITS: 100 INJECTION, SOLUTION INTRAVENOUS; SUBCUTANEOUS at 18:07

## 2022-12-08 RX ADMIN — OXYCODONE 10 MG: 5 TABLET ORAL at 18:07

## 2022-12-08 RX ADMIN — PROCHLORPERAZINE EDISYLATE 2.5 MG: 5 INJECTION INTRAMUSCULAR; INTRAVENOUS at 17:14

## 2022-12-08 RX ADMIN — DIPHENHYDRAMINE HYDROCHLORIDE 12.5 MG: 50 INJECTION INTRAMUSCULAR; INTRAVENOUS at 15:49

## 2022-12-08 RX ADMIN — ACETAMINOPHEN 650 MG: 325 TABLET ORAL at 18:06

## 2022-12-08 RX ADMIN — PHENYLEPHRINE HYDROCHLORIDE 100 MCG: 10 INJECTION INTRAVENOUS at 13:34

## 2022-12-08 RX ADMIN — ROCURONIUM BROMIDE 10 MG: 10 SOLUTION INTRAVENOUS at 14:24

## 2022-12-08 RX ADMIN — GABAPENTIN 300 MG: 300 CAPSULE ORAL at 20:51

## 2022-12-08 RX ADMIN — INSULIN LISPRO 4 UNITS: 100 INJECTION, SOLUTION INTRAVENOUS; SUBCUTANEOUS at 21:02

## 2022-12-08 ASSESSMENT — PAIN DESCRIPTION - ONSET
ONSET: AWAKENED FROM SLEEP

## 2022-12-08 ASSESSMENT — PAIN DESCRIPTION - PAIN TYPE
TYPE: ACUTE PAIN;SURGICAL PAIN

## 2022-12-08 ASSESSMENT — PAIN DESCRIPTION - ORIENTATION
ORIENTATION: LEFT
ORIENTATION: LEFT
ORIENTATION: LOWER
ORIENTATION: MID
ORIENTATION: LOWER

## 2022-12-08 ASSESSMENT — PAIN - FUNCTIONAL ASSESSMENT
PAIN_FUNCTIONAL_ASSESSMENT: 0-10
PAIN_FUNCTIONAL_ASSESSMENT: ACTIVITIES ARE NOT PREVENTED

## 2022-12-08 ASSESSMENT — PAIN DESCRIPTION - DESCRIPTORS
DESCRIPTORS: CRAMPING;ACHING;DISCOMFORT
DESCRIPTORS: ACHING
DESCRIPTORS: ACHING
DESCRIPTORS: ACHING;CRAMPING
DESCRIPTORS: ACHING

## 2022-12-08 ASSESSMENT — PAIN SCALES - GENERAL
PAINLEVEL_OUTOF10: 8
PAINLEVEL_OUTOF10: 8
PAINLEVEL_OUTOF10: 7
PAINLEVEL_OUTOF10: 7
PAINLEVEL_OUTOF10: 6
PAINLEVEL_OUTOF10: 8

## 2022-12-08 ASSESSMENT — PAIN DESCRIPTION - LOCATION
LOCATION: ABDOMEN
LOCATION: BACK;ABDOMEN
LOCATION: ABDOMEN
LOCATION: ABDOMEN

## 2022-12-08 ASSESSMENT — PAIN DESCRIPTION - FREQUENCY
FREQUENCY: INTERMITTENT

## 2022-12-08 ASSESSMENT — ENCOUNTER SYMPTOMS: SHORTNESS OF BREATH: 1

## 2022-12-08 NOTE — ANESTHESIA PRE PROCEDURE
Department of Anesthesiology  Preprocedure Note       Name:  Danielle Gardiner   Age:  79 y.o.  :  1955                                          MRN:  [id number]         Date:  2022      Surgeon: Dorothy Luther):  Susie Roche MD    Procedure: Procedure(s):  ROBOTIC LEFT PARTIAL NEPHRECTOMY    Medications prior to admission:   Prior to Admission medications    Medication Sig Start Date End Date Taking? Authorizing Provider   oxyCODONE-acetaminophen (PERCOCET) 5-325 MG per tablet Take 1 tablet by mouth every 8 hours as needed for Pain for up to 3 days. CAUTION CONSTIPATION --   Take fiber or over the counter stool softener if using this medication regularly    Intended supply: 3 days. Take lowest dose possible to manage pain 22 Yes Susie Roche MD   sennosides-docusate sodium (SENOKOT-S) 8.6-50 MG tablet Take 1 tablet by mouth daily for 14 days UNTIL SOFT REGULAR BOWEL MOVEMENTS 22 Yes Susie Roche MD   amLODIPine (NORVASC) 5 MG tablet TAKE ONE TABLET BY MOUTH AT BEDTIME  Patient taking differently: Take 5 mg by mouth nightly 22   Payal Latham MD   pantoprazole (PROTONIX) 40 MG tablet TAKE ONE TABLET BY MOUTH DAILY 22   Payal Latham MD   ferric carboxymaltose (INJECTAFER) 750 MG/15ML SOLN IV solution Inject 750 mg once weekly for 2 doses. 10/13/22   Payal Latham MD   empagliflozin (JARDIANCE) 25 MG tablet Take 1 tablet by mouth daily 10/11/22   Payal Latham MD   gabapentin (NEURONTIN) 300 MG capsule Take 1 capsule by mouth nightly for 90 days.  Intended supply: 30 days 10/10/22 1/8/23  Payal Latham MD   traZODone (DESYREL) 50 MG tablet Take 1 tablet by mouth nightly 10/10/22   Payal Latham MD   cilostazol (PLETAL) 100 MG tablet TAKE ONE TABLET BY MOUTH TWICE A DAY 22   Payal Latham MD   lisinopril (PRINIVIL;ZESTRIL) 20 MG tablet Take 1 tablet by mouth daily 22   Ata Marcelo Marmolejo MD   metoprolol succinate (TOPROL XL) 50 MG extended release tablet Take 1 tablet by mouth daily 8/29/22   Tory Head MD   Handicap Placard MISC by Does not apply route 8/29/22   Tory Head MD   ALBUTEROL IN Inhale 1 puff into the lungs as needed    Historical Provider, MD   blood glucose test strips (DARVIN CONTOUR NEXT TEST) strip Test three times daily.  DX:E11.9 9/18/20   Humberto Franco MD   ipratropium-albuterol (DUONEB) 0.5-2.5 (3) MG/3ML SOLN nebulizer solution Inhale 3 mLs into the lungs every 6 hours as needed for Shortness of Breath 12/4/19 1/20/22  Humberto Franco MD   AGAMATRIX ULTRA-THIN LANCETS MISC Check sugars daily after each meal  Patient not taking: No sig reported 10/30/19   STEPHY Redmond   Blood Glucose Monitoring Suppl (AGAMATRIX AMP) ISABELLA Check BG daily after meals 10/30/19   STEPHY Redmond   [employer] Lancets MISC 1 each by Does not apply route 3 times daily 5/2/18   Constance William PA-C       Current medications:    Current Facility-Administered Medications   Medication Dose Route Frequency Provider Last Rate Last Admin    lactated ringers infusion   IntraVENous Continuous Carlos Whitehead MD        sodium chloride flush 0.9 % injection 5-40 mL  5-40 mL IntraVENous 2 times per day Carlos Whitehead MD        sodium chloride flush 0.9 % injection 5-40 mL  5-40 mL IntraVENous PRN Carlos Whitehead MD        0.9 % sodium chloride infusion   IntraVENous PRN Carlos Whitehead MD        acetaminophen (TYLENOL) tablet 650 mg  650 mg Oral Once PRN Carlos Whitehead MD        meperidine (DEMEROL) injection 12.5 mg  12.5 mg IntraVENous Q5 Min PRN Carlos Whitehead MD        HYDROmorphone (DILAUDID) injection 0.25 mg  0.25 mg IntraVENous Q5 Min PRN Carlos Whitehead MD        HYDROmorphone (DILAUDID) injection 0.5 mg  0.5 mg IntraVENous Q5 Min PRN Carlos Whitehead MD        oxyCODONE (ROXICODONE) immediate release tablet 5 mg  5 mg Oral Once PRN Kody Larkin MD        ondansetron [facility]) injection 4 mg  4 mg IntraVENous Once PRN Kody Larkin MD        Dexamethasone Sodium Phosphate injection 4 mg  4 mg IntraVENous Once PRN Kody Larkin MD        hydrALAZINE (APRESOLINE) injection 10 mg  10 mg IntraVENous Q15 Min PRN Kody Larkin MD   10 mg at 12/08/22 1545    ipratropium-albuterol (DUONEB) nebulizer solution 1 ampule  1 ampule Inhalation Once PRN Kody Larkin MD           Allergies: Allergies   Allergen Reactions    Morphine Other (See Comments)     Bad headache    Oxycodone Hcl     Chantix [Varenicline Tartrate]      BAD DREAMS    Percocet [Oxycodone-Acetaminophen] Nausea And Vomiting       Problem List:    Patient Active Problem List   Diagnosis Code    Diabetes mellitus type 2, diet-controlled ([facility] E11.9    Benign essential HTN I10    Hypercholesteremia E78.00    Pulmonary nodule R91.1    COPD (chronic obstructive pulmonary disease) ([facility] J44.9    Chest pain R07.9    Depression F32. 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       Past Medical History:        Diagnosis Date    Atrial fibrillation (HCC)     Bronchitis chronic     Cancer ([facility]     cervical    CHF (congestive heart failure) (HCC)     Diabetes mellitus (HCC)     Emphysema of lung ([facility]     History of blood transfusion     Hypercholesteremia     Hypertension     Microcytic anemia 03/23/2017    Psoriasis        Past Surgical History:        Procedure Laterality Date    CERVICAL [address] SURGERY  2004    CHOLECYSTECTOMY      COLONOSCOPY N/A 12/18/2021    COLONOSCOPY WITH BIOPSY performed by Barbara Muller MD at [address] [place] [address] N/A 12/18/2021    COLONOSCOPY POLYPECTOMY SNARE/COLD BIOPSY performed by Barbara Muller MD at [facility] CATH LAB PROCEDURE  04/2011    angiogram with 30% blockage    HYSTERECTOMY (CERVIX STATUS UNKNOWN)      KIDNEY STONE SURGERY      TONSILLECTOMY      UPPER GASTROINTESTINAL ENDOSCOPY N/A 10/29/2019    EGD BIOPSY performed by Chloe Martines DO at [facility] [address] N/A 10/11/2020    EGD BIOPSY performed by Chloe Martines DO at [facility] [address] [employer]  10/11/2020    EGD CONTROL HEMORRHAGE performed by Chloe Martines DO at [facility] [employer] N/A 12/18/2021    EGD BIOPSY performed by Barbara Muller MD at [facility] 06/26/2022    EGD BIOPSY performed by Chloe Martines DO at [facility]  06/26/2022    EGD CONTROL HEMORRHAGE performed by Chloe Martines DO at [place] [address]. History:    Social History     Tobacco Use    Smoking status: Every Day     Packs/day: 0.50     Years: 32.00     Pack years: 16.00     Types: Cigarettes    Smokeless tobacco: Never   Substance Use Topics    Alcohol use:  No                                Ready to quit: Not Answered  Counseling given: Yes      Vital Signs (Current):   Vitals:    12/01/22 1442 12/08/22 1150 12/08/22 1545   BP:  (!) 127/59 (!) 200/52   Pulse:  65    Resp:  16    Temp:  97.5 °F (36.4 °C)    TempSrc:  Temporal    SpO2:  96%    Weight: 134 lb (60.8 kg) 132 lb 3.2 oz (60 kg)    Height: 5' 3\" (1.6 m)                                                BP Readings from Last 3 Encounters:   12/08/22 (!) 200/52   12/02/22 (!) 140/70   11/18/22 128/60       NPO Status: Time of last liquid consumption: 2000                        Time of last solid consumption: 2030                        Date of last liquid consumption: 12/07/22                        Date of last solid food consumption: 12/06/22    BMI:   Wt Readings from Last 3 Encounters:   12/08/22 132 lb 3.2 oz (60 kg)   12/02/22 135 lb (61.2 kg)   11/18/22 139 lb (63 kg)     Body mass index is 23.42 kg/m².     CBC:   Lab Results   Component Value Date/Time    WBC 4.8 11/30/2022 11:09 AM    RBC 5.15 11/30/2022 11:09 AM    HGB 13.0 11/30/2022 11:09 AM    HCT 41.3 11/30/2022 11:09 AM    MCV 80.2 11/30/2022 11:09 AM    RDW 26.2 11/30/2022 11:09 AM     11/30/2022 11:09 AM       CMP:   Lab Results   Component Value Date/Time     11/30/2022 11:09 AM    K 4.3 11/30/2022 11:09 AM    K 3.8 06/26/2022 06:04 AM    CL 98 11/30/2022 11:09 AM    CO2 21 11/30/2022 11:09 AM    BUN 14 11/30/2022 11:09 AM    CREATININE 0.6 11/30/2022 11:09 AM    GFRAA >60 10/10/2022 12:12 PM    GFRAA >60 06/05/2013 11:03 AM    AGRATIO 1.3 11/30/2022 11:09 AM    LABGLOM >60 11/30/2022 11:09 AM    GLUCOSE 208 11/30/2022 11:09 AM    PROT 8.1 11/30/2022 11:09 AM    PROT 7.7 12/07/2012 08:59 AM    CALCIUM 9.5 11/30/2022 11:09 AM    BILITOT <0.2 11/30/2022 11:09 AM    ALKPHOS 105 11/30/2022 11:09 AM    AST 20 11/30/2022 11:09 AM    ALT 18 11/30/2022 11:09 AM       POC Tests:   Recent Labs     12/08/22  1218   POCGLU 160*       Coags:   Lab Results   Component Value Date/Time    PROTIME 14.4 06/26/2022 06:04 AM    INR 1.14 06/26/2022 06:04 AM    APTT 30.4 06/26/2022 06:04 AM       HCG (If Applicable): No results found for: PREGTESTUR, PREGSERUM, HCG, HCGQUANT     ABGs: No results found for: PHART, PO2ART, WYM1EXT, BKE4OLG, BEART, S9QSCQMJ     Type & Screen (If Applicable):  No results found for: LABABO, LABRH    Drug/Infectious Status (If Applicable):  No results found for: HIV, HEPCAB    COVID-19 Screening (If Applicable):   Lab Results   Component Value Date/Time    COVID19 NOT DETECTED 06/25/2022 08:08 PM           Anesthesia Evaluation  Patient summary reviewed and Nursing notes reviewed  Airway: Mallampati: II  TM distance: >3 FB   Neck ROM: full  Mouth opening: > = 3 FB   Dental: normal exam         Pulmonary:normal exam    (+) COPD:  shortness of breath:                             Cardiovascular:    (+) hypertension:, CHF:, CUMMINGS:,       ECG reviewed      Echocardiogram reviewed                  Neuro/Psych:   (+) psychiatric history:            GI/Hepatic/Renal:             Endo/Other:    (+) Diabetes, . Abdominal:             Vascular: negative vascular ROS. Other Findings:           Anesthesia Plan      general     ASA 4       Induction: intravenous. MIPS: Postoperative opioids intended. Anesthetic plan and risks discussed with patient. Plan discussed with CRNA.     Attending anesthesiologist reviewed and agrees with Preprocedure content                BISHNU Crane MD   12/8/2022

## 2022-12-08 NOTE — PROGRESS NOTES
D: Patient here from OR via bed, taken to bay 3 in PACU, all current drips, treatments, skin issues, and plan of care were reviewed by both RN's, patient is agitated and anxious, states she wants to get out of bed, stated \" I can't get comfortable\". A: Medicated per doctors order, see MAR. A: Assessment completed and documented, call light is in reach, discussed plan of care with patient but patient does not understand.

## 2022-12-08 NOTE — ANESTHESIA PRE PROCEDURE
Department of Anesthesiology  Preprocedure Note       Name:  Yandel Das   Age:  79 y.o.  :  1955                                          MRN:  [id number]         Date:  2022      Surgeon: Hattie Regalado):  Erik Wilder MD    Procedure: Procedure(s):  ROBOTIC LEFT PARTIAL NEPHRECTOMY    Medications prior to admission:   Prior to Admission medications    Medication Sig Start Date End Date Taking? Authorizing Provider   amLODIPine (NORVASC) 5 MG tablet TAKE ONE TABLET BY MOUTH AT BEDTIME  Patient taking differently: Take 5 mg by mouth nightly 22   Jacqueline Iverson MD   pantoprazole (PROTONIX) 40 MG tablet TAKE ONE TABLET BY MOUTH DAILY 22   Jacqueline Iverson MD   ferric carboxymaltose (INJECTAFER) 750 MG/15ML SOLN IV solution Inject 750 mg once weekly for 2 doses. 10/13/22   Jacqueline Iverson MD   empagliflozin (JARDIANCE) 25 MG tablet Take 1 tablet by mouth daily 10/11/22   Jacqueline Iverson MD   gabapentin (NEURONTIN) 300 MG capsule Take 1 capsule by mouth nightly for 90 days. Intended supply: 30 days 10/10/22 1/8/23  Jacqueline Iverson MD   traZODone (DESYREL) 50 MG tablet Take 1 tablet by mouth nightly 10/10/22   Jacqueline Iverson MD   cilostazol (PLETAL) 100 MG tablet TAKE ONE TABLET BY MOUTH TWICE A DAY 22   Jacqueline Iverson MD   lisinopril (PRINIVIL;ZESTRIL) 20 MG tablet Take 1 tablet by mouth daily 22   Jacqueline Iverson MD   metoprolol succinate (TOPROL XL) 50 MG extended release tablet Take 1 tablet by mouth daily 22   Jacqueline Iverson MD   Handicap Placard MISC by Does not apply route 22   Jacqueline Iverson MD   ALBUTEROL IN Inhale 1 puff into the lungs as needed    Historical Provider, MD   blood glucose test strips (DARVIN CONTOUR NEXT TEST) strip Test three times daily.  DX:E11.9 20   Kiesha Uribe MD   ipratropium-albuterol (DUONEB) 0.5-2.5 (3) MG/3ML SOLN nebulizer solution Inhale 3 mLs into the lungs every 6 hours as needed for Shortness of Breath 12/4/19 1/20/22  Mark Puckett MD   AGAMATRIX ULTRA-THIN LANCETS MISC Check sugars daily after each meal  Patient not taking: No sig reported 10/30/19   STEPHY Beasley   Blood Glucose Monitoring Suppl (AGAMATRIX AMP) ISABELLA Check BG daily after meals 10/30/19   STEPHY Beasley   [employer] Lancets MISC 1 each by Does not apply route 3 times daily 5/2/18   Aida Mcbride PA-C       Current medications:    Current Facility-Administered Medications   Medication Dose Route Frequency Provider Last Rate Last Admin    ceFAZolin (ANCEF) 2000 mg in dextrose 5 % 100 mL IVPB  2,000 mg IntraVENous On Call to [address] MD        lidocaine PF 1 % injection 0.3 mL  0.3 mL IntraDERmal Once PRN Doll Schaumann, MD        lactated ringers infusion   IntraVENous Continuous Doll Schaumann, MD        sodium chloride flush 0.9 % injection 5-40 mL  5-40 mL IntraVENous 2 times per day Doll Schaumann, MD        sodium chloride flush 0.9 % injection 5-40 mL  5-40 mL IntraVENous PRN Doll Schaumann, MD        0.9 % sodium chloride infusion   IntraVENous PRN Doll Schaumann, MD           Allergies: Allergies   Allergen Reactions    Morphine Other (See Comments)     Bad headache    Oxycodone Hcl     Chantix [Varenicline Tartrate]      BAD DREAMS    Percocet [Oxycodone-Acetaminophen] Nausea And Vomiting       Problem List:    Patient Active Problem List   Diagnosis Code    Diabetes mellitus type 2, diet-controlled ([facility] E11.9    Benign essential HTN I10    Hypercholesteremia E78.00    Pulmonary nodule R91.1    COPD (chronic obstructive pulmonary disease) ([facility] J44.9    Chest pain R07.9    Depression F32. A    Iron deficiency anemia D50.9    Gastrointestinal hemorrhage K92.2    Prolonged Q-T interval on ECG R94.31    Exertional dyspnea R06.09    Cardiomyopathy (HCC) I42.9    Thrombocytosis D75.839    Primary insomnia F51.01    Acute GI bleeding K92.2    Near syncope R55    Anemia D64.9    Chronic systolic congestive heart failure (HCC) I50.22    Noncompliance with medication regimen Z91.14    Uncontrolled type 2 diabetes mellitus with hyperglycemia (HCC) E11.65    Tobacco abuse Z72.0    GI bleed K92.2    Leg cramps R25.2       Past Medical History:        Diagnosis Date    Atrial fibrillation (HCC)     Bronchitis chronic     Cancer ([facility]     cervical    CHF (congestive heart failure) ([facility]     Diabetes mellitus ([facility]     Emphysema of lung ([facility]     History of blood transfusion     Hypercholesteremia     Hypertension     Microcytic anemia 03/23/2017    Psoriasis        Past Surgical History:        Procedure Laterality Date    CERVICAL [address] SURGERY  2004    CHOLECYSTECTOMY      COLONOSCOPY N/A 12/18/2021    COLONOSCOPY WITH BIOPSY performed by Pratibha Allen MD at [address] [place] [address] N/A 12/18/2021    COLONOSCOPY POLYPECTOMY SNARE/COLD BIOPSY performed by Pratibha Allen MD at [facility] CATH LAB PROCEDURE  04/2011    angiogram with 30% blockage    HYSTERECTOMY (CERVIX STATUS UNKNOWN)      KIDNEY STONE SURGERY      TONSILLECTOMY      UPPER GASTROINTESTINAL ENDOSCOPY N/A 10/29/2019    EGD BIOPSY performed by Algernon Favre, DO at [facility] N/A 10/11/2020    EGD BIOPSY performed by Algernon Favre, DO at [facility]  10/11/2020    EGD CONTROL HEMORRHAGE performed by Algernon Favre, DO at [facility] N/A 12/18/2021    EGD BIOPSY performed by Pratibha Allen MD at [facility] 06/26/2022    EGD BIOPSY performed by Algernon Favre, DO at [facility]  06/26/2022    EGD CONTROL HEMORRHAGE performed by Severo Heller Martha Ibarra, DO at [address] History:    Social History     Tobacco Use    Smoking status: Every Day     Packs/day: 0.50     Years: 32.00     Pack years: 16.00     Types: Cigarettes    Smokeless tobacco: Never   Substance Use Topics    Alcohol use: No                                Ready to quit: Not Answered  Counseling given: Yes      Vital Signs (Current):   Vitals:    12/01/22 1442 12/08/22 1150   BP:  (!) 127/59   Pulse:  65   Resp:  16   Temp:  97.5 °F (36.4 °C)   TempSrc:  Temporal   SpO2:  96%   Weight: 134 lb (60.8 kg) 132 lb 3.2 oz (60 kg)   Height: 5' 3\" (1.6 m)                                               BP Readings from Last 3 Encounters:   12/08/22 (!) 127/59   12/02/22 (!) 140/70   11/18/22 128/60       NPO Status: Time of last liquid consumption: 2000                        Time of last solid consumption: 2030                        Date of last liquid consumption: 12/07/22                        Date of last solid food consumption: 12/06/22    BMI:   Wt Readings from Last 3 Encounters:   12/08/22 132 lb 3.2 oz (60 kg)   12/02/22 135 lb (61.2 kg)   11/18/22 139 lb (63 kg)     Body mass index is 23.42 kg/m².     CBC:   Lab Results   Component Value Date/Time    WBC 4.8 11/30/2022 11:09 AM    RBC 5.15 11/30/2022 11:09 AM    HGB 13.0 11/30/2022 11:09 AM    HCT 41.3 11/30/2022 11:09 AM    MCV 80.2 11/30/2022 11:09 AM    RDW 26.2 11/30/2022 11:09 AM     11/30/2022 11:09 AM       CMP:   Lab Results   Component Value Date/Time     11/30/2022 11:09 AM    K 4.3 11/30/2022 11:09 AM    K 3.8 06/26/2022 06:04 AM    CL 98 11/30/2022 11:09 AM    CO2 21 11/30/2022 11:09 AM    BUN 14 11/30/2022 11:09 AM    CREATININE 0.6 11/30/2022 11:09 AM    GFRAA >60 10/10/2022 12:12 PM    GFRAA >60 06/05/2013 11:03 AM    AGRATIO 1.3 11/30/2022 11:09 AM    LABGLOM >60 11/30/2022 11:09 AM    GLUCOSE 208 11/30/2022 11:09 AM    PROT 8.1 11/30/2022 11:09 AM    PROT 7.7 12/07/2012 08:59 AM CALCIUM 9.5 11/30/2022 11:09 AM    BILITOT <0.2 11/30/2022 11:09 AM    ALKPHOS 105 11/30/2022 11:09 AM    AST 20 11/30/2022 11:09 AM    ALT 18 11/30/2022 11:09 AM       POC Tests: No results for input(s): POCGLU, POCNA, POCK, POCCL, POCBUN, POCHEMO, POCHCT in the last 72 hours. Coags:   Lab Results   Component Value Date/Time    PROTIME 14.4 06/26/2022 06:04 AM    INR 1.14 06/26/2022 06:04 AM    APTT 30.4 06/26/2022 06:04 AM       HCG (If Applicable): No results found for: PREGTESTUR, PREGSERUM, HCG, HCGQUANT     ABGs: No results found for: PHART, PO2ART, FFB3RMY, XLW7EEP, BEART, U4RLVDOU     Type & Screen (If Applicable):  No results found for: LABABO, LABRH    Drug/Infectious Status (If Applicable):  No results found for: HIV, HEPCAB    COVID-19 Screening (If Applicable):   Lab Results   Component Value Date/Time    COVID19 NOT DETECTED 06/25/2022 08:08 PM           Anesthesia Evaluation  Patient summary reviewed and Nursing notes reviewed  Airway: Mallampati: II  TM distance: >3 FB   Neck ROM: full  Mouth opening: > = 3 FB   Dental: normal exam         Pulmonary:normal exam    (+) COPD:  shortness of breath:                             Cardiovascular:    (+) hypertension:, CHF:, CUMMINGS:,                   Neuro/Psych:   (+) psychiatric history:            GI/Hepatic/Renal:             Endo/Other:    (+) DiabetesType II DM, , .                 Abdominal:             Vascular: Other Findings:           Anesthesia Plan      general         Induction: intravenous. MIPS: Postoperative opioids intended. Anesthetic plan and risks discussed with patient. Plan discussed with CRNA.     Attending anesthesiologist reviewed and agrees with Preprocedure content                BISHNU Ryan MD   12/8/2022

## 2022-12-08 NOTE — PROGRESS NOTES
Per Dr. Champ tolentino served, forward to Nurse  And pt has an appt for 10/26   Pt admitted via stretcher to room 322. VSS. Pt a/ox4. Pt educated on prn pain meds, meds given per mar. Pt provided w/ fluids and is sitting up and eating. Family at bedside. F/c in place and patent. Pt oriented to room and call light. Standard safety precautions in place. Bed check in place.

## 2022-12-08 NOTE — OP NOTE
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  Robotic-assisted laparoscopic LEFT  partial nephrectomy. 2.  Intraoperative ultrasound of kidney for localization of renal mass and guidance in resection. SURGEON:  Venancio Forde MD     ANESTHESIA:  General with an ET tube. INDICATIONS:  The patient is a 79year-old who has incidentally found  2 cm renal mass on the left side that was in the anterior, lower pole region, 70% exophytic. The patient was counseled about the options of partial nephrectomy versus observation. The patient chose robotic partial nephrectomy. See office notes as well. Signed the consent prior to surgery. OPERATIVE PROCEDURE: The patient was brought to the operating theater, anesthesia induced. A Marino catheter was deployed. The patient was positioned in the flank position with the table flexed. All bony prominences were protected. The patient was prepped and draped in the usual sterile fashion. At about the 11th rib at the lateral border of the rectus, on left side, we made an 8 mm incision, used Veress access to insufflate the abdomen. Drop test was negative. Opening insufflation pressures were low. We then deployed the robotic 8 mm trocar, followed by the camera and inspected the abdomen. There were no inadvertant injuries or oozing. Under direct vision, we deployed 1  trocar superior and 2 right of the inferior to for a total of four robotic trocars, all along the border of the rectus. Assistant port was deployed near the umbilicus, which was 12 mm. We docked the robot. We mobilized the colon medially. We used intraop ultrasound to fine the mass near lower pole and since it was so exophytic we decided to resected off clamp.   We opened up the Horizon [employer] fascia, dissected down onto the perinephric capsule, and then we mobilized the kidney inside of the fascia. Once we had adequately mobilized the lower pole and lateral pole, we brought in the intraoperative ultrasound again. The probe was inserted through a port site and we assessed the mass in axial and sagittal planes. It was about 2cm, somewhat cystic and exophytic. We did not see any other solid masses. Once we confirmed that we had the mass, we then completed our mobilization, making sure the kidney was freed in order to perform an easy closure. We then sharply excised the cystic mass, working from medial to lateral and we did this off clamp. There was small oozing and pinpoint cautery controlled it. We did not enter the mass at any point and we were not deep into the collecting system. We did not see any collecting system structures or significant bleeding. We then irrigated the area. We then closed the cortical-medullary junction with 2-0 stratafix, using wecks to tighten the suture in a horizontal mattress. Then Gerota's fascia over top with  2-0 STRATAFIX suture. We placed the specimen in the bag. We then undocked the robot and extracted the tumor via 12mm midline port. We closed that incision with a 0-vicryl UR-6 suture. After the period of desufflation, we then re-insufflated the abdomen and inspected the flank area, there was no obvious oozing. We irrigated again and then aspirated out all fluid. We then removed the ports after desufflating the abdomen and then closed the incisions with 4-0 running subcuticular. We dressed all incisions with steris and gauze dressings. The patient was then returned to supine, emerged from anesthesia, and brought to the PACU in stable condition. FINDINGS:  Exophytic renal lesion, resected off clamp as very exophytic     BLOOD LOSS:  50 mL     IV FLUIDS:  1500 mL crystalloid.      URINE OUTPUT :  100 mL     DRAINS:  Marion catheter FOLLOWUP:  To PACU for recovery. postop labs.  Hopefully home tomorrow      Electronically signed by Ariel Osler, MD on 12/8/2022 at 3:45 PM

## 2022-12-08 NOTE — ANESTHESIA POSTPROCEDURE EVALUATION
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     Frida Phase I: Frida Score: 10    Frida Phase II:        Anesthesia Post Evaluation    Patient location during evaluation: PACU  Patient participation: complete - patient participated  Level of consciousness: awake  Pain score: 0  Airway patency: patent  Nausea & Vomiting: no nausea  Complications: no  Cardiovascular status: blood pressure returned to baseline  Respiratory status: acceptable  Hydration status: euvolemic

## 2022-12-08 NOTE — PROGRESS NOTES
4 Eyes Skin Assessment     The patient is being assess for  Admission    I agree that 2 RN's have performed a thorough Head to Toe Skin Assessment on the patient. ALL assessment sites listed below have been assessed. Areas assessed by both nurses: tr/ dimas    [x]   Head, Face, and Ears   [x]   Shoulders, Back, and Chest  [x]   Arms, Elbows, and Hands   [x]   Coccyx, Sacrum, and IschIum  [x]   Legs, Feet, and Heels        Does the Patient have Skin Breakdown?   No         Kasi Prevention initiated:  No   Wound Care Orders initiated:  No      [facility] nurse consulted for Pressure Injury (Stage 3,4, Unstageable, DTI, NWPT, and Complex wounds), New and Established Ostomies:  No      Nurse 1 eSignature: Electronically signed by Clemente Green RN on 12/8/22 at 6:19 PM EST    **SHARE this note so that the co-signing nurse is able to place an eSignature**    Nurse 2 eSignature: Electronically signed by Noe Gilford, LPN on 89/2/49 at 0:98 PM EST

## 2022-12-08 NOTE — H&P
H&P reviewed. The patient was examined and there are no changes to the H&P. Hp from hospital notes, office notes, or printed at bedside. (Dec 2nd, )See all documents including the scanned Documents. These were reviewed with the patient and I examined the patient. There was no change. The surgical site was confirmed by the patient and me. Vitals:    12/08/22 1150   BP: (!) 127/59   Pulse: 65   Resp: 16   Temp: 97.5 °F (36.4 °C)   SpO2: 96%           Plan: The risks, benefits, expected outcome, and alternative to the recommended procedure have been discussed with the patient. Patient understands and wants to proceed with the procedure. All questions answered.      Side marked

## 2022-12-08 NOTE — DISCHARGE INSTRUCTIONS
YOU NEED A LAB DRAW ON MONDAY 12/12/22 TO CHECK YOUR HEMOGLOBIN (BLOOD COUNTS). THERE IS AN ORDER FOR THIS IN THE COMPUTER, PLEASE GO TO ANY [facility] LAB TO HAVE THIS DRAWN       We can call you with pathology results. Keep incisions clean and dry and open to air. Light walking is encouraged. No heavy lifting until cleared by your doctor. Call our office immediately if you see persistent blood in your urine. Call our office for follow up visit in 4-6 weeks. Kelly De La Vega MD  Office: [phone]               Robotic assisted Laparoscopic Partial Nephrectomy: What to Expect at [address]    A partial nephrectomy is surgery to take out part of the kidney. Your belly will feel sore after the surgery. This usually lasts about 1 to 2 weeks. Your doctor will give you pain medicine for this. You may also have other symptoms such as nausea, diarrhea, constipation, gas, or a headache. At first, you may have low energy and get tired quickly. It may take 6 weeks for your energy to fully return. Your body can work fine with one healthy kidney. This care sheet gives you a general idea about how long it will take for you to recover. But each person recovers at a different pace. Follow the steps below to get better as quickly as possible. How can you care for yourself at home? Activity    Rest when you feel tired. Getting enough sleep will help you recover. Try to walk each day. Start by walking a little more than you did the day before. Bit by bit, increase the amount you walk. Walking boosts blood flow and helps prevent pneumonia and constipation. Avoid exercises that use your belly muscles and strenuous activities such as bicycle riding, jogging, weight lifting, or aerobic exercise until your doctor says it is okay. For 3 weeks, avoid lifting anything that would make you strain.  This may include a child, heavy grocery bags and milk containers, a heavy briefcase or backpack, cat litter or dog food bags, or a vacuum . Hold a pillow over the cuts the doctor made (incisions) when you cough or take deep breaths. This will support your belly and decrease your pain. Do breathing exercises at home as instructed by your doctor. This will help prevent pneumonia. Ask your doctor when you can drive again -- usually 2 weeks or when you are off narcotics     You will probably need to take 4 to 6 weeks off from work. It depends on the type of work you do and how you feel. You can take showers. Do not take a bath for the first 2 weeks, or until your doctor tells you it is okay. You can have sex in 2-3 weeks, when you are feeling up to it. Diet    You can eat your normal diet. If your stomach is upset, try bland, low-fat foods like plain rice, broiled chicken, toast, and yogurt. Drink plenty of fluids (unless your doctor tells you not to). You may notice that your bowel movements are not regular right after your surgery. This is common. Try to avoid constipation and straining with bowel movements. You may want to take a fiber supplement every day. If you have not had a bowel movement after a couple of days, ask your doctor about taking a mild laxative. Medicines    Your doctor will tell you if and when you can restart your medicines. He or she will also give you instructions about taking any new medicines. If you take blood thinners, such as warfarin (Coumadin), clopidogrel (Plavix), or aspirin, be sure to talk to your doctor. He or she will tell you if and when to start taking those medicines again. Make sure that you understand exactly what your doctor wants you to do. Take pain medicines exactly as directed. If the doctor gave you a prescription medicine for pain, take it as prescribed. If you are not taking a prescription pain medicine, take an over-the-counter medicine that your doctor recommends.  Read and follow all instructions on the label. Do not take aspirin, ibuprofen (Advil, Motrin), or naproxen (Aleve), or other nonsteroidal anti-inflammatory drugs (NSAIDs) unless your doctor says it is okay. If you think your pain medicine is making you sick to your stomach: Take your medicine after meals (unless your doctor has told you not to). Ask your doctor for a different pain medicine. Incision care    If you have strips of tape on the incisions, leave the tape on for a week or until it falls off. Wash the area around the incisions daily with warm, soapy water and pat it dry. Don't use hydrogen peroxide or alcohol, which can slow healing. You may cover the incisions with gauze bandages if they weep or rub against clothing. Change the bandages every day. Keep the area around the incisions clean and dry. Follow-up care is a key part of your treatment and safety. Be sure to make and go to all appointments, and call your doctor if you are having problems. It's also a good idea to know your test results and keep a list of the medicines you take. When should you call for help? Call 911 anytime you think you may need emergency care. For example, call if:    You passed out (lost consciousness). You have chest pain, are short of breath, or cough up blood. Call your doctor now or seek immediate medical care if:    You have pain that does not get better after you take your pain medicine. You have symptoms of a urinary tract infection. These may include:  Pain or burning when you urinate. A frequent need to urinate without being able to pass much urine. Pain in the flank, which is just below the rib cage and above the waist on either side of the back. Blood in the urine. A fever. You have signs of infection, such as: Increased pain, swelling, warmth, or redness. Red streaks leading from the incisions. Pus draining from the incisions. A fever. You have loose stitches, or your incisions come open. You are bleeding from the incisions. You cannot urinate. You have signs of a blood clot in your leg (called a deep vein thrombosis), such as:  Pain in the calf, back of the knee, thigh, or groin. Redness and swelling in your leg. Watch closely for changes in your health, and be sure to contact your doctor if you are having any problems. Content Version: 11.6  © 7317-6318 [employer], [employer]. Care instructions adapted under license by [facility] ([facility]). If you have questions about a medical condition or this instruction, always ask your healthcare professional. [place] [address] any warranty or liability for your use of this information.

## 2022-12-09 VITALS
TEMPERATURE: 98.7 F | HEART RATE: 67 BPM | OXYGEN SATURATION: 95 % | HEIGHT: 63 IN | RESPIRATION RATE: 17 BRPM | BODY MASS INDEX: 23.42 KG/M2 | WEIGHT: 132.2 LBS | DIASTOLIC BLOOD PRESSURE: 64 MMHG | SYSTOLIC BLOOD PRESSURE: 157 MMHG

## 2022-12-09 LAB
ANION GAP SERPL CALCULATED.3IONS-SCNC: 12 MMOL/L (ref 3–16)
BASOPHILS ABSOLUTE: 0 K/UL (ref 0–0.2)
BASOPHILS RELATIVE PERCENT: 0.2 %
BUN BLDV-MCNC: 26 MG/DL (ref 7–20)
CALCIUM SERPL-MCNC: 8.5 MG/DL (ref 8.3–10.6)
CHLORIDE BLD-SCNC: 99 MMOL/L (ref 99–110)
CO2: 23 MMOL/L (ref 21–32)
CREAT SERPL-MCNC: 1.1 MG/DL (ref 0.6–1.2)
EOSINOPHILS ABSOLUTE: 0 K/UL (ref 0–0.6)
EOSINOPHILS RELATIVE PERCENT: 0.2 %
GFR SERPL CREATININE-BSD FRML MDRD: 55 ML/MIN/{1.73_M2}
GLUCOSE BLD-MCNC: 143 MG/DL (ref 70–99)
GLUCOSE BLD-MCNC: 219 MG/DL (ref 70–99)
GLUCOSE BLD-MCNC: 251 MG/DL (ref 70–99)
HCT VFR BLD CALC: 35.5 % (ref 36–48)
HEMOGLOBIN: 11.4 G/DL (ref 12–16)
LYMPHOCYTES ABSOLUTE: 0.6 K/UL (ref 1–5.1)
LYMPHOCYTES RELATIVE PERCENT: 6.1 %
MCH RBC QN AUTO: 25.6 PG (ref 26–34)
MCHC RBC AUTO-ENTMCNC: 32.3 G/DL (ref 31–36)
MCV RBC AUTO: 79.4 FL (ref 80–100)
MONOCYTES ABSOLUTE: 0.8 K/UL (ref 0–1.3)
MONOCYTES RELATIVE PERCENT: 8.6 %
NEUTROPHILS ABSOLUTE: 8.3 K/UL (ref 1.7–7.7)
NEUTROPHILS RELATIVE PERCENT: 84.9 %
PDW BLD-RTO: 22.8 % (ref 12.4–15.4)
PERFORMED ON: ABNORMAL
PERFORMED ON: ABNORMAL
PLATELET # BLD: 239 K/UL (ref 135–450)
PLATELET SLIDE REVIEW: ADEQUATE
PMV BLD AUTO: 9.5 FL (ref 5–10.5)
POTASSIUM REFLEX MAGNESIUM: 4.1 MMOL/L (ref 3.5–5.1)
RBC # BLD: 4.47 M/UL (ref 4–5.2)
SLIDE REVIEW: ABNORMAL
SODIUM BLD-SCNC: 134 MMOL/L (ref 136–145)
WBC # BLD: 9.8 K/UL (ref 4–11)

## 2022-12-09 PROCEDURE — 85025 COMPLETE CBC W/AUTO DIFF WBC: CPT

## 2022-12-09 PROCEDURE — 6370000000 HC RX 637 (ALT 250 FOR IP): Performed by: UROLOGY

## 2022-12-09 PROCEDURE — 51798 US URINE CAPACITY MEASURE: CPT

## 2022-12-09 PROCEDURE — 36415 COLL VENOUS BLD VENIPUNCTURE: CPT

## 2022-12-09 PROCEDURE — 2580000003 HC RX 258: Performed by: UROLOGY

## 2022-12-09 PROCEDURE — 80048 BASIC METABOLIC PNL TOTAL CA: CPT

## 2022-12-09 PROCEDURE — 6360000002 HC RX W HCPCS: Performed by: UROLOGY

## 2022-12-09 RX ADMIN — OXYCODONE 5 MG: 5 TABLET ORAL at 01:26

## 2022-12-09 RX ADMIN — ACETAMINOPHEN 650 MG: 325 TABLET ORAL at 05:07

## 2022-12-09 RX ADMIN — PANTOPRAZOLE SODIUM 40 MG: 40 TABLET, DELAYED RELEASE ORAL at 08:19

## 2022-12-09 RX ADMIN — ACETAMINOPHEN 650 MG: 325 TABLET ORAL at 00:08

## 2022-12-09 RX ADMIN — CEFAZOLIN 1000 MG: 1 INJECTION, POWDER, FOR SOLUTION INTRAMUSCULAR; INTRAVENOUS at 05:09

## 2022-12-09 RX ADMIN — ACETAMINOPHEN 650 MG: 325 TABLET ORAL at 11:46

## 2022-12-09 RX ADMIN — LISINOPRIL 20 MG: 20 TABLET ORAL at 08:20

## 2022-12-09 RX ADMIN — Medication 10 ML: at 08:20

## 2022-12-09 RX ADMIN — OXYCODONE 5 MG: 5 TABLET ORAL at 12:33

## 2022-12-09 RX ADMIN — ENOXAPARIN SODIUM 40 MG: 100 INJECTION SUBCUTANEOUS at 08:20

## 2022-12-09 RX ADMIN — OXYCODONE 5 MG: 5 TABLET ORAL at 08:19

## 2022-12-09 RX ADMIN — INSULIN LISPRO 2 UNITS: 100 INJECTION, SOLUTION INTRAVENOUS; SUBCUTANEOUS at 08:20

## 2022-12-09 ASSESSMENT — PAIN DESCRIPTION - ORIENTATION
ORIENTATION: LEFT
ORIENTATION: MID
ORIENTATION: MID
ORIENTATION: LEFT

## 2022-12-09 ASSESSMENT — PAIN DESCRIPTION - DESCRIPTORS
DESCRIPTORS: ACHING;DISCOMFORT
DESCRIPTORS: DISCOMFORT;ACHING
DESCRIPTORS: DISCOMFORT

## 2022-12-09 ASSESSMENT — PAIN DESCRIPTION - LOCATION
LOCATION: ABDOMEN

## 2022-12-09 ASSESSMENT — PAIN SCALES - GENERAL
PAINLEVEL_OUTOF10: 5
PAINLEVEL_OUTOF10: 6
PAINLEVEL_OUTOF10: 6
PAINLEVEL_OUTOF10: 5

## 2022-12-09 NOTE — PROGRESS NOTES
Pt assessment completed and charted. VSS. Pt a/ox4. Pt tolerating diet and passing gas. F/c out this morning. Pt up to bathroom to void. Voided 100ml and post bladder scan was 44ml. Pt reporting abd pain 5/10, pt educated on prn pain meds, meds given per mar. Pt has x5 lap incisions, C/D/I. Pt denies any other needs at this time. Pt calls out appropriately. Pt is a fall risk;  -Bed in lowest position and wheels locked.    -Call light within reach.   -Bedside table within reach.   -Non-skid footwear in place.  -bed check

## 2022-12-09 NOTE — PROGRESS NOTES
Pt A&Ox4. VSS. Shift assessment complete and charted. Pt . 4U of HUMALOG given per order. MD notified. Standard safety measures implemented. Call light and bedside table within reach. Pt educated to use call light for assistance. Pt verbalized understanding. Pt ambulates with IV, tolerates well. Pt reports L ABD pain 6/10, DILAUDID given per order. Pt denies any pain or needs upon writer leaving room.

## 2022-12-09 NOTE — CARE COORDINATION
Case Management Assessment  Initial Evaluation    Date/Time of Evaluation: 12/9/2022 9:32 AM  Assessment Completed by: CRAIG Salazar    If patient is discharged prior to next notation, then this note serves as note for discharge by case management. 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 Yes    Chart Reviewed: Yes      History Provided by: Patient  Patient Orientation: Alert and Oriented, Person, Place, Situation, Self    Patient Cognition: Alert    Hospitalization in the last 30 days (Readmission):  No      Advance Directives:      Code Status: Full Code   Patient's Primary Decision Maker is: Legal Next of Kin    Primary Decision Maker: Arlin Johnston - Child - [phone]    Secondary Decision Maker: isidro rausch - Child - [phone]    Discharge Planning:    Patient lives with: Children Type of Home: House  Primary Care Giver: Self  Patient Support Systems include: Children   Current Financial resources: Medicare  Current services prior to admission: None            Type of Home Care services:  None    ADLS  Prior functional level: Independent in ADLs/IADLs  Current functional level: Independent in ADLs/IADLs    Family can provide assistance at DC: Yes  Would you like Case Management to discuss the discharge plan with any other family members/significant others, and if so, who?  No  Plans to Return to Present Housing: Yes  Potential Assistance needed at discharge: N/A  Patient expects to discharge to: [address] [place] [address] for transportation at discharge: Family    Financial    Payor: Hailey Abbott / Plan: Marely Meadows / Product Type: *No Product type* /     Does insurance require precert for SNF: Yes    Potential assistance Purchasing Medications: No  Meds-to-Beds request: Yes      [facility] [id number] - [place], [place] [address]  Phone: [phone] Fax: [phone]      Notes:    Factors facilitating achievement of predicted outcomes: Family support, Motivated, Cooperative, Pleasant, and Good insight into deficits    Barriers to discharge: Pain and voiding trail     Additional Case Management Notes: Patient from home with adult son, 3 MARISOL, no prior DME, IPTA. Patient denies needs from CM.     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 yes     Lakshmi Garg, [facility]  Case Management Department  Ph: [phone] Fax: [phone]

## 2022-12-09 NOTE — PROGRESS NOTES
Pt a/o x4. VSS. All prescriptions, discharge and follow up instructions given to the patient. The patient verbalizes understanding and denies questions. All belongings collected and sent with the patient. The patient was discharged off the unit by wheelchair and writer in stable condition. Pt educated that Dr Joey Luna office will be calling in a prescription for her pain medication. Script for percocet was shredded and d/c instructions were updated. Joey Luna office reported that they would update us when script is finalized and we will update pt.

## 2022-12-09 NOTE — DISCHARGE SUMMARY
Urology Discharge Summary      Patient Identification  Romana Drake is a 79 y.o. female. :  1955  Admit Date:  2022    Discharge date:   2022     Disposition: home     Discharge Diagnoses:   Patient Active Problem List   Diagnosis    Diabetes mellitus type 2, diet-controlled ([address]    Benign essential HTN    Hypercholesteremia    Pulmonary nodule    COPD (chronic obstructive pulmonary disease) (HCC)    Chest pain    Depression    Iron deficiency anemia    Gastrointestinal hemorrhage    Prolonged Q-T interval on ECG    Exertional dyspnea    Cardiomyopathy ([address]    Thrombocytosis    Primary insomnia    Acute GI bleeding    Near syncope    Anemia    Chronic systolic congestive heart failure ([address]    Noncompliance with medication regimen    Uncontrolled type 2 diabetes mellitus with hyperglycemia (HCC)    Tobacco abuse    GI bleed    Leg cramps    Neoplasm of uncertain behavior of left kidney       Surgery: Robotic-assisted laparoscopic LEFT  partial nephrectomy. Activity:  No strenuous activity or heavy lifting until seen in follow up     Condition on discharge: stable     Follow-up: 4 weeks with Dr. [facility] course: 79 y.o. female admitted after Robotic-assisted laparoscopic LEFT  partial nephrectomy. .  Surgery was without noted complications. Post op course was without complications. Her da silva was removed on POD1 and she was able to urinate without difficulty. She tolerated general diet and had return of bowel function. Post op labs revealed a mild drop in h/h, of note she has a long standing history of iron def anemia requiring IV iron as OP. Recommend that she have a follow up CBC on 22, order placed in her chart. Otherwise her pain was well controlled and is stable for dc to home. She was dc'd with rx for pain medication and stool softeners      Physical exam on day of discharge:   Well developed, well nourished in no acute distress  Mood indicates no abnormalities. Pt doesnt appear depressed  Orientated to time and place  Neck is supple, trachea is midline  Respiratory effort is normal  Cardiovascular show no extremity swelling  Abdomen soft, not tender. Surgical incisions are CDI     Female :   Marion with yellow urine removed during exam            Medication List        START taking these medications      oxyCODONE-acetaminophen 5-325 MG per tablet  Commonly known as: Percocet  Take 1 tablet by mouth every 8 hours as needed for Pain for up to 3 days. CAUTION CONSTIPATION --   Take fiber or over the counter stool softener if using this medication regularly    Intended supply: 3 days. Take lowest dose possible to manage pain     sennosides-docusate sodium 8.6-50 MG tablet  Commonly known as: SENOKOT-S  Take 1 tablet by mouth daily for 14 days UNTIL SOFT REGULAR BOWEL MOVEMENTS            CHANGE how you take these medications      amLODIPine 5 MG tablet  Commonly known as: NORVASC  TAKE ONE TABLET BY MOUTH AT BEDTIME  What changed: See the new instructions. CONTINUE taking these medications      AgaMatrix AMP Adri  Check BG daily after meals     ALBUTEROL IN     blood glucose test strips strip  Commonly known as: [employer] Contour Next Test  Test three times daily. DX:E11.9     cilostazol 100 MG tablet  Commonly known as: PLETAL  TAKE ONE TABLET BY MOUTH TWICE A DAY     * [employer] Lancets Misc  1 each by Does not apply route 3 times daily     * [employer] Ultra-Thin Lancets Misc  Check sugars daily after each meal     empagliflozin 25 MG tablet  Commonly known as: Jardiance  Take 1 tablet by mouth daily     gabapentin 300 MG capsule  Commonly known as: NEURONTIN  Take 1 capsule by mouth nightly for 90 days. Intended supply: 30 days     Handicap Placard Misc  by Does not apply route     Injectafer 750 MG/15ML Soln IV solution  Generic drug: ferric carboxymaltose  Inject 750 mg once weekly for 2 doses.      ipratropium-albuterol 0.5-2.5 (3) MG/3ML Soln nebulizer solution  Commonly known as: DUONEB  Inhale 3 mLs into the lungs every 6 hours as needed for Shortness of Breath     lisinopril 20 MG tablet  Commonly known as: PRINIVIL;ZESTRIL  Take 1 tablet by mouth daily     metoprolol succinate 50 MG extended release tablet  Commonly known as: TOPROL XL  Take 1 tablet by mouth daily     pantoprazole 40 MG tablet  Commonly known as: PROTONIX  TAKE ONE TABLET BY MOUTH DAILY     traZODone 50 MG tablet  Commonly known as: DESYREL  Take 1 tablet by mouth nightly           * This list has 2 medication(s) that are the same as other medications prescribed for you. Read the directions carefully, and ask your doctor or other care provider to review them with you.                    Where to Get Your Medications        You can get these medications from any pharmacy    Bring a paper prescription for each of these medications  oxyCODONE-acetaminophen 5-325 MG per tablet  sennosides-docusate sodium 8.6-50 MG tablet         Thalia Kuhn PA-C  [facility]

## 2022-12-09 NOTE — PROGRESS NOTES
Dr Kerri Patel paged to let know about pain med at discharge being on pt's allergy list. Waiting to hear back.

## 2022-12-09 NOTE — PLAN OF CARE
Problem: Chronic Conditions and Co-morbidities  Goal: Patient's chronic conditions and co-morbidity symptoms are monitored and maintained or improved  Outcome: Progressing  Flowsheets (Taken 12/8/2022 2046)  Care Plan - Patient's Chronic Conditions and Co-Morbidity Symptoms are Monitored and Maintained or Improved:   Monitor and assess patient's chronic conditions and comorbid symptoms for stability, deterioration, or improvement   Collaborate with multidisciplinary team to address chronic and comorbid conditions and prevent exacerbation or deterioration   Update acute care plan with appropriate goals if chronic or comorbid symptoms are exacerbated and prevent overall improvement and discharge     Problem: Pain  Goal: Verbalizes/displays adequate comfort level or baseline comfort level  12/8/2022 2057 by Amado Armas RN  Outcome: Progressing  Flowsheets (Taken 12/8/2022 2034)  Verbalizes/displays adequate comfort level or baseline comfort level:   Encourage patient to monitor pain and request assistance   Assess pain using appropriate pain scale   Administer analgesics based on type and severity of pain and evaluate response   Implement non-pharmacological measures as appropriate and evaluate response   Consider cultural and social influences on pain and pain management  12/8/2022 1838 by Randy Xie RN  Outcome: Progressing  Flowsheets (Taken 12/8/2022 1838)  Verbalizes/displays adequate comfort level or baseline comfort level:   Encourage patient to monitor pain and request assistance   Administer analgesics based on type and severity of pain and evaluate response   Assess pain using appropriate pain scale   Implement non-pharmacological measures as appropriate and evaluate response   Consider cultural and social influences on pain and pain management   Notify Licensed Independent Practitioner if interventions unsuccessful or patient reports new pain     Problem: Safety - Adult  Goal: Free from fall injury  12/8/2022 2057 by Ponce Tan RN  Outcome: Progressing  Flowsheets (Taken 12/8/2022 1838 by Richard Arroyo, RN)  Free From Fall Injury: Instruct family/caregiver on patient safety  12/8/2022 1838 by Richard Arroyo RN  Outcome: Progressing  Flowsheets (Taken 12/8/2022 1838)  Free From Fall Injury: Instruct family/caregiver on patient safety     Problem: ABCDS Injury Assessment  Goal: Absence of physical injury  Outcome: Progressing  Flowsheets (Taken 12/8/2022 2057)  Absence of Physical Injury: Implement safety measures based on patient assessment

## 2022-12-10 NOTE — PROGRESS NOTES
Called and placed page to MD at 12:54, 13:50 for a new order for pain meds for pt to d/c. MD said a tramadol script would be called in from office. Office called back about 1500 and reported that MD was going to place order but they needed to page for dosage and quantity. Gave office [facility]'s outpt pharmacy number to send the script there and office reported they would call back when place. Pt wanted to be d/c and wanted to wait by out pt pharmacy. Pt called about about 1600 and reporting the outpt pharmacy still did not have script. Called office again and they reported that they believed MD called it into [facility]. Writer called [facility] on pt's chart and it was called in there. Pt was informed that script was called to her pharmacy, pt was upset after waiting for hours her to d/c and get script.

## 2022-12-12 ENCOUNTER — HOSPITAL ENCOUNTER (OUTPATIENT)
Age: 67
Discharge: HOME | End: 2022-12-12
Payer: MEDICARE

## 2022-12-12 ENCOUNTER — CARE COORDINATION (OUTPATIENT)
Dept: CASE MANAGEMENT | Age: 67
End: 2022-12-12

## 2022-12-12 DIAGNOSIS — D50.0 IRON DEFICIENCY ANEMIA DUE TO CHRONIC BLOOD LOSS: ICD-10-CM

## 2022-12-12 DIAGNOSIS — D41.02 NEOPLASM OF UNCERTAIN BEHAVIOR OF LEFT KIDNEY: Primary | ICD-10-CM

## 2022-12-12 LAB
HCT VFR BLD CALC: 40.3 % (ref 36–48)
HEMOGLOBIN: 13.1 G/DL (ref 12–16)
MCH RBC QN AUTO: 25.9 PG (ref 26–34)
MCHC RBC AUTO-ENTMCNC: 32.4 G/DL (ref 31–36)
MCV RBC AUTO: 79.7 FL (ref 80–100)
PDW BLD-RTO: 22 % (ref 12.4–15.4)
PLATELET # BLD: 255 K/UL (ref 135–450)
PMV BLD AUTO: 10.1 FL (ref 5–10.5)
RBC # BLD: 5.06 M/UL (ref 4–5.2)
SLIDE REVIEW: ABNORMAL
WBC # BLD: 7.5 K/UL (ref 4–11)

## 2022-12-12 PROCEDURE — 85027 COMPLETE CBC AUTOMATED: CPT

## 2022-12-12 PROCEDURE — 1111F DSCHRG MED/CURRENT MED MERGE: CPT | Performed by: INTERNAL MEDICINE

## 2022-12-12 PROCEDURE — 36415 COLL VENOUS BLD VENIPUNCTURE: CPT

## 2022-12-12 NOTE — CARE COORDINATION
[facility] Care Transitions Initial Follow Up Call    Call within 2 business days of discharge: Yes    Care Transition Nurse contacted the patient by telephone to perform post hospital discharge assessment. Verified name and  with patient as identifiers. Provided introduction to self, and explanation of the Care Transition Nurse role. 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 .. Admission (Discharged) Latina Phoenix, MD            Was this an external facility discharge? No Discharge Facility:     Challenges to be reviewed by the provider   Additional needs identified to be addressed with provider: yes. Patient continues to have post op pain rated 5-6/10 even while taking prescription tramadol. Patient stated that she has minimal to no pain relief. Method of communication with provider: phone. spoke to Joaquina at Dr Cristian Ochoa office     Patient answered call and verified . Patient pleasant and agreeable to transition call. Patient stated that she has been in pain since discharge from hospital. Rated pain 5-6/10 at surgery site. Stated that she is taking tramadol as directed with minimal to no relief. 3 way call placed to Dr Cristian Ochoa office and spoke to Joaquina. Message was sent to providers in urology office today and patient's contact number taken by office. Joaquina instructed patient that follow up call would be completed to her. Post hospitalization visit scheduled  with urologist. Patient confirmed that she is taking all medication as directed and 1111f added to system. Confirmed that bowels are moving on routine and last BM was yesterday.  Denied any acute needs at present time.  Agreeable to f/u calls. Educated on the use of urgent care or physicians 24 hr access line if assistance is needed after hours. Care Transition Nurse reviewed discharge instructions with patient who verbalized understanding. The patient was given an opportunity to ask questions and does not have any further questions or concerns at this time. Were discharge instructions available to patient? Yes. Reviewed appropriate site of care based on symptoms and resources available to patient including: PCP  Specialist. The patient agrees to contact the PCP office for questions related to their healthcare. Advance Care Planning:   Does patient have an Advance Directive:  Code Status: Full Code  . Code Status: Full Code   Patient's Primary Decision Maker is: Legal Next of Kin    Primary Decision Maker: Arlin Johnston - Child - [phone]    Secondary Decision Maker: isidro rausch - Child - [phone]    Medication reconciliation was performed with patient, who verbalizes understanding of administration of home medications.  Medications reviewed, 1111F entered: yes    Was patient discharged with a pulse oximeter? no    Non-face-to-face services provided:  Obtained and reviewed discharge summary and/or continuity of care documents    Offered patient enrollment in the Remote Patient Monitoring (RPM) program for in-home monitoring: Patient is not eligible for RPM program.    Care Transitions 24 Hour Call    Do you have a copy of your discharge instructions?: Yes  Do you have all of your prescriptions and are they filled?: Yes  Have you been contacted by a [facility] Pharmacist?: No  Have you scheduled your follow up appointment?: Yes  How are you going to get to your appointment?: Car - family or friend to transport  Do you feel like you have everything you need to keep you well at home?: Yes  Care Transitions Interventions         Follow Up  Future Appointments   Date Time Provider Dorota Agosto   2/6/2023 1:30 PM Jorge Paz MD [facility]       Care Transition Nurse provided contact information. Plan for follow-up call in 3-5 days based on severity of symptoms and risk factors. Plan for next call:  was patient able to get prescription for pain management?     Terri Mora RN

## 2022-12-15 ENCOUNTER — CARE COORDINATION (OUTPATIENT)
Dept: CASE MANAGEMENT | Age: 67
End: 2022-12-15

## 2022-12-15 NOTE — CARE COORDINATION
[facility] Care Transitions Follow Up Call    Care Transition Nurse contacted the patient by telephone to follow up after admission on 2022. Verified name and  with patient as identifiers. Patient: Yan Mota  Patient : 1955   MRN: [id number]  Reason for Admission: scheduled surgical procedure  Discharge Date: 22 RARS: Readmission Risk Score: 11.9      Needs to be reviewed by the provider   Additional needs identified to be addressed with provider: No               Method of communication with provider: none. Malaika Garcia states she is \"still sore\". She rates her pain at 3-4/10. She states the physician changed her pain medication and she feels it is more effective. Louvenia Borne states she is urinating without difficulty. She states she is wearing a small pad for \"leaks\". She states her bowels are moving without difficulty. Louvenia Borne states she is tolerating food & fluids - denies any N/V. She denies any fever at this time. She states her physician notified her of the biopsy results yesterday - Louvenia Borne states she was told it was cancer but the physician feels they got it all and Louvenia Borne states they will discuss in greater detail at her appointment 2023. She states her daughter will provide her transportation. Malaika Garcia denies any needs at this time. Follow Up  Future Appointments   Date Time Provider Dorota Agosto   2023  1:30 PM Sathya John MD [facility] None            Care Transitions Subsequent and Final Call    Subsequent and Final Calls  Do you have any ongoing symptoms?: No  Have your medications changed?: Yes  Do you have any questions related to your medications?: No  Do you currently have any active services?: No  Do you have any needs or concerns that I can assist you with?: No  Identified Barriers: None  Care Transitions Interventions  Other Interventions:             Care Transition Nurse provided contact information for future needs.    Plan for next call:  routine surgical recovery    Lukas Conde RN BSN  Care Transition Nurse  [phone]

## 2022-12-21 ENCOUNTER — CARE COORDINATION (OUTPATIENT)
Dept: CASE MANAGEMENT | Age: 67
End: 2022-12-21

## 2022-12-21 NOTE — CARE COORDINATION
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 Unable to leave a voicemail message to return call. Will continue to follow. Care Transitions Subsequent and Final Call    Subsequent and Final Calls  Care Transitions Interventions  Other Interventions:              Follow Up  Future Appointments   Date Time Provider Dorota Agosto   2023  1:30 PM MD Gifty Yates None       Jamin Chappell LPN

## 2022-12-28 ENCOUNTER — CARE COORDINATION (OUTPATIENT)
Dept: CASE MANAGEMENT | Age: 67
End: 2022-12-28

## 2022-12-28 NOTE — CARE COORDINATION
[facility] Care Transitions Follow Up Call    Care Transition Nurse contacted the patient by telephone to follow up after admission. Verified name and  with patient as identifiers. 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 Patient answered call and verified . Patient pleasant and agreeable to transition call. Continues to have pain rated 4/10 and taking tylenol as needed. Patient has oxycodone/acetaminophen that she is taking sparingly. Stated that she was given only 12 tablets and only taking when she absolutely need it. Encouraged to reach out to urology group if pain medication is still needed. Stated understanding. Patient has good appetite. Denied any problem with urination or bowels. Denies any acute needs at present time. Agreeable to f/u calls. Educated on the use of urgent care or physicians 24 hr access line if assistance is needed after hours. Addressed changes since last contact:  none  Discussed follow-up appointments. If no appointment was previously scheduled, appointment scheduling offered: Yes. Is follow up appointment scheduled within 7 days of discharge? No.    Follow Up  Future Appointments   Date Time Provider Dorota Agosto   2023  1:30 PM Sal Nance MD [place] [facility] None     Non[facility] follow up appointment(s): dr Andrew Alvarado 22    Care Transition Nurse reviewed discharge instructions with patient and discussed any barriers to care and/or understanding of plan of care after discharge. Discussed appropriate site of care based on symptoms and resources available to patient including: PCP  Specialist  When to call 911.  The patient agrees to contact the PCP office for questions related to their healthcare. Advance Care Planning:   not on file. Patients top risk factors for readmission: functional physical ability  Interventions to address risk factors: Education of patient/family/caregiver/guardian to support self-management-     Offered patient enrollment in the Remote Patient Monitoring (RPM) program for in-home monitoring: Patient is not eligible for RPM program.     Care Transitions Subsequent and Final Call    Subsequent and Final Calls  Do you have any ongoing symptoms?: No  Have your medications changed?: No  Do you have any questions related to your medications?: No  Do you currently have any active services?: No  Do you have any needs or concerns that I can assist you with?: No  Identified Barriers: None  Care Transitions Interventions  Other Interventions:             Care Transition Nurse provided contact information for future needs. Plan for follow-up call in 7-10 days based on severity of symptoms and risk factors.   Plan for next call: follow-up appointment-     Virgilio Eng RN

## 2023-01-06 ENCOUNTER — CARE COORDINATION (OUTPATIENT)
Dept: CASE MANAGEMENT | Age: 68
End: 2023-01-06

## 2023-01-06 NOTE — CARE COORDINATION
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 VM left stating purpose of call along with my contact information requesting a return call.     Follow Up  Future Appointments   Date Time Provider Dorota Agosto   2023  1:30 PM MD Tenzin Lau None      Care Transitions Subsequent and Final Call    Subsequent and Final Calls  Care Transitions Interventions  Other Interventions:           Yves Collazo RN

## 2023-01-09 NOTE — PROGRESS NOTES
Called and placed page out to dr Tyler Soolrio again concerning pt's d/c awaiting to hear back. Detail Level: Detailed Detail Level: Zone

## 2023-01-13 ENCOUNTER — CARE COORDINATION (OUTPATIENT)
Dept: CASE MANAGEMENT | Age: 68
End: 2023-01-13

## 2023-01-13 NOTE — CARE COORDINATION
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 Unable to leave a message phone just rang then went to busy tone. No further outreach will be attempted. Arin Renee LPN  Care Coordinator     Care Transitions Subsequent and Final Call    Subsequent and Final Calls  Care Transitions Interventions  Other Interventions:              Follow Up  Future Appointments   Date Time Provider Dorota Agosto   2023  1:30 PM Yordy Casarez MD [facility] None       Arin Renee LPN

## 2023-02-21 RX ORDER — GABAPENTIN 300 MG/1
CAPSULE ORAL
Qty: 90 CAPSULE | Refills: 0 | Status: SHIPPED | OUTPATIENT
Start: 2023-02-25 | End: 2023-05-26

## 2023-02-21 RX ORDER — AMLODIPINE BESYLATE 5 MG/1
TABLET ORAL
Qty: 90 TABLET | Refills: 0 | Status: SHIPPED | OUTPATIENT
Start: 2023-02-21

## 2023-02-23 DIAGNOSIS — I50.20 SYSTOLIC CONGESTIVE HEART FAILURE, UNSPECIFIED HF CHRONICITY (HCC): ICD-10-CM

## 2023-02-23 RX ORDER — METOPROLOL SUCCINATE 50 MG/1
TABLET, EXTENDED RELEASE ORAL
Qty: 30 TABLET | Refills: 1 | Status: SHIPPED | OUTPATIENT
Start: 2023-02-23

## 2023-02-23 RX ORDER — LISINOPRIL 20 MG/1
TABLET ORAL
Qty: 30 TABLET | Refills: 1 | Status: SHIPPED | OUTPATIENT
Start: 2023-02-23

## 2023-04-10 DIAGNOSIS — R53.83 FATIGUE, UNSPECIFIED TYPE: ICD-10-CM

## 2023-04-10 DIAGNOSIS — I10 BENIGN ESSENTIAL HTN: ICD-10-CM

## 2023-04-10 DIAGNOSIS — E11.9 DIABETES MELLITUS TYPE 2, DIET-CONTROLLED (HCC): ICD-10-CM

## 2023-04-10 PROBLEM — E11.65 TYPE 2 DIABETES MELLITUS WITH HYPERGLYCEMIA (HCC): Status: ACTIVE | Noted: 2023-04-10

## 2023-04-11 LAB
ALBUMIN SERPL-MCNC: 4.2 G/DL (ref 3.4–5)
ALBUMIN/GLOB SERPL: 1.2 {RATIO} (ref 1.1–2.2)
ALP SERPL-CCNC: 96 U/L (ref 40–129)
ALT SERPL-CCNC: 13 U/L (ref 10–40)
ANION GAP SERPL CALCULATED.3IONS-SCNC: 21 MMOL/L (ref 3–16)
AST SERPL-CCNC: 16 U/L (ref 15–37)
BASOPHILS # BLD: 0.1 K/UL (ref 0–0.2)
BASOPHILS NFR BLD: 0.7 %
BILIRUB SERPL-MCNC: <0.2 MG/DL (ref 0–1)
BUN SERPL-MCNC: 16 MG/DL (ref 7–20)
CALCIUM SERPL-MCNC: 9.5 MG/DL (ref 8.3–10.6)
CHLORIDE SERPL-SCNC: 97 MMOL/L (ref 99–110)
CHOLEST SERPL-MCNC: 188 MG/DL (ref 0–199)
CO2 SERPL-SCNC: 18 MMOL/L (ref 21–32)
CREAT SERPL-MCNC: 0.8 MG/DL (ref 0.6–1.2)
DEPRECATED RDW RBC AUTO: 16.2 % (ref 12.4–15.4)
EOSINOPHIL # BLD: 0.3 K/UL (ref 0–0.6)
EOSINOPHIL NFR BLD: 3.5 %
EST. AVERAGE GLUCOSE BLD GHB EST-MCNC: 246 MG/DL
GFR SERPLBLD CREATININE-BSD FMLA CKD-EPI: >60 ML/MIN/{1.73_M2}
GLUCOSE SERPL-MCNC: 195 MG/DL (ref 70–99)
HBA1C MFR BLD: 10.2 %
HCT VFR BLD AUTO: 32.1 % (ref 36–48)
HDLC SERPL-MCNC: 36 MG/DL (ref 40–60)
HGB BLD-MCNC: 10 G/DL (ref 12–16)
LDLC SERPL CALC-MCNC: ABNORMAL MG/DL
LDLC SERPL-MCNC: 100 MG/DL
LYMPHOCYTES # BLD: 1.2 K/UL (ref 1–5.1)
LYMPHOCYTES NFR BLD: 14.6 %
MCH RBC QN AUTO: 21.1 PG (ref 26–34)
MCHC RBC AUTO-ENTMCNC: 31.2 G/DL (ref 31–36)
MCV RBC AUTO: 67.8 FL (ref 80–100)
MONOCYTES # BLD: 0.8 K/UL (ref 0–1.3)
MONOCYTES NFR BLD: 9.9 %
NEUTROPHILS # BLD: 6 K/UL (ref 1.7–7.7)
NEUTROPHILS NFR BLD: 71.3 %
PATH INTERP BLD-IMP: NO
PLATELET # BLD AUTO: 297 K/UL (ref 135–450)
PMV BLD AUTO: 10.2 FL (ref 5–10.5)
POTASSIUM SERPL-SCNC: 4.7 MMOL/L (ref 3.5–5.1)
PROT SERPL-MCNC: 7.7 G/DL (ref 6.4–8.2)
RBC # BLD AUTO: 4.74 M/UL (ref 4–5.2)
SODIUM SERPL-SCNC: 136 MMOL/L (ref 136–145)
TRIGL SERPL-MCNC: 307 MG/DL (ref 0–150)
TSH SERPL DL<=0.005 MIU/L-ACNC: 1.56 UIU/ML (ref 0.27–4.2)
VIT B12 SERPL-MCNC: 482 PG/ML (ref 211–911)
VLDLC SERPL CALC-MCNC: ABNORMAL MG/DL
WBC # BLD AUTO: 8.4 K/UL (ref 4–11)

## 2023-04-13 PROBLEM — K90.9 MALABSORPTION OF IRON: Status: ACTIVE | Noted: 2023-04-13

## 2023-04-19 ENCOUNTER — TELEPHONE (OUTPATIENT)
Dept: INTERNAL MEDICINE CLINIC | Age: 68
End: 2023-04-19

## 2023-04-19 RX ORDER — SODIUM CHLORIDE 9 MG/ML
INJECTION, SOLUTION INTRAVENOUS CONTINUOUS
Status: CANCELLED
Start: 2023-04-21

## 2023-04-19 NOTE — TELEPHONE ENCOUNTER
----- Message from Yennifer Rasheed sent at 4/18/2023  1:47 PM EDT -----  Contact: Monica/outpatient    ----- Message -----  From: Yennifer Rasheed  Sent: 4/18/2023   1:47 PM EDT  To: Lavelle Dong MD    Waiting for Dr. Leona Wagner to sign  ----- Message -----  From: Lavelle Dong MD  Sent: 4/18/2023   1:31 PM EDT  To: Edgar Reyes    Venofer 200 mg daily for 5 doses  ----- Message -----  From: Yennifer Rasheed  Sent: 4/18/2023   9:20 AM EDT  To: Lavelle Dong MD    Insurance denied Injactefer and requesting Venofer. Please advise.

## 2023-04-21 ENCOUNTER — HOSPITAL ENCOUNTER (OUTPATIENT)
Dept: NURSING | Age: 68
Setting detail: INFUSION SERIES
Discharge: HOME | End: 2023-04-21
Payer: MEDICARE

## 2023-04-21 VITALS
TEMPERATURE: 98.1 F | HEIGHT: 63 IN | RESPIRATION RATE: 14 BRPM | BODY MASS INDEX: 24.27 KG/M2 | DIASTOLIC BLOOD PRESSURE: 94 MMHG | HEART RATE: 84 BPM | SYSTOLIC BLOOD PRESSURE: 184 MMHG | WEIGHT: 137 LBS

## 2023-04-21 DIAGNOSIS — D50.9 IRON DEFICIENCY ANEMIA, UNSPECIFIED IRON DEFICIENCY ANEMIA TYPE: ICD-10-CM

## 2023-04-21 DIAGNOSIS — K90.9 MALABSORPTION OF IRON: Primary | ICD-10-CM

## 2023-04-21 PROCEDURE — 2580000003 HC RX 258: Performed by: INTERNAL MEDICINE

## 2023-04-21 PROCEDURE — 96365 THER/PROPH/DIAG IV INF INIT: CPT

## 2023-04-21 PROCEDURE — 99211 OFF/OP EST MAY X REQ PHY/QHP: CPT

## 2023-04-21 PROCEDURE — 6360000002 HC RX W HCPCS: Performed by: INTERNAL MEDICINE

## 2023-04-21 RX ORDER — SODIUM CHLORIDE 9 MG/ML
INJECTION, SOLUTION INTRAVENOUS CONTINUOUS
Status: CANCELLED
Start: 2023-04-22

## 2023-04-21 RX ORDER — SODIUM CHLORIDE 9 MG/ML
250 INJECTION, SOLUTION INTRAVENOUS CONTINUOUS
Status: DISCONTINUED | OUTPATIENT
Start: 2023-04-21 | End: 2023-04-22 | Stop reason: HOSPADM

## 2023-04-21 RX ADMIN — SODIUM CHLORIDE 250 ML: 9 INJECTION, SOLUTION INTRAVENOUS at 10:44

## 2023-04-21 RX ADMIN — IRON SUCROSE 200 MG: 20 INJECTION, SOLUTION INTRAVENOUS at 10:44

## 2023-04-21 ASSESSMENT — PAIN DESCRIPTION - LOCATION: LOCATION: LEG;FOOT

## 2023-04-21 ASSESSMENT — PAIN SCALES - GENERAL: PAINLEVEL_OUTOF10: 3

## 2023-04-21 ASSESSMENT — PAIN DESCRIPTION - DESCRIPTORS: DESCRIPTORS: TINGLING;ACHING

## 2023-04-21 ASSESSMENT — PAIN DESCRIPTION - ORIENTATION: ORIENTATION: RIGHT;LEFT

## 2023-04-21 ASSESSMENT — PAIN DESCRIPTION - PAIN TYPE: TYPE: CHRONIC PAIN

## 2023-04-21 NOTE — DISCHARGE INSTRUCTIONS
What is this medicine? IRON SUCROSE (KE ornelas) is an iron complex. Iron is used to make healthy red blood cells, which carry oxygen and nutrients throughout the body. This medicine is used to treat iron deficiency anemia in people with chronic kidney disease. This medicine may be used for other purposes; ask your health care provider or pharmacist if you have questions. COMMON BRAND NAME(S): Venofer     What should I tell my health care provider before I take this medicine? They need to know if you have any of these conditions:  -anemia not caused by low iron levels  -heart disease  -high levels of iron in the blood  -kidney disease  -liver disease  -an unusual or allergic reaction to iron, other medicines, foods, dyes, or preservatives  -pregnant or trying to get pregnant  -breast-feeding     How should I use this medicine? This medicine is for infusion into a vein. It is given by a health care professional in a hospital or clinic setting. What if I miss a dose? It is important not to miss your dose. Call your doctor or health care professional if you are unable to keep an appointment. What may interact with this medicine? Do not take this medicine with any of the following medications:  -deferoxamine  -dimercaprol  -other iron products     This medicine may also interact with the following medications:  -chloramphenicol  -deferasirox     This list may not describe all possible interactions. Give your health care provider a list of all the medicines, herbs, non-prescription drugs, or dietary supplements you use. Also tell them if you smoke, drink alcohol, or use illegal drugs. Some items may interact with your medicine. What should I watch for while using this medicine? Visit your doctor or healthcare professional regularly. Tell your doctor or healthcare professional if your symptoms do not start to get better or if they get worse.  You may need blood work done

## 2023-04-21 NOTE — PROGRESS NOTES
Patient ambulated to treatment room without complications. Pt states she is feeling \" okay \" Discussed Venofer infusion with patient. Patient verbalized understanding. Vitals within Defined limits. Lung sounds Clear , Heart sounds s1,s2 regular . IV started in left AC with 20 ga. Blood return noted, Flushed, Alcohol cap applied. Venofer infusion started at 1044 infusing at 220 ml/hr. Patient tolerating start without complications. Venofer infusion completed at 1114 Patient tolerated well without any complications. IV Removed, Dressing applied. Patient ambulated to [facility] after signing discharge papers.

## 2023-04-24 ENCOUNTER — HOSPITAL ENCOUNTER (OUTPATIENT)
Dept: NURSING | Age: 68
Setting detail: INFUSION SERIES
Discharge: HOME | End: 2023-04-24
Payer: MEDICARE

## 2023-04-24 VITALS
WEIGHT: 137 LBS | HEIGHT: 63 IN | DIASTOLIC BLOOD PRESSURE: 73 MMHG | RESPIRATION RATE: 16 BRPM | HEART RATE: 95 BPM | SYSTOLIC BLOOD PRESSURE: 186 MMHG | BODY MASS INDEX: 24.27 KG/M2 | TEMPERATURE: 98.3 F

## 2023-04-24 DIAGNOSIS — K90.9 MALABSORPTION OF IRON: Primary | ICD-10-CM

## 2023-04-24 DIAGNOSIS — D50.9 IRON DEFICIENCY ANEMIA, UNSPECIFIED IRON DEFICIENCY ANEMIA TYPE: ICD-10-CM

## 2023-04-24 PROCEDURE — 96365 THER/PROPH/DIAG IV INF INIT: CPT

## 2023-04-24 PROCEDURE — 6360000002 HC RX W HCPCS: Performed by: INTERNAL MEDICINE

## 2023-04-24 PROCEDURE — 99211 OFF/OP EST MAY X REQ PHY/QHP: CPT

## 2023-04-24 PROCEDURE — 2580000003 HC RX 258: Performed by: INTERNAL MEDICINE

## 2023-04-24 RX ORDER — SODIUM CHLORIDE 9 MG/ML
INJECTION, SOLUTION INTRAVENOUS CONTINUOUS
Status: CANCELLED
Start: 2023-04-25

## 2023-04-24 RX ORDER — SODIUM CHLORIDE 9 MG/ML
INJECTION, SOLUTION INTRAVENOUS CONTINUOUS
Status: DISCONTINUED | OUTPATIENT
Start: 2023-04-24 | End: 2023-04-25 | Stop reason: HOSPADM

## 2023-04-24 RX ADMIN — SODIUM CHLORIDE: 9 INJECTION, SOLUTION INTRAVENOUS at 08:34

## 2023-04-24 RX ADMIN — IRON SUCROSE 200 MG: 20 INJECTION, SOLUTION INTRAVENOUS at 08:34

## 2023-04-24 ASSESSMENT — PAIN SCALES - GENERAL: PAINLEVEL_OUTOF10: 0

## 2023-04-24 NOTE — PROGRESS NOTES
Venofer has infused and tubing was flushed with NS IV was removed without difficulty  Cath tip intact  Pressure then pressure dressing was applied and secured with a coban dressing  IV site unremarkable  Pt darien well  Discharge instructions were reviewed and understanding was verbalized  Copy was given then pt was discharged ambulatory in stable condition

## 2023-04-24 NOTE — PROGRESS NOTES
Pt here ambulatory for a venofer infusion    Pt without c/o's  Pt denies any questions or concerns about today's infusion   IV # 22 was started in RFA on 1st attempt  Pt darien well  Venofer 200 mg IVPB starting to infuse   IV site unremarkable  Legs elevated  Will monitor for adverse reactions  Pt watching TV

## 2023-04-28 ENCOUNTER — HOSPITAL ENCOUNTER (OUTPATIENT)
Dept: NURSING | Age: 68
Setting detail: INFUSION SERIES
Discharge: HOME | End: 2023-04-28
Payer: MEDICARE

## 2023-04-28 VITALS
BODY MASS INDEX: 24.27 KG/M2 | HEIGHT: 63 IN | WEIGHT: 137 LBS | DIASTOLIC BLOOD PRESSURE: 68 MMHG | HEART RATE: 81 BPM | SYSTOLIC BLOOD PRESSURE: 140 MMHG | RESPIRATION RATE: 16 BRPM | TEMPERATURE: 98.5 F

## 2023-04-28 DIAGNOSIS — D50.9 IRON DEFICIENCY ANEMIA, UNSPECIFIED IRON DEFICIENCY ANEMIA TYPE: ICD-10-CM

## 2023-04-28 DIAGNOSIS — K90.9 MALABSORPTION OF IRON: Primary | ICD-10-CM

## 2023-04-28 PROCEDURE — 2580000003 HC RX 258: Performed by: INTERNAL MEDICINE

## 2023-04-28 PROCEDURE — 6360000002 HC RX W HCPCS: Performed by: INTERNAL MEDICINE

## 2023-04-28 PROCEDURE — 99211 OFF/OP EST MAY X REQ PHY/QHP: CPT

## 2023-04-28 PROCEDURE — 96365 THER/PROPH/DIAG IV INF INIT: CPT

## 2023-04-28 RX ORDER — SODIUM CHLORIDE 9 MG/ML
INJECTION, SOLUTION INTRAVENOUS CONTINUOUS
Status: DISCONTINUED | OUTPATIENT
Start: 2023-04-28 | End: 2023-04-29 | Stop reason: HOSPADM

## 2023-04-28 RX ORDER — SODIUM CHLORIDE 9 MG/ML
INJECTION, SOLUTION INTRAVENOUS CONTINUOUS
Status: CANCELLED
Start: 2023-04-29

## 2023-04-28 RX ADMIN — IRON SUCROSE 200 MG: 20 INJECTION, SOLUTION INTRAVENOUS at 09:20

## 2023-04-28 RX ADMIN — SODIUM CHLORIDE: 9 INJECTION, SOLUTION INTRAVENOUS at 09:19

## 2023-04-28 ASSESSMENT — PAIN SCALES - GENERAL: PAINLEVEL_OUTOF10: 3

## 2023-04-28 ASSESSMENT — PAIN DESCRIPTION - ORIENTATION: ORIENTATION: RIGHT;LEFT

## 2023-04-28 ASSESSMENT — PAIN DESCRIPTION - LOCATION: LOCATION: LEG

## 2023-04-28 ASSESSMENT — PAIN DESCRIPTION - PAIN TYPE: TYPE: CHRONIC PAIN

## 2023-04-28 ASSESSMENT — PAIN DESCRIPTION - DESCRIPTORS: DESCRIPTORS: ACHING;TINGLING

## 2023-04-28 ASSESSMENT — PAIN DESCRIPTION - FREQUENCY: FREQUENCY: CONTINUOUS

## 2023-04-28 NOTE — PROGRESS NOTES
Pt here ambulatory for a venofer infusion    Pt without c/o's  IV # 22 was started in RFA on 1st attempt per S Palmira RN  Pt darien well  Venofer 200 mg IVPB was infused and tubing was flushed with NS IV was removed without difficulty  Cath tip intact  Pressure then pressure dressing was applied and secured with a coban dressing  IV site unremarkable  Pt darien well  Discharge instructions were reviewed and understanding was verbalized  Copy was given then pt was discharged ambulatory in stable condition

## 2023-05-01 ENCOUNTER — HOSPITAL ENCOUNTER (OUTPATIENT)
Dept: NURSING | Age: 68
Setting detail: INFUSION SERIES
Discharge: HOME | End: 2023-05-01
Payer: MEDICARE

## 2023-05-01 VITALS
HEART RATE: 98 BPM | BODY MASS INDEX: 24.27 KG/M2 | RESPIRATION RATE: 16 BRPM | DIASTOLIC BLOOD PRESSURE: 65 MMHG | TEMPERATURE: 98.3 F | HEIGHT: 63 IN | WEIGHT: 137 LBS | SYSTOLIC BLOOD PRESSURE: 142 MMHG

## 2023-05-01 DIAGNOSIS — K90.9 MALABSORPTION OF IRON: Primary | ICD-10-CM

## 2023-05-01 DIAGNOSIS — D50.9 IRON DEFICIENCY ANEMIA, UNSPECIFIED IRON DEFICIENCY ANEMIA TYPE: ICD-10-CM

## 2023-05-01 PROCEDURE — 96365 THER/PROPH/DIAG IV INF INIT: CPT

## 2023-05-01 PROCEDURE — 6360000002 HC RX W HCPCS: Performed by: INTERNAL MEDICINE

## 2023-05-01 PROCEDURE — 2580000003 HC RX 258: Performed by: INTERNAL MEDICINE

## 2023-05-01 PROCEDURE — 99211 OFF/OP EST MAY X REQ PHY/QHP: CPT

## 2023-05-01 RX ORDER — SODIUM CHLORIDE 9 MG/ML
INJECTION, SOLUTION INTRAVENOUS CONTINUOUS
Status: DISCONTINUED | OUTPATIENT
Start: 2023-05-01 | End: 2023-05-02 | Stop reason: HOSPADM

## 2023-05-01 RX ORDER — SODIUM CHLORIDE 9 MG/ML
INJECTION, SOLUTION INTRAVENOUS CONTINUOUS
Status: CANCELLED
Start: 2023-05-02

## 2023-05-01 RX ADMIN — IRON SUCROSE 200 MG: 20 INJECTION, SOLUTION INTRAVENOUS at 09:10

## 2023-05-01 RX ADMIN — SODIUM CHLORIDE: 9 INJECTION, SOLUTION INTRAVENOUS at 09:09

## 2023-05-01 ASSESSMENT — PAIN SCALES - GENERAL: PAINLEVEL_OUTOF10: 0

## 2023-05-01 NOTE — PROGRESS NOTES
Pt here ambulatory for a venofer infusion    Pt without c/o's  IV # 22 was started in RFA on 1st attempt per myself  Pt darien well  Venofer 200 mg IVPB was infused and tubing was flushed with NS IV was removed without difficulty  Cath tip intact  Pressure then pressure dressing was applied and secured with a coban dressing  IV site unremarkable  Pt darien well  Discharge  instructions were reviewed with pt and   understanding was verbalized  Copy was given then pt was discharged ambulatory in stable condition

## 2023-05-05 ENCOUNTER — HOSPITAL ENCOUNTER (OUTPATIENT)
Dept: NURSING | Age: 68
Setting detail: INFUSION SERIES
Discharge: HOME | End: 2023-05-05
Payer: MEDICARE

## 2023-05-05 VITALS
BODY MASS INDEX: 24.27 KG/M2 | DIASTOLIC BLOOD PRESSURE: 76 MMHG | HEART RATE: 91 BPM | HEIGHT: 63 IN | SYSTOLIC BLOOD PRESSURE: 183 MMHG | TEMPERATURE: 98.5 F | WEIGHT: 137 LBS | RESPIRATION RATE: 16 BRPM

## 2023-05-05 DIAGNOSIS — K90.9 MALABSORPTION OF IRON: Primary | ICD-10-CM

## 2023-05-05 DIAGNOSIS — D50.9 IRON DEFICIENCY ANEMIA, UNSPECIFIED IRON DEFICIENCY ANEMIA TYPE: ICD-10-CM

## 2023-05-05 PROCEDURE — 99211 OFF/OP EST MAY X REQ PHY/QHP: CPT

## 2023-05-05 PROCEDURE — 96365 THER/PROPH/DIAG IV INF INIT: CPT

## 2023-05-05 PROCEDURE — 2580000003 HC RX 258: Performed by: INTERNAL MEDICINE

## 2023-05-05 PROCEDURE — 6360000002 HC RX W HCPCS: Performed by: INTERNAL MEDICINE

## 2023-05-05 RX ORDER — SODIUM CHLORIDE 9 MG/ML
INJECTION, SOLUTION INTRAVENOUS CONTINUOUS
Status: DISCONTINUED | OUTPATIENT
Start: 2023-05-05 | End: 2023-05-05

## 2023-05-05 RX ORDER — SODIUM CHLORIDE 9 MG/ML
INJECTION, SOLUTION INTRAVENOUS CONTINUOUS
Status: CANCELLED
Start: 2023-05-05

## 2023-05-05 RX ADMIN — IRON SUCROSE 200 MG: 20 INJECTION, SOLUTION INTRAVENOUS at 09:01

## 2023-05-05 RX ADMIN — SODIUM CHLORIDE: 9 INJECTION, SOLUTION INTRAVENOUS at 09:01

## 2023-05-05 ASSESSMENT — PAIN DESCRIPTION - LOCATION: LOCATION: FOOT

## 2023-05-05 ASSESSMENT — PAIN DESCRIPTION - DESCRIPTORS: DESCRIPTORS: TINGLING

## 2023-05-05 ASSESSMENT — PAIN DESCRIPTION - ORIENTATION: ORIENTATION: RIGHT;LEFT

## 2023-05-05 ASSESSMENT — PAIN SCALES - GENERAL: PAINLEVEL_OUTOF10: 4

## 2023-05-05 NOTE — PROGRESS NOTES
Pt here ambulatory for a venofer infusion    Pt reporting tingling in her geraldine feet today rated a 4 out of 10  IV # 22 was started in RFA on 2nd attempt per myself  Pt darien well  Venofer 200 mg IVPB was infused and tubing was flushed with NS IV was removed without difficulty  Cath tip intact  Pressure then pressure dressing was applied and secured with a coban dressing  IV site unremarkable  Pt darien well  Discharge  instructions were reviewed with pt and   understanding was verbalized  Copy was given then pt was discharged ambulatory in stable condition

## 2023-05-12 ENCOUNTER — HOSPITAL ENCOUNTER (OUTPATIENT)
Dept: CT IMAGING | Age: 68
Discharge: HOME | End: 2023-05-12
Payer: MEDICARE

## 2023-05-12 DIAGNOSIS — C64.2 MALIGNANT NEOPLASM OF LEFT KIDNEY, EXCEPT RENAL PELVIS (HCC): ICD-10-CM

## 2023-05-12 PROCEDURE — 74170 CT ABD WO CNTRST FLWD CNTRST: CPT

## 2023-05-12 PROCEDURE — 6360000004 HC RX CONTRAST MEDICATION: Performed by: UROLOGY

## 2023-05-12 RX ADMIN — IOPAMIDOL 75 ML: 755 INJECTION, SOLUTION INTRAVENOUS at 15:02

## 2023-05-22 RX ORDER — ERGOCALCIFEROL 1.25 MG/1
CAPSULE ORAL
Qty: 6 CAPSULE | Refills: 0 | Status: SHIPPED | OUTPATIENT
Start: 2023-05-22

## 2023-05-30 RX ORDER — GABAPENTIN 300 MG/1
CAPSULE ORAL
Qty: 30 CAPSULE | Refills: 1 | Status: SHIPPED | OUTPATIENT
Start: 2023-05-30 | End: 2023-06-29

## 2023-06-06 ENCOUNTER — TELEPHONE (OUTPATIENT)
Dept: PHARMACY | Facility: CLINIC | Age: 68
End: 2023-06-06

## 2023-06-06 NOTE — TELEPHONE ENCOUNTER
04/10/2023    LABA1C 9.8 10/10/2022    LABA1C 6.8 06/25/2022     NOTE: A1c >9%    PLAN  The following are interventions that have been identified:   Patient overdue refilling Jardiance and active on home medication list.     Outreach:  Attempting to reach patient to review - unable to leave message. Letter sent to patient.          [facility], [address]  Phone: toll free [phone]        ===================================================================    For Pharmacy Admin Tracking Only    Program: [address] in place:  No  Gap Closed?: No   Time Spent (min): 15

## 2023-07-01 RX ORDER — GABAPENTIN 300 MG/1
CAPSULE ORAL
Qty: 30 CAPSULE | Refills: 0 | Status: SHIPPED | OUTPATIENT
Start: 2023-07-01 | End: 2023-07-31

## 2023-07-15 LAB — PATHOLOGY/CYTOLOGY REPORT: NORMAL

## 2023-07-24 LAB — PATHOLOGY/CYTOLOGY REPORT: NORMAL

## 2023-08-24 RX ORDER — GABAPENTIN 300 MG/1
CAPSULE ORAL
Qty: 30 CAPSULE | Refills: 0 | Status: SHIPPED | OUTPATIENT
Start: 2023-08-26 | End: 2023-09-25

## 2023-09-06 ENCOUNTER — HOSPITAL ENCOUNTER (OUTPATIENT)
Age: 68
Discharge: HOME | End: 2023-09-06
Payer: MEDICARE

## 2023-09-06 ENCOUNTER — HOSPITAL ENCOUNTER (OUTPATIENT)
Dept: GENERAL RADIOLOGY | Age: 68
Discharge: HOME | End: 2023-09-06
Payer: MEDICARE

## 2023-09-06 DIAGNOSIS — J11.1 INFLUENZA: ICD-10-CM

## 2023-09-06 PROCEDURE — 71046 X-RAY EXAM CHEST 2 VIEWS: CPT

## 2023-09-18 ENCOUNTER — OFFICE VISIT (OUTPATIENT)
Dept: INTERNAL MEDICINE CLINIC | Age: 68
End: 2023-09-18

## 2023-09-18 VITALS
WEIGHT: 132 LBS | HEART RATE: 80 BPM | DIASTOLIC BLOOD PRESSURE: 60 MMHG | RESPIRATION RATE: 14 BRPM | HEIGHT: 63 IN | SYSTOLIC BLOOD PRESSURE: 128 MMHG | BODY MASS INDEX: 23.39 KG/M2

## 2023-09-18 DIAGNOSIS — E55.9 VITAMIN D DEFICIENCY: Primary | ICD-10-CM

## 2023-09-18 DIAGNOSIS — I50.22 CHRONIC SYSTOLIC CONGESTIVE HEART FAILURE (HCC): ICD-10-CM

## 2023-09-18 DIAGNOSIS — E11.9 DIABETES MELLITUS TYPE 2, DIET-CONTROLLED (HCC): ICD-10-CM

## 2023-09-18 DIAGNOSIS — I73.9 INTERMITTENT CLAUDICATION (HCC): ICD-10-CM

## 2023-09-18 DIAGNOSIS — N28.89 RENAL MASS: ICD-10-CM

## 2023-09-18 DIAGNOSIS — E55.9 VITAMIN D DEFICIENCY: ICD-10-CM

## 2023-09-18 DIAGNOSIS — I10 BENIGN ESSENTIAL HTN: ICD-10-CM

## 2023-09-18 DIAGNOSIS — Z72.0 TOBACCO ABUSE: ICD-10-CM

## 2023-09-18 DIAGNOSIS — I42.0 DILATED CARDIOMYOPATHY (HCC): ICD-10-CM

## 2023-09-18 DIAGNOSIS — D50.9 IRON DEFICIENCY ANEMIA, UNSPECIFIED IRON DEFICIENCY ANEMIA TYPE: ICD-10-CM

## 2023-09-18 DIAGNOSIS — J44.9 CHRONIC OBSTRUCTIVE PULMONARY DISEASE, UNSPECIFIED COPD TYPE (HCC): ICD-10-CM

## 2023-09-18 DIAGNOSIS — I73.9 PERIPHERAL VASCULAR DISEASE (HCC): ICD-10-CM

## 2023-09-18 DIAGNOSIS — R53.83 FATIGUE, UNSPECIFIED TYPE: ICD-10-CM

## 2023-09-18 DIAGNOSIS — I73.9 PAD (PERIPHERAL ARTERY DISEASE) (HCC): ICD-10-CM

## 2023-09-18 DIAGNOSIS — I35.0 MILD AORTIC STENOSIS: ICD-10-CM

## 2023-09-18 LAB
25(OH)D3 SERPL-MCNC: 26 NG/ML
ALBUMIN SERPL-MCNC: 4.5 G/DL (ref 3.4–5)
ALBUMIN/GLOB SERPL: 1.5 {RATIO} (ref 1.1–2.2)
ALP SERPL-CCNC: 94 U/L (ref 40–129)
ALT SERPL-CCNC: 15 U/L (ref 10–40)
ANION GAP SERPL CALCULATED.3IONS-SCNC: 13 MMOL/L (ref 3–16)
AST SERPL-CCNC: 17 U/L (ref 15–37)
BILIRUB SERPL-MCNC: <0.2 MG/DL (ref 0–1)
BUN SERPL-MCNC: 26 MG/DL (ref 7–20)
CALCIUM SERPL-MCNC: 9.2 MG/DL (ref 8.3–10.6)
CHLORIDE SERPL-SCNC: 95 MMOL/L (ref 99–110)
CO2 SERPL-SCNC: 25 MMOL/L (ref 21–32)
CREAT SERPL-MCNC: 0.8 MG/DL (ref 0.6–1.2)
FERRITIN SERPL IA-MCNC: 9.6 NG/ML (ref 15–150)
GFR SERPLBLD CREATININE-BSD FMLA CKD-EPI: >60 ML/MIN/{1.73_M2}
GLUCOSE SERPL-MCNC: 251 MG/DL (ref 70–99)
IRON SATN MFR SERPL: 6 % (ref 15–50)
IRON SERPL-MCNC: 27 UG/DL (ref 37–145)
POTASSIUM SERPL-SCNC: 4.5 MMOL/L (ref 3.5–5.1)
PROT SERPL-MCNC: 7.6 G/DL (ref 6.4–8.2)
SODIUM SERPL-SCNC: 133 MMOL/L (ref 136–145)
TIBC SERPL-MCNC: 474 UG/DL (ref 260–445)

## 2023-09-18 PROCEDURE — 1123F ACP DISCUSS/DSCN MKR DOCD: CPT | Performed by: NURSE PRACTITIONER

## 2023-09-18 PROCEDURE — 3078F DIAST BP <80 MM HG: CPT | Performed by: NURSE PRACTITIONER

## 2023-09-18 PROCEDURE — 3074F SYST BP LT 130 MM HG: CPT | Performed by: NURSE PRACTITIONER

## 2023-09-18 PROCEDURE — 99215 OFFICE O/P EST HI 40 MIN: CPT | Performed by: NURSE PRACTITIONER

## 2023-09-18 PROCEDURE — 3046F HEMOGLOBIN A1C LEVEL >9.0%: CPT | Performed by: NURSE PRACTITIONER

## 2023-09-18 RX ORDER — AZITHROMYCIN 250 MG/1
250 TABLET, FILM COATED ORAL SEE ADMIN INSTRUCTIONS
Qty: 6 TABLET | Refills: 0 | Status: SHIPPED | OUTPATIENT
Start: 2023-09-18 | End: 2023-09-23

## 2023-09-18 RX ORDER — ALBUTEROL SULFATE 90 UG/1
2 AEROSOL, METERED RESPIRATORY (INHALATION) 4 TIMES DAILY PRN
Qty: 54 G | Refills: 1 | Status: SHIPPED | OUTPATIENT
Start: 2023-09-18

## 2023-09-18 ASSESSMENT — ENCOUNTER SYMPTOMS
ABDOMINAL PAIN: 0
WHEEZING: 0
RHINORRHEA: 0
SHORTNESS OF BREATH: 0
VOMITING: 0
NAUSEA: 0

## 2023-09-18 NOTE — PROGRESS NOTES
Subjective:      Patient ID: Saad Lopez is a 76 y.o. female. HPI    Patient is here for follow up. She was at the [facility] 2 weeks ago. They treated her for pneumonia with Doxycycline. Still hoarse. Coughing up clear sputum. Congestion. Coughing so bad, it hurts her lower back. Has leg cramping/spasms. On Gabapentin. Sometimes it works. Had pain to Her neck, went to back and under both breasts. Diabetes Mellitus Type 2: Current symptoms/problems include none. Medication compliance:  compliant all of the time; on Jardiance  Diabetic diet compliance:  compliant most of the time,  Weight trend: stable  Current exercise: no regular exercise    Home blood sugar records: fasting range: 160 mg/dl  Any episodes of hypoglycemia? no  Eye exam current (within one year): no   reports that she has been smoking cigarettes. She has a 32.00 pack-year smoking history. She has never used smokeless tobacco.   Daily Aspirin? Not taking   Known diabetic complications: peripheral vascular disease  Saw Dr. Ashley Crabtree. Hypertension:  Blood pressure typically runs normal outside of the office. She is adherent to a low sodium diet. Patient denies chest pain, dry cough, fatigue. Antihypertensive medication side effects: no medication side effects noted. Use of agents associated with hypertension: none. Hyperlipidemia:  none. Had iron infusions in December. Still feeling kind of tired and weak.        Lab Results   Component Value Date    LABA1C 10.2 04/10/2023    LABA1C 9.8 10/10/2022    LABA1C 6.8 06/25/2022     Lab Results   Component Value Date    CREATININE 0.8 04/10/2023     Lab Results   Component Value Date    ALT 13 04/10/2023    AST 16 04/10/2023     No components found for: \"CHLPL\"  Lab Results   Component Value Date    TRIG 307 (H) 04/10/2023     Lab Results   Component Value Date    HDL 36 (L) 04/10/2023     Lab Results   Component Value Date/Time    LDLCALC see below 04/10/2023 02:57 PM

## 2023-09-19 LAB
EST. AVERAGE GLUCOSE BLD GHB EST-MCNC: 231.7 MG/DL
HBA1C MFR BLD: 9.7 %

## 2023-09-19 RX ORDER — ERGOCALCIFEROL 1.25 MG/1
CAPSULE ORAL
Qty: 6 CAPSULE | Refills: 0 | Status: SHIPPED | OUTPATIENT
Start: 2023-09-19

## 2023-09-22 RX ORDER — GABAPENTIN 300 MG/1
CAPSULE ORAL
Qty: 30 CAPSULE | Refills: 0 | Status: SHIPPED | OUTPATIENT
Start: 2023-09-23 | End: 2023-10-23

## 2023-10-06 ENCOUNTER — HOSPITAL ENCOUNTER (OUTPATIENT)
Dept: NUCLEAR MEDICINE | Age: 68
Discharge: HOME | End: 2023-10-06
Attending: INTERNAL MEDICINE
Payer: MEDICARE

## 2023-10-06 DIAGNOSIS — D50.0 IRON DEFICIENCY ANEMIA SECONDARY TO BLOOD LOSS (CHRONIC): ICD-10-CM

## 2023-10-06 PROCEDURE — A9560 TC99M LABELED RBC: HCPCS | Performed by: INTERNAL MEDICINE

## 2023-10-06 PROCEDURE — 78278 ACUTE GI BLOOD LOSS IMAGING: CPT

## 2023-10-06 PROCEDURE — 3430000000 HC RX DIAGNOSTIC RADIOPHARMACEUTICAL: Performed by: INTERNAL MEDICINE

## 2023-10-06 RX ADMIN — Medication 26 MILLICURIE: at 11:38

## 2023-10-30 RX ORDER — ERGOCALCIFEROL 1.25 MG/1
CAPSULE ORAL
Qty: 6 CAPSULE | Refills: 0 | Status: SHIPPED | OUTPATIENT
Start: 2023-10-30

## 2023-11-07 RX ORDER — GABAPENTIN 300 MG/1
CAPSULE ORAL
Qty: 30 CAPSULE | Refills: 0 | Status: SHIPPED | OUTPATIENT
Start: 2023-11-09 | End: 2023-12-09

## 2023-11-16 ENCOUNTER — TELEPHONE (OUTPATIENT)
Dept: PHARMACY | Facility: CLINIC | Age: 68
End: 2023-11-16

## 2023-11-16 NOTE — TELEPHONE ENCOUNTER
POPULATION HEALTH CLINICAL PHARMACY: ADHERENCE REVIEW  Identified care gap per [employer]: fills at [facility] [place]: ACE/ARB adherence    Patient also appears to be prescribed: Diabetes (failed  adherence measure)    ASSESSMENT  DIABETES ADHERENCE    Insurance Records claims through 2023 (Prior Year [address] = not reported; YTD [address] = 85%; Potential Fail Date: 23): Lisinopril 20mg last filled on 23 for 90 day supply. Next refill due: 10/4/23    Prescribed si tablet/capsule daily    Per Insurer Portal: Same as above. Per [address]: will get 90 day supply ready to  since past due. Lab Results   Component Value Date    LABA1C 9.7 2023    LABA1C 10.2 04/10/2023    LABA1C 9.8 10/10/2022     NOTE: A1c >9%    PLAN  Per insurer report, LIS-0 - co-pays are based on tiers and patient is subject to coverage gap. The following are interventions that have been identified:   Patient overdue refilling Lisinopril 20mg and active on home medication list.   Refill/s of Lisinopril 20mg READY to  at patient's [address]    Attempting to reach patient to review. Left message asking for return call. and  refill     Last Visit: 23  Next Visit: none      Sally Walker CPhT.    [employer] free: [phone]     For Pharmacy Admin Tracking Only    Program: Aniya in place:  No  Recommendation Provided To: Pharmacy: 1  Intervention Detail: Refill(s) Provided  Intervention Accepted By: Pharmacy: 1  Gap Closed?: Yes   Time Spent (min): 15

## 2023-11-27 RX ORDER — AMLODIPINE BESYLATE 5 MG/1
5 TABLET ORAL NIGHTLY
Qty: 30 TABLET | Refills: 0 | Status: SHIPPED | OUTPATIENT
Start: 2023-11-27 | End: 2023-11-27 | Stop reason: SDUPTHER

## 2023-11-27 RX ORDER — PANTOPRAZOLE SODIUM 40 MG/1
40 TABLET, DELAYED RELEASE ORAL DAILY
Qty: 30 TABLET | Refills: 0 | Status: SHIPPED | OUTPATIENT
Start: 2023-11-27 | End: 2023-12-26

## 2023-11-27 RX ORDER — AMLODIPINE BESYLATE 5 MG/1
5 TABLET ORAL NIGHTLY
Qty: 30 TABLET | Refills: 0 | Status: SHIPPED | OUTPATIENT
Start: 2023-11-27

## 2023-11-27 RX ORDER — TRAZODONE HYDROCHLORIDE 50 MG/1
50 TABLET ORAL NIGHTLY
Qty: 30 TABLET | Refills: 0 | Status: SHIPPED | OUTPATIENT
Start: 2023-11-27

## 2023-12-05 ENCOUNTER — TELEPHONE (OUTPATIENT)
Dept: INTERNAL MEDICINE CLINIC | Age: 68
End: 2023-12-05

## 2023-12-05 RX ORDER — GABAPENTIN 300 MG/1
CAPSULE ORAL
Qty: 30 CAPSULE | Refills: 2 | Status: SHIPPED | OUTPATIENT
Start: 2023-12-07 | End: 2024-01-06

## 2023-12-05 NOTE — TELEPHONE ENCOUNTER
----- Message from Amparo Strauss sent at 12/5/2023  8:45 AM EST -----  Contact: [phone]  Pt made future priscila for med refill     Gabapentin     [facility].  [address]

## 2023-12-13 RX ORDER — TIZANIDINE 4 MG/1
TABLET ORAL
Qty: 60 TABLET | Refills: 0 | Status: SHIPPED | OUTPATIENT
Start: 2023-12-13

## 2023-12-20 DIAGNOSIS — I50.20 SYSTOLIC CONGESTIVE HEART FAILURE, UNSPECIFIED HF CHRONICITY (HCC): ICD-10-CM

## 2023-12-20 RX ORDER — LISINOPRIL 20 MG/1
20 TABLET ORAL DAILY
Qty: 30 TABLET | Refills: 2 | OUTPATIENT
Start: 2023-12-20

## 2023-12-26 RX ORDER — PANTOPRAZOLE SODIUM 40 MG/1
40 TABLET, DELAYED RELEASE ORAL DAILY
Qty: 30 TABLET | Refills: 2 | Status: SHIPPED | OUTPATIENT
Start: 2023-12-26

## 2023-12-26 RX ORDER — TRAZODONE HYDROCHLORIDE 50 MG/1
50 TABLET ORAL NIGHTLY
Qty: 30 TABLET | Refills: 2 | Status: SHIPPED | OUTPATIENT
Start: 2023-12-26

## 2023-12-26 RX ORDER — METOPROLOL SUCCINATE 50 MG/1
50 TABLET, EXTENDED RELEASE ORAL DAILY
Qty: 30 TABLET | Refills: 2 | Status: SHIPPED | OUTPATIENT
Start: 2023-12-26

## 2024-01-23 RX ORDER — AMLODIPINE BESYLATE 5 MG/1
5 TABLET ORAL NIGHTLY
Qty: 30 TABLET | Refills: 1 | Status: SHIPPED | OUTPATIENT
Start: 2024-01-23

## 2024-01-29 RX ORDER — AMLODIPINE BESYLATE 5 MG/1
5 TABLET ORAL NIGHTLY
Qty: 90 TABLET | Refills: 0 | Status: SHIPPED | OUTPATIENT
Start: 2024-01-29

## 2024-03-07 RX ORDER — GABAPENTIN 300 MG/1
CAPSULE ORAL
Qty: 30 CAPSULE | Refills: 2 | Status: SHIPPED | OUTPATIENT
Start: 2024-03-08 | End: 2024-04-07

## 2024-03-15 SDOH — ECONOMIC STABILITY: HOUSING INSECURITY
IN THE LAST 12 MONTHS, WAS THERE A TIME WHEN YOU DID NOT HAVE A STEADY PLACE TO SLEEP OR SLEPT IN A SHELTER (INCLUDING NOW)?: NO

## 2024-03-15 SDOH — ECONOMIC STABILITY: FOOD INSECURITY: WITHIN THE PAST 12 MONTHS, YOU WORRIED THAT YOUR FOOD WOULD RUN OUT BEFORE YOU GOT MONEY TO BUY MORE.: NEVER TRUE

## 2024-03-15 SDOH — ECONOMIC STABILITY: FOOD INSECURITY: WITHIN THE PAST 12 MONTHS, THE FOOD YOU BOUGHT JUST DIDN'T LAST AND YOU DIDN'T HAVE MONEY TO GET MORE.: NEVER TRUE

## 2024-03-15 SDOH — ECONOMIC STABILITY: TRANSPORTATION INSECURITY
IN THE PAST 12 MONTHS, HAS LACK OF TRANSPORTATION KEPT YOU FROM MEETINGS, WORK, OR FROM GETTING THINGS NEEDED FOR DAILY LIVING?: NO

## 2024-03-15 SDOH — ECONOMIC STABILITY: INCOME INSECURITY: HOW HARD IS IT FOR YOU TO PAY FOR THE VERY BASICS LIKE FOOD, HOUSING, MEDICAL CARE, AND HEATING?: SOMEWHAT HARD

## 2024-03-18 ENCOUNTER — OFFICE VISIT (OUTPATIENT)
Dept: INTERNAL MEDICINE CLINIC | Age: 69
End: 2024-03-18

## 2024-03-18 ENCOUNTER — HOSPITAL ENCOUNTER (OUTPATIENT)
Age: 69
Discharge: HOME | End: 2024-03-18
Payer: MEDICARE

## 2024-03-18 VITALS
WEIGHT: 132 LBS | DIASTOLIC BLOOD PRESSURE: 80 MMHG | RESPIRATION RATE: 12 BRPM | BODY MASS INDEX: 23.39 KG/M2 | HEIGHT: 63 IN | HEART RATE: 70 BPM | SYSTOLIC BLOOD PRESSURE: 120 MMHG

## 2024-03-18 DIAGNOSIS — I50.22 CHRONIC SYSTOLIC CONGESTIVE HEART FAILURE (HCC): ICD-10-CM

## 2024-03-18 DIAGNOSIS — J44.9 CHRONIC OBSTRUCTIVE PULMONARY DISEASE, UNSPECIFIED COPD TYPE (HCC): ICD-10-CM

## 2024-03-18 DIAGNOSIS — M81.0 SENILE OSTEOPOROSIS: ICD-10-CM

## 2024-03-18 DIAGNOSIS — Z72.0 TOBACCO ABUSE: ICD-10-CM

## 2024-03-18 DIAGNOSIS — E11.9 DIABETES MELLITUS TYPE 2, DIET-CONTROLLED (HCC): ICD-10-CM

## 2024-03-18 DIAGNOSIS — I42.0 DILATED CARDIOMYOPATHY (HCC): ICD-10-CM

## 2024-03-18 DIAGNOSIS — E11.9 DIABETES MELLITUS TYPE 2, DIET-CONTROLLED (HCC): Primary | ICD-10-CM

## 2024-03-18 DIAGNOSIS — Z87.891 PERSONAL HISTORY OF TOBACCO USE: ICD-10-CM

## 2024-03-18 DIAGNOSIS — E11.65 TYPE 2 DIABETES MELLITUS WITH HYPERGLYCEMIA, WITHOUT LONG-TERM CURRENT USE OF INSULIN (HCC): ICD-10-CM

## 2024-03-18 DIAGNOSIS — I73.9 INTERMITTENT CLAUDICATION (HCC): ICD-10-CM

## 2024-03-18 PROBLEM — K92.2 GI BLEED: Status: RESOLVED | Noted: 2022-06-25 | Resolved: 2024-03-18

## 2024-03-18 LAB
ALBUMIN SERPL-MCNC: 4.3 G/DL (ref 3.4–5)
ALBUMIN/GLOB SERPL: 1.3 {RATIO} (ref 1.1–2.2)
ALP SERPL-CCNC: 87 U/L (ref 40–129)
ALT SERPL-CCNC: 23 U/L (ref 10–40)
ANION GAP SERPL CALCULATED.3IONS-SCNC: 15 MMOL/L (ref 3–16)
AST SERPL-CCNC: 18 U/L (ref 15–37)
BASOPHILS # BLD: 0.1 K/UL (ref 0–0.2)
BASOPHILS NFR BLD: 1.2 %
BILIRUB SERPL-MCNC: 0.3 MG/DL (ref 0–1)
BILIRUB UR QL STRIP.AUTO: NEGATIVE
BUN SERPL-MCNC: 16 MG/DL (ref 7–20)
CALCIUM SERPL-MCNC: 9.7 MG/DL (ref 8.3–10.6)
CHLORIDE SERPL-SCNC: 99 MMOL/L (ref 99–110)
CHOLEST SERPL-MCNC: 227 MG/DL (ref 0–199)
CLARITY UR: CLEAR
CO2 SERPL-SCNC: 23 MMOL/L (ref 21–32)
COLOR UR: YELLOW
CREAT SERPL-MCNC: 0.7 MG/DL (ref 0.6–1.2)
CREAT UR-MCNC: 63.7 MG/DL (ref 28–259)
DEPRECATED RDW RBC AUTO: 23.5 % (ref 12.4–15.4)
EOSINOPHIL # BLD: 0.2 K/UL (ref 0–0.6)
EOSINOPHIL NFR BLD: 2.2 %
EPI CELLS #/AREA URNS HPF: NORMAL /HPF (ref 0–5)
GFR SERPLBLD CREATININE-BSD FMLA CKD-EPI: >60 ML/MIN/{1.73_M2}
GLUCOSE SERPL-MCNC: 288 MG/DL (ref 70–99)
GLUCOSE UR STRIP.AUTO-MCNC: >=1000 MG/DL
HCT VFR BLD AUTO: 36.7 % (ref 36–48)
HDLC SERPL-MCNC: 34 MG/DL (ref 40–60)
HGB BLD-MCNC: 11.6 G/DL (ref 12–16)
HGB UR QL STRIP.AUTO: NEGATIVE
KETONES UR STRIP.AUTO-MCNC: NEGATIVE MG/DL
LDLC SERPL CALC-MCNC: ABNORMAL MG/DL
LDLC SERPL-MCNC: 100 MG/DL
LEUKOCYTE ESTERASE UR QL STRIP.AUTO: NEGATIVE
LYMPHOCYTES # BLD: 1.4 K/UL (ref 1–5.1)
LYMPHOCYTES NFR BLD: 14.8 %
MCH RBC QN AUTO: 22.8 PG (ref 26–34)
MCHC RBC AUTO-ENTMCNC: 31.5 G/DL (ref 31–36)
MCV RBC AUTO: 72.4 FL (ref 80–100)
MICROALBUMIN UR DL<=1MG/L-MCNC: 55.9 MG/DL
MICROALBUMIN/CREAT UR: 877.6 MG/G (ref 0–30)
MONOCYTES # BLD: 1 K/UL (ref 0–1.3)
MONOCYTES NFR BLD: 10.8 %
NEUTROPHILS # BLD: 6.6 K/UL (ref 1.7–7.7)
NEUTROPHILS NFR BLD: 71 %
NITRITE UR QL STRIP.AUTO: NEGATIVE
PH UR STRIP.AUTO: 6 [PH] (ref 5–8)
PLATELET # BLD AUTO: 282 K/UL (ref 135–450)
PLATELET BLD QL SMEAR: ADEQUATE
PMV BLD AUTO: 10.3 FL (ref 5–10.5)
POTASSIUM SERPL-SCNC: 4.5 MMOL/L (ref 3.5–5.1)
PROT SERPL-MCNC: 7.5 G/DL (ref 6.4–8.2)
PROT UR STRIP.AUTO-MCNC: 100 MG/DL
RBC # BLD AUTO: 5.07 M/UL (ref 4–5.2)
RBC #/AREA URNS HPF: NORMAL /HPF (ref 0–4)
SLIDE REVIEW: ABNORMAL
SODIUM SERPL-SCNC: 137 MMOL/L (ref 136–145)
SP GR UR STRIP.AUTO: 1.02 (ref 1–1.03)
TRIGL SERPL-MCNC: 361 MG/DL (ref 0–150)
UA DIPSTICK W REFLEX MICRO PNL UR: YES
URN SPEC COLLECT METH UR: ABNORMAL
UROBILINOGEN UR STRIP-ACNC: 0.2 E.U./DL
VLDLC SERPL CALC-MCNC: ABNORMAL MG/DL
WBC # BLD AUTO: 9.3 K/UL (ref 4–11)
WBC #/AREA URNS HPF: NORMAL /HPF (ref 0–5)

## 2024-03-18 PROCEDURE — 85025 COMPLETE CBC W/AUTO DIFF WBC: CPT

## 2024-03-18 PROCEDURE — 80061 LIPID PANEL: CPT

## 2024-03-18 PROCEDURE — G0009 ADMIN PNEUMOCOCCAL VACCINE: HCPCS | Performed by: INTERNAL MEDICINE

## 2024-03-18 PROCEDURE — G0296 VISIT TO DETERM LDCT ELIG: HCPCS | Performed by: INTERNAL MEDICINE

## 2024-03-18 PROCEDURE — 1123F ACP DISCUSS/DSCN MKR DOCD: CPT | Performed by: INTERNAL MEDICINE

## 2024-03-18 PROCEDURE — 90677 PCV20 VACCINE IM: CPT | Performed by: INTERNAL MEDICINE

## 2024-03-18 PROCEDURE — 82570 ASSAY OF URINE CREATININE: CPT

## 2024-03-18 PROCEDURE — 82043 UR ALBUMIN QUANTITATIVE: CPT

## 2024-03-18 PROCEDURE — 81001 URINALYSIS AUTO W/SCOPE: CPT

## 2024-03-18 PROCEDURE — 83036 HEMOGLOBIN GLYCOSYLATED A1C: CPT

## 2024-03-18 PROCEDURE — 3074F SYST BP LT 130 MM HG: CPT | Performed by: INTERNAL MEDICINE

## 2024-03-18 PROCEDURE — 3079F DIAST BP 80-89 MM HG: CPT | Performed by: INTERNAL MEDICINE

## 2024-03-18 PROCEDURE — 80053 COMPREHEN METABOLIC PANEL: CPT

## 2024-03-18 PROCEDURE — 36415 COLL VENOUS BLD VENIPUNCTURE: CPT

## 2024-03-18 PROCEDURE — 99214 OFFICE O/P EST MOD 30 MIN: CPT | Performed by: INTERNAL MEDICINE

## 2024-03-18 RX ORDER — ESCITALOPRAM OXALATE 10 MG/1
10 TABLET ORAL DAILY
Qty: 30 TABLET | Refills: 2 | Status: SHIPPED | OUTPATIENT
Start: 2024-03-18 | End: 2024-06-16

## 2024-03-18 ASSESSMENT — ENCOUNTER SYMPTOMS
VOMITING: 0
WHEEZING: 0
SHORTNESS OF BREATH: 0
RHINORRHEA: 0
ABDOMINAL PAIN: 0
NAUSEA: 0

## 2024-03-18 ASSESSMENT — PATIENT HEALTH QUESTIONNAIRE - PHQ9
3. TROUBLE FALLING OR STAYING ASLEEP: SEVERAL DAYS
8. MOVING OR SPEAKING SO SLOWLY THAT OTHER PEOPLE COULD HAVE NOTICED. OR THE OPPOSITE, BEING SO FIGETY OR RESTLESS THAT YOU HAVE BEEN MOVING AROUND A LOT MORE THAN USUAL: NOT AT ALL
5. POOR APPETITE OR OVEREATING: MORE THAN HALF THE DAYS
SUM OF ALL RESPONSES TO PHQ QUESTIONS 1-9: 12
SUM OF ALL RESPONSES TO PHQ QUESTIONS 1-9: 12
SUM OF ALL RESPONSES TO PHQ9 QUESTIONS 1 & 2: 4
10. IF YOU CHECKED OFF ANY PROBLEMS, HOW DIFFICULT HAVE THESE PROBLEMS MADE IT FOR YOU TO DO YOUR WORK, TAKE CARE OF THINGS AT HOME, OR GET ALONG WITH OTHER PEOPLE: SOMEWHAT DIFFICULT
7. TROUBLE CONCENTRATING ON THINGS, SUCH AS READING THE NEWSPAPER OR WATCHING TELEVISION: SEVERAL DAYS
6. FEELING BAD ABOUT YOURSELF - OR THAT YOU ARE A FAILURE OR HAVE LET YOURSELF OR YOUR FAMILY DOWN: SEVERAL DAYS
SUM OF ALL RESPONSES TO PHQ QUESTIONS 1-9: 12
2. FEELING DOWN, DEPRESSED OR HOPELESS: MORE THAN HALF THE DAYS
1. LITTLE INTEREST OR PLEASURE IN DOING THINGS: MORE THAN HALF THE DAYS
4. FEELING TIRED OR HAVING LITTLE ENERGY: NEARLY EVERY DAY
9. THOUGHTS THAT YOU WOULD BE BETTER OFF DEAD, OR OF HURTING YOURSELF: NOT AT ALL
SUM OF ALL RESPONSES TO PHQ QUESTIONS 1-9: 12

## 2024-03-18 NOTE — PATIENT INSTRUCTIONS
follow-up, he or she will help you understand what to do next.  After a lung cancer screening, you can go back to your usual activities right away.  A lung cancer screening test can't tell if you have lung cancer. If your results are positive, your doctor can't tell whether an abnormal finding is a harmless nodule, cancer, or something else without doing more tests.  What can you do to help prevent lung cancer?  Some lung cancers can't be prevented. But if you smoke, quitting smoking is the best step you can take to prevent lung cancer. If you want to quit, your doctor can recommend medicines or other ways to help.  Follow-up care is a key part of your treatment and safety. Be sure to make and go to all appointments, and call your doctor if you are having problems. It's also a good idea to know your test results and keep a list of the medicines you take.  Where can you learn more?  Go to https://www.[employer]ee[employer].net/patientEd and enter Q940 to learn more about \"Learning About Lung Cancer Screening.\"  Current as of: October 25, 2023               Content Version: 14.0  © 4709-8533 [employer].   Care instructions adapted under license by [employer]. If you have questions about a medical condition or this instruction, always ask your healthcare professional. [employer] disclaims any warranty or liability for your use of this information.

## 2024-03-19 ENCOUNTER — TELEPHONE (OUTPATIENT)
Dept: INTERNAL MEDICINE CLINIC | Age: 69
End: 2024-03-19

## 2024-03-19 DIAGNOSIS — R80.9 PROTEINURIA, UNSPECIFIED TYPE: ICD-10-CM

## 2024-03-19 DIAGNOSIS — E78.00 HYPERCHOLESTEREMIA: Primary | ICD-10-CM

## 2024-03-19 LAB
EST. AVERAGE GLUCOSE BLD GHB EST-MCNC: 237.4 MG/DL
HBA1C MFR BLD: 9.9 %

## 2024-03-19 RX ORDER — INSULIN GLARGINE 100 [IU]/ML
15 INJECTION, SOLUTION SUBCUTANEOUS NIGHTLY
Qty: 5 ADJUSTABLE DOSE PRE-FILLED PEN SYRINGE | Refills: 2 | Status: SHIPPED | OUTPATIENT
Start: 2024-03-19 | End: 2024-03-20

## 2024-03-19 RX ORDER — ATORVASTATIN CALCIUM 40 MG/1
40 TABLET, FILM COATED ORAL DAILY
Qty: 30 TABLET | Refills: 1 | Status: SHIPPED | OUTPATIENT
Start: 2024-03-19

## 2024-03-19 NOTE — TELEPHONE ENCOUNTER
----- Message from Ata Chung MD sent at 3/19/2024  1:56 PM EDT -----  Start Lantus Solostar pen 15 units at night.  A1c is over 9.  She is having protein in her urine.  Do a 24-hour urine protein and creatinine clearance.  Start Lipitor 40 mg daily check liver and lipids in 6 weeks

## 2024-03-20 ENCOUNTER — TELEPHONE (OUTPATIENT)
Dept: INTERNAL MEDICINE CLINIC | Age: 69
End: 2024-03-20

## 2024-03-20 RX ORDER — INSULIN GLARGINE 100 [IU]/ML
15 INJECTION, SOLUTION SUBCUTANEOUS NIGHTLY
Qty: 14 ML | Refills: 0 | Status: SHIPPED | OUTPATIENT
Start: 2024-03-20 | End: 2024-03-25

## 2024-03-20 RX ORDER — INSULIN GLARGINE 100 [IU]/ML
15 INJECTION, SOLUTION SUBCUTANEOUS NIGHTLY
Qty: 14 ML | Refills: 0 | Status: SHIPPED | OUTPATIENT
Start: 2024-03-20

## 2024-03-20 RX ORDER — INSULIN GLARGINE 300 U/ML
15 INJECTION, SOLUTION SUBCUTANEOUS NIGHTLY
Qty: 4.5 ML | Refills: 0 | Status: SHIPPED | OUTPATIENT
Start: 2024-03-20

## 2024-03-20 NOTE — TELEPHONE ENCOUNTER
----- Message from Ata Chung MD sent at 3/20/2024 11:20 AM EDT -----  You have to ask the patient. She may qualify for patient assistance.  ----- Message -----  From: Ale Antonio  Sent: 3/20/2024   8:55 AM EDT  To: Ata Chung MD    Toujeo would cost pt about $103, Basaglar would be $97 and Semglee is not on formulary. Please advise.  ----- Message -----  From: Ale Antonio  Sent: 3/20/2024   8:04 AM EDT  To: Ale Antonio      ----- Message -----  From: Ata Chung MD  Sent: 3/20/2024   8:03 AM EDT  To: Syeda Armas MA    Basglar or Semglee (generic Lantus)  ----- Message -----  From: Syeda Armas MA  Sent: 3/19/2024   4:24 PM EDT  To: Ata Chung MD    Patient states the Lantus is going to cost her 97.62 and she can't afford it. She would like to know if there is something cheaper. Please advise.

## 2024-03-20 NOTE — TELEPHONE ENCOUNTER
----- Message from Ata Chung MD sent at 3/20/2024 11:20 AM EDT -----  Contact: patient [phone]  The other option is Novolin N same dose  ----- Message -----  From: Sara Marsh  Sent: 3/20/2024   8:52 AM EDT  To: Ata Chung MD    Patient states she called the pharmacy regarding the new medication you sent.  Patient states they are telling her it will cost her $100 also.  Patient states she is on a fixed income and there is no way she can afford this.  Patient wondering if there is anything cheaper you can prescribe.  Please advise        [facility] [id number] - [place], [place] - [address] - P [phone] - F [phone]  [address] [facility] [address] [place] [facility] [address]  Phone: [phone]  Fax: [phone]

## 2024-03-20 NOTE — TELEPHONE ENCOUNTER
----- Message from Ale Antonio sent at 3/20/2024  8:04 AM EDT -----    ----- Message -----  From: Ata Chung MD  Sent: 3/20/2024   8:03 AM EDT  To: Syeda Armas MA    Basglar or Semglee (generic Lantus)  ----- Message -----  From: Syeda Armas MA  Sent: 3/19/2024   4:24 PM EDT  To: Ata Chung MD    Patient states the Lantus is going to cost her 97.62 and she can't afford it. She would like to know if there is something cheaper. Please advise.

## 2024-03-25 ENCOUNTER — TELEPHONE (OUTPATIENT)
Dept: INTERNAL MEDICINE CLINIC | Age: 69
End: 2024-03-25

## 2024-03-25 RX ORDER — METOPROLOL SUCCINATE 50 MG/1
50 TABLET, EXTENDED RELEASE ORAL DAILY
Qty: 90 TABLET | Refills: 0 | Status: SHIPPED | OUTPATIENT
Start: 2024-03-25

## 2024-03-25 RX ORDER — PANTOPRAZOLE SODIUM 40 MG/1
40 TABLET, DELAYED RELEASE ORAL DAILY
Qty: 90 TABLET | Refills: 0 | Status: SHIPPED | OUTPATIENT
Start: 2024-03-25

## 2024-03-25 RX ORDER — TRAZODONE HYDROCHLORIDE 50 MG/1
50 TABLET ORAL NIGHTLY
Qty: 90 TABLET | Refills: 0 | Status: SHIPPED | OUTPATIENT
Start: 2024-03-25

## 2024-03-25 NOTE — TELEPHONE ENCOUNTER
----- Message from Sara Marsh sent at 3/25/2024  9:01 AM EDT -----  Contact: patient [phone]  Patient requesting needles to go with her insulin glargine (BASAGLAR KWIKPEN) 100 UNIT/ML injection pen.  Patient picked up the insulin, but pharmacist told her we would have to send order for needles.         [facility] [id number] - [facility], [place] - [address] [facility] [address] - P [phone] - F [phone]  [address] [facility] [address]  Phone: [phone]  Fax: [phone]

## 2024-04-04 RX ORDER — ESCITALOPRAM OXALATE 10 MG/1
10 TABLET ORAL DAILY
Qty: 90 TABLET | Refills: 0 | Status: SHIPPED | OUTPATIENT
Start: 2024-04-04 | End: 2024-07-03

## 2024-05-09 RX ORDER — ATORVASTATIN CALCIUM 40 MG/1
40 TABLET, FILM COATED ORAL DAILY
Qty: 30 TABLET | Refills: 0 | Status: SHIPPED | OUTPATIENT
Start: 2024-05-09

## 2024-05-16 RX ORDER — ATORVASTATIN CALCIUM 40 MG/1
40 TABLET, FILM COATED ORAL DAILY
Qty: 30 TABLET | Refills: 0 | OUTPATIENT
Start: 2024-05-16

## 2024-05-31 ENCOUNTER — TELEPHONE (OUTPATIENT)
Dept: INTERNAL MEDICINE CLINIC | Age: 69
End: 2024-05-31

## 2024-05-31 DIAGNOSIS — R80.9 PROTEINURIA, UNSPECIFIED TYPE: ICD-10-CM

## 2024-05-31 DIAGNOSIS — E78.00 HYPERCHOLESTEREMIA: ICD-10-CM

## 2024-05-31 LAB
ALBUMIN SERPL-MCNC: 4.4 G/DL (ref 3.4–5)
ALP SERPL-CCNC: 95 U/L (ref 40–129)
ALT SERPL-CCNC: 16 U/L (ref 10–40)
AST SERPL-CCNC: 11 U/L (ref 15–37)
BILIRUB DIRECT SERPL-MCNC: <0.2 MG/DL (ref 0–0.3)
BILIRUB INDIRECT SERPL-MCNC: ABNORMAL MG/DL (ref 0–1)
BILIRUB SERPL-MCNC: <0.2 MG/DL (ref 0–1)
CHOLEST SERPL-MCNC: 178 MG/DL (ref 0–199)
COLLECT DURATION TIME UR: 24 HOURS
COLLECT DURATION TIME UR: 24 HR
CREAT 24H UR-MRATE: 1.1 G/24HR (ref 0.6–1.5)
CREAT CL 24H UR+SERPL-VRATE: 132 ML/MIN (ref 88–128)
CREAT SERPL-MCNC: 0.6 MG/DL (ref 0.6–1.2)
HDLC SERPL-MCNC: 38 MG/DL (ref 40–60)
LDL CHOLESTEROL: 83 MG/DL
PROT SERPL-MCNC: 7.7 G/DL (ref 6.4–8.2)
SPECIMEN VOL 24H UR: 2100 ML
TRIGL SERPL-MCNC: 285 MG/DL (ref 0–150)
VLDLC SERPL CALC-MCNC: 57 MG/DL

## 2024-05-31 RX ORDER — GABAPENTIN 300 MG/1
300 CAPSULE ORAL NIGHTLY
Qty: 30 CAPSULE | Refills: 2 | Status: SHIPPED | OUTPATIENT
Start: 2024-06-04 | End: 2024-09-02

## 2024-05-31 RX ORDER — AMLODIPINE BESYLATE 5 MG/1
5 TABLET ORAL NIGHTLY
Qty: 90 TABLET | Refills: 0 | Status: SHIPPED | OUTPATIENT
Start: 2024-05-31

## 2024-05-31 RX ORDER — INSULIN GLARGINE 300 U/ML
15 INJECTION, SOLUTION SUBCUTANEOUS NIGHTLY
Qty: 4.5 ML | Refills: 0 | Status: SHIPPED | OUTPATIENT
Start: 2024-05-31

## 2024-05-31 NOTE — TELEPHONE ENCOUNTER
----- Message from Sara Marsh sent at 5/31/2024  9:02 AM EDT -----  Contact: pt [phone]  Patient requesting refills on the following:    amLODIPine (NORVASC) 5 MG tablet  insulin glargine, 1 unit dial, (TOUJEO SOLOSTAR) 300 UNIT/ML concentrated injection pen  gabapentin (NEURONTIN) 300 MG capsule        [facility] [id number] - [place], [place] - [address] [facility] - P [phone] - F [phone]  [address] [place] [address]  Phone: [phone]  Fax: [phone]

## 2024-06-10 RX ORDER — ATORVASTATIN CALCIUM 40 MG/1
40 TABLET, FILM COATED ORAL DAILY
Qty: 30 TABLET | Refills: 2 | Status: SHIPPED | OUTPATIENT
Start: 2024-06-10

## 2024-06-17 RX ORDER — AMLODIPINE BESYLATE 5 MG/1
5 TABLET ORAL NIGHTLY
Qty: 90 TABLET | Refills: 0 | Status: SHIPPED | OUTPATIENT
Start: 2024-06-17

## 2024-06-21 RX ORDER — TRAZODONE HYDROCHLORIDE 50 MG/1
50 TABLET ORAL NIGHTLY
Qty: 90 TABLET | Refills: 0 | Status: SHIPPED | OUTPATIENT
Start: 2024-06-21

## 2024-06-21 RX ORDER — PANTOPRAZOLE SODIUM 40 MG/1
40 TABLET, DELAYED RELEASE ORAL DAILY
Qty: 90 TABLET | Refills: 0 | Status: SHIPPED | OUTPATIENT
Start: 2024-06-21

## 2024-06-21 RX ORDER — METOPROLOL SUCCINATE 50 MG/1
50 TABLET, EXTENDED RELEASE ORAL DAILY
Qty: 90 TABLET | Refills: 0 | Status: SHIPPED | OUTPATIENT
Start: 2024-06-21

## 2024-07-08 RX ORDER — ATORVASTATIN CALCIUM 40 MG/1
40 TABLET, FILM COATED ORAL DAILY
Qty: 90 TABLET | Refills: 0 | Status: SHIPPED | OUTPATIENT
Start: 2024-07-08

## 2024-08-05 RX ORDER — ESCITALOPRAM OXALATE 10 MG/1
10 TABLET ORAL DAILY
Qty: 90 TABLET | Refills: 0 | Status: SHIPPED | OUTPATIENT
Start: 2024-08-05 | End: 2024-11-03

## 2024-08-20 SDOH — HEALTH STABILITY: PHYSICAL HEALTH: ON AVERAGE, HOW MANY DAYS PER WEEK DO YOU ENGAGE IN MODERATE TO STRENUOUS EXERCISE (LIKE A BRISK WALK)?: 0 DAYS

## 2024-08-20 ASSESSMENT — PATIENT HEALTH QUESTIONNAIRE - PHQ9
1. LITTLE INTEREST OR PLEASURE IN DOING THINGS: SEVERAL DAYS
3. TROUBLE FALLING OR STAYING ASLEEP: NEARLY EVERY DAY
4. FEELING TIRED OR HAVING LITTLE ENERGY: NEARLY EVERY DAY
SUM OF ALL RESPONSES TO PHQ9 QUESTIONS 1 & 2: 2
SUM OF ALL RESPONSES TO PHQ QUESTIONS 1-9: 10
10. IF YOU CHECKED OFF ANY PROBLEMS, HOW DIFFICULT HAVE THESE PROBLEMS MADE IT FOR YOU TO DO YOUR WORK, TAKE CARE OF THINGS AT HOME, OR GET ALONG WITH OTHER PEOPLE: SOMEWHAT DIFFICULT
9. THOUGHTS THAT YOU WOULD BE BETTER OFF DEAD, OR OF HURTING YOURSELF: NOT AT ALL
8. MOVING OR SPEAKING SO SLOWLY THAT OTHER PEOPLE COULD HAVE NOTICED. OR THE OPPOSITE, BEING SO FIGETY OR RESTLESS THAT YOU HAVE BEEN MOVING AROUND A LOT MORE THAN USUAL: NOT AT ALL
SUM OF ALL RESPONSES TO PHQ QUESTIONS 1-9: 10
5. POOR APPETITE OR OVEREATING: MORE THAN HALF THE DAYS
SUM OF ALL RESPONSES TO PHQ QUESTIONS 1-9: 10
7. TROUBLE CONCENTRATING ON THINGS, SUCH AS READING THE NEWSPAPER OR WATCHING TELEVISION: NOT AT ALL
SUM OF ALL RESPONSES TO PHQ QUESTIONS 1-9: 10
6. FEELING BAD ABOUT YOURSELF - OR THAT YOU ARE A FAILURE OR HAVE LET YOURSELF OR YOUR FAMILY DOWN: NOT AT ALL
2. FEELING DOWN, DEPRESSED OR HOPELESS: SEVERAL DAYS

## 2024-08-20 ASSESSMENT — LIFESTYLE VARIABLES
HOW OFTEN DO YOU HAVE SIX OR MORE DRINKS ON ONE OCCASION: 1
HOW OFTEN DO YOU HAVE A DRINK CONTAINING ALCOHOL: 1
HOW MANY STANDARD DRINKS CONTAINING ALCOHOL DO YOU HAVE ON A TYPICAL DAY: 0
HOW MANY STANDARD DRINKS CONTAINING ALCOHOL DO YOU HAVE ON A TYPICAL DAY: PATIENT DOES NOT DRINK
HOW OFTEN DO YOU HAVE A DRINK CONTAINING ALCOHOL: NEVER

## 2024-08-21 ENCOUNTER — OFFICE VISIT (OUTPATIENT)
Dept: INTERNAL MEDICINE CLINIC | Age: 69
End: 2024-08-21

## 2024-08-21 ENCOUNTER — TELEPHONE (OUTPATIENT)
Dept: INTERNAL MEDICINE CLINIC | Age: 69
End: 2024-08-21

## 2024-08-21 VITALS
DIASTOLIC BLOOD PRESSURE: 80 MMHG | HEIGHT: 63 IN | RESPIRATION RATE: 12 BRPM | HEART RATE: 70 BPM | SYSTOLIC BLOOD PRESSURE: 120 MMHG | WEIGHT: 134 LBS | BODY MASS INDEX: 23.74 KG/M2

## 2024-08-21 DIAGNOSIS — Z79.4 TYPE 2 DIABETES MELLITUS WITH BOTH EYES AFFECTED BY MILD NONPROLIFERATIVE RETINOPATHY WITHOUT MACULAR EDEMA, WITH LONG-TERM CURRENT USE OF INSULIN (HCC): ICD-10-CM

## 2024-08-21 DIAGNOSIS — Z00.00 MEDICARE ANNUAL WELLNESS VISIT, SUBSEQUENT: Primary | ICD-10-CM

## 2024-08-21 DIAGNOSIS — D50.9 IRON DEFICIENCY ANEMIA, UNSPECIFIED IRON DEFICIENCY ANEMIA TYPE: ICD-10-CM

## 2024-08-21 DIAGNOSIS — I50.22 CHRONIC SYSTOLIC CONGESTIVE HEART FAILURE (HCC): ICD-10-CM

## 2024-08-21 DIAGNOSIS — E78.00 HYPERCHOLESTEREMIA: ICD-10-CM

## 2024-08-21 DIAGNOSIS — E11.65 TYPE 2 DIABETES MELLITUS WITH HYPERGLYCEMIA, WITHOUT LONG-TERM CURRENT USE OF INSULIN (HCC): ICD-10-CM

## 2024-08-21 DIAGNOSIS — J44.9 CHRONIC OBSTRUCTIVE PULMONARY DISEASE, UNSPECIFIED COPD TYPE (HCC): ICD-10-CM

## 2024-08-21 DIAGNOSIS — Z79.4 TYPE 2 DIABETES MELLITUS WITH DIABETIC MICROALBUMINURIA, WITH LONG-TERM CURRENT USE OF INSULIN (HCC): ICD-10-CM

## 2024-08-21 DIAGNOSIS — R80.9 TYPE 2 DIABETES MELLITUS WITH DIABETIC MICROALBUMINURIA, WITH LONG-TERM CURRENT USE OF INSULIN (HCC): ICD-10-CM

## 2024-08-21 DIAGNOSIS — E11.42 TYPE 2 DIABETES MELLITUS WITH DIABETIC POLYNEUROPATHY, WITH LONG-TERM CURRENT USE OF INSULIN (HCC): ICD-10-CM

## 2024-08-21 DIAGNOSIS — I10 BENIGN ESSENTIAL HTN: ICD-10-CM

## 2024-08-21 DIAGNOSIS — E11.3293 TYPE 2 DIABETES MELLITUS WITH BOTH EYES AFFECTED BY MILD NONPROLIFERATIVE RETINOPATHY WITHOUT MACULAR EDEMA, WITH LONG-TERM CURRENT USE OF INSULIN (HCC): ICD-10-CM

## 2024-08-21 DIAGNOSIS — F51.01 PRIMARY INSOMNIA: ICD-10-CM

## 2024-08-21 DIAGNOSIS — E11.29 TYPE 2 DIABETES MELLITUS WITH DIABETIC MICROALBUMINURIA, WITH LONG-TERM CURRENT USE OF INSULIN (HCC): ICD-10-CM

## 2024-08-21 DIAGNOSIS — Z79.4 TYPE 2 DIABETES MELLITUS WITH DIABETIC POLYNEUROPATHY, WITH LONG-TERM CURRENT USE OF INSULIN (HCC): ICD-10-CM

## 2024-08-21 DIAGNOSIS — I50.20 SYSTOLIC CONGESTIVE HEART FAILURE, UNSPECIFIED HF CHRONICITY (HCC): ICD-10-CM

## 2024-08-21 DIAGNOSIS — K90.9 MALABSORPTION OF IRON: ICD-10-CM

## 2024-08-21 LAB
ALBUMIN SERPL-MCNC: 4.3 G/DL (ref 3.4–5)
ALBUMIN/GLOB SERPL: 1.4 {RATIO} (ref 1.1–2.2)
ALP SERPL-CCNC: 92 U/L (ref 40–129)
ALT SERPL-CCNC: 23 U/L (ref 10–40)
ANION GAP SERPL CALCULATED.3IONS-SCNC: 13 MMOL/L (ref 3–16)
AST SERPL-CCNC: 22 U/L (ref 15–37)
BASOPHILS # BLD: 0.1 K/UL (ref 0–0.2)
BASOPHILS NFR BLD: 0.9 %
BILIRUB SERPL-MCNC: <0.2 MG/DL (ref 0–1)
BUN SERPL-MCNC: 20 MG/DL (ref 7–20)
CALCIUM SERPL-MCNC: 9.9 MG/DL (ref 8.3–10.6)
CHLORIDE SERPL-SCNC: 96 MMOL/L (ref 99–110)
CHOLEST SERPL-MCNC: 220 MG/DL (ref 0–199)
CO2 SERPL-SCNC: 24 MMOL/L (ref 21–32)
CREAT SERPL-MCNC: 0.8 MG/DL (ref 0.6–1.2)
DEPRECATED RDW RBC AUTO: 28.8 % (ref 12.4–15.4)
EOSINOPHIL # BLD: 0.2 K/UL (ref 0–0.6)
EOSINOPHIL NFR BLD: 2.4 %
GFR SERPLBLD CREATININE-BSD FMLA CKD-EPI: 80 ML/MIN/{1.73_M2}
GLUCOSE SERPL-MCNC: 251 MG/DL (ref 70–99)
HCT VFR BLD AUTO: 41.2 % (ref 36–48)
HDLC SERPL-MCNC: 35 MG/DL (ref 40–60)
HGB BLD-MCNC: 12.9 G/DL (ref 12–16)
LDLC SERPL CALC-MCNC: ABNORMAL MG/DL
LDLC SERPL-MCNC: 93 MG/DL
LYMPHOCYTES # BLD: 1.1 K/UL (ref 1–5.1)
LYMPHOCYTES NFR BLD: 12.7 %
MCH RBC QN AUTO: 23.9 PG (ref 26–34)
MCHC RBC AUTO-ENTMCNC: 31.4 G/DL (ref 31–36)
MCV RBC AUTO: 76 FL (ref 80–100)
MONOCYTES # BLD: 0.6 K/UL (ref 0–1.3)
MONOCYTES NFR BLD: 7.5 %
NEUTROPHILS # BLD: 6.3 K/UL (ref 1.7–7.7)
NEUTROPHILS NFR BLD: 76.5 %
PLATELET # BLD AUTO: 366 K/UL (ref 135–450)
PMV BLD AUTO: 10.5 FL (ref 5–10.5)
POTASSIUM SERPL-SCNC: 4.9 MMOL/L (ref 3.5–5.1)
PROT SERPL-MCNC: 7.4 G/DL (ref 6.4–8.2)
RBC # BLD AUTO: 5.42 M/UL (ref 4–5.2)
SODIUM SERPL-SCNC: 133 MMOL/L (ref 136–145)
TRIGL SERPL-MCNC: 338 MG/DL (ref 0–150)
URATE SERPL-MCNC: 5.1 MG/DL (ref 2.6–6)
VLDLC SERPL CALC-MCNC: ABNORMAL MG/DL
WBC # BLD AUTO: 8.3 K/UL (ref 4–11)

## 2024-08-21 PROCEDURE — 3079F DIAST BP 80-89 MM HG: CPT | Performed by: INTERNAL MEDICINE

## 2024-08-21 PROCEDURE — 3074F SYST BP LT 130 MM HG: CPT | Performed by: INTERNAL MEDICINE

## 2024-08-21 PROCEDURE — G0439 PPPS, SUBSEQ VISIT: HCPCS | Performed by: INTERNAL MEDICINE

## 2024-08-21 PROCEDURE — 3046F HEMOGLOBIN A1C LEVEL >9.0%: CPT | Performed by: INTERNAL MEDICINE

## 2024-08-21 PROCEDURE — 1123F ACP DISCUSS/DSCN MKR DOCD: CPT | Performed by: INTERNAL MEDICINE

## 2024-08-21 RX ORDER — METOPROLOL SUCCINATE 50 MG/1
50 TABLET, EXTENDED RELEASE ORAL DAILY
Qty: 90 TABLET | Refills: 0 | Status: SHIPPED | OUTPATIENT
Start: 2024-08-21

## 2024-08-21 RX ORDER — KETOROLAC TROMETHAMINE 30 MG/ML
INJECTION, SOLUTION INTRAMUSCULAR; INTRAVENOUS
Qty: 1 EACH | Refills: 0 | Status: SHIPPED | OUTPATIENT
Start: 2024-08-21

## 2024-08-21 RX ORDER — AMLODIPINE BESYLATE 5 MG/1
5 TABLET ORAL NIGHTLY
Qty: 90 TABLET | Refills: 0 | Status: SHIPPED | OUTPATIENT
Start: 2024-08-21

## 2024-08-21 RX ORDER — INSULIN GLARGINE 300 U/ML
15 INJECTION, SOLUTION SUBCUTANEOUS NIGHTLY
Qty: 4.5 ML | Refills: 0 | Status: SHIPPED | OUTPATIENT
Start: 2024-08-21

## 2024-08-21 RX ORDER — PANTOPRAZOLE SODIUM 40 MG/1
40 TABLET, DELAYED RELEASE ORAL DAILY
Qty: 90 TABLET | Refills: 0 | Status: SHIPPED | OUTPATIENT
Start: 2024-08-21

## 2024-08-21 RX ORDER — TRAZODONE HYDROCHLORIDE 50 MG/1
50 TABLET ORAL NIGHTLY
Qty: 90 TABLET | Refills: 0 | Status: SHIPPED | OUTPATIENT
Start: 2024-08-21

## 2024-08-21 RX ORDER — BLOOD-GLUCOSE SENSOR
EACH MISCELLANEOUS
Qty: 2 EACH | Refills: 5 | Status: SHIPPED | OUTPATIENT
Start: 2024-08-21 | End: 2024-08-21 | Stop reason: SDUPTHER

## 2024-08-21 RX ORDER — LISINOPRIL 20 MG/1
20 TABLET ORAL DAILY
Qty: 90 TABLET | Refills: 0 | Status: SHIPPED | OUTPATIENT
Start: 2024-08-21

## 2024-08-21 RX ORDER — ATORVASTATIN CALCIUM 40 MG/1
40 TABLET, FILM COATED ORAL DAILY
Qty: 90 TABLET | Refills: 0 | Status: SHIPPED | OUTPATIENT
Start: 2024-08-21

## 2024-08-21 RX ORDER — BLOOD-GLUCOSE SENSOR
EACH MISCELLANEOUS
Qty: 2 EACH | Refills: 5 | Status: SHIPPED | OUTPATIENT
Start: 2024-08-21

## 2024-08-21 RX ORDER — KETOROLAC TROMETHAMINE 30 MG/ML
INJECTION, SOLUTION INTRAMUSCULAR; INTRAVENOUS
Qty: 1 EACH | Refills: 0 | Status: SHIPPED | OUTPATIENT
Start: 2024-08-21 | End: 2024-08-21 | Stop reason: SDUPTHER

## 2024-08-21 RX ORDER — GABAPENTIN 300 MG/1
600 CAPSULE ORAL NIGHTLY
Qty: 180 CAPSULE | Refills: 0 | Status: SHIPPED | OUTPATIENT
Start: 2024-08-31 | End: 2024-11-29

## 2024-08-21 NOTE — PROGRESS NOTES
Medicare Annual Wellness Visit    Kenya Peters is here for Medicare AWV    Assessment & Plan   Medicare annual wellness visit, subsequent  Systolic congestive heart failure, unspecified HF chronicity ([facility])  -     lisinopril (PRINIVIL;ZESTRIL) 20 MG tablet; Take 1 tablet by mouth daily, Disp-90 tablet, R-0Normal  Type 2 diabetes mellitus with hyperglycemia, without long-term current use of insulin ([facility])  -     Comprehensive Metabolic Panel; Future  -     CBC with Auto Differential; Future  -     Hemoglobin A1C; Future  -     Lipid Panel; Future  -     Uric Acid; Future  Type 2 diabetes mellitus with diabetic microalbuminuria, with long-term current use of insulin ([facility])  Type 2 diabetes mellitus with both eyes affected by mild nonproliferative retinopathy without macular edema, with long-term current use of insulin ([facility])  Benign essential HTN  Hypercholesteremia  Chronic obstructive pulmonary disease, unspecified COPD type ([facility])  Iron deficiency anemia, unspecified iron deficiency anemia type  Primary insomnia  Chronic systolic congestive heart failure ([facility])  Malabsorption of iron  Type 2 diabetes mellitus with diabetic polyneuropathy, with long-term current use of insulin ([facility])    Medicare exam  DM type 2 with multiple complications. Check labs. Increase Neurontin to 600 mg po qhs. Her neuropathy is worse. Resume Jardiance.   CHF stable.  Iron deficiency anemia. On iron infusion  Renal cell ca in remission  PAD stable  Mild AI stable.   COPD stable    Recommendations for Preventive Services Due: see orders and patient instructions/AVS.  Recommended screening schedule for the next 5-10 years is provided to the patient in written form: see Patient Instructions/AVS.     Return in 3 months (on 11/21/2024).     Subjective     She is here for a medicare exam.       Diabetes Mellitus Type 2: Current symptoms/problems include none.    Medication compliance:  compliant most of the time  Diabetic diet compliance:  compliant

## 2024-08-21 NOTE — TELEPHONE ENCOUNTER
----- Message from Mariann AMOS sent at 8/21/2024 10:29 AM EDT -----  Contact: [phone]  Outpatient Pharmacy is out Dexcom but patient asking to send script to [facility] and patient will get it covered 100%. Please advise

## 2024-08-22 LAB
EST. AVERAGE GLUCOSE BLD GHB EST-MCNC: 182.9 MG/DL
HBA1C MFR BLD: 8 %

## 2024-09-03 RX ORDER — GABAPENTIN 300 MG/1
300 CAPSULE ORAL NIGHTLY
Qty: 30 CAPSULE | OUTPATIENT
Start: 2024-09-03

## 2024-09-04 DIAGNOSIS — M81.0 SENILE OSTEOPOROSIS: Primary | ICD-10-CM

## 2024-09-06 ENCOUNTER — HOSPITAL ENCOUNTER (OUTPATIENT)
Dept: GENERAL RADIOLOGY | Age: 69
Discharge: HOME | End: 2024-09-06
Attending: INTERNAL MEDICINE
Payer: MEDICARE

## 2024-09-06 DIAGNOSIS — M81.0 SENILE OSTEOPOROSIS: ICD-10-CM

## 2024-09-06 PROCEDURE — 77080 DXA BONE DENSITY AXIAL: CPT

## 2024-09-17 ENCOUNTER — TELEPHONE (OUTPATIENT)
Dept: INTERNAL MEDICINE CLINIC | Age: 69
End: 2024-09-17

## 2024-09-17 RX ORDER — ALENDRONATE SODIUM 70 MG/1
70 TABLET ORAL
Qty: 4 TABLET | Refills: 2 | Status: SHIPPED | OUTPATIENT
Start: 2024-09-17

## 2024-09-18 RX ORDER — PANTOPRAZOLE SODIUM 40 MG/1
40 TABLET, DELAYED RELEASE ORAL DAILY
Qty: 30 TABLET | Refills: 1 | Status: SHIPPED | OUTPATIENT
Start: 2024-09-18

## 2024-09-23 RX ORDER — METOPROLOL SUCCINATE 50 MG/1
50 TABLET, EXTENDED RELEASE ORAL DAILY
Qty: 30 TABLET | Refills: 1 | Status: SHIPPED | OUTPATIENT
Start: 2024-09-23

## 2024-10-03 RX ORDER — INSULIN GLARGINE 300 [IU]/ML
15 INJECTION, SOLUTION SUBCUTANEOUS NIGHTLY
Qty: 4.5 ML | Refills: 0 | Status: SHIPPED | OUTPATIENT
Start: 2024-10-03

## 2024-11-19 ENCOUNTER — TELEPHONE (OUTPATIENT)
Dept: INTERNAL MEDICINE CLINIC | Age: 69
End: 2024-11-19

## 2024-11-19 DIAGNOSIS — I50.20 SYSTOLIC CONGESTIVE HEART FAILURE, UNSPECIFIED HF CHRONICITY (HCC): ICD-10-CM

## 2024-11-19 RX ORDER — LISINOPRIL 20 MG/1
20 TABLET ORAL DAILY
Qty: 90 TABLET | Refills: 0 | Status: SHIPPED | OUTPATIENT
Start: 2024-11-19

## 2024-11-19 NOTE — TELEPHONE ENCOUNTER
----- Message from Cale PION sent at 11/19/2024 11:59 AM EST -----  Contact: MINDY [phone]  Patient states she scheduled a future appointment and is now in need of her refills, but she is unsure on which medications as the pharmacy typically is the one to send over the refill request but it had gotten denied. Please advise     [facility] [id number] - [place], [place] - [address] - P [phone] - F [phone]  [address] [facility] [place] [address]  Phone: [phone]  Fax: [phone]

## 2024-11-20 ENCOUNTER — OFFICE VISIT (OUTPATIENT)
Dept: INTERNAL MEDICINE CLINIC | Age: 69
End: 2024-11-20

## 2024-11-20 VITALS
HEIGHT: 63 IN | SYSTOLIC BLOOD PRESSURE: 120 MMHG | DIASTOLIC BLOOD PRESSURE: 80 MMHG | RESPIRATION RATE: 12 BRPM | HEART RATE: 70 BPM | BODY MASS INDEX: 23.74 KG/M2 | WEIGHT: 134 LBS

## 2024-11-20 DIAGNOSIS — I50.22 CHRONIC SYSTOLIC CONGESTIVE HEART FAILURE (HCC): ICD-10-CM

## 2024-11-20 DIAGNOSIS — R80.9 TYPE 2 DIABETES MELLITUS WITH DIABETIC MICROALBUMINURIA, WITH LONG-TERM CURRENT USE OF INSULIN (HCC): ICD-10-CM

## 2024-11-20 DIAGNOSIS — J44.9 CHRONIC OBSTRUCTIVE PULMONARY DISEASE, UNSPECIFIED COPD TYPE (HCC): ICD-10-CM

## 2024-11-20 DIAGNOSIS — Z23 NEED FOR INFLUENZA VACCINATION: ICD-10-CM

## 2024-11-20 DIAGNOSIS — K90.9 MALABSORPTION OF IRON: ICD-10-CM

## 2024-11-20 DIAGNOSIS — Z79.4 TYPE 2 DIABETES MELLITUS WITH DIABETIC MICROALBUMINURIA, WITH LONG-TERM CURRENT USE OF INSULIN (HCC): ICD-10-CM

## 2024-11-20 DIAGNOSIS — F51.01 PRIMARY INSOMNIA: ICD-10-CM

## 2024-11-20 DIAGNOSIS — C64.2 RENAL CELL CANCER, LEFT (HCC): ICD-10-CM

## 2024-11-20 DIAGNOSIS — Z12.31 ENCOUNTER FOR SCREENING MAMMOGRAM FOR MALIGNANT NEOPLASM OF BREAST: ICD-10-CM

## 2024-11-20 DIAGNOSIS — E11.29 TYPE 2 DIABETES MELLITUS WITH DIABETIC MICROALBUMINURIA, WITH LONG-TERM CURRENT USE OF INSULIN (HCC): ICD-10-CM

## 2024-11-20 DIAGNOSIS — Z79.4 TYPE 2 DIABETES MELLITUS WITH BOTH EYES AFFECTED BY MILD NONPROLIFERATIVE RETINOPATHY WITHOUT MACULAR EDEMA, WITH LONG-TERM CURRENT USE OF INSULIN (HCC): Primary | ICD-10-CM

## 2024-11-20 DIAGNOSIS — E11.3293 TYPE 2 DIABETES MELLITUS WITH BOTH EYES AFFECTED BY MILD NONPROLIFERATIVE RETINOPATHY WITHOUT MACULAR EDEMA, WITH LONG-TERM CURRENT USE OF INSULIN (HCC): Primary | ICD-10-CM

## 2024-11-20 DIAGNOSIS — E11.42 TYPE 2 DIABETES MELLITUS WITH DIABETIC POLYNEUROPATHY, WITH LONG-TERM CURRENT USE OF INSULIN (HCC): ICD-10-CM

## 2024-11-20 DIAGNOSIS — E11.65 TYPE 2 DIABETES MELLITUS WITH HYPERGLYCEMIA, WITHOUT LONG-TERM CURRENT USE OF INSULIN (HCC): ICD-10-CM

## 2024-11-20 DIAGNOSIS — Z79.4 TYPE 2 DIABETES MELLITUS WITH DIABETIC POLYNEUROPATHY, WITH LONG-TERM CURRENT USE OF INSULIN (HCC): ICD-10-CM

## 2024-11-20 DIAGNOSIS — I10 BENIGN ESSENTIAL HTN: ICD-10-CM

## 2024-11-20 PROCEDURE — 1159F MED LIST DOCD IN RCRD: CPT | Performed by: INTERNAL MEDICINE

## 2024-11-20 PROCEDURE — 3074F SYST BP LT 130 MM HG: CPT | Performed by: INTERNAL MEDICINE

## 2024-11-20 PROCEDURE — 90662 IIV NO PRSV INCREASED AG IM: CPT | Performed by: INTERNAL MEDICINE

## 2024-11-20 PROCEDURE — 3052F HG A1C>EQUAL 8.0%<EQUAL 9.0%: CPT | Performed by: INTERNAL MEDICINE

## 2024-11-20 PROCEDURE — G0008 ADMIN INFLUENZA VIRUS VAC: HCPCS | Performed by: INTERNAL MEDICINE

## 2024-11-20 PROCEDURE — 1160F RVW MEDS BY RX/DR IN RCRD: CPT | Performed by: INTERNAL MEDICINE

## 2024-11-20 PROCEDURE — 99214 OFFICE O/P EST MOD 30 MIN: CPT | Performed by: INTERNAL MEDICINE

## 2024-11-20 PROCEDURE — 1123F ACP DISCUSS/DSCN MKR DOCD: CPT | Performed by: INTERNAL MEDICINE

## 2024-11-20 PROCEDURE — 3079F DIAST BP 80-89 MM HG: CPT | Performed by: INTERNAL MEDICINE

## 2024-11-20 RX ORDER — GABAPENTIN 300 MG/1
600 CAPSULE ORAL NIGHTLY
Qty: 180 CAPSULE | Refills: 0 | Status: SHIPPED | OUTPATIENT
Start: 2024-11-29 | End: 2025-02-27

## 2024-11-20 RX ORDER — INSULIN GLARGINE 300 [IU]/ML
15 INJECTION, SOLUTION SUBCUTANEOUS NIGHTLY
Qty: 4.5 ML | Refills: 0 | Status: SHIPPED | OUTPATIENT
Start: 2024-11-20

## 2024-11-20 RX ORDER — ESCITALOPRAM OXALATE 10 MG/1
10 TABLET ORAL DAILY
Qty: 90 TABLET | Refills: 0 | Status: SHIPPED | OUTPATIENT
Start: 2024-11-20 | End: 2025-02-18

## 2024-11-20 RX ORDER — AMLODIPINE BESYLATE 5 MG/1
5 TABLET ORAL NIGHTLY
Qty: 90 TABLET | Refills: 0 | Status: SHIPPED | OUTPATIENT
Start: 2024-11-20

## 2024-11-20 RX ORDER — TRAZODONE HYDROCHLORIDE 50 MG/1
50 TABLET, FILM COATED ORAL NIGHTLY
Qty: 90 TABLET | Refills: 0 | Status: SHIPPED | OUTPATIENT
Start: 2024-11-20

## 2024-11-20 RX ORDER — ATORVASTATIN CALCIUM 40 MG/1
40 TABLET, FILM COATED ORAL DAILY
Qty: 90 TABLET | Refills: 0 | Status: SHIPPED | OUTPATIENT
Start: 2024-11-20

## 2024-11-20 RX ORDER — METOPROLOL SUCCINATE 50 MG/1
50 TABLET, EXTENDED RELEASE ORAL DAILY
Qty: 30 TABLET | Refills: 1 | Status: SHIPPED | OUTPATIENT
Start: 2024-11-20

## 2024-11-20 RX ORDER — ACYCLOVIR 800 MG/1
TABLET ORAL
Qty: 2 EACH | Refills: 5 | Status: SHIPPED | OUTPATIENT
Start: 2024-11-20

## 2024-11-20 ASSESSMENT — ENCOUNTER SYMPTOMS
NAUSEA: 0
WHEEZING: 0
RHINORRHEA: 0
SHORTNESS OF BREATH: 0
VOMITING: 0
ABDOMINAL PAIN: 0

## 2024-11-20 NOTE — PROGRESS NOTES
Subjective:      Patient ID: Kenya Peters is a 69 y.o. female.    HPI    Patient is here for follow up.    Diabetes Mellitus Type 2: Current symptoms/problems include none.    Medication compliance:  compliant most of the time  Diabetic diet compliance:  compliant most of the time,  Weight trend: stable  Current exercise: no regular exercise    Home blood sugar records: fasting range: 160 mg/dl  Any episodes of hypoglycemia? no  Eye exam current (within one year): no   reports that she has been smoking cigarettes. She has a 32 pack-year smoking history. She has never used smokeless tobacco.   Daily Aspirin? Yes  Known diabetic complications: peripheral vascular disease    Hypertension:  Blood pressure typically runs normal outside of the office.   She is adherent to a low sodium diet. Patient denies chest pain, dry cough, fatigue.  Antihypertensive medication side effects: no medication side effects noted.  Use of agents associated with hypertension: none.     Hyperlipidemia:  none.       Lab Results   Component Value Date    LABA1C 8.0 08/21/2024    LABA1C 9.9 03/18/2024    LABA1C 9.7 09/18/2023     Lab Results   Component Value Date    CREATININE 0.8 08/21/2024     Lab Results   Component Value Date    ALT 23 08/21/2024    AST 22 08/21/2024     No components found for: \"CHLPL\"  Lab Results   Component Value Date    TRIG 338 (H) 08/21/2024     Lab Results   Component Value Date    HDL 35 (L) 08/21/2024     Lab Results   Component Value Date/Time    LDLDIRECT 93 08/21/2024 10:10 AM      She has PAD. She is still smoking. She has some chronic leg pain.    She has mild AI.    She has chronic systolic CHF. It is stable. No ER visit. She still smokes. Her last ECHO was improved. It was done in 8/22. She has not seen cardiology recently.    She has history of renal cell cancer left. It is in remission. She sees Oncology.       She has anemia. She has seen Hematology. She is getting iron infusion.   She is feeling

## 2024-11-21 LAB
ALBUMIN SERPL-MCNC: 4.4 G/DL (ref 3.4–5)
ALBUMIN/GLOB SERPL: 1.4 {RATIO} (ref 1.1–2.2)
ALP SERPL-CCNC: 92 U/L (ref 40–129)
ALT SERPL-CCNC: 21 U/L (ref 10–40)
ANION GAP SERPL CALCULATED.3IONS-SCNC: 12 MMOL/L (ref 3–16)
AST SERPL-CCNC: 23 U/L (ref 15–37)
BASOPHILS # BLD: 0.1 K/UL (ref 0–0.2)
BASOPHILS NFR BLD: 1.3 %
BILIRUB SERPL-MCNC: <0.2 MG/DL (ref 0–1)
BUN SERPL-MCNC: 19 MG/DL (ref 7–20)
CALCIUM SERPL-MCNC: 9.6 MG/DL (ref 8.3–10.6)
CHLORIDE SERPL-SCNC: 99 MMOL/L (ref 99–110)
CHOLEST SERPL-MCNC: 205 MG/DL (ref 0–199)
CO2 SERPL-SCNC: 26 MMOL/L (ref 21–32)
CREAT SERPL-MCNC: 0.7 MG/DL (ref 0.6–1.2)
DEPRECATED RDW RBC AUTO: 15.9 % (ref 12.4–15.4)
EOSINOPHIL # BLD: 0.3 K/UL (ref 0–0.6)
EOSINOPHIL NFR BLD: 3.3 %
EST. AVERAGE GLUCOSE BLD GHB EST-MCNC: 214.5 MG/DL
FERRITIN SERPL IA-MCNC: 9.3 NG/ML (ref 15–150)
GFR SERPLBLD CREATININE-BSD FMLA CKD-EPI: >90 ML/MIN/{1.73_M2}
GLUCOSE SERPL-MCNC: 130 MG/DL (ref 70–99)
HBA1C MFR BLD: 9.1 %
HCT VFR BLD AUTO: 33.6 % (ref 36–48)
HDLC SERPL-MCNC: 31 MG/DL (ref 40–60)
HGB BLD-MCNC: 10.7 G/DL (ref 12–16)
IRON SATN MFR SERPL: 4 % (ref 15–50)
IRON SERPL-MCNC: 19 UG/DL (ref 37–145)
LDLC SERPL CALC-MCNC: ABNORMAL MG/DL
LDLC SERPL-MCNC: 101 MG/DL
LYMPHOCYTES # BLD: 1.5 K/UL (ref 1–5.1)
LYMPHOCYTES NFR BLD: 18.2 %
MCH RBC QN AUTO: 23.3 PG (ref 26–34)
MCHC RBC AUTO-ENTMCNC: 31.9 G/DL (ref 31–36)
MCV RBC AUTO: 73.1 FL (ref 80–100)
MONOCYTES # BLD: 0.8 K/UL (ref 0–1.3)
MONOCYTES NFR BLD: 10.3 %
NEUTROPHILS # BLD: 5.4 K/UL (ref 1.7–7.7)
NEUTROPHILS NFR BLD: 66.9 %
PLATELET # BLD AUTO: 313 K/UL (ref 135–450)
PMV BLD AUTO: 10.1 FL (ref 5–10.5)
POTASSIUM SERPL-SCNC: 4.1 MMOL/L (ref 3.5–5.1)
PROT SERPL-MCNC: 7.5 G/DL (ref 6.4–8.2)
RBC # BLD AUTO: 4.59 M/UL (ref 4–5.2)
SODIUM SERPL-SCNC: 137 MMOL/L (ref 136–145)
TIBC SERPL-MCNC: 486 UG/DL (ref 260–445)
TRIGL SERPL-MCNC: 333 MG/DL (ref 0–150)
VLDLC SERPL CALC-MCNC: ABNORMAL MG/DL
WBC # BLD AUTO: 8.1 K/UL (ref 4–11)

## 2024-11-25 ENCOUNTER — TELEPHONE (OUTPATIENT)
Dept: INTERNAL MEDICINE CLINIC | Age: 69
End: 2024-11-25

## 2024-11-25 RX ORDER — INSULIN GLARGINE 300 [IU]/ML
20 INJECTION, SOLUTION SUBCUTANEOUS NIGHTLY
Qty: 6 ML | Refills: 0 | Status: SHIPPED | OUTPATIENT
Start: 2024-11-25

## 2024-11-25 RX ORDER — INSULIN LISPRO 100 [IU]/ML
10 INJECTION, SOLUTION INTRAVENOUS; SUBCUTANEOUS
Qty: 27 ML | Refills: 0 | Status: SHIPPED | OUTPATIENT
Start: 2024-11-25

## 2024-11-25 NOTE — TELEPHONE ENCOUNTER
----- Message from Dr. Ata Chung MD sent at 11/25/2024  3:28 PM EST -----  Increase Lantus to 20 units at hs. Continue diet and exercise.   Add Humalog pen 10 units before every meal

## 2024-12-09 RX ORDER — ALENDRONATE SODIUM 70 MG/1
TABLET ORAL
Qty: 4 TABLET | Refills: 2 | Status: SHIPPED | OUTPATIENT
Start: 2024-12-09

## 2025-01-17 RX ORDER — ALENDRONATE SODIUM 70 MG/1
70 TABLET ORAL
Qty: 4 TABLET | Refills: 2 | Status: SHIPPED | OUTPATIENT
Start: 2025-01-17

## 2025-01-23 RX ORDER — ALENDRONATE SODIUM 70 MG/1
TABLET ORAL
Qty: 4 TABLET | Refills: 2 | OUTPATIENT
Start: 2025-01-23

## 2025-02-07 RX ORDER — PANTOPRAZOLE SODIUM 40 MG/1
40 TABLET, DELAYED RELEASE ORAL DAILY
Qty: 30 TABLET | Refills: 1 | Status: SHIPPED | OUTPATIENT
Start: 2025-02-07

## 2025-02-17 DIAGNOSIS — I50.20 SYSTOLIC CONGESTIVE HEART FAILURE, UNSPECIFIED HF CHRONICITY (HCC): ICD-10-CM

## 2025-02-17 RX ORDER — LISINOPRIL 20 MG/1
20 TABLET ORAL DAILY
Qty: 30 TABLET | Refills: 0 | Status: SHIPPED | OUTPATIENT
Start: 2025-02-17

## 2025-03-07 RX ORDER — ESCITALOPRAM OXALATE 10 MG/1
10 TABLET ORAL DAILY
Qty: 30 TABLET | Refills: 0 | Status: SHIPPED | OUTPATIENT
Start: 2025-03-07

## 2025-03-07 RX ORDER — GABAPENTIN 300 MG/1
CAPSULE ORAL
Qty: 60 CAPSULE | Refills: 0 | Status: SHIPPED | OUTPATIENT
Start: 2025-03-07 | End: 2025-04-06

## 2025-03-18 RX ORDER — TRAZODONE HYDROCHLORIDE 50 MG/1
50 TABLET ORAL NIGHTLY
Qty: 90 TABLET | Refills: 0 | OUTPATIENT
Start: 2025-03-18

## 2025-03-28 DIAGNOSIS — I50.20 SYSTOLIC CONGESTIVE HEART FAILURE, UNSPECIFIED HF CHRONICITY (HCC): ICD-10-CM

## 2025-03-28 RX ORDER — INSULIN GLARGINE 300 [IU]/ML
15 INJECTION, SOLUTION SUBCUTANEOUS NIGHTLY
Qty: 1.5 ML | OUTPATIENT
Start: 2025-03-28

## 2025-03-28 RX ORDER — LISINOPRIL 20 MG/1
20 TABLET ORAL DAILY
Qty: 30 TABLET | Refills: 0 | OUTPATIENT
Start: 2025-03-28

## 2025-03-31 RX ORDER — PANTOPRAZOLE SODIUM 40 MG/1
40 TABLET, DELAYED RELEASE ORAL DAILY
Qty: 30 TABLET | Refills: 1 | OUTPATIENT
Start: 2025-03-31

## 2025-04-02 ENCOUNTER — TELEPHONE (OUTPATIENT)
Dept: PHARMACY | Facility: CLINIC | Age: 70
End: 2025-04-02

## 2025-04-02 NOTE — TELEPHONE ENCOUNTER
[facility] CLINICAL PHARMACY REVIEW: ADHERENCE  Identified care gap per [employer]: fills at [facility]:  ACE/ARB and statin adherence    Patient also appears to be prescribed: Diabetes medications     ASSESSMENT  ACEi/ARB ADHERENCE    Insurance Records claims through 03/23/2025 (Prior Year = Unknown; YTD PDC = FIRST FILL; Potential Fail Date: 5/20/2025):   LISINOPRIL 20 mg once daily last filled on 2/17/2025 for 30 day supply. Next refill due: 3/19/2025    Last Rx sent on 2/17/2025 for 30 ds with 0 refills' per PCP patient needs an appointment for future fills of 90 day supply     BP Readings from Last 3 Encounters:   11/20/24 120/80   08/21/24 120/80   03/18/24 120/80     Lab Results   Component Value Date/Time    LABGLOM >90 11/20/2024 03:18 PM    LABGLOM >60 03/18/2024 12:24 PM     Lab Results   Component Value Date    CREATININE 0.7 11/20/2024     Estimated Creatinine Clearance: 63 mL/min (based on SCr of 0.7 mg/dL).    Lab Results   Component Value Date    K 4.1 11/20/2024      DIABETES ADHERENCE    INSULIN GLARGINE (300 units/ml) 20 units at bedtime last filled on 11/25/2024 for 90 day supply.   Last Rx sent on 11/25/2024 for 90 ds with 0 refills    Per Reconciled Dispense Report as of 3/30/2025: Per PCP Patient needs appointment for future refills on glargine    Dispensed Days Supply Quantity Provider Pharmacy   INSULIN GLARGINE SOLOSTAR  300 UNIT/ML SOPN 02/17/2025 30 1.5 mL Ata Chung MD [facility] [id number]...   TOUJEO SOLOSTAR  300 UNIT/ML SOPN 11/20/2024 90 4.5 mL Ata Chung MD [facility] [id number]...     INSULIN LISPRO (100 units/ml) 10 units 3 times daily before meals last filled on 11/25/2024 for 30 day supply no refills: 1 fill left on lispro due to quantity sent vs quantity dispensed   Per Reconciled Dispense Report as of 3/30/2025:   Dispensed Days Supply Quantity Provider Pharmacy   INSULIN LISPRO KWIKPEN  100 UNIT/ML SOPN 02/08/2025 30 9 mL Ata Chung,

## 2025-04-04 RX ORDER — ESCITALOPRAM OXALATE 10 MG/1
10 TABLET ORAL DAILY
Qty: 30 TABLET | Refills: 0 | OUTPATIENT
Start: 2025-04-04

## 2025-04-04 RX ORDER — GABAPENTIN 300 MG/1
CAPSULE ORAL
Qty: 60 CAPSULE | Refills: 0 | OUTPATIENT
Start: 2025-04-04 | End: 2025-05-04

## 2025-04-07 RX ORDER — PANTOPRAZOLE SODIUM 40 MG/1
40 TABLET, DELAYED RELEASE ORAL DAILY
Qty: 30 TABLET | Refills: 1 | OUTPATIENT
Start: 2025-04-07

## 2025-04-14 RX ORDER — GABAPENTIN 300 MG/1
CAPSULE ORAL
Qty: 60 CAPSULE | Refills: 0 | Status: SHIPPED | OUTPATIENT
Start: 2025-04-14 | End: 2025-05-14

## 2025-04-14 RX ORDER — ESCITALOPRAM OXALATE 10 MG/1
10 TABLET ORAL DAILY
Qty: 90 TABLET | Refills: 0 | Status: SHIPPED | OUTPATIENT
Start: 2025-04-14

## 2025-04-14 RX ORDER — TRAZODONE HYDROCHLORIDE 50 MG/1
50 TABLET ORAL NIGHTLY
Qty: 90 TABLET | Refills: 0 | Status: SHIPPED | OUTPATIENT
Start: 2025-04-14

## 2025-04-14 RX ORDER — PANTOPRAZOLE SODIUM 40 MG/1
40 TABLET, DELAYED RELEASE ORAL DAILY
Qty: 90 TABLET | Refills: 0 | Status: SHIPPED | OUTPATIENT
Start: 2025-04-14

## 2025-04-14 NOTE — TELEPHONE ENCOUNTER
----- Message from Mariann AMOS sent at 4/14/2025  9:09 AM EDT -----  Contact: [phone]  Pt requesting refill       gabapentin (NEURONTIN) 300 MG capsule      trazodone (DESYREL) 50 MG tablet          [facility] [id number] [place], [place] - [address] - P [phone] - F [phone] (Ph: [phone])

## 2025-04-16 DIAGNOSIS — I50.20 SYSTOLIC CONGESTIVE HEART FAILURE, UNSPECIFIED HF CHRONICITY (HCC): ICD-10-CM

## 2025-04-16 NOTE — TELEPHONE ENCOUNTER
[facility] CLINICAL PHARMACY: ADHERENCE REVIEW  Identified care gap per [employer]: fills at [facility]: ACE/ARB and Statin adherence    Patient also appears to be prescribed: Diabetes regimen including insulin    ASSESSMENT    ACE/ARB ADHERENCE    Insurance Records claims through 2025 (Prior Year PDC = 100% - PASSED ; YTD PDC = FIRST FILL; Potential Fail Date: 25):   Lisinopril 20 mg tab last filled on 25 for 30 day supply. Next refill due: 3/19/25  2024 max refill due date 25    Prescribed si tablet/capsule daily    Per Reconcile Dispense History: no updated info  Last Rx 2/17/25 x 30 day supply 0 refills - needs refill Rx, have been refused by staff due to needing appointment; NOV scheduled for 25    BP Readings from Last 3 Encounters:   24 120/80   24 120/80   24 120/80     Estimated Creatinine Clearance: 63 mL/min (based on SCr of 0.7 mg/dL).  Lab Results   Component Value Date    CREATININE 0.7 2024     Lab Results   Component Value Date    K 4.1 2024     DIABETES ADHERENCE - PDC n/a since is on insulin    Lab Results   Component Value Date    LABA1C 9.1 2024    LABA1C 8.0 2024    LABA1C 9.9 2024     NOTE: A1c not yet completed this calendar year    STATIN ADHERENCE    Insurance Records claims through 2025 (Prior Year PDC = 100% - PASSED ; YTD PDC = FIRST FILL; Potential Fail Date: 25):   Atorvastatin 40 mg tab last filled on 3/11/25 for 30 day supply. Next refill due: 4/10/25  2024 max refill due date 25    Per payer portal last claim now 4/11/25 x 30 day supply, max refill due 25    Lab Results   Component Value Date    CHOL 205 (H) 2024    TRIG 333 (H) 2024    HDL 31 (L) 2024    LDLDIRECT 101 (H) 2024     Lab Results   Component Value Date    LDL see below 2024    LDLDIRECT 101 (H) 2024      ALT   Date Value Ref Range Status   2024 21 10 - 40 U/L Final     AST   Date

## 2025-04-21 RX ORDER — LISINOPRIL 20 MG/1
20 TABLET ORAL DAILY
Qty: 30 TABLET | Refills: 0 | Status: SHIPPED | OUTPATIENT
Start: 2025-04-21

## 2025-04-21 NOTE — TELEPHONE ENCOUNTER
ELA DELGADILLO MD, OK for 30 day supply of lisinopril to hold until 5/5/25 OV? Rx pended for your signature/modification as appropriate    Last Visit: 11/20/24  Next Visit: 5/5/25    Thank you,  Trista Dailey, PharmD, [facility]  Population Health Pharmacist  [facility] Clinical Pharmacy  Department, toll free: [phone], option 1   =========================================================  Another attempt made to reach patient - rang and rang without answer.

## 2025-04-22 NOTE — TELEPHONE ENCOUNTER
Rx signed by provider, thank you!    [employer] message not yet read at time of writing, will send as letter.    For Pharmacy Admin Tracking Only    Program: [employer]  CPA in place:  No  Recommendation Provided To: Provider: 1 via Note to Provider  Intervention Detail: Adherence Monitorin and Refill(s) Provided  Intervention Accepted By: Provider: 1  Gap Closed?: No   Time Spent (min): 15

## 2025-05-02 SDOH — ECONOMIC STABILITY: INCOME INSECURITY: IN THE LAST 12 MONTHS, WAS THERE A TIME WHEN YOU WERE NOT ABLE TO PAY THE MORTGAGE OR RENT ON TIME?: NO

## 2025-05-02 SDOH — ECONOMIC STABILITY: FOOD INSECURITY: WITHIN THE PAST 12 MONTHS, THE FOOD YOU BOUGHT JUST DIDN'T LAST AND YOU DIDN'T HAVE MONEY TO GET MORE.: NEVER TRUE

## 2025-05-02 SDOH — ECONOMIC STABILITY: FOOD INSECURITY: WITHIN THE PAST 12 MONTHS, YOU WORRIED THAT YOUR FOOD WOULD RUN OUT BEFORE YOU GOT MONEY TO BUY MORE.: NEVER TRUE

## 2025-05-02 SDOH — ECONOMIC STABILITY: TRANSPORTATION INSECURITY
IN THE PAST 12 MONTHS, HAS THE LACK OF TRANSPORTATION KEPT YOU FROM MEDICAL APPOINTMENTS OR FROM GETTING MEDICATIONS?: NO

## 2025-05-02 ASSESSMENT — PATIENT HEALTH QUESTIONNAIRE - PHQ9
9. THOUGHTS THAT YOU WOULD BE BETTER OFF DEAD, OR OF HURTING YOURSELF: NOT AT ALL
8. MOVING OR SPEAKING SO SLOWLY THAT OTHER PEOPLE COULD HAVE NOTICED. OR THE OPPOSITE, BEING SO FIGETY OR RESTLESS THAT YOU HAVE BEEN MOVING AROUND A LOT MORE THAN USUAL: NOT AT ALL
1. LITTLE INTEREST OR PLEASURE IN DOING THINGS: MORE THAN HALF THE DAYS
5. POOR APPETITE OR OVEREATING: NEARLY EVERY DAY
6. FEELING BAD ABOUT YOURSELF - OR THAT YOU ARE A FAILURE OR HAVE LET YOURSELF OR YOUR FAMILY DOWN: NOT AT ALL
2. FEELING DOWN, DEPRESSED OR HOPELESS: SEVERAL DAYS
1. LITTLE INTEREST OR PLEASURE IN DOING THINGS: MORE THAN HALF THE DAYS
3. TROUBLE FALLING OR STAYING ASLEEP: NEARLY EVERY DAY
3. TROUBLE FALLING OR STAYING ASLEEP: NEARLY EVERY DAY
SUM OF ALL RESPONSES TO PHQ QUESTIONS 1-9: 12
8. MOVING OR SPEAKING SO SLOWLY THAT OTHER PEOPLE COULD HAVE NOTICED. OR THE OPPOSITE - BEING SO FIDGETY OR RESTLESS THAT YOU HAVE BEEN MOVING AROUND A LOT MORE THAN USUAL: NOT AT ALL
4. FEELING TIRED OR HAVING LITTLE ENERGY: NEARLY EVERY DAY
6. FEELING BAD ABOUT YOURSELF - OR THAT YOU ARE A FAILURE OR HAVE LET YOURSELF OR YOUR FAMILY DOWN: NOT AT ALL
7. TROUBLE CONCENTRATING ON THINGS, SUCH AS READING THE NEWSPAPER OR WATCHING TELEVISION: NOT AT ALL
SUM OF ALL RESPONSES TO PHQ QUESTIONS 1-9: 12
10. IF YOU CHECKED OFF ANY PROBLEMS, HOW DIFFICULT HAVE THESE PROBLEMS MADE IT FOR YOU TO DO YOUR WORK, TAKE CARE OF THINGS AT HOME, OR GET ALONG WITH OTHER PEOPLE: SOMEWHAT DIFFICULT
9. THOUGHTS THAT YOU WOULD BE BETTER OFF DEAD, OR OF HURTING YOURSELF: NOT AT ALL
SUM OF ALL RESPONSES TO PHQ QUESTIONS 1-9: 12
10. IF YOU CHECKED OFF ANY PROBLEMS, HOW DIFFICULT HAVE THESE PROBLEMS MADE IT FOR YOU TO DO YOUR WORK, TAKE CARE OF THINGS AT HOME, OR GET ALONG WITH OTHER PEOPLE: SOMEWHAT DIFFICULT
5. POOR APPETITE OR OVEREATING: NEARLY EVERY DAY
7. TROUBLE CONCENTRATING ON THINGS, SUCH AS READING THE NEWSPAPER OR WATCHING TELEVISION: NOT AT ALL
SUM OF ALL RESPONSES TO PHQ QUESTIONS 1-9: 12
2. FEELING DOWN, DEPRESSED OR HOPELESS: SEVERAL DAYS
SUM OF ALL RESPONSES TO PHQ QUESTIONS 1-9: 12
4. FEELING TIRED OR HAVING LITTLE ENERGY: NEARLY EVERY DAY

## 2025-05-05 ENCOUNTER — RESULTS FOLLOW-UP (OUTPATIENT)
Dept: INTERNAL MEDICINE CLINIC | Age: 70
End: 2025-05-05

## 2025-05-05 ENCOUNTER — APPOINTMENT (OUTPATIENT)
Dept: CT IMAGING | Age: 70
DRG: 378 | End: 2025-05-05
Payer: MEDICARE

## 2025-05-05 ENCOUNTER — HOSPITAL ENCOUNTER (INPATIENT)
Age: 70
LOS: 1 days | Discharge: HOME | DRG: 378 | End: 2025-05-06
Attending: STUDENT IN AN ORGANIZED HEALTH CARE EDUCATION/TRAINING PROGRAM | Admitting: STUDENT IN AN ORGANIZED HEALTH CARE EDUCATION/TRAINING PROGRAM
Payer: MEDICARE

## 2025-05-05 ENCOUNTER — APPOINTMENT (OUTPATIENT)
Dept: GENERAL RADIOLOGY | Age: 70
DRG: 378 | End: 2025-05-05
Payer: MEDICARE

## 2025-05-05 ENCOUNTER — OFFICE VISIT (OUTPATIENT)
Dept: INTERNAL MEDICINE CLINIC | Age: 70
End: 2025-05-05

## 2025-05-05 VITALS
WEIGHT: 134 LBS | HEART RATE: 68 BPM | BODY MASS INDEX: 23.74 KG/M2 | DIASTOLIC BLOOD PRESSURE: 60 MMHG | RESPIRATION RATE: 12 BRPM | SYSTOLIC BLOOD PRESSURE: 116 MMHG | HEIGHT: 63 IN

## 2025-05-05 DIAGNOSIS — E11.29 TYPE 2 DIABETES MELLITUS WITH DIABETIC MICROALBUMINURIA, WITH LONG-TERM CURRENT USE OF INSULIN (HCC): ICD-10-CM

## 2025-05-05 DIAGNOSIS — D50.9 IRON DEFICIENCY ANEMIA, UNSPECIFIED IRON DEFICIENCY ANEMIA TYPE: ICD-10-CM

## 2025-05-05 DIAGNOSIS — Z79.4 TYPE 2 DIABETES MELLITUS WITH DIABETIC POLYNEUROPATHY, WITH LONG-TERM CURRENT USE OF INSULIN (HCC): ICD-10-CM

## 2025-05-05 DIAGNOSIS — F51.01 PRIMARY INSOMNIA: ICD-10-CM

## 2025-05-05 DIAGNOSIS — I50.22 CHRONIC SYSTOLIC CONGESTIVE HEART FAILURE (HCC): ICD-10-CM

## 2025-05-05 DIAGNOSIS — K90.9 MALABSORPTION OF IRON: ICD-10-CM

## 2025-05-05 DIAGNOSIS — E78.00 HYPERCHOLESTEREMIA: ICD-10-CM

## 2025-05-05 DIAGNOSIS — Z79.4 TYPE 2 DIABETES MELLITUS WITH DIABETIC MICROALBUMINURIA, WITH LONG-TERM CURRENT USE OF INSULIN (HCC): Primary | ICD-10-CM

## 2025-05-05 DIAGNOSIS — I42.0 DILATED CARDIOMYOPATHY (HCC): ICD-10-CM

## 2025-05-05 DIAGNOSIS — Z79.4 TYPE 2 DIABETES MELLITUS WITH DIABETIC MICROALBUMINURIA, WITH LONG-TERM CURRENT USE OF INSULIN (HCC): ICD-10-CM

## 2025-05-05 DIAGNOSIS — R53.1 GENERALIZED WEAKNESS: Primary | ICD-10-CM

## 2025-05-05 DIAGNOSIS — J44.9 CHRONIC OBSTRUCTIVE PULMONARY DISEASE, UNSPECIFIED COPD TYPE (HCC): ICD-10-CM

## 2025-05-05 DIAGNOSIS — F33.41 RECURRENT MAJOR DEPRESSIVE DISORDER, IN PARTIAL REMISSION: ICD-10-CM

## 2025-05-05 DIAGNOSIS — E11.65 TYPE 2 DIABETES MELLITUS WITH HYPERGLYCEMIA, WITHOUT LONG-TERM CURRENT USE OF INSULIN (HCC): ICD-10-CM

## 2025-05-05 DIAGNOSIS — I10 BENIGN ESSENTIAL HTN: ICD-10-CM

## 2025-05-05 DIAGNOSIS — R80.9 TYPE 2 DIABETES MELLITUS WITH DIABETIC MICROALBUMINURIA, WITH LONG-TERM CURRENT USE OF INSULIN (HCC): ICD-10-CM

## 2025-05-05 DIAGNOSIS — E11.29 TYPE 2 DIABETES MELLITUS WITH DIABETIC MICROALBUMINURIA, WITH LONG-TERM CURRENT USE OF INSULIN (HCC): Primary | ICD-10-CM

## 2025-05-05 DIAGNOSIS — E11.42 TYPE 2 DIABETES MELLITUS WITH DIABETIC POLYNEUROPATHY, WITH LONG-TERM CURRENT USE OF INSULIN (HCC): ICD-10-CM

## 2025-05-05 DIAGNOSIS — K92.2 GASTROINTESTINAL HEMORRHAGE, UNSPECIFIED GASTROINTESTINAL HEMORRHAGE TYPE: ICD-10-CM

## 2025-05-05 DIAGNOSIS — Z79.4 TYPE 2 DIABETES MELLITUS WITH BOTH EYES AFFECTED BY MILD NONPROLIFERATIVE RETINOPATHY WITHOUT MACULAR EDEMA, WITH LONG-TERM CURRENT USE OF INSULIN (HCC): ICD-10-CM

## 2025-05-05 DIAGNOSIS — C64.2 RENAL CELL CANCER, LEFT (HCC): ICD-10-CM

## 2025-05-05 DIAGNOSIS — E11.3293 TYPE 2 DIABETES MELLITUS WITH BOTH EYES AFFECTED BY MILD NONPROLIFERATIVE RETINOPATHY WITHOUT MACULAR EDEMA, WITH LONG-TERM CURRENT USE OF INSULIN (HCC): ICD-10-CM

## 2025-05-05 DIAGNOSIS — R80.9 TYPE 2 DIABETES MELLITUS WITH DIABETIC MICROALBUMINURIA, WITH LONG-TERM CURRENT USE OF INSULIN (HCC): Primary | ICD-10-CM

## 2025-05-05 LAB
ALBUMIN SERPL-MCNC: 3.9 G/DL (ref 3.4–5)
ALBUMIN SERPL-MCNC: 4.4 G/DL (ref 3.4–5)
ALBUMIN/GLOB SERPL: 1.3 {RATIO} (ref 1.1–2.2)
ALBUMIN/GLOB SERPL: 1.5 {RATIO} (ref 1.1–2.2)
ALP SERPL-CCNC: 81 U/L (ref 40–129)
ALP SERPL-CCNC: 87 U/L (ref 40–129)
ALT SERPL-CCNC: 17 U/L (ref 10–40)
ALT SERPL-CCNC: 21 U/L (ref 10–40)
ANION GAP SERPL CALCULATED.3IONS-SCNC: 11 MMOL/L (ref 3–16)
ANION GAP SERPL CALCULATED.3IONS-SCNC: 15 MMOL/L (ref 3–16)
ANISOCYTOSIS BLD QL SMEAR: ABNORMAL
AST SERPL-CCNC: 24 U/L (ref 15–37)
AST SERPL-CCNC: 27 U/L (ref 15–37)
BASOPHILS # BLD: 0.1 K/UL (ref 0–0.2)
BASOPHILS NFR BLD: 0.8 %
BILIRUB SERPL-MCNC: <0.2 MG/DL (ref 0–1)
BILIRUB SERPL-MCNC: <0.2 MG/DL (ref 0–1)
BILIRUBIN, POC: NORMAL
BLOOD URINE, POC: NORMAL
BUN SERPL-MCNC: 28 MG/DL (ref 7–20)
BUN SERPL-MCNC: 32 MG/DL (ref 7–20)
CALCIUM SERPL-MCNC: 9.3 MG/DL (ref 8.3–10.6)
CALCIUM SERPL-MCNC: 9.6 MG/DL (ref 8.3–10.6)
CHLORIDE SERPL-SCNC: 100 MMOL/L (ref 99–110)
CHLORIDE SERPL-SCNC: 101 MMOL/L (ref 99–110)
CLARITY, POC: NORMAL
CO2 SERPL-SCNC: 19 MMOL/L (ref 21–32)
CO2 SERPL-SCNC: 25 MMOL/L (ref 21–32)
COLOR, POC: NORMAL
CREAT SERPL-MCNC: 0.7 MG/DL (ref 0.6–1.2)
CREAT SERPL-MCNC: 0.8 MG/DL (ref 0.6–1.2)
CREAT UR-MCNC: 51.9 MG/DL (ref 28–259)
DEPRECATED RDW RBC AUTO: 18.1 % (ref 12.4–15.4)
EOSINOPHIL # BLD: 0.2 K/UL (ref 0–0.6)
EOSINOPHIL NFR BLD: 1.6 %
FERRITIN SERPL IA-MCNC: 11.2 NG/ML (ref 15–150)
GFR SERPLBLD CREATININE-BSD FMLA CKD-EPI: 79 ML/MIN/{1.73_M2}
GFR SERPLBLD CREATININE-BSD FMLA CKD-EPI: >90 ML/MIN/{1.73_M2}
GLUCOSE SERPL-MCNC: 146 MG/DL (ref 70–99)
GLUCOSE SERPL-MCNC: 170 MG/DL (ref 70–99)
GLUCOSE URINE, POC: NORMAL MG/DL
HCT VFR BLD AUTO: 29.7 % (ref 36–48)
HEMOCCULT STL QL: ABNORMAL
HGB BLD-MCNC: 9.3 G/DL (ref 12–16)
HYPOCHROMIA BLD QL SMEAR: ABNORMAL
IRON SATN MFR SERPL: 3 % (ref 15–50)
IRON SERPL-MCNC: 16 UG/DL (ref 37–145)
KETONES, POC: NORMAL MG/DL
LEUKOCYTE EST, POC: NORMAL
LYMPHOCYTES # BLD: 1.2 K/UL (ref 1–5.1)
LYMPHOCYTES NFR BLD: 11 %
MCH RBC QN AUTO: 21.1 PG (ref 26–34)
MCHC RBC AUTO-ENTMCNC: 31.1 G/DL (ref 31–36)
MCV RBC AUTO: 67.7 FL (ref 80–100)
MICROALBUMIN UR DL<=1MG/L-MCNC: 15.4 MG/DL
MICROALBUMIN/CREAT UR: 296.7 MG/G (ref 0–30)
MICROCYTES BLD QL SMEAR: ABNORMAL
MONOCYTES # BLD: 0.9 K/UL (ref 0–1.3)
MONOCYTES NFR BLD: 7.8 %
NEUTROPHILS # BLD: 8.6 K/UL (ref 1.7–7.7)
NEUTROPHILS NFR BLD: 78.8 %
NITRITE, POC: NORMAL
PATH INTERP BLD-IMP: YES
PH, POC: NORMAL
PLATELET # BLD AUTO: 335 K/UL (ref 135–450)
PMV BLD AUTO: 9.5 FL (ref 5–10.5)
POIKILOCYTOSIS BLD QL SMEAR: ABNORMAL
POLYCHROMASIA BLD QL SMEAR: ABNORMAL
POTASSIUM SERPL-SCNC: 4.1 MMOL/L (ref 3.5–5.1)
POTASSIUM SERPL-SCNC: 4.3 MMOL/L (ref 3.5–5.1)
PROT SERPL-MCNC: 7 G/DL (ref 6.4–8.2)
PROT SERPL-MCNC: 7.3 G/DL (ref 6.4–8.2)
PROTEIN, POC: NORMAL MG/DL
RBC # BLD AUTO: 4.39 M/UL (ref 4–5.2)
SLIDE REVIEW: ABNORMAL
SODIUM SERPL-SCNC: 135 MMOL/L (ref 136–145)
SODIUM SERPL-SCNC: 136 MMOL/L (ref 136–145)
SPECIFIC GRAVITY, POC: NORMAL
TIBC SERPL-MCNC: 507 UG/DL (ref 260–445)
TROPONIN, HIGH SENSITIVITY: 18 NG/L (ref 0–14)
TROPONIN, HIGH SENSITIVITY: 19 NG/L (ref 0–14)
TSH SERPL DL<=0.005 MIU/L-ACNC: 1.54 UIU/ML (ref 0.27–4.2)
TSH SERPL DL<=0.005 MIU/L-ACNC: 1.7 UIU/ML (ref 0.27–4.2)
URATE SERPL-MCNC: 5.5 MG/DL (ref 2.6–6)
UROBILINOGEN, POC: NORMAL MG/DL
VIT B12 SERPL-MCNC: 442 PG/ML (ref 211–911)
WBC # BLD AUTO: 11 K/UL (ref 4–11)

## 2025-05-05 PROCEDURE — 1160F RVW MEDS BY RX/DR IN RCRD: CPT | Performed by: INTERNAL MEDICINE

## 2025-05-05 PROCEDURE — 3074F SYST BP LT 130 MM HG: CPT | Performed by: INTERNAL MEDICINE

## 2025-05-05 PROCEDURE — 81002 URINALYSIS NONAUTO W/O SCOPE: CPT | Performed by: INTERNAL MEDICINE

## 2025-05-05 PROCEDURE — 3078F DIAST BP <80 MM HG: CPT | Performed by: INTERNAL MEDICINE

## 2025-05-05 PROCEDURE — 96361 HYDRATE IV INFUSION ADD-ON: CPT

## 2025-05-05 PROCEDURE — 96374 THER/PROPH/DIAG INJ IV PUSH: CPT

## 2025-05-05 PROCEDURE — 96376 TX/PRO/DX INJ SAME DRUG ADON: CPT

## 2025-05-05 PROCEDURE — 80053 COMPREHEN METABOLIC PANEL: CPT

## 2025-05-05 PROCEDURE — 36415 COLL VENOUS BLD VENIPUNCTURE: CPT

## 2025-05-05 PROCEDURE — 82270 OCCULT BLOOD FECES: CPT

## 2025-05-05 PROCEDURE — 84484 ASSAY OF TROPONIN QUANT: CPT

## 2025-05-05 PROCEDURE — 74174 CTA ABD&PLVS W/CONTRAST: CPT

## 2025-05-05 PROCEDURE — 85025 COMPLETE CBC W/AUTO DIFF WBC: CPT

## 2025-05-05 PROCEDURE — 6360000002 HC RX W HCPCS

## 2025-05-05 PROCEDURE — 99285 EMERGENCY DEPT VISIT HI MDM: CPT

## 2025-05-05 PROCEDURE — 71045 X-RAY EXAM CHEST 1 VIEW: CPT

## 2025-05-05 PROCEDURE — 2580000003 HC RX 258

## 2025-05-05 PROCEDURE — 6360000004 HC RX CONTRAST MEDICATION

## 2025-05-05 PROCEDURE — 99214 OFFICE O/P EST MOD 30 MIN: CPT | Performed by: INTERNAL MEDICINE

## 2025-05-05 PROCEDURE — 93005 ELECTROCARDIOGRAM TRACING: CPT

## 2025-05-05 PROCEDURE — 1159F MED LIST DOCD IN RCRD: CPT | Performed by: INTERNAL MEDICINE

## 2025-05-05 PROCEDURE — 1123F ACP DISCUSS/DSCN MKR DOCD: CPT | Performed by: INTERNAL MEDICINE

## 2025-05-05 PROCEDURE — 84443 ASSAY THYROID STIM HORMONE: CPT

## 2025-05-05 RX ORDER — PANTOPRAZOLE SODIUM 40 MG/10ML
40 INJECTION, POWDER, LYOPHILIZED, FOR SOLUTION INTRAVENOUS ONCE
Status: COMPLETED | OUTPATIENT
Start: 2025-05-05 | End: 2025-05-05

## 2025-05-05 RX ORDER — 0.9 % SODIUM CHLORIDE 0.9 %
500 INTRAVENOUS SOLUTION INTRAVENOUS ONCE
Status: COMPLETED | OUTPATIENT
Start: 2025-05-05 | End: 2025-05-05

## 2025-05-05 RX ORDER — IOPAMIDOL 755 MG/ML
75 INJECTION, SOLUTION INTRAVASCULAR
Status: COMPLETED | OUTPATIENT
Start: 2025-05-05 | End: 2025-05-05

## 2025-05-05 RX ORDER — INSULIN GLARGINE 300 [IU]/ML
30-35 INJECTION, SOLUTION SUBCUTANEOUS NIGHTLY
Qty: 6 ML | Refills: 0 | Status: SHIPPED | OUTPATIENT
Start: 2025-05-05 | End: 2025-08-05

## 2025-05-05 RX ADMIN — IOPAMIDOL 75 ML: 755 INJECTION, SOLUTION INTRAVENOUS at 22:17

## 2025-05-05 RX ADMIN — SODIUM CHLORIDE 500 ML: 0.9 INJECTION, SOLUTION INTRAVENOUS at 19:04

## 2025-05-05 RX ADMIN — PANTOPRAZOLE SODIUM 40 MG: 40 INJECTION, POWDER, LYOPHILIZED, FOR SOLUTION INTRAVENOUS at 23:03

## 2025-05-05 ASSESSMENT — ENCOUNTER SYMPTOMS
VOMITING: 0
ABDOMINAL PAIN: 0
SHORTNESS OF BREATH: 0
NAUSEA: 0
WHEEZING: 0
RHINORRHEA: 0

## 2025-05-05 ASSESSMENT — PAIN - FUNCTIONAL ASSESSMENT: PAIN_FUNCTIONAL_ASSESSMENT: NONE - DENIES PAIN

## 2025-05-05 NOTE — ED PROVIDER NOTES
I independently performed a history and physical on Kenya Peters.     I have discussed the case with the KARISSA/resident at 1900 and approve / take responsibility for the initial management plan and anticipated disposition as documented below.     In summary the patient presents with generalized fatigue for at least the past week as well as concern for variable blood pressure possibly with hypotension at home.  Also per primary had blood in urine of unclear etiology patient is a history of anemia requires iron infusions.    My assessment the patient is afebrile and hemodynamically stable in no acute distress.  Blood pressure is in fact increased from historic trend.  On exam her breath sounds are clear abdomen soft nontender nondistended extremities warm and well-perfused.  She is clinically euvolemic nontoxic-appearing she has no other acute complaints no pain.  We will advance her workup for generalized malaise and fatigue we will also provide empiric fluids and assess for orthostatic symptoms though she denies a positional component to her fatigue.    I interpreted the following studies:    ECG: Sinus rhythm at a rate of 91 without ectopy.  Left axis -49.  Prolonged  with right bundle branch block.  Prolonged QTc 516.  No evidence of acute ischemia EKG is not significantly changed from prior dated November 30, 2022.    I personally discussed the patient's management with the following:  na    ED Course as of 05/05/25 2020   Mon May 05, 2025   2019 Occult Blood Diagnostic(!): Result: POSITIVE  Normal range: Negative    Stool occult blood positive with a relative anemia below baseline.  Overall I favor this is the root cause of her presenting condition.  She has no hemodynamic instability but we will discuss further with GI for further recommendations she may benefit from hospitalization for further characterization. [NG]      ED Course User Index  [NG] Genaro Orellana MD       For further details of Kenya

## 2025-05-05 NOTE — PROGRESS NOTES
effort is normal. No respiratory distress.      Breath sounds: Normal breath sounds. No wheezing or rales.   Abdominal:      General: Bowel sounds are normal. There is no distension.      Palpations: Abdomen is soft. There is no hepatomegaly, splenomegaly or mass.      Tenderness: There is no abdominal tenderness.   Musculoskeletal:         General: Normal range of motion.      Cervical back: Normal range of motion and neck supple.   Lymphadenopathy:      Cervical: No cervical adenopathy.   Skin:     General: Skin is warm and dry.      Findings: No rash.   Neurological:      Mental Status: She is alert and oriented to person, place, and time.      Cranial Nerves: No cranial nerve deficit.      Deep Tendon Reflexes: Reflexes are normal and symmetric.   Psychiatric:         Behavior: Behavior normal.         Thought Content: Thought content normal.         Judgment: Judgment normal.           ECHO 8/4/22      Conclusions      Summary   Left ventricular cavity size is normal with moderate concentric left   ventricular hypertrophy.   The left ventricular systolic function is mildly reduced with an ejection   fraction of 45-50 %.   Basal inferior/basal inferoseptal hypokinesis.   Grade II diastolic dysfunction with elevated left ventricular filling   pressure.   The right ventricle is normal in size and function.   Thickening/calcification of the non-coronary cusp similar in appearance to   the previous images 10/2019.   The left atrium is mildly dilated.   Mild mitral regurgitation.   Mild aortic regurgitation.      No significant changes noted compared with the prior study performed   10/29/2019.      Signature      ------------------------------------------------------------------   Electronically signed by Toño Mclean MD (Interpreting   physician) on 08/04/2022 at 05:29 PM   ------------------------------------------------------------------     Assessment:       Diagnosis Orders   1. Type 2 diabetes mellitus with

## 2025-05-05 NOTE — ED PROVIDER NOTES
[address] MEDICAL-SURGICAL  EMERGENCY DEPARTMENT ENCOUNTER        Pt Name: Kenya Peters  MRN: [id number]  Birthdate 1955  Date of evaluation: 5/5/2025  Provider: STEPHY Flores  PCP: Ata Chung MD  Note Started: 6:11 PM EDT 5/5/25       I have seen and evaluated this patient with my supervising physician Genaro Orellana MD.      CHIEF COMPLAINT       Chief Complaint   Patient presents with    Fatigue     Generalized weakness.  Saw lalo this morning for routine exam.  Pt states her bp was low at the office.  Was told she had blood in her urine.  Went home and states she was taking her bp at home and they were all low.         HISTORY OF PRESENT ILLNESS: 1 or more Elements     History From: Patient    Limitations to history : None    Social Determinants Significantly Affecting Health : None    Chief Complaint: Fatigue    Kenya Peters is a 69 y.o. female who presents to the emergency department for fatigue.  States that she has been having intermittent fatigue for a while however recently has been getting worse.  She did follow-up with her primary care provider and was told that her blood pressure was lower than usual.  States she was rechecking it throughout the day at home and that it was getting lower.  She does state that she has felt nauseous since her having diarrhea today.  Does state that she becomes lightheaded upon standing which started today.  She denies dizziness or vertigo symptoms.  Denies vision changes, speech changes, numbness or weakness of her extremities.  She denies fevers, chills, recent sick contacts or illness.  She did have a urinalysis done today which showed blood in her urine and she was referred back to follow-up with urology.  She denies dysuria or urinary frequency/urgency out of her baseline.  Denies chest pain or shortness of breath.  Denies abdominal pain.    Nursing Notes were all reviewed and agreed with or any disagreements were addressed

## 2025-05-05 NOTE — ED NOTES
Patient resting in bed at this time, denies any further needs. Call light within reach and provider at bedside

## 2025-05-06 ENCOUNTER — TELEPHONE (OUTPATIENT)
Dept: INTERNAL MEDICINE CLINIC | Age: 70
End: 2025-05-06

## 2025-05-06 VITALS
WEIGHT: 134.3 LBS | HEART RATE: 73 BPM | SYSTOLIC BLOOD PRESSURE: 151 MMHG | RESPIRATION RATE: 16 BRPM | TEMPERATURE: 97.8 F | OXYGEN SATURATION: 97 % | BODY MASS INDEX: 23.8 KG/M2 | HEIGHT: 63 IN | DIASTOLIC BLOOD PRESSURE: 62 MMHG

## 2025-05-06 PROBLEM — K92.2 GIB (GASTROINTESTINAL BLEEDING): Status: ACTIVE | Noted: 2025-05-06

## 2025-05-06 PROBLEM — R53.1 GENERALIZED WEAKNESS: Status: ACTIVE | Noted: 2025-05-06

## 2025-05-06 LAB
ALBUMIN SERPL-MCNC: 3.4 G/DL (ref 3.4–5)
ALBUMIN/GLOB SERPL: 1 {RATIO} (ref 1.1–2.2)
ALP SERPL-CCNC: 72 U/L (ref 40–129)
ALT SERPL-CCNC: 16 U/L (ref 10–40)
ANION GAP SERPL CALCULATED.3IONS-SCNC: 12 MMOL/L (ref 3–16)
AST SERPL-CCNC: 20 U/L (ref 15–37)
BASOPHILS # BLD: 0.1 K/UL (ref 0–0.2)
BASOPHILS NFR BLD: 0.7 %
BILIRUB SERPL-MCNC: <0.2 MG/DL (ref 0–1)
BUN SERPL-MCNC: 20 MG/DL (ref 7–20)
CALCIUM SERPL-MCNC: 8.9 MG/DL (ref 8.3–10.6)
CHLORIDE SERPL-SCNC: 105 MMOL/L (ref 99–110)
CO2 SERPL-SCNC: 22 MMOL/L (ref 21–32)
CREAT SERPL-MCNC: 0.7 MG/DL (ref 0.6–1.2)
DEPRECATED RDW RBC AUTO: 18.1 % (ref 12.4–15.4)
EKG ATRIAL RATE: 91 BPM
EKG DIAGNOSIS: NORMAL
EKG P AXIS: 72 DEGREES
EKG P-R INTERVAL: 170 MS
EKG Q-T INTERVAL: 420 MS
EKG QRS DURATION: 134 MS
EKG QTC CALCULATION (BAZETT): 516 MS
EKG R AXIS: -49 DEGREES
EKG T AXIS: 136 DEGREES
EKG VENTRICULAR RATE: 91 BPM
EOSINOPHIL # BLD: 0.2 K/UL (ref 0–0.6)
EOSINOPHIL NFR BLD: 1.2 %
EST. AVERAGE GLUCOSE BLD GHB EST-MCNC: 177.2 MG/DL
GFR SERPLBLD CREATININE-BSD FMLA CKD-EPI: >90 ML/MIN/{1.73_M2}
GLUCOSE BLD-MCNC: 126 MG/DL (ref 70–99)
GLUCOSE BLD-MCNC: 139 MG/DL (ref 70–99)
GLUCOSE BLD-MCNC: 208 MG/DL (ref 70–99)
GLUCOSE SERPL-MCNC: 114 MG/DL (ref 70–99)
HBA1C MFR BLD: 7.8 %
HCT VFR BLD AUTO: 27.3 % (ref 36–48)
HCT VFR BLD AUTO: 29.7 % (ref 36–48)
HGB BLD-MCNC: 8.2 G/DL (ref 12–16)
HGB BLD-MCNC: 9.2 G/DL (ref 12–16)
INR PPP: 1.03 (ref 0.85–1.15)
LYMPHOCYTES # BLD: 1.2 K/UL (ref 1–5.1)
LYMPHOCYTES NFR BLD: 8.3 %
MCH RBC QN AUTO: 20.4 PG (ref 26–34)
MCHC RBC AUTO-ENTMCNC: 30.9 G/DL (ref 31–36)
MCV RBC AUTO: 65.9 FL (ref 80–100)
MONOCYTES # BLD: 1 K/UL (ref 0–1.3)
MONOCYTES NFR BLD: 6.7 %
NEUTROPHILS # BLD: 11.9 K/UL (ref 1.7–7.7)
NEUTROPHILS NFR BLD: 83.1 %
PATH INTERP BLD-IMP: NO
PERFORMED ON: ABNORMAL
PLATELET # BLD AUTO: 379 K/UL (ref 135–450)
PMV BLD AUTO: 10.2 FL (ref 5–10.5)
POTASSIUM SERPL-SCNC: 4 MMOL/L (ref 3.5–5.1)
PROT SERPL-MCNC: 6.7 G/DL (ref 6.4–8.2)
PROTHROMBIN TIME: 13.7 SEC (ref 11.9–14.9)
RBC # BLD AUTO: 4.5 M/UL (ref 4–5.2)
SODIUM SERPL-SCNC: 139 MMOL/L (ref 136–145)
WBC # BLD AUTO: 14.3 K/UL (ref 4–11)

## 2025-05-06 PROCEDURE — G0378 HOSPITAL OBSERVATION PER HR: HCPCS

## 2025-05-06 PROCEDURE — 2580000003 HC RX 258: Performed by: STUDENT IN AN ORGANIZED HEALTH CARE EDUCATION/TRAINING PROGRAM

## 2025-05-06 PROCEDURE — 6360000002 HC RX W HCPCS: Performed by: STUDENT IN AN ORGANIZED HEALTH CARE EDUCATION/TRAINING PROGRAM

## 2025-05-06 PROCEDURE — 85610 PROTHROMBIN TIME: CPT

## 2025-05-06 PROCEDURE — 36415 COLL VENOUS BLD VENIPUNCTURE: CPT

## 2025-05-06 PROCEDURE — 80053 COMPREHEN METABOLIC PANEL: CPT

## 2025-05-06 PROCEDURE — 1200000000 HC SEMI PRIVATE

## 2025-05-06 PROCEDURE — 85018 HEMOGLOBIN: CPT

## 2025-05-06 PROCEDURE — 96376 TX/PRO/DX INJ SAME DRUG ADON: CPT

## 2025-05-06 PROCEDURE — 6370000000 HC RX 637 (ALT 250 FOR IP): Performed by: STUDENT IN AN ORGANIZED HEALTH CARE EDUCATION/TRAINING PROGRAM

## 2025-05-06 PROCEDURE — 96361 HYDRATE IV INFUSION ADD-ON: CPT

## 2025-05-06 PROCEDURE — 93010 ELECTROCARDIOGRAM REPORT: CPT | Performed by: INTERNAL MEDICINE

## 2025-05-06 PROCEDURE — 85014 HEMATOCRIT: CPT

## 2025-05-06 RX ORDER — LISINOPRIL 20 MG/1
20 TABLET ORAL DAILY
Status: DISCONTINUED | OUTPATIENT
Start: 2025-05-06 | End: 2025-05-06 | Stop reason: HOSPADM

## 2025-05-06 RX ORDER — AMLODIPINE BESYLATE 5 MG/1
5 TABLET ORAL NIGHTLY
Status: DISCONTINUED | OUTPATIENT
Start: 2025-05-06 | End: 2025-05-06 | Stop reason: HOSPADM

## 2025-05-06 RX ORDER — ONDANSETRON 2 MG/ML
4 INJECTION INTRAMUSCULAR; INTRAVENOUS EVERY 6 HOURS PRN
Status: DISCONTINUED | OUTPATIENT
Start: 2025-05-06 | End: 2025-05-06 | Stop reason: HOSPADM

## 2025-05-06 RX ORDER — ONDANSETRON 4 MG/1
4 TABLET, ORALLY DISINTEGRATING ORAL EVERY 8 HOURS PRN
Status: DISCONTINUED | OUTPATIENT
Start: 2025-05-06 | End: 2025-05-06 | Stop reason: HOSPADM

## 2025-05-06 RX ORDER — ATORVASTATIN CALCIUM 40 MG/1
40 TABLET, FILM COATED ORAL DAILY
Status: DISCONTINUED | OUTPATIENT
Start: 2025-05-06 | End: 2025-05-06 | Stop reason: HOSPADM

## 2025-05-06 RX ORDER — METOPROLOL SUCCINATE 50 MG/1
50 TABLET, EXTENDED RELEASE ORAL DAILY
Status: DISCONTINUED | OUTPATIENT
Start: 2025-05-06 | End: 2025-05-06 | Stop reason: HOSPADM

## 2025-05-06 RX ORDER — SODIUM CHLORIDE 0.9 % (FLUSH) 0.9 %
5-40 SYRINGE (ML) INJECTION PRN
Status: DISCONTINUED | OUTPATIENT
Start: 2025-05-06 | End: 2025-05-06 | Stop reason: HOSPADM

## 2025-05-06 RX ORDER — SODIUM CHLORIDE 9 MG/ML
INJECTION, SOLUTION INTRAVENOUS PRN
Status: DISCONTINUED | OUTPATIENT
Start: 2025-05-06 | End: 2025-05-06 | Stop reason: HOSPADM

## 2025-05-06 RX ORDER — TRAZODONE HYDROCHLORIDE 50 MG/1
50 TABLET ORAL NIGHTLY
Status: DISCONTINUED | OUTPATIENT
Start: 2025-05-06 | End: 2025-05-06 | Stop reason: HOSPADM

## 2025-05-06 RX ORDER — ACETAMINOPHEN 650 MG/1
650 SUPPOSITORY RECTAL EVERY 6 HOURS PRN
Status: DISCONTINUED | OUTPATIENT
Start: 2025-05-06 | End: 2025-05-06 | Stop reason: HOSPADM

## 2025-05-06 RX ORDER — INSULIN GLARGINE 100 [IU]/ML
10 INJECTION, SOLUTION SUBCUTANEOUS NIGHTLY
Status: DISCONTINUED | OUTPATIENT
Start: 2025-05-06 | End: 2025-05-06 | Stop reason: HOSPADM

## 2025-05-06 RX ORDER — INSULIN LISPRO 100 [IU]/ML
4 INJECTION, SOLUTION INTRAVENOUS; SUBCUTANEOUS
Status: DISCONTINUED | OUTPATIENT
Start: 2025-05-06 | End: 2025-05-06 | Stop reason: HOSPADM

## 2025-05-06 RX ORDER — SODIUM CHLORIDE, SODIUM LACTATE, POTASSIUM CHLORIDE, CALCIUM CHLORIDE 600; 310; 30; 20 MG/100ML; MG/100ML; MG/100ML; MG/100ML
INJECTION, SOLUTION INTRAVENOUS CONTINUOUS
Status: DISCONTINUED | OUTPATIENT
Start: 2025-05-06 | End: 2025-05-06 | Stop reason: HOSPADM

## 2025-05-06 RX ORDER — DEXTROSE MONOHYDRATE 100 MG/ML
INJECTION, SOLUTION INTRAVENOUS CONTINUOUS PRN
Status: DISCONTINUED | OUTPATIENT
Start: 2025-05-06 | End: 2025-05-06 | Stop reason: HOSPADM

## 2025-05-06 RX ORDER — GLUCAGON 1 MG/ML
1 KIT INJECTION PRN
Status: DISCONTINUED | OUTPATIENT
Start: 2025-05-06 | End: 2025-05-06 | Stop reason: HOSPADM

## 2025-05-06 RX ORDER — SODIUM CHLORIDE 0.9 % (FLUSH) 0.9 %
5-40 SYRINGE (ML) INJECTION EVERY 12 HOURS SCHEDULED
Status: DISCONTINUED | OUTPATIENT
Start: 2025-05-06 | End: 2025-05-06 | Stop reason: HOSPADM

## 2025-05-06 RX ORDER — ACETAMINOPHEN 325 MG/1
650 TABLET ORAL EVERY 6 HOURS PRN
Status: DISCONTINUED | OUTPATIENT
Start: 2025-05-06 | End: 2025-05-06 | Stop reason: HOSPADM

## 2025-05-06 RX ORDER — INSULIN LISPRO 100 [IU]/ML
0-8 INJECTION, SOLUTION INTRAVENOUS; SUBCUTANEOUS
Status: DISCONTINUED | OUTPATIENT
Start: 2025-05-06 | End: 2025-05-06 | Stop reason: HOSPADM

## 2025-05-06 RX ADMIN — SODIUM CHLORIDE 40 MG: 9 INJECTION INTRAMUSCULAR; INTRAVENOUS; SUBCUTANEOUS at 03:12

## 2025-05-06 RX ADMIN — ATORVASTATIN CALCIUM 40 MG: 40 TABLET, FILM COATED ORAL at 09:12

## 2025-05-06 RX ADMIN — METOPROLOL SUCCINATE 50 MG: 50 TABLET, EXTENDED RELEASE ORAL at 09:12

## 2025-05-06 RX ADMIN — SODIUM CHLORIDE, SODIUM LACTATE, POTASSIUM CHLORIDE, AND CALCIUM CHLORIDE: .6; .31; .03; .02 INJECTION, SOLUTION INTRAVENOUS at 02:26

## 2025-05-06 RX ADMIN — LISINOPRIL 20 MG: 20 TABLET ORAL at 09:12

## 2025-05-06 RX ADMIN — INSULIN LISPRO 2 UNITS: 100 INJECTION, SOLUTION INTRAVENOUS; SUBCUTANEOUS at 03:13

## 2025-05-06 NOTE — TELEPHONE ENCOUNTER
----- Message from Dr. Ata Chung MD sent at 5/6/2025  8:30 AM EDT -----  Contact: [phone]  Use left wrist to monitor BP. BP is ok and continue same meds  ----- Message -----  From: Mariann Herring  Sent: 5/5/2025   3:45 PM EDT  To: Ata Chung MD    Pt states she took her BP with a wrist BP cuff today when she went home.     (R) 82/47 (L)103/47        Please advise

## 2025-05-06 NOTE — DISCHARGE SUMMARY
Hospital Medicine Discharge Summary    Patient: Kenya Peters   : 1955     Hospital:  [facility]  Admit Date: 2025   Discharge Date:   25   Disposition:  Home   Code status:  Full  Condition at Discharge: Stable  Primary Care Provider: Ata Chung MD    Admitting Provider: Gino Huerta MD  Discharge Provider: Marianela Porter, APRN - CNP     Discharge Diagnoses:      Active Hospital Problems    Diagnosis     GIB (gastrointestinal bleeding) [K92.2]        Patient is a 70 yo female with pmx of DM, HTN, MARY, renal cell cancer s/p nephrectomy, HLD, PAD, CHF, and COPD who presented to ED  with c/o low blood pressure. Patient reports feeling increasingly weak and tired at home the last couple days. She reports lower than normal blood pressure when checked at home. She denies shortness of breath and chest pain. She denies any overt signs of bleeding such as hematemesis and hematochezia. She denies abdominal pain. She reports three episodes of watery diarrhea yesterday. She reports some nausea without vomiting yesterday. She denies fever, chills, acid indigestion, weight loss, and lack of appetite. She reports blood in urine when recently checked at PCP. She was also found to be occult positive. She reports following with Dr. Pena at [facility] for MARY. She reports she has appointment Friday. She reports receiving iron infusions in the past, most recently, three months ago. She reports taking pantoprazole daily. She reports occasional NSAID use or alcohol use. She reports smoking tobacco. She denies taking ASA or AC. Her last EGD was  with ablation of two duodenal AVMs. Her last colonoscopy was 2021 with removal of polyps, internal hemorrhoids, and sigmoid diverticulosis.         Generalized weakness and relative anemia with concerns for GI bleed.  N.p.o. admitted with GI consultation start the patient on Protonix 40 mg IV daily telemetry in place.  CT of the abdomen and

## 2025-05-06 NOTE — PROGRESS NOTES
Discharge instructions given to pt verbalize understanding.  Pt discharged home refused transport left unit walking.

## 2025-05-06 NOTE — PROGRESS NOTES
4 Eyes Skin Assessment     NAME:  Kenya Peters  YOB: 1955  MEDICAL RECORD NUMBER:  [id number]    The patient is being assessed for  Admission    I agree that at least one RN has performed a thorough Head to Toe Skin Assessment on the patient. ALL assessment sites listed below have been assessed.      Areas assessed by both nurses:    Head, Face, Ears, Shoulders, Back, Chest, Arms, Elbows, Hands, Sacrum. Buttock, Coccyx, Ischium, and Legs. Feet and Heels        Does the Patient have a Wound? No noted wound(s)       Kasi Prevention initiated by RN: No  Wound Care Orders initiated by RN: No    Pressure Injury (Stage 3,4, Unstageable, DTI, NWPT, and Complex wounds) if present, place Wound referral order by RN under : No    New Ostomies, if present place, Ostomy referral order under : No     Nurse 1 eSignature: Electronically signed by Kennedi Ocasio RN on 5/6/25 at 4:51 AM EDT    **SHARE this note so that the co-signing nurse can place an eSignature**    Nurse 2 eSignature: Electronically signed by Nell Thompson RN on 5/6/25 at 7:01 AM EDT

## 2025-05-06 NOTE — CONSULTS
Gastroenterology Consult Note    Patient:   Kenya Peters   :    1955   Facility:   [facility]  Referring/PCP: Ata Chung MD  Date:     2025  Consultant:   GOLDEN Mendosa CNP      Chief Complaint   Patient presents with    Fatigue     Generalized weakness.  Saw lalo this morning for routine exam.  Pt states her bp was low at the office.  Was told she had blood in her urine.  Went home and states she was taking her bp at home and they were all low.          History of Present illness   Patient is a 70 yo female with pmx of DM, HTN, MARY, renal cell cancer s/p nephrectomy, HLD, PAD, CHF, and COPD who presented to ED  with c/o low blood pressure. Patient reports feeling increasingly weak and tired at home the last couple days. She reports lower than normal blood pressure when checked at home. She denies shortness of breath and chest pain. She denies any overt signs of bleeding such as hematemesis and hematochezia. She denies abdominal pain. She reports three episodes of watery diarrhea yesterday. She reports some nausea without vomiting yesterday. She denies fever, chills, acid indigestion, weight loss, and lack of appetite. She reports blood in urine when recently checked at PCP. She was also found to be occult positive. She reports following with Dr. Pena at [facility] for MARY. She reports she has appointment Friday. She reports receiving iron infusions in the past, most recently, three months ago. She reports taking pantoprazole daily. She reports occasional NSAID use or alcohol use. She reports smoking tobacco. She denies taking ASA or AC. Her last EGD was  with ablation of two duodenal AVMs. Her last colonoscopy was 2021 with removal of polyps, internal hemorrhoids, and sigmoid diverticulosis.     Past Medical History:   Diagnosis Date    Atrial fibrillation (HCC)     Bronchitis chronic     Cancer (HCC)     cervical    Chest pain 2012    CHF

## 2025-05-06 NOTE — CARE COORDINATION
Case Management Assessment  Initial Evaluation    Date/Time of Evaluation: 5/6/2025 10:25 AM  Assessment Completed by: JOSE CRUZ Sargent    If patient is discharged prior to next notation, then this note serves as note for discharge by case management.    Patient Name: Kenya Rausch                   YOB: 1955  Diagnosis: GIB (gastrointestinal bleeding) [K92.2]  Generalized weakness [R53.1]  Gastrointestinal hemorrhage, unspecified gastrointestinal hemorrhage type [K92.2]                   Date / Time: 5/5/2025  5:57 PM    Patient Admission Status: Inpatient   Readmission Risk (Low < 19, Mod (19-27), High > 27): Readmission Risk Score: 13.3    Current PCP: Ata Chung MD  PCP verified by CM? Yes    Chart Reviewed: Yes      History Provided by: Patient, Medical Record  Patient Orientation: Alert and Oriented    Patient Cognition: Alert    Hospitalization in the last 30 days (Readmission):  No    If yes, Readmission Assessment in CM Navigator will be completed.    Advance Directives:      Code Status: Full Code   Patient's Primary Decision Maker is: Legal Next of Kin    Primary Decision Maker (Active): Osman Johnstonine - Child - [phone]    Secondary Decision Maker: isidro rausch - Child - [phone]    Discharge Planning:    Patient lives with: Children Type of Home: House  Primary Care Giver: Self  Patient Support Systems include: Children   Current Financial resources: Medicare  Current community resources: None  Current services prior to admission: None            Current DME:              Type of Home Care services:  None    ADLS  Prior functional level: Independent in ADLs/IADLs  Current functional level: Independent in ADLs/IADLs    PT AM-PAC:   /24  OT AM-PAC:   /24    Family can provide assistance at DC: Yes  Would you like Case Management to discuss the discharge plan with any other family members/significant others, and if so, who? No  Plans to Return to Present Housing:

## 2025-05-06 NOTE — PROGRESS NOTES
Kenya Peters was admitted by overnight hospitalist: EMR and notes reviewed.      Kenya Peters is a 69-year-old female with past medical history of hypertension diabetes hyperlipidemia COPD psoriasis history of GI bleed history of carcinoma presented to the hospital because generalized weakness and fatigue along with reported blood in the urine?  And soft blood pressures.  Patient saw the PCP in the morning because of blood pressure patient was told that she had blood in the urine patient went home and she was taking her blood pressure and has been consistently found to be low at home and patient was feeling extremely weak as well.  When the patient arrived in the ED blood pressure has been running around 149/57 with a heart rate around 87 satting at 100% on room air.  Labs show hemoglobin of 9.3 occult blood is positive troponin mildly elevated at 18 creatinine was 0.8.  Relative anemia below baseline n.p.o. at midnight with GI consultation denies any nausea vomiting chest pain orthopnea PND dizziness lightheadedness leg pain or leg swelling.     Pt was admitted by overnight hospitalist;       Assessment and Plan:  Generalized weakness and relative anemia with concerns for GI bleed.  N.p.o. admitted with GI consultation start the patient on Protonix 40 mg IV daily telemetry in place.  CT of the abdomen and pelvis did not show any active signs of active bleed  - 5/6/ repeat am h/h 8.2/27.3 ~ gram drop     Elevated troponin likely demand ischemia trend troponin X3 non-MI related likely.    Benign essential hypertension on amlodipine at home which can be continued    Peripheral neuropathy on gabapentin    Insulin-dependent diabetes mellitus type 2 start the patient on Lantus of 10 units and sliding scale insulin along with Humalog 4 units 3 times daily with meals    Chronic GERD currently on IV Protonix    Muscle spasms on Zanaflex at home    Chronic insomnia is on trazodone at home    Underlying osteoporosis on

## 2025-05-06 NOTE — DISCHARGE INSTRUCTIONS
Ok to start IV Iron  - F/U with Hem Onc as planned   Your information:  Name: Kenya Peters  : 1955    Your instructions:    Follow up with family doctor in 1 week. Follow up with oncology on Friday as scheduled. Follow up with Dr Gatica for outpt colonoscopy.    What to do after you leave the hospital:    Recommended diet: diabetic diet    Recommended activity: activity as tolerated        The following personal items were collected during your admission and were returned to you:    Belongings  Dental Appliances: None  Vision - Corrective Lenses: Eyeglasses  Hearing Aid: None  Clothing: Footwear, Pants, Shirt, Socks  Jewelry: None  Body Piercings Removed: No  Electronic Devices: Cell Phone,   Weapons (Notify Protective Services/Security): None  Other Valuables: Other (Comment) (Patient have no Purse and No Home Medications with her.)  Home Medications: None  Valuables Given To: Patient  Provide Name(s) of Who Valuable(s) Were Given To: Patient  Patient approves for provider to speak to responsible person post operatively: Yes    Information obtained by:  By signing below, I understand that if any problems occur once I leave the hospital I am to contact family doctor.  I understand and acknowledge receipt of the instructions indicated above.

## 2025-05-06 NOTE — PLAN OF CARE
Problem: Chronic Conditions and Co-morbidities  Goal: Patient's chronic conditions and co-morbidity symptoms are monitored and maintained or improved  Outcome: Progressing     Problem: Discharge Planning  Goal: Discharge to home or other facility with appropriate resources  Outcome: Progressing  Flowsheets (Taken 5/6/2025 0215 by Kennedi Ocasio RN)  Discharge to home or other facility with appropriate resources: Identify barriers to discharge with patient and caregiver     Problem: Safety - Adult  Goal: Free from fall injury  Outcome: Progressing

## 2025-05-06 NOTE — TELEPHONE ENCOUNTER
----- Message from Dr. Ata Chung MD sent at 5/6/2025  8:30 AM EDT -----  Contact: [phone]  Use left wrist to monitor BP. BP is ok and continue same meds  ----- Message -----  From: Mariann Herring  Sent: 5/5/2025   3:45 PM EDT  To: Ata Chung MD    Pt states she took her BP with a wrist BP cuff today when she went home.     (R) 82/47 (L)103/47        Please advise   Medical Necessity Information: It is in your best interest to select a reason for this procedure from the list below. All of these items fulfill various CMS LCD requirements except lesion extends to a margin. Include Z78.9 (Other Specified Conditions Influencing Health Status) As An Associated Diagnosis?: No Medical Necessity Clause: This procedure was medically necessary because the lesion that was treated was: Lab: 6 Lab Facility: 3 Size Of Lesion In Cm: 0.3 Size Of Margin In Cm: 0.2 Anesthesia Volume In Cc: 2 Eye Clamp Note Details: An eye clamp was used during the procedure. Excision Method: Elliptical Saucerization Depth: dermis and superficial adipose tissue Repair Type: Complex Intermediate / Complex Repair - Final Wound Length In Cm: 1.3 Suturegard Retention Suture: 2-0 Nylon Retention Suture Bite Size: 3 mm Length To Time In Minutes Device Was In Place: 10 Number Of Hemigard Strips Per Side: 1 Complex Requirements: Extensive Undermining Performed?: Yes Width Of Defect Perpendicular To Closure In Cm (Required): 0.7 Undermining Type: Entire Wound Debridement Text: The wound edges were debrided prior to proceeding with the closure to facilitate wound healing. Helical Rim Text: The closure involved the helical rim. Vermilion Border Text: The closure involved the vermilion border. Nostril Rim Text: The closure involved the nostril rim. Retention Suture Text: Retention sutures were placed to support the closure and prevent dehiscence. Secondary Defect Length (In Cm): 0 Suture Removal: 14 days Epidermal Closure Graft Donor Site (Optional): simple interrupted Graft Donor Site Bandage (Optional-Leave Blank If You Don't Want In Note): Steri-strips and a pressure bandage were applied to the donor site. Detail Level: Detailed Excision Depth: adipose tissue Scalpel Size: 15 blade Anesthesia Type: 1% lidocaine with epinephrine Hemostasis: Electrocautery Estimated Blood Loss (Cc): minimal Deep Sutures: 4-0 Monocryl Epidermal Sutures: 5-0 Ethilon Epidermal Closure: running Wound Care: Vaseline Dressing: dry sterile dressing Suturegard Intro: Intraoperative tissue expansion was performed, utilizing the SUTUREGARD device, in order to reduce wound tension. Suturegard Body: The suture ends were repeatedly re-tightened and re-clamped to achieve the desired tissue expansion. Hemigard Intro: Due to skin fragility and wound tension, it was decided to use HEMIGARD adhesive retention suture devices to permit a linear closure. The skin was cleaned and dried for a 6cm distance away from the wound. Excessive hair, if present, was removed to allow for adhesion. Hemigard Postcare Instructions: The HEMIGARD strips are to remain completely dry for at least 5-7 days. Positioning (Leave Blank If You Do Not Want): The patient was placed in a comfortable position exposing the surgical site. Complex Repair Preamble Text (Leave Blank If You Do Not Want): Extensive wide undermining was performed. Intermediate Repair Preamble Text (Leave Blank If You Do Not Want): Undermining was performed with blunt dissection. Fusiform Excision Additional Text (Leave Blank If You Do Not Want): The margin was drawn around the clinically apparent lesion.  A fusiform shape was then drawn on the skin incorporating the lesion and margins.  Incisions were then made along these lines to the appropriate tissue plane and the lesion was extirpated. Eliptical Excision Additional Text (Leave Blank If You Do Not Want): The margin was drawn around the clinically apparent lesion.  An elliptical shape was then drawn on the skin incorporating the lesion and margins.  Incisions were then made along these lines to the appropriate tissue plane and the lesion was extirpated. Saucerization Excision Additional Text (Leave Blank If You Do Not Want): The margin was drawn around the clinically apparent lesion.  Incisions were then made along these lines, in a tangential fashion, to the appropriate tissue plane and the lesion was extirpated. Slit Excision Additional Text (Leave Blank If You Do Not Want): A linear line was drawn on the skin overlying the lesion. An incision was made slowly until the lesion was visualized.  Once visualized, the lesion was removed with blunt dissection. Excisional Biopsy Additional Text (Leave Blank If You Do Not Want): The margin was drawn around the clinically apparent lesion. An elliptical shape was then drawn on the skin incorporating the lesion and margins.  Incisions were then made along these lines to the appropriate tissue plane and the lesion was extirpated. Perilesional Excision Additional Text (Leave Blank If You Do Not Want): The margin was drawn around the clinically apparent lesion. Incisions were then made along these lines to the appropriate tissue plane and the lesion was extirpated. Repair Performed By Another Provider Text (Leave Blank If You Do Not Want): After the tissue was excised the defect was repaired by another provider. No Repair - Repaired With Adjacent Surgical Defect Text (Leave Blank If You Do Not Want): After the excision the defect was repaired concurrently with another surgical defect which was in close approximation. Adjacent Tissue Transfer Text: The defect edges were debeveled with a #15 scalpel blade.  Given the location of the defect and the proximity to free margins an adjacent tissue transfer was deemed most appropriate.  Using a sterile surgical marker, an appropriate flap was drawn incorporating the defect and placing the expected incisions within the relaxed skin tension lines where possible.    The area thus outlined was incised deep to adipose tissue with a #15 scalpel blade.  The skin margins were undermined to an appropriate distance in all directions utilizing iris scissors. Advancement Flap (Single) Text: The defect edges were debeveled with a #15 scalpel blade.  Given the location of the defect and the proximity to free margins a single advancement flap was deemed most appropriate.  Using a sterile surgical marker, an appropriate advancement flap was drawn incorporating the defect and placing the expected incisions within the relaxed skin tension lines where possible.    The area thus outlined was incised deep to adipose tissue with a #15 scalpel blade.  The skin margins were undermined to an appropriate distance in all directions utilizing iris scissors. Advancement Flap (Double) Text: The defect edges were debeveled with a #15 scalpel blade.  Given the location of the defect and the proximity to free margins a double advancement flap was deemed most appropriate.  Using a sterile surgical marker, the appropriate advancement flaps were drawn incorporating the defect and placing the expected incisions within the relaxed skin tension lines where possible.    The area thus outlined was incised deep to adipose tissue with a #15 scalpel blade.  The skin margins were undermined to an appropriate distance in all directions utilizing iris scissors. Burow's Advancement Flap Text: The defect edges were debeveled with a #15 scalpel blade.  Given the location of the defect and the proximity to free margins a Burow's advancement flap was deemed most appropriate.  Using a sterile surgical marker, the appropriate advancement flap was drawn incorporating the defect and placing the expected incisions within the relaxed skin tension lines where possible.    The area thus outlined was incised deep to adipose tissue with a #15 scalpel blade.  The skin margins were undermined to an appropriate distance in all directions utilizing iris scissors. Chonodrocutaneous Helical Advancement Flap Text: The defect edges were debeveled with a #15 scalpel blade.  Given the location of the defect and the proximity to free margins a chondrocutaneous helical advancement flap was deemed most appropriate.  Using a sterile surgical marker, the appropriate advancement flap was drawn incorporating the defect and placing the expected incisions within the relaxed skin tension lines where possible.    The area thus outlined was incised deep to adipose tissue with a #15 scalpel blade.  The skin margins were undermined to an appropriate distance in all directions utilizing iris scissors. Crescentic Advancement Flap Text: The defect edges were debeveled with a #15 scalpel blade.  Given the location of the defect and the proximity to free margins a crescentic advancement flap was deemed most appropriate.  Using a sterile surgical marker, the appropriate advancement flap was drawn incorporating the defect and placing the expected incisions within the relaxed skin tension lines where possible.    The area thus outlined was incised deep to adipose tissue with a #15 scalpel blade.  The skin margins were undermined to an appropriate distance in all directions utilizing iris scissors. A-T Advancement Flap Text: The defect edges were debeveled with a #15 scalpel blade.  Given the location of the defect, shape of the defect and the proximity to free margins an A-T advancement flap was deemed most appropriate.  Using a sterile surgical marker, an appropriate advancement flap was drawn incorporating the defect and placing the expected incisions within the relaxed skin tension lines where possible.    The area thus outlined was incised deep to adipose tissue with a #15 scalpel blade.  The skin margins were undermined to an appropriate distance in all directions utilizing iris scissors. O-T Advancement Flap Text: The defect edges were debeveled with a #15 scalpel blade.  Given the location of the defect, shape of the defect and the proximity to free margins an O-T advancement flap was deemed most appropriate.  Using a sterile surgical marker, an appropriate advancement flap was drawn incorporating the defect and placing the expected incisions within the relaxed skin tension lines where possible.    The area thus outlined was incised deep to adipose tissue with a #15 scalpel blade.  The skin margins were undermined to an appropriate distance in all directions utilizing iris scissors. O-L Flap Text: The defect edges were debeveled with a #15 scalpel blade.  Given the location of the defect, shape of the defect and the proximity to free margins an O-L flap was deemed most appropriate.  Using a sterile surgical marker, an appropriate advancement flap was drawn incorporating the defect and placing the expected incisions within the relaxed skin tension lines where possible.    The area thus outlined was incised deep to adipose tissue with a #15 scalpel blade.  The skin margins were undermined to an appropriate distance in all directions utilizing iris scissors. O-Z Flap Text: The defect edges were debeveled with a #15 scalpel blade.  Given the location of the defect, shape of the defect and the proximity to free margins an O-Z flap was deemed most appropriate.  Using a sterile surgical marker, an appropriate transposition flap was drawn incorporating the defect and placing the expected incisions within the relaxed skin tension lines where possible. The area thus outlined was incised deep to adipose tissue with a #15 scalpel blade.  The skin margins were undermined to an appropriate distance in all directions utilizing iris scissors. Double O-Z Flap Text: The defect edges were debeveled with a #15 scalpel blade.  Given the location of the defect, shape of the defect and the proximity to free margins a Double O-Z flap was deemed most appropriate.  Using a sterile surgical marker, an appropriate transposition flap was drawn incorporating the defect and placing the expected incisions within the relaxed skin tension lines where possible. The area thus outlined was incised deep to adipose tissue with a #15 scalpel blade.  The skin margins were undermined to an appropriate distance in all directions utilizing iris scissors. V-Y Flap Text: The defect edges were debeveled with a #15 scalpel blade.  Given the location of the defect, shape of the defect and the proximity to free margins a V-Y flap was deemed most appropriate.  Using a sterile surgical marker, an appropriate advancement flap was drawn incorporating the defect and placing the expected incisions within the relaxed skin tension lines where possible.    The area thus outlined was incised deep to adipose tissue with a #15 scalpel blade.  The skin margins were undermined to an appropriate distance in all directions utilizing iris scissors. Advancement-Rotation Flap Text: The defect edges were debeveled with a #15 scalpel blade.  Given the location of the defect, shape of the defect and the proximity to free margins an advancement-rotation flap was deemed most appropriate.  Using a sterile surgical marker, an appropriate flap was drawn incorporating the defect and placing the expected incisions within the relaxed skin tension lines where possible. The area thus outlined was incised deep to adipose tissue with a #15 scalpel blade.  The skin margins were undermined to an appropriate distance in all directions utilizing iris scissors. Mercedes Flap Text: The defect edges were debeveled with a #15 scalpel blade.  Given the location of the defect, shape of the defect and the proximity to free margins a Mercedes flap was deemed most appropriate.  Using a sterile surgical marker, an appropriate advancement flap was drawn incorporating the defect and placing the expected incisions within the relaxed skin tension lines where possible. The area thus outlined was incised deep to adipose tissue with a #15 scalpel blade.  The skin margins were undermined to an appropriate distance in all directions utilizing iris scissors. Modified Advancement Flap Text: The defect edges were debeveled with a #15 scalpel blade.  Given the location of the defect, shape of the defect and the proximity to free margins a modified advancement flap was deemed most appropriate.  Using a sterile surgical marker, an appropriate advancement flap was drawn incorporating the defect and placing the expected incisions within the relaxed skin tension lines where possible.    The area thus outlined was incised deep to adipose tissue with a #15 scalpel blade.  The skin margins were undermined to an appropriate distance in all directions utilizing iris scissors. Mucosal Advancement Flap Text: Given the location of the defect, shape of the defect and the proximity to free margins a mucosal advancement flap was deemed most appropriate. Incisions were made with a 15 blade scalpel in the appropriate fashion along the cutaneous vermilion border and the mucosal lip. The remaining actinically damaged mucosal tissue was excised.  The mucosal advancement flap was then elevated to the gingival sulcus with care taken to preserve the neurovascular structures and advanced into the primary defect. Care was taken to ensure that precise realignment of the vermilion border was achieved. Peng Advancement Flap Text: The defect edges were debeveled with a #15 scalpel blade.  Given the location of the defect, shape of the defect and the proximity to free margins a Peng advancement flap was deemed most appropriate.  Using a sterile surgical marker, an appropriate advancement flap was drawn incorporating the defect and placing the expected incisions within the relaxed skin tension lines where possible. The area thus outlined was incised deep to adipose tissue with a #15 scalpel blade.  The skin margins were undermined to an appropriate distance in all directions utilizing iris scissors. Hatchet Flap Text: The defect edges were debeveled with a #15 scalpel blade.  Given the location of the defect, shape of the defect and the proximity to free margins a hatchet flap was deemed most appropriate.  Using a sterile surgical marker, an appropriate hatchet flap was drawn incorporating the defect and placing the expected incisions within the relaxed skin tension lines where possible.    The area thus outlined was incised deep to adipose tissue with a #15 scalpel blade.  The skin margins were undermined to an appropriate distance in all directions utilizing iris scissors. Rotation Flap Text: The defect edges were debeveled with a #15 scalpel blade.  Given the location of the defect, shape of the defect and the proximity to free margins a rotation flap was deemed most appropriate.  Using a sterile surgical marker, an appropriate rotation flap was drawn incorporating the defect and placing the expected incisions within the relaxed skin tension lines where possible.    The area thus outlined was incised deep to adipose tissue with a #15 scalpel blade.  The skin margins were undermined to an appropriate distance in all directions utilizing iris scissors. Spiral Flap Text: The defect edges were debeveled with a #15 scalpel blade.  Given the location of the defect, shape of the defect and the proximity to free margins a spiral flap was deemed most appropriate.  Using a sterile surgical marker, an appropriate rotation flap was drawn incorporating the defect and placing the expected incisions within the relaxed skin tension lines where possible. The area thus outlined was incised deep to adipose tissue with a #15 scalpel blade.  The skin margins were undermined to an appropriate distance in all directions utilizing iris scissors. Staged Advancement Flap Text: The defect edges were debeveled with a #15 scalpel blade.  Given the location of the defect, shape of the defect and the proximity to free margins a staged advancement flap was deemed most appropriate.  Using a sterile surgical marker, an appropriate advancement flap was drawn incorporating the defect and placing the expected incisions within the relaxed skin tension lines where possible. The area thus outlined was incised deep to adipose tissue with a #15 scalpel blade.  The skin margins were undermined to an appropriate distance in all directions utilizing iris scissors. Star Wedge Flap Text: The defect edges were debeveled with a #15 scalpel blade.  Given the location of the defect, shape of the defect and the proximity to free margins a star wedge flap was deemed most appropriate.  Using a sterile surgical marker, an appropriate rotation flap was drawn incorporating the defect and placing the expected incisions within the relaxed skin tension lines where possible. The area thus outlined was incised deep to adipose tissue with a #15 scalpel blade.  The skin margins were undermined to an appropriate distance in all directions utilizing iris scissors. Transposition Flap Text: The defect edges were debeveled with a #15 scalpel blade.  Given the location of the defect and the proximity to free margins a transposition flap was deemed most appropriate.  Using a sterile surgical marker, an appropriate transposition flap was drawn incorporating the defect.    The area thus outlined was incised deep to adipose tissue with a #15 scalpel blade.  The skin margins were undermined to an appropriate distance in all directions utilizing iris scissors. Muscle Hinge Flap Text: The defect edges were debeveled with a #15 scalpel blade.  Given the size, depth and location of the defect and the proximity to free margins a muscle hinge flap was deemed most appropriate.  Using a sterile surgical marker, an appropriate hinge flap was drawn incorporating the defect. The area thus outlined was incised with a #15 scalpel blade.  The skin margins were undermined to an appropriate distance in all directions utilizing iris scissors. Mustarde Flap Text: The defect edges were debeveled with a #15 scalpel blade.  Given the size, depth and location of the defect and the proximity to free margins a Mustarde flap was deemed most appropriate.  Using a sterile surgical marker, an appropriate flap was drawn incorporating the defect. The area thus outlined was incised with a #15 scalpel blade.  The skin margins were undermined to an appropriate distance in all directions utilizing iris scissors. Nasal Turnover Hinge Flap Text: The defect edges were debeveled with a #15 scalpel blade.  Given the size, depth, location of the defect and the defect being full thickness a nasal turnover hinge flap was deemed most appropriate.  Using a sterile surgical marker, an appropriate hinge flap was drawn incorporating the defect. The area thus outlined was incised with a #15 scalpel blade. The flap was designed to recreate the nasal mucosal lining and the alar rim. The skin margins were undermined to an appropriate distance in all directions utilizing iris scissors. Nasalis-Muscle-Based Myocutaneous Island Pedicle Flap Text: Using a #15 blade, an incision was made around the donor flap to the level of the nasalis muscle. Wide lateral undermining was then performed in both the subcutaneous plane above the nasalis muscle, and in a submuscular plane just above periosteum. This allowed the formation of a free nasalis muscle axial pedicle (based on the angular artery) which was still attached to the actual cutaneous flap, increasing its mobility and vascular viability. Hemostasis was obtained with pinpoint electrocoagulation. The flap was mobilized into position and the pivotal anchor points positioned and stabilized with buried interrupted sutures. Subcutaneous and dermal tissues were closed in a multilayered fashion with sutures. Tissue redundancies were excised, and the epidermal edges were apposed without significant tension and sutured with sutures. Orbicularis Oris Muscle Flap Text: The defect edges were debeveled with a #15 scalpel blade.  Given that the defect affected the competency of the oral sphincter an obicularis oris muscle flap was deemed most appropriate to restore this competency and normal muscle function.  Using a sterile surgical marker, an appropriate flap was drawn incorporating the defect. The area thus outlined was incised with a #15 scalpel blade. Melolabial Transposition Flap Text: The defect edges were debeveled with a #15 scalpel blade.  Given the location of the defect and the proximity to free margins a melolabial flap was deemed most appropriate.  Using a sterile surgical marker, an appropriate melolabial transposition flap was drawn incorporating the defect.    The area thus outlined was incised deep to adipose tissue with a #15 scalpel blade.  The skin margins were undermined to an appropriate distance in all directions utilizing iris scissors. Rhombic Flap Text: The defect edges were debeveled with a #15 scalpel blade.  Given the location of the defect and the proximity to free margins a rhombic flap was deemed most appropriate.  Using a sterile surgical marker, an appropriate rhombic flap was drawn incorporating the defect.    The area thus outlined was incised deep to adipose tissue with a #15 scalpel blade.  The skin margins were undermined to an appropriate distance in all directions utilizing iris scissors. Rhomboid Transposition Flap Text: The defect edges were debeveled with a #15 scalpel blade.  Given the location of the defect and the proximity to free margins a rhomboid transposition flap was deemed most appropriate.  Using a sterile surgical marker, an appropriate rhomboid flap was drawn incorporating the defect.    The area thus outlined was incised deep to adipose tissue with a #15 scalpel blade.  The skin margins were undermined to an appropriate distance in all directions utilizing iris scissors. Bi-Rhombic Flap Text: The defect edges were debeveled with a #15 scalpel blade.  Given the location of the defect and the proximity to free margins a bi-rhombic flap was deemed most appropriate.  Using a sterile surgical marker, an appropriate rhombic flap was drawn incorporating the defect. The area thus outlined was incised deep to adipose tissue with a #15 scalpel blade.  The skin margins were undermined to an appropriate distance in all directions utilizing iris scissors. Helical Rim Advancement Flap Text: The defect edges were debeveled with a #15 blade scalpel.  Given the location of the defect and the proximity to free margins (helical rim) a double helical rim advancement flap was deemed most appropriate.  Using a sterile surgical marker, the appropriate advancement flaps were drawn incorporating the defect and placing the expected incisions between the helical rim and antihelix where possible.  The area thus outlined was incised through and through with a #15 scalpel blade.  With a skin hook and iris scissors, the flaps were gently and sharply undermined and freed up. Bilateral Helical Rim Advancement Flap Text: The defect edges were debeveled with a #15 blade scalpel.  Given the location of the defect and the proximity to free margins (helical rim) a bilateral helical rim advancement flap was deemed most appropriate.  Using a sterile surgical marker, the appropriate advancement flaps were drawn incorporating the defect and placing the expected incisions between the helical rim and antihelix where possible.  The area thus outlined was incised through and through with a #15 scalpel blade.  With a skin hook and iris scissors, the flaps were gently and sharply undermined and freed up. Ear Star Wedge Flap Text: The defect edges were debeveled with a #15 blade scalpel.  Given the location of the defect and the proximity to free margins (helical rim) an ear star wedge flap was deemed most appropriate.  Using a sterile surgical marker, the appropriate flap was drawn incorporating the defect and placing the expected incisions between the helical rim and antihelix where possible.  The area thus outlined was incised through and through with a #15 scalpel blade. Banner Transposition Flap Text: The defect edges were debeveled with a #15 scalpel blade.  Given the location of the defect and the proximity to free margins a Banner transposition flap was deemed most appropriate.  Using a sterile surgical marker, an appropriate flap drawn around the defect. The area thus outlined was incised deep to adipose tissue with a #15 scalpel blade.  The skin margins were undermined to an appropriate distance in all directions utilizing iris scissors. Bilobed Flap Text: The defect edges were debeveled with a #15 scalpel blade.  Given the location of the defect and the proximity to free margins a bilobe flap was deemed most appropriate.  Using a sterile surgical marker, an appropriate bilobe flap drawn around the defect.    The area thus outlined was incised deep to adipose tissue with a #15 scalpel blade.  The skin margins were undermined to an appropriate distance in all directions utilizing iris scissors. Bilobed Transposition Flap Text: The defect edges were debeveled with a #15 scalpel blade.  Given the location of the defect and the proximity to free margins a bilobed transposition flap was deemed most appropriate.  Using a sterile surgical marker, an appropriate bilobe flap drawn around the defect.    The area thus outlined was incised deep to adipose tissue with a #15 scalpel blade.  The skin margins were undermined to an appropriate distance in all directions utilizing iris scissors. Trilobed Flap Text: The defect edges were debeveled with a #15 scalpel blade.  Given the location of the defect and the proximity to free margins a trilobed flap was deemed most appropriate.  Using a sterile surgical marker, an appropriate trilobed flap drawn around the defect.    The area thus outlined was incised deep to adipose tissue with a #15 scalpel blade.  The skin margins were undermined to an appropriate distance in all directions utilizing iris scissors. Dorsal Nasal Flap Text: The defect edges were debeveled with a #15 scalpel blade.  Given the location of the defect and the proximity to free margins a dorsal nasal flap was deemed most appropriate.  Using a sterile surgical marker, an appropriate dorsal nasal flap was drawn around the defect.    The area thus outlined was incised deep to adipose tissue with a #15 scalpel blade.  The skin margins were undermined to an appropriate distance in all directions utilizing iris scissors. Island Pedicle Flap Text: The defect edges were debeveled with a #15 scalpel blade.  Given the location of the defect, shape of the defect and the proximity to free margins an island pedicle advancement flap was deemed most appropriate.  Using a sterile surgical marker, an appropriate advancement flap was drawn incorporating the defect, outlining the appropriate donor tissue and placing the expected incisions within the relaxed skin tension lines where possible.    The area thus outlined was incised deep to adipose tissue with a #15 scalpel blade.  The skin margins were undermined to an appropriate distance in all directions around the primary defect and laterally outward around the island pedicle utilizing iris scissors.  There was minimal undermining beneath the pedicle flap. Island Pedicle Flap With Canthal Suspension Text: The defect edges were debeveled with a #15 scalpel blade.  Given the location of the defect, shape of the defect and the proximity to free margins an island pedicle advancement flap was deemed most appropriate.  Using a sterile surgical marker, an appropriate advancement flap was drawn incorporating the defect, outlining the appropriate donor tissue and placing the expected incisions within the relaxed skin tension lines where possible. The area thus outlined was incised deep to adipose tissue with a #15 scalpel blade.  The skin margins were undermined to an appropriate distance in all directions around the primary defect and laterally outward around the island pedicle utilizing iris scissors.  There was minimal undermining beneath the pedicle flap. A suspension suture was placed in the canthal tendon to prevent tension and prevent ectropion. Alar Island Pedicle Flap Text: The defect edges were debeveled with a #15 scalpel blade.  Given the location of the defect, shape of the defect and the proximity to the alar rim an island pedicle advancement flap was deemed most appropriate.  Using a sterile surgical marker, an appropriate advancement flap was drawn incorporating the defect, outlining the appropriate donor tissue and placing the expected incisions within the nasal ala running parallel to the alar rim. The area thus outlined was incised with a #15 scalpel blade.  The skin margins were undermined minimally to an appropriate distance in all directions around the primary defect and laterally outward around the island pedicle utilizing iris scissors.  There was minimal undermining beneath the pedicle flap. Double Island Pedicle Flap Text: The defect edges were debeveled with a #15 scalpel blade.  Given the location of the defect, shape of the defect and the proximity to free margins a double island pedicle advancement flap was deemed most appropriate.  Using a sterile surgical marker, an appropriate advancement flap was drawn incorporating the defect, outlining the appropriate donor tissue and placing the expected incisions within the relaxed skin tension lines where possible.    The area thus outlined was incised deep to adipose tissue with a #15 scalpel blade.  The skin margins were undermined to an appropriate distance in all directions around the primary defect and laterally outward around the island pedicle utilizing iris scissors.  There was minimal undermining beneath the pedicle flap. Island Pedicle Flap-Requiring Vessel Identification Text: The defect edges were debeveled with a #15 scalpel blade.  Given the location of the defect, shape of the defect and the proximity to free margins an island pedicle advancement flap was deemed most appropriate.  Using a sterile surgical marker, an appropriate advancement flap was drawn, based on the axial vessel mentioned above, incorporating the defect, outlining the appropriate donor tissue and placing the expected incisions within the relaxed skin tension lines where possible.    The area thus outlined was incised deep to adipose tissue with a #15 scalpel blade.  The skin margins were undermined to an appropriate distance in all directions around the primary defect and laterally outward around the island pedicle utilizing iris scissors.  There was minimal undermining beneath the pedicle flap. Keystone Flap Text: The defect edges were debeveled with a #15 scalpel blade.  Given the location of the defect, shape of the defect a keystone flap was deemed most appropriate.  Using a sterile surgical marker, an appropriate keystone flap was drawn incorporating the defect, outlining the appropriate donor tissue and placing the expected incisions within the relaxed skin tension lines where possible. The area thus outlined was incised deep to adipose tissue with a #15 scalpel blade.  The skin margins were undermined to an appropriate distance in all directions around the primary defect and laterally outward around the flap utilizing iris scissors. O-T Plasty Text: The defect edges were debeveled with a #15 scalpel blade.  Given the location of the defect, shape of the defect and the proximity to free margins an O-T plasty was deemed most appropriate.  Using a sterile surgical marker, an appropriate O-T plasty was drawn incorporating the defect and placing the expected incisions within the relaxed skin tension lines where possible.    The area thus outlined was incised deep to adipose tissue with a #15 scalpel blade.  The skin margins were undermined to an appropriate distance in all directions utilizing iris scissors. O-Z Plasty Text: The defect edges were debeveled with a #15 scalpel blade.  Given the location of the defect, shape of the defect and the proximity to free margins an O-Z plasty (double transposition flap) was deemed most appropriate.  Using a sterile surgical marker, the appropriate transposition flaps were drawn incorporating the defect and placing the expected incisions within the relaxed skin tension lines where possible.    The area thus outlined was incised deep to adipose tissue with a #15 scalpel blade.  The skin margins were undermined to an appropriate distance in all directions utilizing iris scissors.  Hemostasis was achieved with electrocautery.  The flaps were then transposed into place, one clockwise and the other counterclockwise, and anchored with interrupted buried subcutaneous sutures. Double O-Z Plasty Text: The defect edges were debeveled with a #15 scalpel blade.  Given the location of the defect, shape of the defect and the proximity to free margins a Double O-Z plasty (double transposition flap) was deemed most appropriate.  Using a sterile surgical marker, the appropriate transposition flaps were drawn incorporating the defect and placing the expected incisions within the relaxed skin tension lines where possible. The area thus outlined was incised deep to adipose tissue with a #15 scalpel blade.  The skin margins were undermined to an appropriate distance in all directions utilizing iris scissors.  Hemostasis was achieved with electrocautery.  The flaps were then transposed into place, one clockwise and the other counterclockwise, and anchored with interrupted buried subcutaneous sutures. V-Y Plasty Text: The defect edges were debeveled with a #15 scalpel blade.  Given the location of the defect, shape of the defect and the proximity to free margins an V-Y advancement flap was deemed most appropriate.  Using a sterile surgical marker, an appropriate advancement flap was drawn incorporating the defect and placing the expected incisions within the relaxed skin tension lines where possible.    The area thus outlined was incised deep to adipose tissue with a #15 scalpel blade.  The skin margins were undermined to an appropriate distance in all directions utilizing iris scissors. H Plasty Text: Given the location of the defect, shape of the defect and the proximity to free margins a H-plasty was deemed most appropriate for repair.  Using a sterile surgical marker, the appropriate advancement arms of the H-plasty were drawn incorporating the defect and placing the expected incisions within the relaxed skin tension lines where possible. The area thus outlined was incised deep to adipose tissue with a #15 scalpel blade. The skin margins were undermined to an appropriate distance in all directions utilizing iris scissors.  The opposing advancement arms were then advanced into place in opposite direction and anchored with interrupted buried subcutaneous sutures. W Plasty Text: The lesion was extirpated to the level of the fat with a #15 scalpel blade.  Given the location of the defect, shape of the defect and the proximity to free margins a W-plasty was deemed most appropriate for repair.  Using a sterile surgical marker, the appropriate transposition arms of the W-plasty were drawn incorporating the defect and placing the expected incisions within the relaxed skin tension lines where possible.    The area thus outlined was incised deep to adipose tissue with a #15 scalpel blade.  The skin margins were undermined to an appropriate distance in all directions utilizing iris scissors.  The opposing transposition arms were then transposed into place in opposite direction and anchored with interrupted buried subcutaneous sutures. Z Plasty Text: The lesion was extirpated to the level of the fat with a #15 scalpel blade.  Given the location of the defect, shape of the defect and the proximity to free margins a Z-plasty was deemed most appropriate for repair.  Using a sterile surgical marker, the appropriate transposition arms of the Z-plasty were drawn incorporating the defect and placing the expected incisions within the relaxed skin tension lines where possible.    The area thus outlined was incised deep to adipose tissue with a #15 scalpel blade.  The skin margins were undermined to an appropriate distance in all directions utilizing iris scissors.  The opposing transposition arms were then transposed into place in opposite direction and anchored with interrupted buried subcutaneous sutures. Zygomaticofacial Flap Text: Given the location of the defect, shape of the defect and the proximity to free margins a zygomaticofacial flap was deemed most appropriate for repair.  Using a sterile surgical marker, the appropriate flap was drawn incorporating the defect and placing the expected incisions within the relaxed skin tension lines where possible. The area thus outlined was incised deep to adipose tissue with a #15 scalpel blade with preservation of a vascular pedicle.  The skin margins were undermined to an appropriate distance in all directions utilizing iris scissors.  The flap was then placed into the defect and anchored with interrupted buried subcutaneous sutures. Cheek Interpolation Flap Text: A decision was made to reconstruct the defect utilizing an interpolation axial flap and a staged reconstruction.  A telfa template was made of the defect.  This telfa template was then used to outline the Cheek Interpolation flap.  The donor area for the pedicle flap was then injected with anesthesia.  The flap was excised through the skin and subcutaneous tissue down to the layer of the underlying musculature.  The interpolation flap was carefully excised within this deep plane to maintain its blood supply.  The edges of the donor site were undermined.   The donor site was closed in a primary fashion.  The pedicle was then rotated into position and sutured.  Once the tube was sutured into place, adequate blood supply was confirmed with blanching and refill.  The pedicle was then wrapped with xeroform gauze and dressed appropriately with a telfa and gauze bandage to ensure continued blood supply and protect the attached pedicle. Cheek-To-Nose Interpolation Flap Text: A decision was made to reconstruct the defect utilizing an interpolation axial flap and a staged reconstruction.  A telfa template was made of the defect.  This telfa template was then used to outline the Cheek-To-Nose Interpolation flap.  The donor area for the pedicle flap was then injected with anesthesia.  The flap was excised through the skin and subcutaneous tissue down to the layer of the underlying musculature.  The interpolation flap was carefully excised within this deep plane to maintain its blood supply.  The edges of the donor site were undermined.   The donor site was closed in a primary fashion.  The pedicle was then rotated into position and sutured.  Once the tube was sutured into place, adequate blood supply was confirmed with blanching and refill.  The pedicle was then wrapped with xeroform gauze and dressed appropriately with a telfa and gauze bandage to ensure continued blood supply and protect the attached pedicle. Interpolation Flap Text: A decision was made to reconstruct the defect utilizing an interpolation axial flap and a staged reconstruction.  A telfa template was made of the defect.  This telfa template was then used to outline the interpolation flap.  The donor area for the pedicle flap was then injected with anesthesia.  The flap was excised through the skin and subcutaneous tissue down to the layer of the underlying musculature.  The interpolation flap was carefully excised within this deep plane to maintain its blood supply.  The edges of the donor site were undermined.   The donor site was closed in a primary fashion.  The pedicle was then rotated into position and sutured.  Once the tube was sutured into place, adequate blood supply was confirmed with blanching and refill.  The pedicle was then wrapped with xeroform gauze and dressed appropriately with a telfa and gauze bandage to ensure continued blood supply and protect the attached pedicle. Melolabial Interpolation Flap Text: A decision was made to reconstruct the defect utilizing an interpolation axial flap and a staged reconstruction.  A telfa template was made of the defect.  This telfa template was then used to outline the melolabial interpolation flap.  The donor area for the pedicle flap was then injected with anesthesia.  The flap was excised through the skin and subcutaneous tissue down to the layer of the underlying musculature.  The pedicle flap was carefully excised within this deep plane to maintain its blood supply.  The edges of the donor site were undermined.   The donor site was closed in a primary fashion.  The pedicle was then rotated into position and sutured.  Once the tube was sutured into place, adequate blood supply was confirmed with blanching and refill.  The pedicle was then wrapped with xeroform gauze and dressed appropriately with a telfa and gauze bandage to ensure continued blood supply and protect the attached pedicle. Mastoid Interpolation Flap Text: A decision was made to reconstruct the defect utilizing an interpolation axial flap and a staged reconstruction.  A telfa template was made of the defect.  This telfa template was then used to outline the mastoid interpolation flap.  The donor area for the pedicle flap was then injected with anesthesia.  The flap was excised through the skin and subcutaneous tissue down to the layer of the underlying musculature.  The pedicle flap was carefully excised within this deep plane to maintain its blood supply.  The edges of the donor site were undermined.   The donor site was closed in a primary fashion.  The pedicle was then rotated into position and sutured.  Once the tube was sutured into place, adequate blood supply was confirmed with blanching and refill.  The pedicle was then wrapped with xeroform gauze and dressed appropriately with a telfa and gauze bandage to ensure continued blood supply and protect the attached pedicle. Posterior Auricular Interpolation Flap Text: A decision was made to reconstruct the defect utilizing an interpolation axial flap and a staged reconstruction.  A telfa template was made of the defect.  This telfa template was then used to outline the posterior auricular interpolation flap.  The donor area for the pedicle flap was then injected with anesthesia.  The flap was excised through the skin and subcutaneous tissue down to the layer of the underlying musculature.  The pedicle flap was carefully excised within this deep plane to maintain its blood supply.  The edges of the donor site were undermined.   The donor site was closed in a primary fashion.  The pedicle was then rotated into position and sutured.  Once the tube was sutured into place, adequate blood supply was confirmed with blanching and refill.  The pedicle was then wrapped with xeroform gauze and dressed appropriately with a telfa and gauze bandage to ensure continued blood supply and protect the attached pedicle. Paramedian Forehead Flap Text: A decision was made to reconstruct the defect utilizing an interpolation axial flap and a staged reconstruction.  A telfa template was made of the defect.  This telfa template was then used to outline the paramedian forehead pedicle flap.  The donor area for the pedicle flap was then injected with anesthesia.  The flap was excised through the skin and subcutaneous tissue down to the layer of the underlying musculature.  The pedicle flap was carefully excised within this deep plane to maintain its blood supply.  The edges of the donor site were undermined.   The donor site was closed in a primary fashion.  The pedicle was then rotated into position and sutured.  Once the tube was sutured into place, adequate blood supply was confirmed with blanching and refill.  The pedicle was then wrapped with xeroform gauze and dressed appropriately with a telfa and gauze bandage to ensure continued blood supply and protect the attached pedicle. Abbe Flap (Upper To Lower Lip) Text: The defect of the lower lip was assessed and measured.  Given the location and size of the defect, an Abbe flap was deemed most appropriate.  Using a sterile surgical marker, an appropriate Abbe flap was measured and drawn on the upper lip. Local anesthesia was then infiltrated.  A scalpel was then used to incise the upper lip through and through the skin, vermilion, muscle and mucosa, leaving the flap pedicled on the opposite side.  The flap was then rotated and transferred to the lower lip defect.  The flap was then sutured into place with a three layer technique, closing the orbicularis oris muscle layer with subcutaneous buried sutures, followed by a mucosal layer and an epidermal layer. Abbe Flap (Lower To Upper Lip) Text: The defect of the upper lip was assessed and measured.  Given the location and size of the defect, an Abbe flap was deemed most appropriate.  Using a sterile surgical marker, an appropriate Abbe flap was measured and drawn on the lower lip. Local anesthesia was then infiltrated. A scalpel was then used to incise the upper lip through and through the skin, vermilion, muscle and mucosa, leaving the flap pedicled on the opposite side.  The flap was then rotated and transferred to the lower lip defect.  The flap was then sutured into place with a three layer technique, closing the orbicularis oris muscle layer with subcutaneous buried sutures, followed by a mucosal layer and an epidermal layer. Estlander Flap (Upper To Lower Lip) Text: The defect of the lower lip was assessed and measured.  Given the location and size of the defect, an Estlander flap was deemed most appropriate.  Using a sterile surgical marker, an appropriate Estlander flap was measured and drawn on the upper lip. Local anesthesia was then infiltrated. A scalpel was then used to incise the lateral aspect of the flap, through skin, muscle and mucosa, leaving the flap pedicled medially.  The flap was then rotated and positioned to fill the lower lip defect.  The flap was then sutured into place with a three layer technique, closing the orbicularis oris muscle layer with subcutaneous buried sutures, followed by a mucosal layer and an epidermal layer. Lip Wedge Excision Repair Text: Given the location of the defect and the proximity to free margins a full thickness wedge repair was deemed most appropriate.  Using a sterile surgical marker, the appropriate repair was drawn incorporating the defect and placing the expected incisions perpendicular to the vermilion border.  The vermilion border was also meticulously outlined to ensure appropriate reapproximation during the repair.  The area thus outlined was incised through and through with a #15 scalpel blade.  The muscularis and dermis were reaproximated with deep sutures following hemostasis. Care was taken to realign the vermilion border before proceeding with the superficial closure.  Once the vermilion was realigned the superfical and mucosal closure was finished. Ftsg Text: The defect edges were debeveled with a #15 scalpel blade.  Given the location of the defect, shape of the defect and the proximity to free margins a full thickness skin graft was deemed most appropriate.  Using a sterile surgical marker, the primary defect shape was transferred to the donor site. The area thus outlined was incised deep to adipose tissue with a #15 scalpel blade.  The harvested graft was then trimmed of adipose tissue until only dermis and epidermis was left.  The skin margins of the secondary defect were undermined to an appropriate distance in all directions utilizing iris scissors.  The secondary defect was closed with interrupted buried subcutaneous sutures.  The skin edges were then re-apposed with running  sutures.  The skin graft was then placed in the primary defect and oriented appropriately. Split-Thickness Skin Graft Text: The defect edges were debeveled with a #15 scalpel blade.  Given the location of the defect, shape of the defect and the proximity to free margins a split thickness skin graft was deemed most appropriate.  Using a sterile surgical marker, the primary defect shape was transferred to the donor site. The split thickness graft was then harvested.  The skin graft was then placed in the primary defect and oriented appropriately. Burow's Graft Text: The defect edges were debeveled with a #15 scalpel blade.  Given the location of the defect, shape of the defect, the proximity to free margins and the presence of a standing cone deformity a Burow's skin graft was deemed most appropriate. The standing cone was removed and this tissue was then trimmed to the shape of the primary defect. The adipose tissue was also removed until only dermis and epidermis were left.  The skin margins of the secondary defect were undermined to an appropriate distance in all directions utilizing iris scissors.  The secondary defect was closed with interrupted buried subcutaneous sutures.  The skin edges were then re-apposed with running  sutures.  The skin graft was then placed in the primary defect and oriented appropriately. Cartilage Graft Text: The defect edges were debeveled with a #15 scalpel blade.  Given the location of the defect, shape of the defect, the fact the defect involved a full thickness cartilage defect a cartilage graft was deemed most appropriate.  An appropriate donor site was identified, cleansed, and anesthetized. The cartilage graft was then harvested and transferred to the recipient site, oriented appropriately and then sutured into place.  The secondary defect was then repaired using a primary closure. Composite Graft Text: The defect edges were debeveled with a #15 scalpel blade.  Given the location of the defect, shape of the defect, the proximity to free margins and the fact the defect was full thickness a composite graft was deemed most appropriate.  The defect was outline and then transferred to the donor site.  A full thickness graft was then excised from the donor site. The graft was then placed in the primary defect, oriented appropriately and then sutured into place.  The secondary defect was then repaired using a primary closure. Epidermal Autograft Text: The defect edges were debeveled with a #15 scalpel blade.  Given the location of the defect, shape of the defect and the proximity to free margins an epidermal autograft was deemed most appropriate.  Using a sterile surgical marker, the primary defect shape was transferred to the donor site. The epidermal graft was then harvested.  The skin graft was then placed in the primary defect and oriented appropriately. Dermal Autograft Text: The defect edges were debeveled with a #15 scalpel blade.  Given the location of the defect, shape of the defect and the proximity to free margins a dermal autograft was deemed most appropriate.  Using a sterile surgical marker, the primary defect shape was transferred to the donor site. The area thus outlined was incised deep to adipose tissue with a #15 scalpel blade.  The harvested graft was then trimmed of adipose and epidermal tissue until only dermis was left.  The skin graft was then placed in the primary defect and oriented appropriately. Skin Substitute Text: The defect edges were debeveled with a #15 scalpel blade.  Given the location of the defect, shape of the defect and the proximity to free margins a skin substitute graft was deemed most appropriate.  The graft material was trimmed to fit the size of the defect. The graft was then placed in the primary defect and oriented appropriately. Tissue Cultured Epidermal Autograft Text: The defect edges were debeveled with a #15 scalpel blade.  Given the location of the defect, shape of the defect and the proximity to free margins a tissue cultured epidermal autograft was deemed most appropriate.  The graft was then trimmed to fit the size of the defect.  The graft was then placed in the primary defect and oriented appropriately. Xenograft Text: The defect edges were debeveled with a #15 scalpel blade.  Given the location of the defect, shape of the defect and the proximity to free margins a xenograft was deemed most appropriate.  The graft was then trimmed to fit the size of the defect.  The graft was then placed in the primary defect and oriented appropriately. Purse String (Intermediate) Text: Given the location of the defect and the characteristics of the surrounding skin a purse string intermediate closure was deemed most appropriate.  Undermining was performed circumferentially around the surgical defect.  A purse string suture was then placed and tightened. Purse String (Simple) Text: Given the location of the defect and the characteristics of the surrounding skin a purse string simple closure was deemed most appropriate.  Undermining was performed circumferentially around the surgical defect.  A purse string suture was then placed and tightened. Complex Repair And Single Advancement Flap Text: The defect edges were debeveled with a #15 scalpel blade.  The primary defect was closed partially with a complex linear closure.  Given the location of the remaining defect, shape of the defect and the proximity to free margins a single advancement flap was deemed most appropriate for complete closure of the defect.  Using a sterile surgical marker, an appropriate advancement flap was drawn incorporating the defect and placing the expected incisions within the relaxed skin tension lines where possible.    The area thus outlined was incised deep to adipose tissue with a #15 scalpel blade.  The skin margins were undermined to an appropriate distance in all directions utilizing iris scissors. Complex Repair And Double Advancement Flap Text: The defect edges were debeveled with a #15 scalpel blade.  The primary defect was closed partially with a complex linear closure.  Given the location of the remaining defect, shape of the defect and the proximity to free margins a double advancement flap was deemed most appropriate for complete closure of the defect.  Using a sterile surgical marker, an appropriate advancement flap was drawn incorporating the defect and placing the expected incisions within the relaxed skin tension lines where possible.    The area thus outlined was incised deep to adipose tissue with a #15 scalpel blade.  The skin margins were undermined to an appropriate distance in all directions utilizing iris scissors. Complex Repair And Modified Advancement Flap Text: The defect edges were debeveled with a #15 scalpel blade.  The primary defect was closed partially with a complex linear closure.  Given the location of the remaining defect, shape of the defect and the proximity to free margins a modified advancement flap was deemed most appropriate for complete closure of the defect.  Using a sterile surgical marker, an appropriate advancement flap was drawn incorporating the defect and placing the expected incisions within the relaxed skin tension lines where possible.    The area thus outlined was incised deep to adipose tissue with a #15 scalpel blade.  The skin margins were undermined to an appropriate distance in all directions utilizing iris scissors. Complex Repair And A-T Advancement Flap Text: The defect edges were debeveled with a #15 scalpel blade.  The primary defect was closed partially with a complex linear closure.  Given the location of the remaining defect, shape of the defect and the proximity to free margins an A-T advancement flap was deemed most appropriate for complete closure of the defect.  Using a sterile surgical marker, an appropriate advancement flap was drawn incorporating the defect and placing the expected incisions within the relaxed skin tension lines where possible.    The area thus outlined was incised deep to adipose tissue with a #15 scalpel blade.  The skin margins were undermined to an appropriate distance in all directions utilizing iris scissors. Complex Repair And O-T Advancement Flap Text: The defect edges were debeveled with a #15 scalpel blade.  The primary defect was closed partially with a complex linear closure.  Given the location of the remaining defect, shape of the defect and the proximity to free margins an O-T advancement flap was deemed most appropriate for complete closure of the defect.  Using a sterile surgical marker, an appropriate advancement flap was drawn incorporating the defect and placing the expected incisions within the relaxed skin tension lines where possible.    The area thus outlined was incised deep to adipose tissue with a #15 scalpel blade.  The skin margins were undermined to an appropriate distance in all directions utilizing iris scissors. Complex Repair And O-L Flap Text: The defect edges were debeveled with a #15 scalpel blade.  The primary defect was closed partially with a complex linear closure.  Given the location of the remaining defect, shape of the defect and the proximity to free margins an O-L flap was deemed most appropriate for complete closure of the defect.  Using a sterile surgical marker, an appropriate flap was drawn incorporating the defect and placing the expected incisions within the relaxed skin tension lines where possible.    The area thus outlined was incised deep to adipose tissue with a #15 scalpel blade.  The skin margins were undermined to an appropriate distance in all directions utilizing iris scissors. Complex Repair And Bilobe Flap Text: The defect edges were debeveled with a #15 scalpel blade.  The primary defect was closed partially with a complex linear closure.  Given the location of the remaining defect, shape of the defect and the proximity to free margins a bilobe flap was deemed most appropriate for complete closure of the defect.  Using a sterile surgical marker, an appropriate advancement flap was drawn incorporating the defect and placing the expected incisions within the relaxed skin tension lines where possible.    The area thus outlined was incised deep to adipose tissue with a #15 scalpel blade.  The skin margins were undermined to an appropriate distance in all directions utilizing iris scissors. Complex Repair And Melolabial Flap Text: The defect edges were debeveled with a #15 scalpel blade.  The primary defect was closed partially with a complex linear closure.  Given the location of the remaining defect, shape of the defect and the proximity to free margins a melolabial flap was deemed most appropriate for complete closure of the defect.  Using a sterile surgical marker, an appropriate advancement flap was drawn incorporating the defect and placing the expected incisions within the relaxed skin tension lines where possible.    The area thus outlined was incised deep to adipose tissue with a #15 scalpel blade.  The skin margins were undermined to an appropriate distance in all directions utilizing iris scissors. Complex Repair And Rotation Flap Text: The defect edges were debeveled with a #15 scalpel blade.  The primary defect was closed partially with a complex linear closure.  Given the location of the remaining defect, shape of the defect and the proximity to free margins a rotation flap was deemed most appropriate for complete closure of the defect.  Using a sterile surgical marker, an appropriate advancement flap was drawn incorporating the defect and placing the expected incisions within the relaxed skin tension lines where possible.    The area thus outlined was incised deep to adipose tissue with a #15 scalpel blade.  The skin margins were undermined to an appropriate distance in all directions utilizing iris scissors. Complex Repair And Rhombic Flap Text: The defect edges were debeveled with a #15 scalpel blade.  The primary defect was closed partially with a complex linear closure.  Given the location of the remaining defect, shape of the defect and the proximity to free margins a rhombic flap was deemed most appropriate for complete closure of the defect.  Using a sterile surgical marker, an appropriate advancement flap was drawn incorporating the defect and placing the expected incisions within the relaxed skin tension lines where possible.    The area thus outlined was incised deep to adipose tissue with a #15 scalpel blade.  The skin margins were undermined to an appropriate distance in all directions utilizing iris scissors. Complex Repair And Transposition Flap Text: The defect edges were debeveled with a #15 scalpel blade.  The primary defect was closed partially with a complex linear closure.  Given the location of the remaining defect, shape of the defect and the proximity to free margins a transposition flap was deemed most appropriate for complete closure of the defect.  Using a sterile surgical marker, an appropriate advancement flap was drawn incorporating the defect and placing the expected incisions within the relaxed skin tension lines where possible.    The area thus outlined was incised deep to adipose tissue with a #15 scalpel blade.  The skin margins were undermined to an appropriate distance in all directions utilizing iris scissors. Complex Repair And V-Y Plasty Text: The defect edges were debeveled with a #15 scalpel blade.  The primary defect was closed partially with a complex linear closure.  Given the location of the remaining defect, shape of the defect and the proximity to free margins a V-Y plasty was deemed most appropriate for complete closure of the defect.  Using a sterile surgical marker, an appropriate advancement flap was drawn incorporating the defect and placing the expected incisions within the relaxed skin tension lines where possible.    The area thus outlined was incised deep to adipose tissue with a #15 scalpel blade.  The skin margins were undermined to an appropriate distance in all directions utilizing iris scissors. Complex Repair And M Plasty Text: The defect edges were debeveled with a #15 scalpel blade.  The primary defect was closed partially with a complex linear closure.  Given the location of the remaining defect, shape of the defect and the proximity to free margins an M plasty was deemed most appropriate for complete closure of the defect.  Using a sterile surgical marker, an appropriate advancement flap was drawn incorporating the defect and placing the expected incisions within the relaxed skin tension lines where possible.    The area thus outlined was incised deep to adipose tissue with a #15 scalpel blade.  The skin margins were undermined to an appropriate distance in all directions utilizing iris scissors. Complex Repair And Double M Plasty Text: The defect edges were debeveled with a #15 scalpel blade.  The primary defect was closed partially with a complex linear closure.  Given the location of the remaining defect, shape of the defect and the proximity to free margins a double M plasty was deemed most appropriate for complete closure of the defect.  Using a sterile surgical marker, an appropriate advancement flap was drawn incorporating the defect and placing the expected incisions within the relaxed skin tension lines where possible.    The area thus outlined was incised deep to adipose tissue with a #15 scalpel blade.  The skin margins were undermined to an appropriate distance in all directions utilizing iris scissors. Complex Repair And W Plasty Text: The defect edges were debeveled with a #15 scalpel blade.  The primary defect was closed partially with a complex linear closure.  Given the location of the remaining defect, shape of the defect and the proximity to free margins a W plasty was deemed most appropriate for complete closure of the defect.  Using a sterile surgical marker, an appropriate advancement flap was drawn incorporating the defect and placing the expected incisions within the relaxed skin tension lines where possible.    The area thus outlined was incised deep to adipose tissue with a #15 scalpel blade.  The skin margins were undermined to an appropriate distance in all directions utilizing iris scissors. Complex Repair And Z Plasty Text: The defect edges were debeveled with a #15 scalpel blade.  The primary defect was closed partially with a complex linear closure.  Given the location of the remaining defect, shape of the defect and the proximity to free margins a Z plasty was deemed most appropriate for complete closure of the defect.  Using a sterile surgical marker, an appropriate advancement flap was drawn incorporating the defect and placing the expected incisions within the relaxed skin tension lines where possible.    The area thus outlined was incised deep to adipose tissue with a #15 scalpel blade.  The skin margins were undermined to an appropriate distance in all directions utilizing iris scissors. Complex Repair And Dorsal Nasal Flap Text: The defect edges were debeveled with a #15 scalpel blade.  The primary defect was closed partially with a complex linear closure.  Given the location of the remaining defect, shape of the defect and the proximity to free margins a dorsal nasal flap was deemed most appropriate for complete closure of the defect.  Using a sterile surgical marker, an appropriate flap was drawn incorporating the defect and placing the expected incisions within the relaxed skin tension lines where possible.    The area thus outlined was incised deep to adipose tissue with a #15 scalpel blade.  The skin margins were undermined to an appropriate distance in all directions utilizing iris scissors. Complex Repair And Ftsg Text: The defect edges were debeveled with a #15 scalpel blade.  The primary defect was closed partially with a complex linear closure.  Given the location of the defect, shape of the defect and the proximity to free margins a full thickness skin graft was deemed most appropriate to repair the remaining defect.  The graft was trimmed to fit the size of the remaining defect.  The graft was then placed in the primary defect, oriented appropriately, and sutured into place. Complex Repair And Burow's Graft Text: The defect edges were debeveled with a #15 scalpel blade.  The primary defect was closed partially with a complex linear closure.  Given the location of the defect, shape of the defect, the proximity to free margins and the presence of a standing cone deformity a Burow's graft was deemed most appropriate to repair the remaining defect.  The graft was trimmed to fit the size of the remaining defect.  The graft was then placed in the primary defect, oriented appropriately, and sutured into place. Complex Repair And Split-Thickness Skin Graft Text: The defect edges were debeveled with a #15 scalpel blade.  The primary defect was closed partially with a complex linear closure.  Given the location of the defect, shape of the defect and the proximity to free margins a split thickness skin graft was deemed most appropriate to repair the remaining defect.  The graft was trimmed to fit the size of the remaining defect.  The graft was then placed in the primary defect, oriented appropriately, and sutured into place. Complex Repair And Epidermal Autograft Text: The defect edges were debeveled with a #15 scalpel blade.  The primary defect was closed partially with a complex linear closure.  Given the location of the defect, shape of the defect and the proximity to free margins an epidermal autograft was deemed most appropriate to repair the remaining defect.  The graft was trimmed to fit the size of the remaining defect.  The graft was then placed in the primary defect, oriented appropriately, and sutured into place. Complex Repair And Dermal Autograft Text: The defect edges were debeveled with a #15 scalpel blade.  The primary defect was closed partially with a complex linear closure.  Given the location of the defect, shape of the defect and the proximity to free margins an dermal autograft was deemed most appropriate to repair the remaining defect.  The graft was trimmed to fit the size of the remaining defect.  The graft was then placed in the primary defect, oriented appropriately, and sutured into place. Complex Repair And Tissue Cultured Epidermal Autograft Text: The defect edges were debeveled with a #15 scalpel blade.  The primary defect was closed partially with a complex linear closure.  Given the location of the defect, shape of the defect and the proximity to free margins an tissue cultured epidermal autograft was deemed most appropriate to repair the remaining defect.  The graft was trimmed to fit the size of the remaining defect.  The graft was then placed in the primary defect, oriented appropriately, and sutured into place. Complex Repair And Xenograft Text: The defect edges were debeveled with a #15 scalpel blade.  The primary defect was closed partially with a complex linear closure.  Given the location of the defect, shape of the defect and the proximity to free margins a xenograft was deemed most appropriate to repair the remaining defect.  The graft was trimmed to fit the size of the remaining defect.  The graft was then placed in the primary defect, oriented appropriately, and sutured into place. Complex Repair And Skin Substitute Graft Text: The defect edges were debeveled with a #15 scalpel blade.  The primary defect was closed partially with a complex linear closure.  Given the location of the remaining defect, shape of the defect and the proximity to free margins a skin substitute graft was deemed most appropriate to repair the remaining defect.  The graft was trimmed to fit the size of the remaining defect.  The graft was then placed in the primary defect, oriented appropriately, and sutured into place. Path Notes (To The Dermatopathologist): Please check margins. Consent was obtained from the patient. The risks and benefits to therapy were discussed in detail. Specifically, the risks of infection, scarring, bleeding, prolonged wound healing, incomplete removal, allergy to anesthesia, nerve injury and recurrence were addressed. Prior to the procedure, the treatment site was clearly identified and confirmed by the patient. All components of Universal Protocol/PAUSE Rule completed. Post-Care Instructions: I reviewed with the patient in detail post-care instructions. Patient is not to engage in any heavy lifting, exercise, or swimming for the next 14 days. Should the patient develop any fevers, chills, bleeding, severe pain patient will contact the office immediately. Home Suture Removal Text: Patient was provided a home suture removal kit and will remove their sutures at home.  If they have any questions or difficulties they will call the office. Where Do You Want The Question To Include Opioid Counseling Located?: Case Summary Tab Billing Type: Third-Party Bill Information: Selecting Yes will display possible errors in your note based on the variables you have selected. This validation is only offered as a suggestion for you. PLEASE NOTE THAT THE VALIDATION TEXT WILL BE REMOVED WHEN YOU FINALIZE YOUR NOTE. IF YOU WANT TO FAX A PRELIMINARY NOTE YOU WILL NEED TO TOGGLE THIS TO 'NO' IF YOU DO NOT WANT IT IN YOUR FAXED NOTE.

## 2025-05-06 NOTE — PLAN OF CARE
Problem: Chronic Conditions and Co-morbidities  Goal: Patient's chronic conditions and co-morbidity symptoms are monitored and maintained or improved  5/6/2025 1225 by Flor Sutton RN  Outcome: Adequate for Discharge     Problem: Discharge Planning  Goal: Discharge to home or other facility with appropriate resources  5/6/2025 1225 by Flor Sutton RN  Outcome: Adequate for Discharge     Problem: Safety - Adult  Goal: Free from fall injury  5/6/2025 1225 by Flor Sutton RN  Outcome: Adequate for Discharge

## 2025-05-06 NOTE — PROGRESS NOTES
Call from [facility], pt ok for d/c follow up oncology on Fri as scheduled,follow up Dr Morales OP for colonoscopy.

## 2025-05-06 NOTE — H&P
V2.0  History and Physical      Name:  Kenya Peters /Age/Sex: 1955  (69 y.o. female)   MRN & CSN:  [id number] & [id number] Encounter Date/Time: 2025 12:45 AM EDT   Location:   PCP: Ata Chung MD       Hospital Day: 2    Assessment and Plan:   Kenya Peters is a 69 y.o. female  who presents with GIB (gastrointestinal bleeding)    Hospital Problems           Last Modified POA    * (Principal) GIB (gastrointestinal bleeding) 2025 Yes       Assessment and Plan:  Generalized weakness and relative anemia with concerns for GI bleed.  N.p.o. admitted with GI consultation start the patient on Protonix 40 mg IV daily telemetry in place.  CT of the abdomen and pelvis did not show any active signs of active bleed  Elevated troponin likely demand ischemia trend troponin X3 non-MI related likely.  Benign essential hypertension on amlodipine at home which can be continued  Peripheral neuropathy on gabapentin  Insulin-dependent diabetes mellitus type 2 start the patient on Lantus of 10 units and sliding scale insulin along with Humalog 4 units 3 times daily with meals  Chronic GERD currently on IV Protonix  Muscle spasms on Zanaflex at home  Chronic insomnia is on trazodone at home  Underlying osteoporosis on alendronate which could be a trigger for dyspepsia?  Await GI  Peripheral artery disease on cilostazol at home      Advance Care Planning    10 minutes were spent discussing the patient's resuscitation status, advance care planning, and end of life care with the patient and/or family/surrogate.    The patient and/or family/surrogate voluntarily agreed to participate in ACP services.    Patient's cognitive capacity: Alert and oriented X3    I answered all the patient/family questions that I could within the range and scope of the current medical situation.  We discussed the medical conditions, risks, benefits, outcomes, and goals of care at this time for the patient's medical issues at hand

## 2025-05-06 NOTE — FLOWSHEET NOTE
05/06/25 0145   Vital Signs   Temp 97.5 °F (36.4 °C)   Temp Source Oral   Pulse 92   Heart Rate Source Monitor   Respirations 18   /65   MAP (Calculated) 88   BP Location Right upper arm   BP Method Automatic   Patient Position Semi albertolers   Pain Assessment   Pain Assessment None - Denies Pain   Opioid-Induced Sedation   POSS Score 1   RASS   [place]edillo Agitation Sedation Scale (RASS) 0   Oxygen Therapy   SpO2 99 %   O2 Device None (Room air)   Height and Weight   Height 1.6 m (5' 3\")   Weight - Scale 60.9 kg (134 lb 4.8 oz)   BSA (Calculated - sq m) 1.65 sq meters   BMI (Calculated) 23.8     Admission Questions and assessment is complete. The pt is A&O x 4,  CHG wipes done, weight taken. Pt IVF started (SEE MAR) The pt denies any further needs at this time. The pt call light and personal items are with in reach. Will continue to monitor.

## 2025-05-07 ENCOUNTER — CARE COORDINATION (OUTPATIENT)
Dept: CASE MANAGEMENT | Age: 70
End: 2025-05-07

## 2025-05-07 LAB
PATH INTERP BLD-IMP: NORMAL
STFR SERPL-SCNC: 11.2 MG/L (ref 1.9–4.4)

## 2025-05-07 NOTE — PROGRESS NOTES
Physician Progress Note      PATIENT:               MINDY SARMIENTO  CSN #:                  [id number]  :                       1955  ADMIT DATE:       2025 5:57 PM  DISCH DATE:        2025 12:45 PM  RESPONDING  PROVIDER #:        AZ ZIEGLER APRN - CNP          QUERY TEXT:    Congestive Heart Failure is documented in the medical record in PMHx of H/P.    Please document the type and acuity:    The clinical indicators include:  69-year-old female with past medical history of hypertension diabetes   hyperlipidemia COPD psoriasis history of GI bleed history of carcinoma    H/P \"Past Medical History...CHF (congestive heart failure)\"     CXR The lungs are without acute focal process.  There is no effusion or   pneumothorax. The cardiomediastinal silhouette is stable. The osseous   structures are stable\"      Norvasc 5mgpo nightly, Toprol XL 50mg daily, Lisinopril 20mg po daily,   supportive care, CXR  Options provided:  -- Chronic Systolic CHF/HFrEF  -- Other - I will add my own diagnosis  -- Disagree - Not applicable / Not valid  -- Disagree - Clinically unable to determine / Unknown  -- Refer to Clinical Documentation Reviewer    PROVIDER RESPONSE TEXT:    This patient has chronic systolic CHF/HFrEF.    Query created by: Lynette Godoy on 2025 11:29 AM      QUERY TEXT:    Elevated cardiac troponin (cTc) levels are documented in the medical record in   D/C Summary. Please clarify the cause:    The clinical indicators include:  68 yo female with pmx of DM, HTN, MARY, renal cell cancer s/p nephrectomy, HLD,   PAD, CHF, and COPD    Lab results Troponin High Sensitivity  19->18    D/C Summary \"Elevated troponin likely demand ischemia trend troponin X3 non-MI   related likely.\"    Repeat troponins, EKG, supportive care  Options provided:  -- Myocardial injury, non-ischemic (non-traumatic) related to anemia  -- Myocardial injury, non-ischemic (non-traumatic) related to other cause,   Please

## 2025-05-07 NOTE — TELEPHONE ENCOUNTER
Pt states she has multiple appointments with other physicians coming up and will call back at another time to schedule if she feels she needs to see one of our providers.

## 2025-05-07 NOTE — CARE COORDINATION
Care Transitions Note    Initial Call - Call within 2 business days of discharge: Yes    Attempted to reach patient, family, Emanuel / dtr / Dec Maker  for transitions of care follow up. Unable to reach patient, family, Emanuel / dtr / Dec Maker,   .    Outreach Attempts:   HIPAA compliant voicemail left for family, Arlin - Pts home nbr NA .     Patient: Kenya Peters    Patient : 1955   MRN: <[id number]>    Reason for Admission: GIB   Discharge Date: 25  RURS: Readmission Risk Score: 12.6    Last Discharge Facility       Date Complaint Diagnosis Description Type Department Provider    25 Fatigue Generalized weakness ... [place] ([place] [facility] [place]; Esteban...            Was this an external facility discharge? No    Follow Up Appointment:   Patient does not have a follow up appointment scheduled at time of call.  [facility] - msg pcp pool       Plan for follow-up on next business day.      Sanjeev Garcia RN

## 2025-05-08 ENCOUNTER — CARE COORDINATION (OUTPATIENT)
Dept: CASE MANAGEMENT | Age: 70
End: 2025-05-08

## 2025-05-08 NOTE — CARE COORDINATION
Care Transitions Note    Initial Call - Call within 2 business days of discharge: Yes    Attempted to reach patient for transitions of care follow up. Unable to reach patient.    Outreach Attempts:   CTN spoke to pt who states she is ok and is at work.  Not a good time to talk and no needs.  Pt declined additional calls and CTN encouraged pt to schedule hfu appts / pt declined need for assistance and has spoken to pcp's office     Patient: Kenya Peters    Patient : 1955   MRN: <[id number]>    Reason for Admission: GIB  Discharge Date: 25  RURS: Readmission Risk Score: 12.6    Last Discharge Facility       Date Complaint Diagnosis Description Type Department Provider    25 Fatigue Generalized weakness ... ED to Hosp-Admission (Discharged) ([place]ITT[place] [facility] [place]ta Deluca MD; Esteban...            Was this an external facility discharge? No    Follow Up Appointment:   Patient does not have a follow up appointment scheduled at time of call.  Declined assistance with F2F - virtual - another provider       No further follow-up call indicated CT episode closed - CTN signed off pt.       Sanjeev Garcia RN

## 2025-05-12 RX ORDER — AMLODIPINE BESYLATE 5 MG/1
5 TABLET ORAL NIGHTLY
Qty: 90 TABLET | Refills: 0 | Status: SHIPPED | OUTPATIENT
Start: 2025-05-12

## 2025-05-13 RX ORDER — ATORVASTATIN CALCIUM 40 MG/1
40 TABLET, FILM COATED ORAL DAILY
Qty: 30 TABLET | Refills: 2 | Status: SHIPPED | OUTPATIENT
Start: 2025-05-13

## 2025-05-13 RX ORDER — GABAPENTIN 300 MG/1
CAPSULE ORAL
Qty: 60 CAPSULE | Refills: 0 | Status: SHIPPED | OUTPATIENT
Start: 2025-05-14 | End: 2025-06-13

## 2025-05-24 DIAGNOSIS — I50.20 SYSTOLIC CONGESTIVE HEART FAILURE, UNSPECIFIED HF CHRONICITY (HCC): ICD-10-CM

## 2025-05-27 RX ORDER — LISINOPRIL 20 MG/1
20 TABLET ORAL DAILY
Qty: 30 TABLET | Refills: 2 | Status: SHIPPED | OUTPATIENT
Start: 2025-05-27

## 2025-05-29 RX ORDER — INSULIN GLARGINE 300 [IU]/ML
INJECTION, SOLUTION SUBCUTANEOUS
Qty: 3 ML | Refills: 1 | Status: SHIPPED | OUTPATIENT
Start: 2025-05-29

## 2025-06-09 RX ORDER — ALENDRONATE SODIUM 70 MG/1
TABLET ORAL
Qty: 4 TABLET | Refills: 1 | Status: SHIPPED | OUTPATIENT
Start: 2025-06-09

## 2025-06-10 NOTE — PROGRESS NOTES
Kenya Fran    Age 70 y.o.    female    1955    N [id number]    6/19/2025  Arrival Time_____________  OR Time____________30 Min     Procedure(s):  COLONOSCOPY                      Monitor Anesthesia Care    Surgeon(s):  Gabriel Schumacher, DO       Phone [phone] (home)     Initals  Date  Info Source  Home  Cell         Work  _____________________________________________________________________  _____________________________________________________________________  _____________________________________________________________________  _____________________________________________________________________  _____________________________________________________________________    PCP _____________________________ Phone_________________     H&P  ________________  Bringing      Chart              Epic      DOS      Called________  EKG ________________   Bringing      Chart              Epic      DOS      Called________  LABS________________   Bringing     Chart              Epic      DOS      Called________  Cardiac Clearance ______ Bringing      Chart              Epic      DOS      Called________  Pulmonary Clearance____ Bringing      Chart              Epic      DOS      Called________    Cardiologist________________________ Phone___________________________  Pulmonologist_______________________Phone___________________________      ? Blood Refusal / Waiver on Chart            PAT Communications________________  ? Pre-op Instructions Given /Understood          _________________________________  ? Directions to Surgery Center                          _________________________________  ? Transportation Home_______________      __________________________________  ? Crutches/Walker__________________        __________________________________    Orders: Hard copy/ EPIC                 Transcribed/ EPIC                  ________Pharmacy                         ________WEIGHT    COVID + _______DATE    ___OK per

## 2025-06-12 RX ORDER — GABAPENTIN 300 MG/1
CAPSULE ORAL
Qty: 60 CAPSULE | Refills: 0 | Status: SHIPPED | OUTPATIENT
Start: 2025-06-13 | End: 2025-07-12

## 2025-06-16 NOTE — PROGRESS NOTES
DATE AND TIME OF PROCEDURE: 06/19/2025 11:00 AM           ARRIVAL TIME: 10:00 AM    Bring Picture ID and insurance card.  Please wear simple, loose fitting clothing to the surgery center.   Do not bring valuables (money, credit cards, checkbooks, etc.)   Do not wear any makeup.  No nail polish or artifical nails.  Do not wear any jewelry or piercings on day of procedure.  All body piercings must be removed.  If you have dentures, they will be removed before going to the procedure room; we will provide a container, if needed.   If you wear contact lenses or glasses, they will be removed; please bring a case..  If you wear hearing aids, they will be removed; we will provide a container, if needed.   If you use oxygen and have a portable tank please bring it with you. If you use a CPAP machine, bring it with you.  Shower the evening before or morning of procedure.  You may brush your teeth and gargle the morning of procedure.  DO NOT SWALLOW WATER.   Take full dose of TOUJEO 06/18/2025.   No Humalog insulin day of procedure. Do not take JARDIANCE after 6/16/2025 until instructed to restart after procedure. Do not take LISINOPRIL 6/19/2025 but take it before 10:00 AM on 6/18/2025.  Take the following medications with a sip of water morning of procedure:  METOPROLOL, ATORVASTATIN, AND PANTOPRAZOLE. OK TO USE INHALER AND NEBULIZER, IF NEEDED MORNING OF PROCEDURE.  CLEAR LIQUIDS ONLY DAY BEFORE PROCEDURE. THIS INCLUDES JUICE, JELLO, BROTH, SODA, BLACK COFFEE, SPORTS DRINKS, ETC. AVOID ANY MILK OR CREAM-BASED PRODUCTS, AND DYES SUCH AS REDS, PURPLES, AND BLUES.   START 1ST HALF OF PREP AT 5:00 PM ON 06/18/2025.  TAKE 2ND HALF OF PREP 6 HOURS PRIOR TO PROCEDURAL ARRIVAL TIME: 4:00 AM 06/19/2025.  ENTIRE PREP TO BE COMPLETED 4 HOURS PRIOR TO PROCEDURAL ARRIVAL TIME:  6:00 AM 06/19/2025.  ONCE PREP COMPLETED, NOTHING BY MOUTH. THIS INCLUDES ICE CHIPS, SIPS OF WATER, HARD CANDY, AND GUM. TAKE MEDICATIONS AS DIRECTED PRIOR TO  PREP COMPLETION TIME.   Aspirin, Ibuprofen, Advil, Naproxen, Vitamin E and other Anti-inflammatory products and supplements should be stopped for 5 -7days before procedure or as directed by your physician.  Do not smoke or drink any alcoholic beverages 24 hours prior to procedure.  This includes non-alcoholic beer. Refrain from the usage of any recreational drugs.   You MUST plan for a responsible adult to stay on site while you are here and take you home after your procedure. You will not be allowed to leave alone or drive yourself home. It is strongly suggested someone stay with you the first 24 hrs. Your procedure will be cancelled if you do not have a ride home.   If you have a Living Will and Durable Power of  for Healthcare, please bring a copy.  Notify your surgeon if you develop any illness between now and time of procedure. Cough, cold, fever, sore throat, nausea, vomiting, etc.  Please notify your surgeon if you experience dizziness, shortness of breath or blurred vision between now & the time of your procedure.  To provide excellent care visitors will be limited to two per room at any given time. No visitors under the age of 14.  For your convenience [facility] has a pharmacy on site to fill your prescriptions.      [facility] [place] is located at [address] [place] [address]  It is the tan building to the right of the [facility] hospital and the entrance is marked in blue letters [facility] [place]    If you have any questions or concerns, please feel free to call the pre-admission testing department at [phone]. Monday-Friday 8:00 AM-4:30 PM

## 2025-06-18 ENCOUNTER — ANESTHESIA EVENT (OUTPATIENT)
Dept: ENDOSCOPY | Age: 70
End: 2025-06-18
Payer: MEDICARE

## 2025-06-19 ENCOUNTER — ANESTHESIA (OUTPATIENT)
Dept: ENDOSCOPY | Age: 70
End: 2025-06-19
Payer: MEDICARE

## 2025-06-19 ENCOUNTER — HOSPITAL ENCOUNTER (OUTPATIENT)
Age: 70
Setting detail: OUTPATIENT SURGERY
Discharge: HOME | End: 2025-06-19
Attending: INTERNAL MEDICINE | Admitting: INTERNAL MEDICINE
Payer: MEDICARE

## 2025-06-19 VITALS
HEART RATE: 62 BPM | WEIGHT: 131 LBS | BODY MASS INDEX: 23.21 KG/M2 | OXYGEN SATURATION: 94 % | TEMPERATURE: 98.4 F | SYSTOLIC BLOOD PRESSURE: 121 MMHG | RESPIRATION RATE: 14 BRPM | DIASTOLIC BLOOD PRESSURE: 55 MMHG | HEIGHT: 63 IN

## 2025-06-19 DIAGNOSIS — R19.5 POSITIVE OCCULT STOOL BLOOD TEST: ICD-10-CM

## 2025-06-19 DIAGNOSIS — D64.9 ANEMIA, UNSPECIFIED TYPE: ICD-10-CM

## 2025-06-19 LAB
GLUCOSE BLD-MCNC: 130 MG/DL (ref 70–99)
GLUCOSE BLD-MCNC: 138 MG/DL (ref 70–99)
PERFORMED ON: ABNORMAL
PERFORMED ON: ABNORMAL

## 2025-06-19 PROCEDURE — 2709999900 HC NON-CHARGEABLE SUPPLY: Performed by: INTERNAL MEDICINE

## 2025-06-19 PROCEDURE — 7100000011 HC PHASE II RECOVERY - ADDTL 15 MIN: Performed by: INTERNAL MEDICINE

## 2025-06-19 PROCEDURE — 88305 TISSUE EXAM BY PATHOLOGIST: CPT

## 2025-06-19 PROCEDURE — 6360000002 HC RX W HCPCS: Performed by: NURSE ANESTHETIST, CERTIFIED REGISTERED

## 2025-06-19 PROCEDURE — 3700000001 HC ADD 15 MINUTES (ANESTHESIA): Performed by: INTERNAL MEDICINE

## 2025-06-19 PROCEDURE — 3700000000 HC ANESTHESIA ATTENDED CARE: Performed by: INTERNAL MEDICINE

## 2025-06-19 PROCEDURE — 3609010600 HC COLONOSCOPY POLYPECTOMY SNARE/COLD BIOPSY: Performed by: INTERNAL MEDICINE

## 2025-06-19 PROCEDURE — 2580000003 HC RX 258: Performed by: NURSE ANESTHETIST, CERTIFIED REGISTERED

## 2025-06-19 PROCEDURE — 7100000010 HC PHASE II RECOVERY - FIRST 15 MIN: Performed by: INTERNAL MEDICINE

## 2025-06-19 RX ORDER — SODIUM CHLORIDE, SODIUM LACTATE, POTASSIUM CHLORIDE, CALCIUM CHLORIDE 600; 310; 30; 20 MG/100ML; MG/100ML; MG/100ML; MG/100ML
INJECTION, SOLUTION INTRAVENOUS
Status: DISCONTINUED | OUTPATIENT
Start: 2025-06-19 | End: 2025-06-19 | Stop reason: SDUPTHER

## 2025-06-19 RX ORDER — SODIUM CHLORIDE 0.9 % (FLUSH) 0.9 %
5-40 SYRINGE (ML) INJECTION PRN
Status: DISCONTINUED | OUTPATIENT
Start: 2025-06-19 | End: 2025-06-19 | Stop reason: HOSPADM

## 2025-06-19 RX ORDER — SODIUM CHLORIDE 9 MG/ML
INJECTION, SOLUTION INTRAVENOUS PRN
Status: DISCONTINUED | OUTPATIENT
Start: 2025-06-19 | End: 2025-06-19 | Stop reason: HOSPADM

## 2025-06-19 RX ORDER — NALOXONE HYDROCHLORIDE 0.4 MG/ML
INJECTION, SOLUTION INTRAMUSCULAR; INTRAVENOUS; SUBCUTANEOUS PRN
Status: DISCONTINUED | OUTPATIENT
Start: 2025-06-19 | End: 2025-06-19 | Stop reason: HOSPADM

## 2025-06-19 RX ORDER — SODIUM CHLORIDE 0.9 % (FLUSH) 0.9 %
5-40 SYRINGE (ML) INJECTION EVERY 12 HOURS SCHEDULED
Status: DISCONTINUED | OUTPATIENT
Start: 2025-06-19 | End: 2025-06-19 | Stop reason: HOSPADM

## 2025-06-19 RX ORDER — LIDOCAINE HYDROCHLORIDE 20 MG/ML
INJECTION, SOLUTION EPIDURAL; INFILTRATION; INTRACAUDAL; PERINEURAL
Status: DISCONTINUED | OUTPATIENT
Start: 2025-06-19 | End: 2025-06-19 | Stop reason: SDUPTHER

## 2025-06-19 RX ORDER — ONDANSETRON 2 MG/ML
4 INJECTION INTRAMUSCULAR; INTRAVENOUS EVERY 30 MIN PRN
Status: DISCONTINUED | OUTPATIENT
Start: 2025-06-19 | End: 2025-06-19 | Stop reason: HOSPADM

## 2025-06-19 RX ORDER — SODIUM CHLORIDE, SODIUM LACTATE, POTASSIUM CHLORIDE, CALCIUM CHLORIDE 600; 310; 30; 20 MG/100ML; MG/100ML; MG/100ML; MG/100ML
INJECTION, SOLUTION INTRAVENOUS CONTINUOUS
Status: DISCONTINUED | OUTPATIENT
Start: 2025-06-19 | End: 2025-06-19 | Stop reason: HOSPADM

## 2025-06-19 RX ORDER — LIDOCAINE HYDROCHLORIDE 10 MG/ML
0.3 INJECTION, SOLUTION EPIDURAL; INFILTRATION; INTRACAUDAL; PERINEURAL
Status: DISCONTINUED | OUTPATIENT
Start: 2025-06-19 | End: 2025-06-19 | Stop reason: HOSPADM

## 2025-06-19 RX ORDER — PROPOFOL 10 MG/ML
INJECTION, EMULSION INTRAVENOUS
Status: DISCONTINUED | OUTPATIENT
Start: 2025-06-19 | End: 2025-06-19 | Stop reason: SDUPTHER

## 2025-06-19 RX ADMIN — SODIUM CHLORIDE, SODIUM LACTATE, POTASSIUM CHLORIDE, AND CALCIUM CHLORIDE: .6; .31; .03; .02 INJECTION, SOLUTION INTRAVENOUS at 11:31

## 2025-06-19 RX ADMIN — PROPOFOL 150 MCG/KG/MIN: 10 INJECTION, EMULSION INTRAVENOUS at 11:38

## 2025-06-19 RX ADMIN — PROPOFOL 80 MG: 10 INJECTION, EMULSION INTRAVENOUS at 11:37

## 2025-06-19 RX ADMIN — LIDOCAINE HYDROCHLORIDE 40 MG: 20 INJECTION, SOLUTION EPIDURAL; INFILTRATION; INTRACAUDAL; PERINEURAL at 11:37

## 2025-06-19 ASSESSMENT — PAIN - FUNCTIONAL ASSESSMENT
PAIN_FUNCTIONAL_ASSESSMENT: NONE - DENIES PAIN
PAIN_FUNCTIONAL_ASSESSMENT: 0-10

## 2025-06-19 ASSESSMENT — PAIN DESCRIPTION - DESCRIPTORS: DESCRIPTORS: CRAMPING

## 2025-06-19 NOTE — ANESTHESIA PRE PROCEDURE
Department of Anesthesiology  Preprocedure Note       Name:  Kenya Peters   Age:  70 y.o.  :  1955                                          MRN:  [id number]         Date:  2025      Surgeon: Surgeon(s):  Gabriel Schumacher DO    Procedure: Procedure(s):  COLONOSCOPY    Medications prior to admission:   Prior to Admission medications    Medication Sig Start Date End Date Taking? Authorizing Provider   gabapentin (NEURONTIN) 300 MG capsule TAKE 2 CAPSULES BY MOUTH ONCE NIGHTLY 25 Yes Ata Chung MD   alendronate (FOSAMAX) 70 MG tablet TAKE 1 TABLET BY MOUTH ONCE WEEKLY ON AN EMPTY STOMACH BEFORE FIRST FOOD, BEVERAGE, OR MEDICATION. REMAIN UPRIGHT FOR 30 MINUTES AND TAKE WITH 8 OUNCES OF WATER 25  Yes Ata Chung MD   Insulin Glargine Solostar (TOUJEO) 300 UNIT/ML SOPN INJECT 30-35 UNITS INTO THE SKIN EVERY NIGHT AT BEDTIME  Patient taking differently: 20-25 Units 25  Yes Ata Chung MD   lisinopril (PRINIVIL;ZESTRIL) 20 MG tablet TAKE 1 TABLET BY MOUTH DAILY 25  Yes Ata Chung MD   atorvastatin (LIPITOR) 40 MG tablet TAKE 1 TABLET BY MOUTH DAILY  Patient taking differently: Take 1 tablet by mouth every morning 25  Yes Dustin Brian MD   amLODIPine (NORVASC) 5 MG tablet TAKE ONE TABLET BY MOUTH ONCE NIGHTLY 25  Yes Ata Chung MD   traZODone (DESYREL) 50 MG tablet Take 1 tablet by mouth nightly 25  Yes Ata Chung MD   pantoprazole (PROTONIX) 40 MG tablet Take 1 tablet by mouth daily 25  Yes Ata Chung MD   escitalopram (LEXAPRO) 10 MG tablet Take 1 tablet by mouth daily 25  Yes Ata Chung MD   metoprolol succinate (TOPROL XL) 50 MG extended release tablet Take 1 tablet by mouth daily 24  Yes Ata Chung MD   tiZANidine (ZANAFLEX) 4 MG tablet Take 1 tablet by mouth every 8 hours as needed (muscle spasms) 24  Yes

## 2025-06-19 NOTE — PROGRESS NOTES
Son to bedside updated with no questions. Discharge instructions reviewed with patient and responsible adult. Discharge instructions  given with no additional questions. Patient to be discharged home with belongings.

## 2025-06-19 NOTE — DISCHARGE INSTRUCTIONS
ENDOSCOPY:  Inspection of any cavity of the body by means of an endoscopy. An endoscope is a flexible tube that allows direct visualization of the cavity.    You have had a Colonoscopy    Discharge Instructions    1)  You may experience some lightheadedness for the next several hours. We suggest you plan on quiet relaxation for the rest of the day.    2)  Because of the sedative drugs in your blood steam, be advised that you should not drive, operate any machinery, or sign contracts for the remainder of the day.    3)  You should not take any alcoholic beverages tonight, and only take sleeping medication that has been specifically prescribed for you by your physician.    4)  Unless instructed differently, resume your regular diet. Eat smaller portions for your first meal and progress to normal amounts over the rest of the day.    5)  If you have any fever, chills, excessive bleeding, uncontrolled pain, increased abdominal bloating, or any other problems, notify your doctor or return to the hospital.    6)  Do not expect a normal bowel movement for one to three days due to the cleansing of the large intestine prior to the colonoscopy.    7) If you have a polyp removed, do not do any strenuous activity and avoid the use of Aspirin or related compounds for 2 week.    8) Call for biopsy results in one week:  [phone]    9)  Follow high fiber diet instruction paper

## 2025-06-19 NOTE — ANESTHESIA POSTPROCEDURE EVALUATION
Department of Anesthesiology  Postprocedure Note    Patient: Kenya Peters  MRN: [id number]  YOB: 1955  Date of evaluation: 6/19/2025    Procedure Summary       Date: 06/19/25 Room / Location: [place] [address] / [facility]    Anesthesia Start: 1131 Anesthesia Stop: 1159    Procedure: COLONOSCOPY POLYPECTOMY SNARE/BIOPSY Diagnosis:       Positive occult stool blood test      Anemia, unspecified type      (Positive occult stool blood test [R19.5])      (Anemia, unspecified type [D64.9])    Surgeons: Gabriel Schumacher DO Responsible Provider: Phil Llamas MD    Anesthesia Type: MAC ASA Status: 3            Anesthesia Type: No value filed.    Frida Phase I: Frida Score: 10    Frida Phase II: Frida Score: 9    Anesthesia Post Evaluation    Patient location during evaluation: PACU  Level of consciousness: awake  Airway patency: patent  Nausea & Vomiting: no vomiting  Cardiovascular status: blood pressure returned to baseline  Respiratory status: acceptable  Hydration status: stable  Pain management: adequate    No notable events documented.

## 2025-06-19 NOTE — H&P
Gastroenterology Preop Assessment    Patient:   Kenya Peters   :    1955   Facility:   [facility]  Referring/PCP: Ata Chung MD  Date:     2025    Subjective:   Procedure: colonoscopy    HPI/Reason for procedure:  screening    Past Medical History:   Diagnosis Date    Atrial fibrillation (HCC)     Bronchitis chronic     Cancer (HCC)     cervical    Chest pain 2012    CHF (congestive heart failure) (HCC)     Diabetes mellitus (HCC)     Emphysema of lung (HCC)     GI bleed 2022    History of blood transfusion     Hypercholesteremia     Hypertension     Malabsorption of iron 2023    Microcytic anemia 2017    Psoriasis     Renal cell cancer (HCC)      Past Surgical History:   Procedure Laterality Date    CERVICAL DISC SURGERY      CHOLECYSTECTOMY      COLONOSCOPY N/A 2021    COLONOSCOPY WITH BIOPSY performed by Zelalem Gatica MD at [facility] ENDOSCOPY    COLONOSCOPY N/A 2021    COLONOSCOPY POLYPECTOMY SNARE/COLD BIOPSY performed by Zelalem Gatica MD at [facility] ENDOSCOPY    DIAGNOSTIC CARDIAC CATH LAB PROCEDURE  2011    angiogram with 30% blockage    HYSTERECTOMY (CERVIX STATUS UNKNOWN)      KIDNEY STONE SURGERY      PARTIAL NEPHRECTOMY Left 2022    ROBOTIC LEFT PARTIAL NEPHRECTOMY performed by Yonis Reed MD at [facility] OR    TONSILLECTOMY      UPPER GASTROINTESTINAL ENDOSCOPY N/A 10/29/2019    EGD BIOPSY performed by Gabriel Schumacher DO at [facility] ENDOSCOPY    UPPER GASTROINTESTINAL ENDOSCOPY N/A 10/11/2020    EGD BIOPSY performed by Gabriel Schumacher DO at [facility] ENDOSCOPY    UPPER GASTROINTESTINAL ENDOSCOPY  10/11/2020    EGD CONTROL HEMORRHAGE performed by Gabriel Schumacher DO at [facility] ENDOSCOPY    UPPER GASTROINTESTINAL ENDOSCOPY N/A 2021    EGD BIOPSY performed by Zelalem Gatica MD at [facility] ENDOSCOPY    UPPER GASTROINTESTINAL ENDOSCOPY N/A 2022    EGD

## 2025-07-11 RX ORDER — GABAPENTIN 300 MG/1
CAPSULE ORAL
Qty: 60 CAPSULE | Refills: 0 | Status: SHIPPED | OUTPATIENT
Start: 2025-07-13 | End: 2025-08-12

## 2025-07-14 ENCOUNTER — TELEPHONE (OUTPATIENT)
Dept: INTERNAL MEDICINE CLINIC | Age: 70
End: 2025-07-14

## 2025-07-14 DIAGNOSIS — K29.71 GASTROINTESTINAL HEMORRHAGE ASSOCIATED WITH GASTRITIS, UNSPECIFIED GASTRITIS TYPE: ICD-10-CM

## 2025-07-14 DIAGNOSIS — K92.2 ACUTE GI BLEEDING: ICD-10-CM

## 2025-07-14 DIAGNOSIS — D50.9 IRON DEFICIENCY ANEMIA, UNSPECIFIED IRON DEFICIENCY ANEMIA TYPE: Primary | ICD-10-CM

## 2025-07-14 RX ORDER — PANTOPRAZOLE SODIUM 40 MG/1
40 TABLET, DELAYED RELEASE ORAL DAILY
Qty: 30 TABLET | Refills: 0 | Status: SHIPPED | OUTPATIENT
Start: 2025-07-14

## 2025-07-14 RX ORDER — ESCITALOPRAM OXALATE 10 MG/1
10 TABLET ORAL DAILY
Qty: 30 TABLET | Refills: 0 | Status: SHIPPED | OUTPATIENT
Start: 2025-07-14

## 2025-07-14 RX ORDER — TRAZODONE HYDROCHLORIDE 50 MG/1
TABLET ORAL
Qty: 30 TABLET | Refills: 0 | Status: SHIPPED | OUTPATIENT
Start: 2025-07-14

## 2025-07-14 NOTE — TELEPHONE ENCOUNTER
Order placed, attempted to contact patient to give her scheduling phone number. Unable to leave voicemail.

## 2025-07-14 NOTE — TELEPHONE ENCOUNTER
----- Message from Dr. Ata Chung MD sent at 7/14/2025 12:32 PM EDT -----  Contact: patient [phone]  Pill endoscopy  ----- Message -----  From: Sara Marsh  Sent: 7/11/2025   4:03 PM EDT  To: Ata Chung MD    Patient wanting to know if you can put in the order for the \"pod camera\", testing you spoke to her about to check her abdomin and bowels?  Please advise

## 2025-07-15 RX ORDER — INSULIN GLARGINE 300 [IU]/ML
20-25 INJECTION, SOLUTION SUBCUTANEOUS DAILY
Qty: 8 EACH | Refills: 0 | Status: SHIPPED | OUTPATIENT
Start: 2025-07-15 | End: 2025-10-13

## 2025-07-21 ENCOUNTER — TELEPHONE (OUTPATIENT)
Dept: INTERNAL MEDICINE CLINIC | Age: 70
End: 2025-07-21

## 2025-07-21 DIAGNOSIS — K92.2 ACUTE GI BLEEDING: Primary | ICD-10-CM

## 2025-07-21 NOTE — TELEPHONE ENCOUNTER
----- Message from Dr. Ata Chung MD sent at 7/21/2025  3:02 PM EDT -----  [facility] [employer] [phone] ext [phone]  Yes  ----- Message -----  From: Syeda Armas MA  Sent: 7/21/2025   1:13 PM EDT  To: Ata Chung MD    Okay to send a GI referral for this order? Please advise.  ----- Message -----  From: Sanjuanita Pugh MA  Sent: 7/21/2025  11:55 AM EDT  To: CANDACE Luev[facility] called and they can not schedule the endoscopy GI with capsule. They state the ordering physician's office must call surgery directly to set up for the patient.

## 2025-07-21 NOTE — TELEPHONE ENCOUNTER
Referral placed. Patient informed. Referral and order faxed to [facility]. Patient given contact information for [facility].

## 2025-07-22 DIAGNOSIS — I50.20 SYSTOLIC CONGESTIVE HEART FAILURE, UNSPECIFIED HF CHRONICITY (HCC): ICD-10-CM

## 2025-07-22 RX ORDER — LISINOPRIL 20 MG/1
20 TABLET ORAL DAILY
Qty: 30 TABLET | Refills: 0 | Status: SHIPPED | OUTPATIENT
Start: 2025-07-22

## 2025-07-28 DIAGNOSIS — I50.20 SYSTOLIC CONGESTIVE HEART FAILURE, UNSPECIFIED HF CHRONICITY (HCC): ICD-10-CM

## 2025-07-28 RX ORDER — LISINOPRIL 20 MG/1
20 TABLET ORAL DAILY
Qty: 90 TABLET | Refills: 1 | Status: SHIPPED | OUTPATIENT
Start: 2025-07-28

## 2025-07-30 RX ORDER — ATORVASTATIN CALCIUM 40 MG/1
40 TABLET, FILM COATED ORAL DAILY
Qty: 30 TABLET | Refills: 0 | Status: SHIPPED | OUTPATIENT
Start: 2025-07-30

## 2025-08-05 ENCOUNTER — CARE COORDINATION (OUTPATIENT)
Dept: CARE COORDINATION | Age: 70
End: 2025-08-05

## 2025-08-07 ENCOUNTER — CARE COORDINATION (OUTPATIENT)
Dept: CARE COORDINATION | Age: 70
End: 2025-08-07

## 2025-08-08 ENCOUNTER — CARE COORDINATION (OUTPATIENT)
Dept: CARE COORDINATION | Age: 70
End: 2025-08-08

## 2025-08-11 ENCOUNTER — CARE COORDINATION (OUTPATIENT)
Dept: CARE COORDINATION | Age: 70
End: 2025-08-11

## 2025-08-11 RX ORDER — AMLODIPINE BESYLATE 5 MG/1
5 TABLET ORAL NIGHTLY
Qty: 90 TABLET | Refills: 0 | Status: SHIPPED | OUTPATIENT
Start: 2025-08-11

## 2025-08-11 RX ORDER — GABAPENTIN 300 MG/1
CAPSULE ORAL
Qty: 60 CAPSULE | Refills: 0 | Status: SHIPPED | OUTPATIENT
Start: 2025-08-20 | End: 2025-09-19

## 2025-08-12 ENCOUNTER — CARE COORDINATION (OUTPATIENT)
Dept: CARE COORDINATION | Age: 70
End: 2025-08-12

## 2025-08-12 RX ORDER — PANTOPRAZOLE SODIUM 40 MG/1
40 TABLET, DELAYED RELEASE ORAL DAILY
Qty: 30 TABLET | Refills: 0 | Status: SHIPPED | OUTPATIENT
Start: 2025-08-12

## 2025-08-12 RX ORDER — ESCITALOPRAM OXALATE 10 MG/1
10 TABLET ORAL DAILY
Qty: 30 TABLET | Refills: 0 | Status: SHIPPED | OUTPATIENT
Start: 2025-08-12

## 2025-08-12 RX ORDER — TRAZODONE HYDROCHLORIDE 50 MG/1
TABLET ORAL
Qty: 30 TABLET | Refills: 0 | Status: SHIPPED | OUTPATIENT
Start: 2025-08-12

## 2025-08-12 SDOH — ECONOMIC STABILITY: FOOD INSECURITY: WITHIN THE PAST 12 MONTHS, YOU WORRIED THAT YOUR FOOD WOULD RUN OUT BEFORE YOU GOT THE MONEY TO BUY MORE.: NEVER TRUE

## 2025-08-12 SDOH — ECONOMIC STABILITY: TRANSPORTATION INSECURITY: IN THE PAST 12 MONTHS, HAS LACK OF TRANSPORTATION KEPT YOU FROM MEDICAL APPOINTMENTS OR FROM GETTING MEDICATIONS?: NO

## 2025-08-12 SDOH — SOCIAL STABILITY: SOCIAL INSECURITY: ARE YOU MARRIED, WIDOWED, DIVORCED, SEPARATED, NEVER MARRIED, OR LIVING WITH A PARTNER?: WIDOWED

## 2025-08-12 SDOH — ECONOMIC STABILITY: FOOD INSECURITY: HOW HARD IS IT FOR YOU TO PAY FOR THE VERY BASICS LIKE FOOD, HOUSING, MEDICAL CARE, AND HEATING?: NOT VERY HARD

## 2025-08-12 SDOH — ECONOMIC STABILITY: FOOD INSECURITY: WITHIN THE PAST 12 MONTHS, THE FOOD YOU BOUGHT JUST DIDN'T LAST AND YOU DIDN'T HAVE MONEY TO GET MORE.: NEVER TRUE

## 2025-08-12 SDOH — SOCIAL STABILITY: SOCIAL NETWORK
IN A TYPICAL WEEK, HOW MANY TIMES DO YOU TALK ON THE PHONE WITH FAMILY, FRIENDS, OR NEIGHBORS?: MORE THAN THREE TIMES A WEEK

## 2025-08-12 SDOH — ECONOMIC STABILITY: HOUSING INSECURITY: IN THE LAST 12 MONTHS, WAS THERE A TIME WHEN YOU WERE NOT ABLE TO PAY THE MORTGAGE OR RENT ON TIME?: NO

## 2025-08-12 SDOH — HEALTH STABILITY: MENTAL HEALTH: HOW OFTEN DO YOU HAVE A DRINK CONTAINING ALCOHOL?: NEVER

## 2025-08-12 SDOH — SOCIAL STABILITY: SOCIAL NETWORK: HOW OFTEN DO YOU GET TOGETHER WITH FRIENDS OR RELATIVES?: MORE THAN THREE TIMES A WEEK

## 2025-08-12 SDOH — HEALTH STABILITY: MENTAL HEALTH: HOW MANY DRINKS CONTAINING ALCOHOL DO YOU HAVE ON A TYPICAL DAY WHEN YOU ARE DRINKING?: PATIENT DOES NOT DRINK

## 2025-08-12 ASSESSMENT — ACTIVITIES OF DAILY LIVING (ADL): LACK_OF_TRANSPORTATION: NO

## 2025-08-13 ENCOUNTER — CARE COORDINATION (OUTPATIENT)
Dept: CARE COORDINATION | Age: 70
End: 2025-08-13

## 2025-08-14 ENCOUNTER — CARE COORDINATION (OUTPATIENT)
Dept: CARE COORDINATION | Age: 70
End: 2025-08-14

## 2025-08-14 RX ORDER — INSULIN LISPRO 100 [IU]/ML
10 INJECTION, SOLUTION INTRAVENOUS; SUBCUTANEOUS
Qty: 9 ML | Refills: 0 | Status: SHIPPED | OUTPATIENT
Start: 2025-08-14

## 2025-08-14 SDOH — ECONOMIC STABILITY: FOOD INSECURITY: WITHIN THE PAST 12 MONTHS, THE FOOD YOU BOUGHT JUST DIDN'T LAST AND YOU DIDN'T HAVE MONEY TO GET MORE.: NEVER TRUE

## 2025-08-14 SDOH — ECONOMIC STABILITY: FOOD INSECURITY: WITHIN THE PAST 12 MONTHS, YOU WORRIED THAT YOUR FOOD WOULD RUN OUT BEFORE YOU GOT MONEY TO BUY MORE.: NEVER TRUE

## 2025-08-14 ASSESSMENT — SOCIAL DETERMINANTS OF HEALTH (SDOH)
HOW OFTEN DO YOU GET TOGETHER WITH FRIENDS OR RELATIVES?: MORE THAN THREE TIMES A WEEK
HOW OFTEN DO YOU ATTEND CHURCH OR RELIGIOUS SERVICES?: NEVER
IN A TYPICAL WEEK, HOW MANY TIMES DO YOU TALK ON THE PHONE WITH FAMILY, FRIENDS, OR NEIGHBORS?: MORE THAN THREE TIMES A WEEK
DO YOU BELONG TO ANY CLUBS OR ORGANIZATIONS SUCH AS CHURCH GROUPS UNIONS, FRATERNAL OR ATHLETIC GROUPS, OR SCHOOL GROUPS?: NO
HOW OFTEN DO YOU ATTENT MEETINGS OF THE CLUB OR ORGANIZATION YOU BELONG TO?: NEVER

## 2025-08-15 RX ORDER — ATORVASTATIN CALCIUM 40 MG/1
40 TABLET, FILM COATED ORAL DAILY
Qty: 30 TABLET | Refills: 0 | Status: SHIPPED | OUTPATIENT
Start: 2025-08-15

## 2025-08-18 ENCOUNTER — CARE COORDINATION (OUTPATIENT)
Dept: CARE COORDINATION | Age: 70
End: 2025-08-18

## 2025-08-19 ENCOUNTER — CARE COORDINATION (OUTPATIENT)
Dept: CARE COORDINATION | Age: 70
End: 2025-08-19

## 2025-08-20 ENCOUNTER — CARE COORDINATION (OUTPATIENT)
Dept: CASE MANAGEMENT | Age: 70
End: 2025-08-20

## 2025-08-26 ENCOUNTER — CARE COORDINATION (OUTPATIENT)
Dept: CARE COORDINATION | Age: 70
End: 2025-08-26

## 2025-08-27 ENCOUNTER — CARE COORDINATION (OUTPATIENT)
Dept: CARE COORDINATION | Age: 70
End: 2025-08-27

## 2025-08-29 ENCOUNTER — CARE COORDINATION (OUTPATIENT)
Dept: CARE COORDINATION | Age: 70
End: 2025-08-29

## 2025-09-05 ENCOUNTER — CARE COORDINATION (OUTPATIENT)
Dept: CARE COORDINATION | Age: 70
End: 2025-09-05

## (undated) DEVICE — Z DISCONTINUED USE 2276105 GOWN PROTCT UNIV CHST W28IN L49IN SL 24IN BLU SPUNBOND FLM

## (undated) DEVICE — PENCIL ES CRD L10FT HND SWCHING ROCK SWCH W/ EDGE COAT BLDE

## (undated) DEVICE — MEGA SUTURECUT ND: Brand: ENDOWRIST

## (undated) DEVICE — ENDOSCOPIC KIT 2 12 FT OP4 DE2 GWN SYR

## (undated) DEVICE — SNARE ENDOSCP L240CM SHTH DIA24MM LOOP W10MM POLYP RND REINF

## (undated) DEVICE — ELECTRODE,ECG,STRESS,FOAM,3PK: Brand: MEDLINE

## (undated) DEVICE — TRAP SPEC POLYPR SGL CHMBR FN MESH SCRN

## (undated) DEVICE — CONMED SCOPE SAVER BITE BLOCK, 20X27 MM: Brand: SCOPE SAVER

## (undated) DEVICE — ELECTRODE PT RET AD L9FT HI MOIST COND ADH HYDRGEL CORDED

## (undated) DEVICE — CANNULA,OXY,ADULT,SUPERSOFT,W/7'TUB,SC: Brand: MEDLINE INDUSTRIES, INC.

## (undated) DEVICE — CANNULA SEAL

## (undated) DEVICE — AIRSEAL 12 MM ACCESS PORT AND PALM GRIP OBTURATOR WITH BLADELESS OPTICAL TIP, 120 MM LENGTH: Brand: AIRSEAL

## (undated) DEVICE — ELECTRODE ECG MONITR FOAM TEAR DROP ADLT RED

## (undated) DEVICE — SUTURE VCRL SZ 0 L36IN ABSRB UD CT-1 L36MM 1/2 CIR TAPR PNT VCP946H

## (undated) DEVICE — COLUMN DRAPE

## (undated) DEVICE — BLADELESS OBTURATOR: Brand: WECK VISTA

## (undated) DEVICE — 3M™ STERI-STRIP™ REINFORCED ADHESIVE SKIN CLOSURES, R1547, 1/2 IN X 4 IN (12 MM X 100 MM), 6 STRIPS/ENVELOPE: Brand: 3M™ STERI-STRIP™

## (undated) DEVICE — PUMP SUC IRR TBNG L10FT W/ HNDPC ASSEMB STRYKEFLOW 2

## (undated) DEVICE — SUTURE MCRYL + SZ 4-0 L18IN ABSRB UD L19MM PS-2 3/8 CIR MCP496G

## (undated) DEVICE — INSUFFLATION NEEDLE TO ESTABLISH PNEUMOPERITONEUM.: Brand: INSUFFLATION NEEDLE

## (undated) DEVICE — TRI-LUMEN FILTERED TUBE SET WITH ACTIVATED CHARCOAL FILTER: Brand: AIRSEAL

## (undated) DEVICE — FORCEP BX STD CAP 240CM RAD JAW 4

## (undated) DEVICE — TIP COVER ACCESSORY

## (undated) DEVICE — SUTURE PDS II SZ 1 L36IN ABSRB VLT CT L40MM 1/2 CIR TAPR Z359T

## (undated) DEVICE — FIAPC® PROBE W/ FILTER 2200 A OD 2.3MM/6.9FR; L 2.2M/7.2FT: Brand: ERBE

## (undated) DEVICE — CANNULA NSL AD TBNG L7FT PVC STR NONFLARED PRNG O2 DEL W STD

## (undated) DEVICE — FORCEPS BX JUMBO L240CM DIA2.8MM WRK CHN 3.2MM ORNG W/ NDL

## (undated) DEVICE — GAUZE,SPONGE,2"X2",8PLY,STERILE,LF,2'S: Brand: MEDLINE

## (undated) DEVICE — ENDO CARRY-ON PROCEDURE KIT INCLUDES SUCTION TUBING, LUBRICANT, GAUZE, BIOHAZARD STICKER, TRANSPORT PAD AND INTERCEPT BEDSIDE KIT.: Brand: ENDO CARRY-ON PROCEDURE KIT

## (undated) DEVICE — TOTAL TRAY, DB, 100% SILI FOLEY, 16FR 10: Brand: MEDLINE

## (undated) DEVICE — TRAP POLYP ETRAP

## (undated) DEVICE — SOLUTION IV IRRIG WATER 1000ML POUR BRL 2F7114

## (undated) DEVICE — SCISSORS SURG DIA8MM MPLR CRV ENDOWRIST

## (undated) DEVICE — 3M™ IOBAN™ 2 ANTIMICROBIAL INCISE DRAPE 6650EZ: Brand: IOBAN™ 2

## (undated) DEVICE — TISSUE RETRIEVAL SYSTEM: Brand: INZII RETRIEVAL SYSTEM

## (undated) DEVICE — SUTURE VCRL + SZ 0 L27IN ABSRB VLT L26MM UR-6 5/8 CIR VCP603H

## (undated) DEVICE — ARM DRAPE

## (undated) DEVICE — SUTURE STRATAFIX SPRL PDO 0 DBL ARMED MH MH 9 IN LEN SWEDGED SXPD2B410

## (undated) DEVICE — SUTURE VCRL + SZ 3-0 L18IN ABSRB UD SH 1/2 CIR TAPERCUT NDL VCP864D

## (undated) DEVICE — STAPLER SKIN WIDE 35 COUNT VISBLTY STER

## (undated) DEVICE — SPONGE: LAP 4X18 W XR 200/CS: Brand: MEDICAL ACTION INDUSTRIES

## (undated) DEVICE — GLOVE,SURG,SENSICARE SLT,LF,PF,7: Brand: MEDLINE

## (undated) DEVICE — SUTURE PERMA-HAND SZ 2-0 L30IN NONABSORBABLE BLK L26MM SH K833H

## (undated) DEVICE — MARYLAND BIPOLAR FORCEPS: Brand: ENDOWRIST

## (undated) DEVICE — Device

## (undated) DEVICE — ELECTRODE,RADIOTRANSLUCENT,FOAM,3PK: Brand: MEDLINE

## (undated) DEVICE — SNARE ENDOSCP AD L240CM LOOP W10MM SHTH DIA2.4MM RND INSUL

## (undated) DEVICE — FORCEPS BX L240CM DIA2.4MM L NDL RAD JAW 4 133340

## (undated) DEVICE — 35 ML SYRINGE LUER-LOCK TIP: Brand: MONOJECT

## (undated) DEVICE — PROGRASP FORCEPS: Brand: ENDOWRIST

## (undated) DEVICE — APPLIER SUT CLP FOR 2-0 3-0 4-0 VCRL ABSRB SUT